# Patient Record
Sex: FEMALE | Race: WHITE | NOT HISPANIC OR LATINO | Employment: OTHER | URBAN - METROPOLITAN AREA
[De-identification: names, ages, dates, MRNs, and addresses within clinical notes are randomized per-mention and may not be internally consistent; named-entity substitution may affect disease eponyms.]

---

## 2017-01-09 ENCOUNTER — OFFICE VISIT (OUTPATIENT)
Dept: PAIN MEDICINE | Facility: CLINIC | Age: 72
End: 2017-01-09
Attending: ANESTHESIOLOGY
Payer: MEDICARE

## 2017-01-09 VITALS
WEIGHT: 246 LBS | TEMPERATURE: 98 F | HEIGHT: 67 IN | DIASTOLIC BLOOD PRESSURE: 58 MMHG | SYSTOLIC BLOOD PRESSURE: 132 MMHG | HEART RATE: 73 BPM | BODY MASS INDEX: 38.61 KG/M2

## 2017-01-09 DIAGNOSIS — M51.16 LUMBAR DISC HERNIATION WITH RADICULOPATHY: ICD-10-CM

## 2017-01-09 DIAGNOSIS — M51.36 ANNULAR TEAR OF LUMBAR DISC: ICD-10-CM

## 2017-01-09 DIAGNOSIS — M47.816 FACET ARTHRITIS OF LUMBAR REGION: ICD-10-CM

## 2017-01-09 PROBLEM — M51.369 ANNULAR TEAR OF LUMBAR DISC: Status: ACTIVE | Noted: 2017-01-09

## 2017-01-09 PROCEDURE — 1160F RVW MEDS BY RX/DR IN RCRD: CPT | Mod: S$GLB,,, | Performed by: ANESTHESIOLOGY

## 2017-01-09 PROCEDURE — 1157F ADVNC CARE PLAN IN RCRD: CPT | Mod: S$GLB,,, | Performed by: ANESTHESIOLOGY

## 2017-01-09 PROCEDURE — 1125F AMNT PAIN NOTED PAIN PRSNT: CPT | Mod: S$GLB,,, | Performed by: ANESTHESIOLOGY

## 2017-01-09 PROCEDURE — 99204 OFFICE O/P NEW MOD 45 MIN: CPT | Mod: GC,S$GLB,, | Performed by: ANESTHESIOLOGY

## 2017-01-09 PROCEDURE — 99999 PR PBB SHADOW E&M-EST. PATIENT-LVL III: CPT | Mod: PBBFAC,,, | Performed by: ANESTHESIOLOGY

## 2017-01-09 PROCEDURE — 1159F MED LIST DOCD IN RCRD: CPT | Mod: S$GLB,,, | Performed by: ANESTHESIOLOGY

## 2017-01-09 RX ORDER — METHYLPREDNISOLONE 4 MG/1
TABLET ORAL
Qty: 1 PACKAGE | Refills: 0 | Status: SHIPPED | OUTPATIENT
Start: 2017-01-09 | End: 2017-01-23 | Stop reason: ALTCHOICE

## 2017-01-09 NOTE — PROGRESS NOTES
Chronic Pain - New Consult    Referring Physician: Dinesh Connors MD    Chief Complaint:   Chief Complaint   Patient presents with    Leg Pain     bilateral         SUBJECTIVE: Disclaimer: This note has been generated using voice-recognition software. There may be typographical errors that have been missed during proof-reading    Initial encounter:    Mila Foster presents to the clinic for the evaluation of bilateral leg pain. The pain started 1 month ago following onset and symptoms have been worsening.    Brief history:    Pain Description:    The pain is located in the low back area and radiates to the bilateral lower extremities      At BEST  1/10     At WORST  10/10 on the WORST day.      On average pain is rated as 7/10.     Today the pain is rated as 7/10    The pain is described as aching, burning, dull, shooting and tight band      Symptoms interfere with daily activity.     Exacerbating factors: Sitting, Bending and Lifting.      Mitigating factors medications.     Patient denies night fever/night sweats, urinary incontinence, bowel incontinence, significant weight loss, significant motor weakness and loss of sensations.  Patient denies any suicidal or homicidal ideations    Pain Medications:  Current:  Gabapentin    Tried in Past:  NSAIDs -Naproxen  TCA -Never  SNRI -Never  Anti-convulsants -Never  Muscle Relaxants -Never  Opioids-[place]    Physical Therapy/Home Exercise: no       report:  Reviewed and consistent with medication use as prescribed.    Pain Procedures: N/A    Chiropractor -never  Acupuncture - never  TENS unit -never  Spinal decompression -never  Joint replacement -never    Imagin2016 Mri of L-Spine  Impression      Lumbar spondylosis as detailed above, most significant for small right foraminal protrusion at the L5-S1 level resulting in moderate neuroforaminal narrowing and likely abutment/impingement upon the exited right L5 nerve root.            History reviewed. No  pertinent past medical history.  History reviewed. No pertinent past surgical history.  Social History     Social History    Marital status: Single     Spouse name: N/A    Number of children: N/A    Years of education: N/A     Occupational History    Not on file.     Social History Main Topics    Smoking status: Never Smoker    Smokeless tobacco: Not on file    Alcohol use Not on file    Drug use: Not on file    Sexual activity: Not on file     Other Topics Concern    Not on file     Social History Narrative     History reviewed. No pertinent family history.    Review of patient's allergies indicates:  No Known Allergies    Current Outpatient Prescriptions   Medication Sig    amlodipine (NORVASC) 10 MG tablet 10 mg.    aspirin (ECOTRIN) 81 MG EC tablet 81 mg.    atenolol (TENORMIN) 50 MG tablet 50 mg.    gabapentin (NEURONTIN) 300 MG capsule 300 mg.    glipiZIDE (GLUCOTROL) 5 MG tablet 5 mg.    hydrochlorothiazide (HYDRODIURIL) 25 MG tablet 25 mg.    lisinopril (PRINIVIL,ZESTRIL) 40 MG tablet 40 mg.    metformin (GLUCOPHAGE-XR) 500 MG 24 hr tablet 500 mg.    omeprazole (PRILOSEC) 20 MG capsule 20 mg.    pravastatin (PRAVACHOL) 40 MG tablet 40 mg.     No current facility-administered medications for this visit.        REVIEW OF SYSTEMS:    GENERAL:  No weight loss, malaise or fevers.  HEENT:   No recent changes in vision or hearing  NECK:  Negative for lumps, no difficulty with swallowing.  RESPIRATORY:  Negative for cough, wheezing or shortness of breath, patient denies any recent URI.  CARDIOVASCULAR:  Negative for chest pain, leg swelling or palpitations.  GI:  Negative for abdominal discomfort, blood in stools or black stools or change in bowel habits.  MUSCULOSKELETAL:  See HPI.  SKIN:  Negative for lesions, rash, and itching.  PSYCH:  No mood disorder or recent psychosocial stressors.  Patients sleep is not disturbed secondary to pain.  HEMATOLOGY/LYMPHOLOGY:  Negative for prolonged  "bleeding, bruising easily or swollen nodes.  Patient is not currently taking any anti-coagulants  ENDO: No history of diabetes or thyroid dysfunction  NEURO:   No history of headaches, syncope, paralysis, seizures or tremors.  All other reviewed and negative other than HPI.    OBJECTIVE:    Visit Vitals    BP (!) 132/58    Pulse 73    Temp 98 °F (36.7 °C) (Oral)    Ht 5' 7" (1.702 m)    Wt 111.6 kg (246 lb)    BMI 38.53 kg/m2       PHYSICAL EXAMINATION:    GENERAL: Well appearing, in no acute distress, alert and oriented x3.  PSYCH:  Mood and affect appropriate.  SKIN: Skin color, texture, turgor normal, no rashes or lesions.  HEAD/FACE:  Normocephalic, atraumatic. Cranial nerves grossly intact.  NECK: No pain to palpation over the cervical paraspinous muscles. Spurling Negative. No pain with neck flexion, extension, or lateral flexion.   CV: RRR with palpation of the radial artery.  PULM: No evidence of respiratory difficulty, symmetric chest rise.  GI:  Soft and non-tender.  BACK: Straight leg raising in the supine position is negative to radicular pain. No pain to palpation over the facet joints of the lumbar spine or spinous processes. Normal range of motion without pain reproduction.  EXTREMITIES: Peripheral joint ROM is full and pain free without obvious instability or laxity in all four extremities. No deformities, edema, or skin discoloration. Good capillary refill.  MUSCULOSKELETAL: Shoulder, hip, and knee provocative maneuvers are negative.  There is no pain with palpation over the sacroiliac joints bilaterally.  FABERs test is negative.  FADIRs test is negative.   Bilateral upper and lower extremity strength is normal and symmetric.  No atrophy or tone abnormalities are noted.  NEURO: Bilateral upper and lower extremity coordination and muscle stretch reflexes are physiologic and symmetric.  Plantar response are downgoing. No clonus.  No loss of sensation is noted.  GAIT: normal.    ASSESSMENT: 71 " y.o. year old female with pain, consistent with     No diagnosis found.    PLAN:     {PLAN:80325}  - RTC ***  - Counseled patient regarding the importance of {:01132}.    The above plan and management options were discussed at length with patient. Patient is in agreement with the above and verbalized understanding. It will be communicated with the referring physician via electronic record, fax, or mail.    Kristina Winn  01/09/2017

## 2017-01-09 NOTE — LETTER
January 9, 2017      Dinesh Connors MD  [address] [facility] [address]           [facility]  [address]  [place] [address]  Phone: [phone]  Fax: [phone]          Patient: Mila Foster   MR Number: [id number]   YOB: 1945   Date of Visit: 1/9/2017       Dear Dr. Dinesh Connors:    Thank you for referring Mila Foster to me for evaluation. Attached you will find relevant portions of my assessment and plan of care.    If you have questions, please do not hesitate to call me. I look forward to following Mila Foster along with you.    Sincerely,    Bonnie Duncan MD    Enclosure  CC:  No Recipients    If you would like to receive this communication electronically, please contact [email or web address] or [phone] to request more information on [employer] access.    For providers and/or their staff who would like to refer a patient to [facility], please contact us through our one-stop-shop provider referral line, [facility], at [phone].    If you feel you have received this communication in error or would no longer like to receive these types of communications, please e-mail [email or web address]

## 2017-01-09 NOTE — PROGRESS NOTES
"  Chronic Pain - New Consult    Referring Physician: Dinesh Connors MD    Chief Complaint:   Chief Complaint   Patient presents with    Leg Pain     bilateral         SUBJECTIVE: Disclaimer: This note has been generated using voice-recognition software. There may be typographical errors that have been missed during proof-reading    Initial encounter:    Mila Fsoter presents to the clinic for the evaluation of bilateral leg pain with intermittent lower back pain. 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 She reports recent falling to knees, but she changed her shoes, and the falling has ceased. She reports that she had a "pain flare" 2 weeks ago requiring a walker for 2 days.     Brief history:  72yo F with hx of HTN and DM complicated by neuropathy BL but R>L with tingling, tightness, shooting pain only in feet. Patient reports glucose was 120 this morning and usually runs around 130.    Pain Description:    The pain is located in the posterior leg area and radiates to her ankles and to her knees at its worst.     At BEST  1/10     At WORST  8/10 on the WORST day.      On average pain is rated as 7/10    Today the pain is rated as 5/10    The pain is described as aching, burning, dull, sharp and tight band      Symptoms interfere with work.     Exacerbating factors: Sitting, picking up children, lifting, bending over    Mitigating factors: walking, rest    Patient denies night fever/night sweats, bowel incontinence, significant weight loss, significant motor weakness and loss of sensations. She does report chronic urinary incontinence unrelated to back/leg pain.  Patient denies any suicidal or homicidal ideations    Pain Medications:  Current:  Gabapentin; been on it 1 year, and she reports she feels it helps    Tried in Past:  NSAIDs -yes; reports it helps  TCA -Never  SNRI -Never  Anti-convulsants -Never  Muscle " Relaxants -Never  Opioids-Never    Physical Therapy/Home Exercise: no       report:  Reviewed and consistent with medication use as prescribed.    Pain Procedures: never    Chiropractor -never  Acupuncture - never  TENS unit -never  Spinal decompression -never  Joint replacement -never    Imagin/12/16 MRI Lumbar Spine WO Contrast -personally reviewed  Annular disk tear in the L5/S1 disk, with small right foraminal protrusion at the L5-S1 level resulting in moderate neuroforaminal narrowing and likely abutment/impingement upon the exited right L5 nerve root. DDD, and facet arthropathy of the lumbar spine.    Past Medical History   Diagnosis Date    Diabetes     Hypertension      History reviewed. No pertinent past surgical history.  Social History     Social History    Marital status: Single     Spouse name: N/A    Number of children: N/A    Years of education: N/A     Occupational History    Not on file.     Social History Main Topics    Smoking status: Never Smoker    Smokeless tobacco: Not on file    Alcohol use Not on file    Drug use: Not on file    Sexual activity: Not on file     Other Topics Concern    Not on file     Social History Narrative     History reviewed. No pertinent family history.    Review of patient's allergies indicates:  No Known Allergies    Current Outpatient Prescriptions   Medication Sig    amlodipine (NORVASC) 10 MG tablet 10 mg.    aspirin (ECOTRIN) 81 MG EC tablet 81 mg.    atenolol (TENORMIN) 50 MG tablet 50 mg.    gabapentin (NEURONTIN) 300 MG capsule 300 mg.    glipiZIDE (GLUCOTROL) 5 MG tablet 5 mg.    hydrochlorothiazide (HYDRODIURIL) 25 MG tablet 25 mg.    lisinopril (PRINIVIL,ZESTRIL) 40 MG tablet 40 mg.    metformin (GLUCOPHAGE-XR) 500 MG 24 hr tablet 500 mg.    omeprazole (PRILOSEC) 20 MG capsule 20 mg.    pravastatin (PRAVACHOL) 40 MG tablet 40 mg.     No current facility-administered medications for this visit.        REVIEW OF  "SYSTEMS:    GENERAL:  No weight loss, malaise or fevers.  HEENT:   No recent changes in vision or hearing  NECK:  Negative for lumps, no difficulty with swallowing.  RESPIRATORY:  Negative for cough, wheezing or shortness of breath, patient denies any recent URI.  CARDIOVASCULAR:  Negative for chest pain, leg swelling or palpitations.  GI:  Negative for abdominal discomfort, blood in stools or black stools or change in bowel habits.  MUSCULOSKELETAL:  See HPI.  SKIN:  Negative for lesions, rash, and itching.  PSYCH:  No mood disorder or recent psychosocial stressors.  Patients sleep is occasionally disturbed secondary to pain.  HEMATOLOGY/LYMPHOLOGY:  Negative for prolonged bleeding, bruising easily or swollen nodes.  Patient is not currently taking any anti-coagulants  ENDO: Positive for DM. Negative for thyroid dysfunction  NEURO:   No history of headaches, syncope, paralysis, seizures or tremors.  All other reviewed and negative other than HPI.    OBJECTIVE:    Visit Vitals    BP (!) 132/58    Pulse 73    Temp 98 °F (36.7 °C) (Oral)    Ht 5' 7" (1.702 m)    Wt 111.6 kg (246 lb)    BMI 38.53 kg/m2       PHYSICAL EXAMINATION:    GENERAL: Well appearing, in no acute distress, alert and oriented x3.  PSYCH:  Mood and affect appropriate.  SKIN: Skin color, texture, turgor normal, no rashes or lesions.  HEAD/FACE:  Normocephalic, atraumatic. Cranial nerves grossly intact.   CV: RRR with palpation of the radial artery.  PULM: No evidence of respiratory difficulty, symmetric chest rise.  BACK: Straight leg raising in the siting position is positive for radicular pain bilaterally in the L5/S1 distribution at 30 degrees elevation. She reports pain to palpation over lumbar spine spinous processes. Pain with lumbar flexion, but no decrease in range of motion.  Facet loading maneuvers cause pain bilaterally, right > left.  LOWER EXTREMITIES: Peripheral joint ROM is full and pain free without obvious instability or " laxity in lower extremities. No deformities, edema, or skin discoloration. Good capillary refill.  MUSCULOSKELETAL: Hip, and knee provocative maneuvers are negative.  There is no pain with palpation over the sacroiliac joints bilaterally.  FABERs test is negative bilaterally.  FADIRs test is negative.   Bilateral and lower extremity strength is normal and symmetric.  No atrophy or tone abnormalities are noted.  NEURO: Bilateral lower extremity coordination and muscle stretch reflexes are physiologic and symmetric.   No loss of sensation is noted.  GAIT: normal.    ASSESSMENT: 71 y.o. year old female with back/leg pain, consistent with BL radiculopathy due to L5 disc protrusion.    Encounter Diagnoses   Name Primary?    Annular tear of lumbar disc     Facet arthritis of lumbar region     Lumbar disc herniation with radiculopathy        PLAN:     At this time as patient is improving from requiring a walker 2 weeks ago, will prescribe a 6 day PO steroid taper. Patient counseled to watch her glucose during this period and evaluate whether pain decreases. Will follow up in clinic with NP in 2 weeks. During this time, she was given a note to restrict lifting to 25lbs until follow up with reevaluation at that time. Further, encouraged to do exercise as pain allows with emphasis on core strengthening and weight loss.     If pain improves from steroid taper and exercise, will try PT. If pain does not improve in this 2 weeks with steroid taper and exercise, will schedule the patient for bilateral S1 transforaminal epidural steroid injections.    - RTC 2 weeks with NP    The above plan and management options were discussed at length with patient. Patient is in agreement with the above and verbalized understanding. It will be communicated with the referring physician via electronic record, fax, or mail.    Prescription sent via e-prescribe to patient's pharmacy.       Lois Gauthier MD, PGY-3  [facility]  01/09/2017     I reviewed and edited the resident's note, I conducted my own interview and physical examination and agree with the findings.      Bonnie Duncan 01/09/2017

## 2017-01-09 NOTE — MR AVS SNAPSHOT
[facility]  [address]  [place] [address]  Phone: [phone]  Fax: [phone]                  Mila Foster   2017 9:00 AM   Office Visit    Description:  Female : 1945   Provider:  Bonnie Duncan MD   Department:  [facility]           Reason for Visit     Leg Pain                To Do List           Future Appointments        Provider Department Dept Phone    2017 9:00 AM Coby Russo NP [facility] [phone]    2017 9:20 AM Dami Parra MD [facility] Neurology [phone]      Goals (5 Years of Data)     None       These Medications        Disp Refills Start End    methylPREDNISolone (MEDROL DOSEPACK) 4 mg tablet 1 Package 0 2017    use as directed    Pharmacy: [facility] [address] [place] [address] AT [facility] #: [phone]         [facility]     [facility] On Call Nurse [facility] -  Assistance  Registered nurses in the [facility] Call Center provide clinical advisement, health education, appointment booking, and other advisory services.  Call for this free service at [phone].             Medications           Message regarding Medications     Verify the changes and/or additions to your medication regime listed below are the same as discussed with your clinician today.  If any of these changes or additions are incorrect, please notify your healthcare provider.        START taking these NEW medications        Refills    methylPREDNISolone (MEDROL DOSEPACK) 4 mg tablet 0    Sig: use as directed    Class: Normal           Verify that the below list of medications is an accurate representation of the medications you are currently taking.  If none reported, the list may be blank. If incorrect, please contact your healthcare provider. Carry this list with you in case of emergency.           Current Medications     amlodipine (NORVASC) 10  "MG tablet 10 mg.    aspirin (ECOTRIN) 81 MG EC tablet 81 mg.    atenolol (TENORMIN) 50 MG tablet 50 mg.    gabapentin (NEURONTIN) 300 MG capsule 300 mg.    glipiZIDE (GLUCOTROL) 5 MG tablet 5 mg.    hydrochlorothiazide (HYDRODIURIL) 25 MG tablet 25 mg.    lisinopril (PRINIVIL,ZESTRIL) 40 MG tablet 40 mg.    metformin (GLUCOPHAGE-XR) 500 MG 24 hr tablet 500 mg.    omeprazole (PRILOSEC) 20 MG capsule 20 mg.    pravastatin (PRAVACHOL) 40 MG tablet 40 mg.    methylPREDNISolone (MEDROL DOSEPACK) 4 mg tablet use as directed           Clinical Reference Information           Vital Signs - Last Recorded  Most recent update: 1/9/2017  9:11 AM by Kristina Winn MA    BP Pulse Temp Ht Wt BMI    (!) 132/58 73 98 °F (36.7 °C) (Oral) 5' 7" (1.702 m) 111.6 kg (246 lb) 38.53 kg/m2      Blood Pressure          Most Recent Value    BP  (!)  132/58      Allergies as of 1/9/2017     No Known Allergies      Immunizations Administered on Date of Encounter - 1/9/2017     None      "

## 2017-01-16 ENCOUNTER — TELEPHONE (OUTPATIENT)
Dept: PAIN MEDICINE | Facility: CLINIC | Age: 72
End: 2017-01-16

## 2017-01-16 NOTE — TELEPHONE ENCOUNTER
Spoke with pt regarding medrol dose pack, states she had diarrhea for 1 day over this past weekend but there are 2 people in her workplace that have the flu, not sure if medication caused this but she is feeling better today        ---- Message from Oumou Henley sent at 1/13/2017  1:07 PM CST -----  _  1st Request  _  2nd Request  _  3rd Request        Who: robert zendejas    Why: methylPREDNISolone (MEDROL DOSEPACK) 4 mg tablet pt is calling because she thinks the med is making her sick. Please call pt     What Number to Call Back:  [phone]    When to Expect a call back: (Before the end of the day)   -- if call after 3:00 call back will be tomorrow.

## 2017-01-23 ENCOUNTER — OFFICE VISIT (OUTPATIENT)
Dept: PAIN MEDICINE | Facility: CLINIC | Age: 72
End: 2017-01-23
Payer: MEDICARE

## 2017-01-23 VITALS
HEART RATE: 91 BPM | BODY MASS INDEX: 37.37 KG/M2 | WEIGHT: 238.13 LBS | TEMPERATURE: 99 F | DIASTOLIC BLOOD PRESSURE: 65 MMHG | HEIGHT: 67 IN | SYSTOLIC BLOOD PRESSURE: 116 MMHG | RESPIRATION RATE: 18 BRPM

## 2017-01-23 DIAGNOSIS — M47.816 FACET ARTHRITIS OF LUMBAR REGION: ICD-10-CM

## 2017-01-23 DIAGNOSIS — M51.16 LUMBAR DISC HERNIATION WITH RADICULOPATHY: Primary | ICD-10-CM

## 2017-01-23 DIAGNOSIS — M51.36 ANNULAR TEAR OF LUMBAR DISC: ICD-10-CM

## 2017-01-23 PROCEDURE — 1159F MED LIST DOCD IN RCRD: CPT | Mod: S$GLB,,, | Performed by: NURSE PRACTITIONER

## 2017-01-23 PROCEDURE — 1125F AMNT PAIN NOTED PAIN PRSNT: CPT | Mod: S$GLB,,, | Performed by: NURSE PRACTITIONER

## 2017-01-23 PROCEDURE — 1157F ADVNC CARE PLAN IN RCRD: CPT | Mod: S$GLB,,, | Performed by: NURSE PRACTITIONER

## 2017-01-23 PROCEDURE — 99999 PR PBB SHADOW E&M-EST. PATIENT-LVL III: CPT | Mod: PBBFAC,,, | Performed by: NURSE PRACTITIONER

## 2017-01-23 PROCEDURE — 99213 OFFICE O/P EST LOW 20 MIN: CPT | Mod: S$GLB,,, | Performed by: NURSE PRACTITIONER

## 2017-01-23 PROCEDURE — 1160F RVW MEDS BY RX/DR IN RCRD: CPT | Mod: S$GLB,,, | Performed by: NURSE PRACTITIONER

## 2017-01-23 RX ORDER — NAPROXEN 500 MG/1
TABLET ORAL
Refills: 1 | Status: ON HOLD | COMMUNITY
Start: 2016-12-06 | End: 2024-02-26 | Stop reason: HOSPADM

## 2017-01-23 NOTE — PROGRESS NOTES
"  Chronic Pain - Established Patient    Referring Physician: No ref. provider found    Chief Complaint:   Chief Complaint   Patient presents with    Low-back Pain     2 week follow up        SUBJECTIVE: Disclaimer: This note has been generated using voice-recognition software. There may be typographical errors that have been missed during proof-reading    Interval History 1/23/2017:  The patient returns to clinic today for follow up of low back pain and bilateral leg pain. She has recently completed a medrol dose pack with some benefit. She still reports radiating pain into both legs. The pain is worse with prolonged sitting and lifting. The pain gets better with walking and rest. The pain radiates down the back of both legs to ankles. She denies any bowel or bladder incontinence or signs of saddle paresthesia. She continues to work with lifting restrictions. She currently takes Gabapentin and Naproxen with benefit. Her pain today is 6/10.     Initial encounter:    Mila Foster presents to the clinic for the evaluation of bilateral leg pain with intermittent lower back pain. 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 She reports recent falling to knees, but she changed her shoes, and the falling has ceased. She reports that she had a "pain flare" 2 weeks ago requiring a walker for 2 days.     Brief history:  72yo F with hx of HTN and DM complicated by neuropathy BL but R>L with tingling, tightness, shooting pain only in feet. Patient reports glucose was 120 this morning and usually runs around 130.    Pain Description:    The pain is located in the posterior leg area and radiates to her ankles and to her knees at its worst.     At BEST  1/10     At WORST  8/10 on the WORST day.      On average pain is rated as 7/10    Today the pain is rated as 5/10    The pain is described as aching, burning, dull, sharp and tight band  "     Symptoms interfere with work.     Exacerbating factors: Sitting, picking up children, lifting, bending over    Mitigating factors: walking, rest    Patient denies night fever/night sweats, bowel incontinence, significant weight loss, significant motor weakness and loss of sensations. She does report chronic urinary incontinence unrelated to back/leg pain.  Patient denies any suicidal or homicidal ideations    Pain Medications:  Current:  Gabapentin; been on it 1 year, and she reports she feels it helps    Tried in Past:  NSAIDs -yes; reports it helps  TCA -Never  SNRI -Never  Anti-convulsants -Never  Muscle Relaxants -Never  Opioids-Never    Physical Therapy/Home Exercise: no       report: N/A    Pain Procedures: never    Chiropractor -never  Acupuncture - never  TENS unit -never  Spinal decompression -never  Joint replacement -never    Imagin/12/16 MRI Lumbar Spine WO Contrast   L1-L2: There is no focal disc herniation. No significant spinal canal or neural foraminal narrowing.    L2-L3: There is no focal disc herniation. No significant spinal canal or neural foraminal narrowing.    L3-L4: Mild bilateral facet arthropathy.  No focal disc herniation. No significant spinal canal or neural foraminal narrowing.    L4-L5: Mild disc bulge and bilateral facet arthropathy results in mild right-sided neural foraminal narrowing. No significant spinal canal stenosis.    L5-S1: Diffuse disc bulge with small superimposed right foraminal disc protrusion.  Findings result in moderate right-sided neuroforaminal narrowing with likely abutment/impingement upon the exited right L5 nerve root. No significant spinal canal stenosis.  Small posterior annular fissure noted.   Impression      Lumbar spondylosis as detailed above, most significant for small right foraminal protrusion at the L5-S1 level resulting in moderate neuroforaminal narrowing and likely abutment/impingement upon the exited right L5 nerve root          Past Medical History   Diagnosis Date    Diabetes     Hypertension      No past surgical history on file.  Social History     Social History    Marital status: Single     Spouse name: N/A    Number of children: N/A    Years of education: N/A     Occupational History    Not on file.     Social History Main Topics    Smoking status: Never Smoker    Smokeless tobacco: Not on file    Alcohol use Not on file    Drug use: Not on file    Sexual activity: Not on file     Other Topics Concern    Not on file     Social History Narrative     No family history on file.    Review of patient's allergies indicates:  No Known Allergies    Current Outpatient Prescriptions   Medication Sig    amlodipine (NORVASC) 10 MG tablet 10 mg.    aspirin (ECOTRIN) 81 MG EC tablet 81 mg.    atenolol (TENORMIN) 50 MG tablet 50 mg.    gabapentin (NEURONTIN) 300 MG capsule 300 mg.    glipiZIDE (GLUCOTROL) 5 MG tablet 5 mg.    hydrochlorothiazide (HYDRODIURIL) 25 MG tablet 25 mg.    lisinopril (PRINIVIL,ZESTRIL) 40 MG tablet 40 mg.    metformin (GLUCOPHAGE-XR) 500 MG 24 hr tablet 500 mg.    methylPREDNISolone (MEDROL DOSEPACK) 4 mg tablet use as directed    omeprazole (PRILOSEC) 20 MG capsule 20 mg.    pravastatin (PRAVACHOL) 40 MG tablet 40 mg.     No current facility-administered medications for this visit.        REVIEW OF SYSTEMS:    GENERAL:  No weight loss, malaise or fevers.  HEENT:   No recent changes in vision or hearing  NECK:  Negative for lumps, no difficulty with swallowing.  RESPIRATORY:  Negative for cough, wheezing or shortness of breath, patient denies any recent URI.  CARDIOVASCULAR:  Negative for chest pain, leg swelling or palpitations. HTN.  GI:  Negative for abdominal discomfort, blood in stools or black stools or change in bowel habits.  MUSCULOSKELETAL:  See HPI.  SKIN:  Negative for lesions, rash, and itching.  PSYCH:  No mood disorder or recent psychosocial stressors.  Patient's sleep is  "occasionally disturbed secondary to pain.  HEMATOLOGY/LYMPHOLOGY:  Negative for prolonged bleeding, bruising easily or swollen nodes.  Patient is not currently taking any anti-coagulants  ENDO: Positive for DM. Negative for thyroid dysfunction  NEURO:   No history of headaches, syncope, paralysis, seizures or tremors.  All other reviewed and negative other than HPI.    OBJECTIVE:    Visit Vitals    /65    Pulse 91    Temp 98.9 °F (37.2 °C) (Oral)    Resp 18    Ht 5' 7" (1.702 m)    Wt 108 kg (238 lb 1.6 oz)    BMI 37.29 kg/m2       PHYSICAL EXAMINATION:    GENERAL: Well appearing, in no acute distress, alert and oriented x3.  PSYCH:  Mood and affect appropriate.  SKIN: Skin color, texture, turgor normal, no rashes or lesions.  HEAD/FACE:  Normocephalic, atraumatic. Cranial nerves grossly intact.   CV: RRR with palpation of the radial artery.  PULM: No evidence of respiratory difficulty, symmetric chest rise.  BACK: Straight leg raising in the siting position is positive for radicular pain bilaterally. Full ROM with pain on flexion. Positive facet loading bilaterally.  LOWER EXTREMITIES: Peripheral joint ROM is full and pain free without obvious instability or laxity in lower extremities. No deformities, edema, or skin discoloration. Good capillary refill.  MUSCULOSKELETAL: Hip, and knee provocative maneuvers are negative.  There is pain with palpation over the sacroiliac joints bilaterally.  FABERs test is negative bilaterally.  FADIRs test is negative.   Bilateral and lower extremity strength is normal and symmetric.  No atrophy or tone abnormalities are noted.  NEURO: Bilateral lower extremity coordination and muscle stretch reflexes are physiologic and symmetric.   No loss of sensation is noted.  GAIT: Antalgic- ambulates without assistance.    ASSESSMENT: 71 y.o. year old female with back/leg pain, consistent with BL radiculopathy due to L5 disc protrusion.    Encounter Diagnoses   Name Primary?    " Lumbar disc herniation with radiculopathy Yes    Annular tear of lumbar disc     Facet arthritis of lumbar region        PLAN:     - Previous imaging was reviewed and discussed with the patient today.    - Schedule for bilateral S1 TF LEONEL. The procedure, risks, benefits and options were discussed with patient. There are no contraindications to the procedure. The patient expressed understanding and agreed to proceed.  Consent obtained today.    - Continue Gabapentin and Naproxen as these are providing benefit.    - Continue 25 lb weight restriction until after procedure. Letter provided today.     - Consider physical therapy for core strengthening after procedure.     - RTC 2 weeks after above procedure.     - Dr. Duncan was consulted on the patient and agrees with this plan.      The above plan and management options were discussed at length with patient. Patient is in agreement with the above and verbalized understanding.     Coby Russo NP  01/23/2017

## 2017-01-23 NOTE — LETTER
January 23, 2017      [facility]  [address]  [facility] [address]  Phone: [phone]  Fax: [phone]       Patient: Mila Foster   YOB: 1945  Date of Visit: 01/23/2017    To Whom It May Concern:    Mila was at [facility] on 01/23/2017. She may return to work on 1/23/2017 with the following restrictions: do no lift heavier than 25 lbs. If you have any questions or concerns, or if I can be of further assistance, please do not hesitate to contact me.    Sincerely,    Coby Russo, NP

## 2017-01-23 NOTE — MR AVS SNAPSHOT
[facility]  [address]  [place] [address]  Phone: [phone]  Fax: [phone]                  Mila Foster   2017 9:00 AM   Office Visit    Description:  Female : 1945   Provider:  Coby Russo NP   Department:  [facility]           Reason for Visit     Low-back Pain           Diagnoses this Visit        Comments    Lumbar disc herniation with radiculopathy    -  Primary     Annular tear of lumbar disc         Facet arthritis of lumbar region         Lumbar radiculopathy                To Do List           Future Appointments        Provider Department Dept Phone    2017 9:20 AM Dami Parra MD [facility] - Neurology [phone]      Goals (5 Years of Data)     None      [facility]     [facility] Nurse Care Line -  Assistance  Registered nurses in the [facility] provide clinical advisement, health education, appointment booking, and other advisory services.  Call for this free service at [phone].             Medications           Message regarding Medications     Verify the changes and/or additions to your medication regime listed below are the same as discussed with your clinician today.  If any of these changes or additions are incorrect, please notify your healthcare provider.        STOP taking these medications     methylPREDNISolone (MEDROL DOSEPACK) 4 mg tablet use as directed           Verify that the below list of medications is an accurate representation of the medications you are currently taking.  If none reported, the list may be blank. If incorrect, please contact your healthcare provider. Carry this list with you in case of emergency.           Current Medications     amlodipine (NORVASC) 10 MG tablet 10 mg.    aspirin (ECOTRIN) 81 MG EC tablet 81 mg.    atenolol (TENORMIN) 50 MG tablet 50 mg.    gabapentin (NEURONTIN) 300 MG capsule 300 mg.    glipiZIDE (GLUCOTROL) 5 MG tablet 5 mg.     "hydrochlorothiazide (HYDRODIURIL) 25 MG tablet 25 mg.    lisinopril (PRINIVIL,ZESTRIL) 40 MG tablet 40 mg.    metformin (GLUCOPHAGE-XR) 500 MG 24 hr tablet 500 mg.    naproxen (NAPROSYN) 500 MG tablet     omeprazole (PRILOSEC) 20 MG capsule 20 mg.    pravastatin (PRAVACHOL) 40 MG tablet 40 mg.           Clinical Reference Information           Vital Signs - Last Recorded  Most recent update: 1/23/2017  8:39 AM by Maryanne Maldonado MA    BP Pulse Temp Resp Ht Wt    116/65 91 98.9 °F (37.2 °C) (Oral) 18 5' 7" (1.702 m) 108 kg (238 lb 1.6 oz)    BMI                37.29 kg/m2          Blood Pressure          Most Recent Value    BP  116/65      Allergies as of 1/23/2017     No Known Allergies      Immunizations Administered on Date of Encounter - 1/23/2017     None      "

## 2017-02-08 ENCOUNTER — HOSPITAL ENCOUNTER (OUTPATIENT)
Facility: OTHER | Age: 72
Discharge: HOME | End: 2017-02-08
Attending: ANESTHESIOLOGY | Admitting: ANESTHESIOLOGY
Payer: MEDICARE

## 2017-02-08 ENCOUNTER — SURGERY (OUTPATIENT)
Age: 72
End: 2017-02-08

## 2017-02-08 VITALS
WEIGHT: 241 LBS | HEIGHT: 67 IN | SYSTOLIC BLOOD PRESSURE: 113 MMHG | TEMPERATURE: 97 F | BODY MASS INDEX: 37.83 KG/M2 | RESPIRATION RATE: 18 BRPM | OXYGEN SATURATION: 96 % | HEART RATE: 68 BPM | DIASTOLIC BLOOD PRESSURE: 65 MMHG

## 2017-02-08 DIAGNOSIS — M51.36 ANNULAR TEAR OF LUMBAR DISC: Primary | ICD-10-CM

## 2017-02-08 DIAGNOSIS — M51.16 LUMBAR DISC HERNIATION WITH RADICULOPATHY: ICD-10-CM

## 2017-02-08 DIAGNOSIS — M47.816 FACET ARTHRITIS OF LUMBAR REGION: ICD-10-CM

## 2017-02-08 LAB — POCT GLUCOSE: 151 MG/DL (ref 70–110)

## 2017-02-08 PROCEDURE — 25000003 PHARM REV CODE 250: Performed by: ANESTHESIOLOGY

## 2017-02-08 PROCEDURE — 64483 NJX AA&/STRD TFRM EPI L/S 1: CPT | Mod: 50 | Performed by: ANESTHESIOLOGY

## 2017-02-08 PROCEDURE — 63600175 PHARM REV CODE 636 W HCPCS: Performed by: ANESTHESIOLOGY

## 2017-02-08 PROCEDURE — 25500020 PHARM REV CODE 255: Performed by: ANESTHESIOLOGY

## 2017-02-08 PROCEDURE — 64483 NJX AA&/STRD TFRM EPI L/S 1: CPT | Mod: 50,,, | Performed by: ANESTHESIOLOGY

## 2017-02-08 PROCEDURE — 82947 ASSAY GLUCOSE BLOOD QUANT: CPT | Performed by: ANESTHESIOLOGY

## 2017-02-08 PROCEDURE — 99152 MOD SED SAME PHYS/QHP 5/>YRS: CPT | Mod: ,,, | Performed by: ANESTHESIOLOGY

## 2017-02-08 RX ORDER — LIDOCAINE HYDROCHLORIDE 10 MG/ML
10 INJECTION INFILTRATION; PERINEURAL
Status: COMPLETED | OUTPATIENT
Start: 2017-02-08 | End: 2017-02-08

## 2017-02-08 RX ORDER — METHYLPREDNISOLONE ACETATE 40 MG/ML
40 INJECTION, SUSPENSION INTRA-ARTICULAR; INTRALESIONAL; INTRAMUSCULAR; SOFT TISSUE ONCE
Status: DISCONTINUED | OUTPATIENT
Start: 2017-02-08 | End: 2017-02-08 | Stop reason: HOSPADM

## 2017-02-08 RX ORDER — MIDAZOLAM HYDROCHLORIDE 1 MG/ML
INJECTION INTRAMUSCULAR; INTRAVENOUS
Status: DISCONTINUED | OUTPATIENT
Start: 2017-02-08 | End: 2017-02-08 | Stop reason: HOSPADM

## 2017-02-08 RX ORDER — SODIUM CHLORIDE 9 MG/ML
INJECTION, SOLUTION INTRAVENOUS CONTINUOUS
Status: DISCONTINUED | OUTPATIENT
Start: 2017-02-08 | End: 2017-02-08 | Stop reason: HOSPADM

## 2017-02-08 RX ORDER — BUPIVACAINE HYDROCHLORIDE 2.5 MG/ML
INJECTION, SOLUTION EPIDURAL; INFILTRATION; INTRACAUDAL
Status: DISCONTINUED | OUTPATIENT
Start: 2017-02-08 | End: 2017-02-08 | Stop reason: HOSPADM

## 2017-02-08 RX ORDER — MIDAZOLAM HYDROCHLORIDE 1 MG/ML
2 INJECTION INTRAMUSCULAR; INTRAVENOUS
Status: DISCONTINUED | OUTPATIENT
Start: 2017-02-08 | End: 2017-02-08 | Stop reason: HOSPADM

## 2017-02-08 RX ORDER — BUPIVACAINE HYDROCHLORIDE 2.5 MG/ML
10 INJECTION, SOLUTION EPIDURAL; INFILTRATION; INTRACAUDAL ONCE
Status: DISCONTINUED | OUTPATIENT
Start: 2017-02-08 | End: 2017-02-08 | Stop reason: HOSPADM

## 2017-02-08 RX ORDER — METHYLPREDNISOLONE ACETATE 40 MG/ML
INJECTION, SUSPENSION INTRA-ARTICULAR; INTRALESIONAL; INTRAMUSCULAR; SOFT TISSUE
Status: DISCONTINUED | OUTPATIENT
Start: 2017-02-08 | End: 2017-02-08 | Stop reason: HOSPADM

## 2017-02-08 RX ADMIN — MIDAZOLAM HYDROCHLORIDE 2 MG: 1 INJECTION, SOLUTION INTRAMUSCULAR; INTRAVENOUS at 10:02

## 2017-02-08 RX ADMIN — BUPIVACAINE HYDROCHLORIDE 10 ML: 2.5 INJECTION, SOLUTION EPIDURAL; INFILTRATION; INTRACAUDAL; PERINEURAL at 10:02

## 2017-02-08 RX ADMIN — LIDOCAINE HYDROCHLORIDE 20 ML: 10 INJECTION, SOLUTION INFILTRATION; PERINEURAL at 10:02

## 2017-02-08 RX ADMIN — METHYLPREDNISOLONE ACETATE 80 MG: 40 INJECTION, SUSPENSION INTRA-ARTICULAR; INTRALESIONAL; INTRAMUSCULAR; SOFT TISSUE at 10:02

## 2017-02-08 RX ADMIN — SODIUM CHLORIDE: 0.9 INJECTION, SOLUTION INTRAVENOUS at 10:02

## 2017-02-08 RX ADMIN — IOHEXOL 50 ML: 300 INJECTION, SOLUTION INTRAVENOUS at 10:02

## 2017-02-08 NOTE — DISCHARGE SUMMARY
Discharge Note  Short Stay      SUMMARY     Admit Date: 2/8/2017    Attending Physician: Bonnie Duncan      Discharge Physician: Bonnie Duncan      Discharge Date: 2/8/2017 10:55 AM       PROCEDURE:  Bilateral  S1  1) Left  S1 TRANSFORAMINAL EPIDURAL STEROID INJECTION  2) Right  S1 TRANSFORAMINAL EPIDURAL STEROID INJECTION      Pre Procedure diagnosis:  Bilateral S1  transforaminal stenosis with radiculopathy    Disposition: Home or self care    Patient Instructions:   Current Discharge Medication List      CONTINUE these medications which have NOT CHANGED    Details   amlodipine (NORVASC) 10 MG tablet 10 mg.  Refills: 1      atenolol (TENORMIN) 50 MG tablet 50 mg.  Refills: 1      gabapentin (NEURONTIN) 300 MG capsule 300 mg.  Refills: 1      glipiZIDE (GLUCOTROL) 5 MG tablet 5 mg.  Refills: 1      hydrochlorothiazide (HYDRODIURIL) 25 MG tablet 25 mg.  Refills: 1      lisinopril (PRINIVIL,ZESTRIL) 40 MG tablet 40 mg.  Refills: 1      metformin (GLUCOPHAGE-XR) 500 MG 24 hr tablet 500 mg.  Refills: 1      naproxen (NAPROSYN) 500 MG tablet Refills: 1      omeprazole (PRILOSEC) 20 MG capsule 20 mg.  Refills: 1      pravastatin (PRAVACHOL) 40 MG tablet 40 mg.  Refills: 1      aspirin (ECOTRIN) 81 MG EC tablet 81 mg.  Refills: 2             Resume home diet and activity

## 2017-02-08 NOTE — IP AVS SNAPSHOT
[facility]  [address] [place], [place] [id number]  Phone: [phone]           Patient Discharge Instructions     Our goal is to set you up for success. This packet includes information on your condition, medications, and your home care. It will help you to care for yourself so you don't get sicker and need to go back to the hospital.     Please ask your nurse if you have any questions.        There are many details to remember when preparing to leave the hospital. Here is what you will need to do:    1. Take your medicine. If you are prescribed medications, review your Medication List in the following pages. You may have new medications to  at the pharmacy and others that you'll need to stop taking. Review the instructions for how and when to take your medications. Talk with your doctor or nurses if you are unsure of what to do.     2. Go to your follow-up appointments. Specific follow-up information is listed in the following pages. Your may be contacted by a transition nurse or clinical provider about future appointments. Be sure we have all of the phone numbers to reach you, if needed. Please contact your provider's office if you are unable to make an appointment.     3. Watch for warning signs. Your doctor or nurse will give you detailed warning signs to watch for and when to call for assistance. These instructions may also include educational information about your condition. If you experience any of warning signs to your health, call your doctor.               [facility]  Unless otherwise directed by your provider, please contact [facility], our nurse care line that is available for 24/7 assistance.     [phone] (toll-free)    Registered nurses in the [facility] provide clinical advisement, health education, appointment booking, and other advisory services.                    ** Verify the list of medication(s) below is accurate and up to  date. Carry this with you in case of emergency. If your medications have changed, please notify your healthcare provider.             Medication List      CONTINUE taking these medications        Additional Info                      amlodipine 10 MG tablet   Commonly known as:  NORVASC   Refills:  1   Dose:  10 mg    Instructions:  10 mg.     Begin Date    AM    Noon    PM    Bedtime       aspirin 81 MG EC tablet   Commonly known as:  ECOTRIN   Refills:  2   Dose:  81 mg    Instructions:  81 mg.     Begin Date    AM    Noon    PM    Bedtime       atenolol 50 MG tablet   Commonly known as:  TENORMIN   Refills:  1   Dose:  50 mg    Instructions:  50 mg.     Begin Date    AM    Noon    PM    Bedtime       gabapentin 300 MG capsule   Commonly known as:  NEURONTIN   Refills:  1   Dose:  300 mg    Instructions:  300 mg.     Begin Date    AM    Noon    PM    Bedtime       glipiZIDE 5 MG tablet   Commonly known as:  GLUCOTROL   Refills:  1   Dose:  5 mg    Instructions:  5 mg.     Begin Date    AM    Noon    PM    Bedtime       hydrochlorothiazide 25 MG tablet   Commonly known as:  HYDRODIURIL   Refills:  1   Dose:  25 mg    Instructions:  25 mg.     Begin Date    AM    Noon    PM    Bedtime       lisinopril 40 MG tablet   Commonly known as:  PRINIVIL,ZESTRIL   Refills:  1   Dose:  40 mg    Instructions:  40 mg.     Begin Date    AM    Noon    PM    Bedtime       metformin 500 MG 24 hr tablet   Commonly known as:  GLUCOPHAGE-XR   Refills:  1   Dose:  500 mg    Instructions:  500 mg.     Begin Date    AM    Noon    PM    Bedtime       naproxen 500 MG tablet   Commonly known as:  NAPROSYN   Refills:  1      Begin Date    AM    Noon    PM    Bedtime       omeprazole 20 MG capsule   Commonly known as:  PRILOSEC   Refills:  1   Dose:  20 mg    Instructions:  20 mg.     Begin Date    AM    Noon    PM    Bedtime       pravastatin 40 MG tablet   Commonly known as:  PRAVACHOL   Refills:  1   Dose:  40 mg    Instructions:  40 mg.      Begin Date    AM    Noon    PM    Bedtime                  Please bring to all follow up appointments:    1. A copy of your discharge instructions.  2. All medicines you are currently taking in their original bottles.  3. Identification and insurance card.    Please arrive 15 minutes ahead of scheduled appointment time.    Please call 24 hours in advance if you must reschedule your appointment and/or time.        Your Scheduled Appointments     Feb 22, 2017  8:00 AM CST   Established Patient Visit with Coby Russo NP   [facility]    [address]  [facility] [id number] [phone]            Feb 23, 2017  9:20 AM CST   Neurology - Established Patient with MD Brady Rosen [facility] - Neurology ([facility] )    [address]  [place] [address] [phone]                  Discharge Instructions       Home Care Instructions Pain Management:    1. DIET:   You may resume your normal diet today.   2. BATHING:   You may shower with luke warm water.  3. DRESSING:   You may remove your bandage today.   4. ACTIVITY LEVEL:   You may resume your normal activities 24 hrs after your procedure.  5. MEDICATIONS:   You may resume your normal medications today.   6. SPECIAL INSTRUCTIONS:   No heat to the injection site for 24 hrs including, bath or shower, heating pad, moist heat, or hot tubs.    Use ice pack to injection site for any pain or discomfort.  Apply ice packs for 20 minute intervals as needed.   If you have received any sedatives by mouth today you may not drive for 12 hours.    If you have received any sedation through your IV, you may not drive for 24 hrs.     PLEASE CALL YOUR DOCTOR IF:  1. Redness or swelling around the injection site.  2. Fever of 101 degrees  3. Drainage (pus) from the injection site.  4. For any continuous bleeding (some dried blood over the incision is normal.)    FOR EMERGENCIES:   If any unusual problems or difficulties occur  "during clinic hours, call [phone] or 321.         Admission Information     Date & Time Provider Department CSN    2/8/2017  9:21 AM Bonnie Duncan MD [facility] [id number]      Care Providers     Provider Role Specialty Primary office phone    Bonnie Duncan MD Attending Provider Pain Medicine [phone]    Bonnie Duncan MD Surgeon  Pain Medicine [phone]      Your Vitals Were     BP Pulse Temp Resp Height Weight    95/60 (BP Method: Manual) 74 97.3 °F (36.3 °C) (Oral) 18 5' 7" (1.702 m) 109.3 kg (241 lb)    SpO2 BMI             97% 37.75 kg/m2         Recent Lab Values     No lab values to display.      Allergies as of 2/8/2017     No Known Allergies      Advance Directives     An advance directive is a document which, in the event you are no longer able to make decisions for yourself, tells your healthcare team what kind of treatment you do or do not want to receive, or who you would like to make those decisions for you.  If you do not currently have an advance directive, [facility] encourages you to create one.  For more information call:  [phone]), [phone] ([phone]),  or log on to [email or web address].        Language Assistance Services     ATTENTION: Language assistance services are available, free of charge. Please call [phone].      ATENCIÓN: Si habla español, tiene a bansal disposición servicios gratuitos de asistencia lingüística. Llame al [phone].     [facility] Ý: N?u b?n nói Ti?ng Vi?t, có các d?ch v? h? tr? ngôn ng? mi?n phí dành cho b?n. G?i s? [phone].         [facility] complies with applicable Federal civil rights laws and does not discriminate on the basis of race, color, national origin, age, disability, or sex.        "

## 2017-02-08 NOTE — H&P (VIEW-ONLY)
"  Chronic Pain - New Consult    Referring Physician: Dinesh Connors MD    Chief Complaint:   Chief Complaint   Patient presents with    Leg Pain     bilateral         SUBJECTIVE: Disclaimer: This note has been generated using voice-recognition software. There may be typographical errors that have been missed during proof-reading    Initial encounter:    Mila Foster presents to the clinic for the evaluation of bilateral leg pain with intermittent lower back pain. 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 She reports recent falling to knees, but she changed her shoes, and the falling has ceased. She reports that she had a "pain flare" 2 weeks ago requiring a walker for 2 days.     Brief history:  70yo F with hx of HTN and DM complicated by neuropathy BL but R>L with tingling, tightness, shooting pain only in feet. Patient reports glucose was 120 this morning and usually runs around 130.    Pain Description:    The pain is located in the posterior leg area and radiates to her ankles and to her knees at its worst.     At BEST  1/10     At WORST  8/10 on the WORST day.      On average pain is rated as 7/10    Today the pain is rated as 5/10    The pain is described as aching, burning, dull, sharp and tight band      Symptoms interfere with work.     Exacerbating factors: Sitting, picking up children, lifting, bending over    Mitigating factors: walking, rest    Patient denies night fever/night sweats, bowel incontinence, significant weight loss, significant motor weakness and loss of sensations. She does report chronic urinary incontinence unrelated to back/leg pain.  Patient denies any suicidal or homicidal ideations    Pain Medications:  Current:  Gabapentin; been on it 1 year, and she reports she feels it helps    Tried in Past:  NSAIDs -yes; reports it helps  TCA -Never  SNRI -Never  Anti-convulsants -Never  Muscle " Relaxants -Never  Opioids-Never    Physical Therapy/Home Exercise: no       report:  Reviewed and consistent with medication use as prescribed.    Pain Procedures: never    Chiropractor -never  Acupuncture - never  TENS unit -never  Spinal decompression -never  Joint replacement -never    Imagin/12/16 MRI Lumbar Spine WO Contrast -personally reviewed  Annular disk tear in the L5/S1 disk, with small right foraminal protrusion at the L5-S1 level resulting in moderate neuroforaminal narrowing and likely abutment/impingement upon the exited right L5 nerve root. DDD, and facet arthropathy of the lumbar spine.    Past Medical History   Diagnosis Date    Diabetes     Hypertension      History reviewed. No pertinent past surgical history.  Social History     Social History    Marital status: Single     Spouse name: N/A    Number of children: N/A    Years of education: N/A     Occupational History    Not on file.     Social History Main Topics    Smoking status: Never Smoker    Smokeless tobacco: Not on file    Alcohol use Not on file    Drug use: Not on file    Sexual activity: Not on file     Other Topics Concern    Not on file     Social History Narrative     History reviewed. No pertinent family history.    Review of patient's allergies indicates:  No Known Allergies    Current Outpatient Prescriptions   Medication Sig    amlodipine (NORVASC) 10 MG tablet 10 mg.    aspirin (ECOTRIN) 81 MG EC tablet 81 mg.    atenolol (TENORMIN) 50 MG tablet 50 mg.    gabapentin (NEURONTIN) 300 MG capsule 300 mg.    glipiZIDE (GLUCOTROL) 5 MG tablet 5 mg.    hydrochlorothiazide (HYDRODIURIL) 25 MG tablet 25 mg.    lisinopril (PRINIVIL,ZESTRIL) 40 MG tablet 40 mg.    metformin (GLUCOPHAGE-XR) 500 MG 24 hr tablet 500 mg.    omeprazole (PRILOSEC) 20 MG capsule 20 mg.    pravastatin (PRAVACHOL) 40 MG tablet 40 mg.     No current facility-administered medications for this visit.        REVIEW OF  "SYSTEMS:    GENERAL:  No weight loss, malaise or fevers.  HEENT:   No recent changes in vision or hearing  NECK:  Negative for lumps, no difficulty with swallowing.  RESPIRATORY:  Negative for cough, wheezing or shortness of breath, patient denies any recent URI.  CARDIOVASCULAR:  Negative for chest pain, leg swelling or palpitations.  GI:  Negative for abdominal discomfort, blood in stools or black stools or change in bowel habits.  MUSCULOSKELETAL:  See HPI.  SKIN:  Negative for lesions, rash, and itching.  PSYCH:  No mood disorder or recent psychosocial stressors.  Patients sleep is occasionally disturbed secondary to pain.  HEMATOLOGY/LYMPHOLOGY:  Negative for prolonged bleeding, bruising easily or swollen nodes.  Patient is not currently taking any anti-coagulants  ENDO: Positive for DM. Negative for thyroid dysfunction  NEURO:   No history of headaches, syncope, paralysis, seizures or tremors.  All other reviewed and negative other than HPI.    OBJECTIVE:    Visit Vitals    BP (!) 132/58    Pulse 73    Temp 98 °F (36.7 °C) (Oral)    Ht 5' 7" (1.702 m)    Wt 111.6 kg (246 lb)    BMI 38.53 kg/m2       PHYSICAL EXAMINATION:    GENERAL: Well appearing, in no acute distress, alert and oriented x3.  PSYCH:  Mood and affect appropriate.  SKIN: Skin color, texture, turgor normal, no rashes or lesions.  HEAD/FACE:  Normocephalic, atraumatic. Cranial nerves grossly intact.   CV: RRR with palpation of the radial artery.  PULM: No evidence of respiratory difficulty, symmetric chest rise.  BACK: Straight leg raising in the siting position is positive for radicular pain bilaterally in the L5/S1 distribution at 30 degrees elevation. She reports pain to palpation over lumbar spine spinous processes. Pain with lumbar flexion, but no decrease in range of motion.  Facet loading maneuvers cause pain bilaterally, right > left.  LOWER EXTREMITIES: Peripheral joint ROM is full and pain free without obvious instability or " laxity in lower extremities. No deformities, edema, or skin discoloration. Good capillary refill.  MUSCULOSKELETAL: Hip, and knee provocative maneuvers are negative.  There is no pain with palpation over the sacroiliac joints bilaterally.  FABERs test is negative bilaterally.  FADIRs test is negative.   Bilateral and lower extremity strength is normal and symmetric.  No atrophy or tone abnormalities are noted.  NEURO: Bilateral lower extremity coordination and muscle stretch reflexes are physiologic and symmetric.   No loss of sensation is noted.  GAIT: normal.    ASSESSMENT: 71 y.o. year old female with back/leg pain, consistent with BL radiculopathy due to L5 disc protrusion.    Encounter Diagnoses   Name Primary?    Annular tear of lumbar disc     Facet arthritis of lumbar region     Lumbar disc herniation with radiculopathy        PLAN:     At this time as patient is improving from requiring a walker 2 weeks ago, will prescribe a 6 day PO steroid taper. Patient counseled to watch her glucose during this period and evaluate whether pain decreases. Will follow up in clinic with NP in 2 weeks. During this time, she was given a note to restrict lifting to 25lbs until follow up with reevaluation at that time. Further, encouraged to do exercise as pain allows with emphasis on core strengthening and weight loss.     If pain improves from steroid taper and exercise, will try PT. If pain does not improve in this 2 weeks with steroid taper and exercise, will schedule the patient for bilateral S1 transforaminal epidural steroid injections.    - RTC 2 weeks with NP    The above plan and management options were discussed at length with patient. Patient is in agreement with the above and verbalized understanding. It will be communicated with the referring physician via electronic record, fax, or mail.    Prescription sent via e-prescribe to patient's pharmacy.       Lois Gauthier MD, PGY-3  [facility]  01/09/2017     I reviewed and edited the resident's note, I conducted my own interview and physical examination and agree with the findings.      Bonnie Duncan 01/09/2017

## 2017-02-08 NOTE — PLAN OF CARE
Problem: Patient Care Overview  Goal: Plan of Care Review  Pt tolerated procedure well. Pt reports 5/10 pain after procedure. Patient complains of soreness at injection site. Dr Duncan notified of blood pressures 90s/50s. Ordered to finish infusing bag of normal saline and recheck blood pressure. Patient asymptomatic.Assisted pt up for first time. Steady on feet. All discharge instructions given.

## 2017-02-08 NOTE — OP NOTE
INFORMED CONSENT: The procedure, risks, benefits and options were discussed with patient. There are no contraindications to the procedure. The patient expressed understanding and agreed to proceed. The personnel performing the procedure was discussed.    02/08/2017    Surgeon: Bonnie Duncan MD    Assistants: None    PROCEDURE:  Bilateral  S1  1) Left  S1 TRANSFORAMINAL EPIDURAL STEROID INJECTION  2) Right  S1 TRANSFORAMINAL EPIDURAL STEROID INJECTION      Pre Procedure diagnosis:  Bilateral S1  transforaminal stenosis with radiculopathy    Post-Procedure diagnosis:   same    Complications: None    Specimens: None      DESCRIPTION OF PROCEDURE: The patient was brought to the procedure room. IV access was obtained prior to the procedure. The patient was positioned prone on the fluoroscopy table. Continuous hemodynamic monitoring was initiated including blood pressure, EKG, and pulse oximetry. . The skin was prepped with chlorhexidine and draped in a sterile fashion. Skin anesthesia was achieved using a total of 10mL of lidocaine, 5mL over each respective injection site.     The  S1 transforaminal spaces were identified with fluoroscopy in the  AP, oblique, and lateral views.  A 22 gauge spinal quinke needle was then advanced into the area of the trans foraminal spaces bilaterally with confirmation of proper needle position using AP, oblique, and lateral fluoroscopic views. Once the needle tip was in the area of the transforaminal space, and there was no blood, CSF or paraesthesias,  1.5 mL of Omnipaque 300mg/ml was injected on each side for a total of 3mL.  Fluoroscopic imaging in the AP and lateral views revealed a clear outline of the spinal nerve with proximal spread of agent through the neural foramen into the epidural space. A total combination of 1 mL of Bupivicaine 0.25% and 40 mg depo medrol was injected on each side for a total of 4mL of injected medications with displacement of the contrast dye confirming  that the medication went into the area of the transforaminal spaces bilaterally. A sterile dressing was applied.   Patient tolerated the procedure well.    Conscious sedation provided by M.D    The patient was monitored with continuous pulse oximetry, EKG, and intermittent blood pressure monitors.  The patient was hemodynamically stable throughout the entire process was responsive to voice, and breathing spontaneously.  Supplemental O2 was provided at 2L/min via nasal cannula.  Patient was comfortable for the duration of the procedure.    There was a total of 2mg IV Midazolam was titrated for the procedure    Patient was taken back to the recovery room for further observation.     The patient was discharged to home in stable condition

## 2017-02-08 NOTE — DISCHARGE INSTRUCTIONS

## 2017-02-13 ENCOUNTER — TELEPHONE (OUTPATIENT)
Dept: PAIN MEDICINE | Facility: CLINIC | Age: 72
End: 2017-02-13

## 2017-02-13 NOTE — TELEPHONE ENCOUNTER
----- Message from Amrita Andrade sent at 2/13/2017  1:03 PM CST -----  x_  1st Request  _  2nd Request  _  3rd Request        Who: robert    Why: pt. Would like to speak with sandro.please call pt. To discuss    What Number to Call Back:[phone]    When to Expect a call back: (Before the end of the day)   -- if the call is after 12:00, the call back will be tomorrow.

## 2017-02-27 ENCOUNTER — OFFICE VISIT (OUTPATIENT)
Dept: NEUROLOGY | Facility: CLINIC | Age: 72
End: 2017-02-27
Payer: MEDICARE

## 2017-02-27 VITALS
HEIGHT: 67 IN | HEART RATE: 75 BPM | SYSTOLIC BLOOD PRESSURE: 122 MMHG | DIASTOLIC BLOOD PRESSURE: 68 MMHG | WEIGHT: 242.75 LBS | BODY MASS INDEX: 38.1 KG/M2

## 2017-02-27 DIAGNOSIS — M54.17 LUMBOSACRAL RADICULOPATHY: Primary | ICD-10-CM

## 2017-02-27 PROCEDURE — 1159F MED LIST DOCD IN RCRD: CPT | Mod: S$GLB,,, | Performed by: PSYCHIATRY & NEUROLOGY

## 2017-02-27 PROCEDURE — 1157F ADVNC CARE PLAN IN RCRD: CPT | Mod: S$GLB,,, | Performed by: PSYCHIATRY & NEUROLOGY

## 2017-02-27 PROCEDURE — 1125F AMNT PAIN NOTED PAIN PRSNT: CPT | Mod: S$GLB,,, | Performed by: PSYCHIATRY & NEUROLOGY

## 2017-02-27 PROCEDURE — 99999 PR PBB SHADOW E&M-EST. PATIENT-LVL III: CPT | Mod: PBBFAC,,, | Performed by: PSYCHIATRY & NEUROLOGY

## 2017-02-27 PROCEDURE — 1160F RVW MEDS BY RX/DR IN RCRD: CPT | Mod: S$GLB,,, | Performed by: PSYCHIATRY & NEUROLOGY

## 2017-02-27 PROCEDURE — 99213 OFFICE O/P EST LOW 20 MIN: CPT | Mod: S$GLB,,, | Performed by: PSYCHIATRY & NEUROLOGY

## 2017-02-27 RX ORDER — GABAPENTIN 300 MG/1
CAPSULE ORAL
Qty: 150 CAPSULE | Refills: 5 | Status: SHIPPED | OUTPATIENT
Start: 2017-02-27 | End: 2019-03-11 | Stop reason: SDUPTHER

## 2017-02-27 NOTE — PROGRESS NOTES
[facility] - NEUROLOGY  [facility] [place]    Date: February 27, 2017   Patient Name: Mila Foster   MRN: [id number]   PCP: Dinesh Connors  Referring Provider: Dinesh Connors MD    Assessment:      This is Mila Foster, 71 y.o. female with lumbar radiculopathy and mild attention difficulties most likely related to ongoing stressors and mood troubles.  Recent MRI L-spine reviewed, does not warrant surgical referral at this time.     Plan:      -  Increase gabapentin to 600/300/600  -  Encouraged follow up with [facility]  -  Counseled regarding cognitive difficulties       I discussed side effects of the medications. I asked the patient to  stop the medication if She notices serious adverse effects as we discussed and to seek immediate medical attention at an ER.     Dami Parra MD  [facility]   Department of Neurology    Subjective:   Primary concern on this visit is centered on back pain and sciatica.  Some improvement since injections this month but continued pain, notably with prolonged sitting.  Notices herself lean forward when walking.  Currently on leave from work due to restrictions with lifting.  Memory troubles largely unchanged.     HPI 11/2016:   Ms. Mila Foster is a 71 y.o. female with HTN and DM with related neuropathy presents for memory trouble     She has noticed trouble completing sentences and reversing word order over the past several months but does not think other people have appreciated this very much. She works at a  center and does not feel that her problems interfere with her work. She does note significant stress related to one of her children going through a divorce and younger co workers leaving additional work for her to do. She began taking fluoxetine a few months ago with some improvement noted. She lives alone and continues to manage her own finances and drive without accidents or trouble getting lost. She has  "some difficulty with sleep related to crawling sensation in right much more than left leg which improves if she gets up and walks around, takes gabapentin at bed time only with mild relief. She completed high school.     Over the past few months she has also notices low back pain radiating down the right leg which is frequently brought on by prolonged sitting, gabapentin has not helped with this. Also notes pain in R>L knee.    PAST MEDICAL HISTORY:  Past Medical History:   Diagnosis Date    Diabetes     Hypertension        PAST SURGICAL HISTORY:  History reviewed. No pertinent surgical history.    CURRENT MEDS:  Current Outpatient Prescriptions   Medication Sig Dispense Refill    amlodipine (NORVASC) 10 MG tablet 10 mg.  1    aspirin (ECOTRIN) 81 MG EC tablet 81 mg.  2    atenolol (TENORMIN) 50 MG tablet 50 mg.  1    gabapentin (NEURONTIN) 300 MG capsule Take 2 tabs in the morning, 1 tab in the afternoon, and 2 tabs in the evening 150 capsule 5    glipiZIDE (GLUCOTROL) 5 MG tablet 5 mg.  1    hydrochlorothiazide (HYDRODIURIL) 25 MG tablet 25 mg.  1    lisinopril (PRINIVIL,ZESTRIL) 40 MG tablet 40 mg.  1    metformin (GLUCOPHAGE-XR) 500 MG 24 hr tablet 500 mg.  1    naproxen (NAPROSYN) 500 MG tablet   1    omeprazole (PRILOSEC) 20 MG capsule 20 mg.  1    pravastatin (PRAVACHOL) 40 MG tablet 40 mg.  1     No current facility-administered medications for this visit.        ALLERGIES:  Review of patient's allergies indicates:  No Known Allergies    FAMILY HISTORY:  History reviewed. No pertinent family history.    SOCIAL HISTORY:  Social History   Substance Use Topics    Smoking status: Never Smoker    Smokeless tobacco: None    Alcohol use None       Review of Systems:  12 review of systems is negative except for the symptoms mentioned in HPI.        Objective:     Vitals:    02/27/17 1024   BP: 122/68   Pulse: 75   Weight: 110.1 kg (242 lb 11.6 oz)   Height: 5' 7" (1.702 m)       General: NAD, well " nourished   Eyes: no tearing, discharge, no erythema   ENT: moist mucous membranes of the oral cavity, nares patent    Neck: Supple, full range of motion  Cardiovascular: Warm and well perfused, pulses equal and symmetrical  Lungs: Normal work of breathing, normal chest wall excursions  Skin: No rash, lesions, or breakdown on exposed skin  Psychiatry: Mood and affect are appropriate   Abdomen: soft, non tender, non distended  Extremeties: No cyanosis, clubbing or edema.    Neurological   MENTAL STATUS: Alert and oriented to person, place, and time. Attention and concentration within normal limits. Speech without dysarthria, able to name and repeat without difficulty. Recent and remote memory within normal limits   CRANIAL NERVES: Visual fields intact. PERRL. EOMI. Facial sensation intact. Face symmetrical. Hearing grossly intact. Full shoulder shrug bilaterally. Tongue protrudes midline   SENSORY: Sensation is intact to light touch throughout.  Joint position perception intact. Negative Romberg.   MOTOR: Normal bulk and tone. No pronator drift.  5/5 deltoid, biceps, triceps, interosseous, hand  bilaterally. 5/5 iliopsoas, knee extension/flexion, foot dorsi/plantarflexion bilaterally.    CEREBELLAR/COORDINATION/GAIT: Gait steady with normal arm swing and stride length.

## 2017-02-27 NOTE — LETTER
February 27, 2017      Dinesh Connors MD  [address] [facility] [address]           [facility]  [address]  [facility] [address]  Phone: [phone]  Fax: [phone]          Patient: Mila Foster   MR Number: [id number]   YOB: 1945   Date of Visit: 2/27/2017       Dear Dr. Dinesh Connors:    Thank you for referring Mila Foster to me for evaluation. Attached you will find relevant portions of my assessment and plan of care.    If you have questions, please do not hesitate to call me. I look forward to following Mila Foster along with you.    Sincerely,    Dami Parra MD    Enclosure  CC:  No Recipients    If you would like to receive this communication electronically, please contact [email or web address] or [phone] to request more information on "[employer]," Link access.    For providers and/or their staff who would like to refer a patient to [facility], please contact us through our one-stop-shop provider referral line, [facility], at [phone].    If you feel you have received this communication in error or would no longer like to receive these types of communications, please e-mail [email or web address]

## 2017-02-27 NOTE — PATIENT INSTRUCTIONS
Take gabapentin 2 tabs morning, 1 tab afternoon, and 2 tabs evening    Call to schedule appointment with [facility].    Return in 3 months

## 2017-02-27 NOTE — MR AVS SNAPSHOT
[facility]  [address]  [facility] [address]  Phone: [phone]  Fax: [phone]                  Mila Foster   2017 11:00 AM   Office Visit    Description:  Female : 1945   Provider:  Dami Parra MD   Department:  [facility]           Reason for Visit     Follow-up           Diagnoses this Visit        Comments    Lumbosacral radiculopathy    -  Primary            To Do List           Future Appointments        Provider Department Dept Phone    2017 11:00 AM MD [facility] [phone]    3/3/2017 8:00 AM Coby Russo NP [facility] Management [phone]      Goals (5 Years of Data)     None      Follow-Up and Disposition     Return in about 3 months (around 2017).       These Medications        Disp Refills Start End    gabapentin (NEURONTIN) 300 MG capsule 150 capsule 5 2017     Take 2 tabs in the morning, 1 tab in the afternoon, and 2 tabs in the evening    Pharmacy: [email or web address] [address] [place] [address] AT [facility] #: [phone]         [facility]     [facility] Care [facility] -  Assistance  Registered nurses in the [facility] provide clinical advisement, health education, appointment booking, and other advisory services.  Call for this free service at [phone].             Medications           Message regarding Medications     Verify the changes and/or additions to your medication regime listed below are the same as discussed with your clinician today.  If any of these changes or additions are incorrect, please notify your healthcare provider.        CHANGE how you are taking these medications     Start Taking Instead of    gabapentin (NEURONTIN) 300 MG capsule gabapentin (NEURONTIN) 300 MG capsule    Dosage:  Take 2 tabs in the morning, 1 tab in the afternoon, and 2 tabs in the evening Dosage:  300 mg.    Reason  for Change:  Reorder            Verify that the below list of medications is an accurate representation of the medications you are currently taking.  If none reported, the list may be blank. If incorrect, please contact your healthcare provider. Carry this list with you in case of emergency.           Current Medications     amlodipine (NORVASC) 10 MG tablet 10 mg.    aspirin (ECOTRIN) 81 MG EC tablet 81 mg.    atenolol (TENORMIN) 50 MG tablet 50 mg.    gabapentin (NEURONTIN) 300 MG capsule Take 2 tabs in the morning, 1 tab in the afternoon, and 2 tabs in the evening    glipiZIDE (GLUCOTROL) 5 MG tablet 5 mg.    hydrochlorothiazide (HYDRODIURIL) 25 MG tablet 25 mg.    lisinopril (PRINIVIL,ZESTRIL) 40 MG tablet 40 mg.    metformin (GLUCOPHAGE-XR) 500 MG 24 hr tablet 500 mg.    naproxen (NAPROSYN) 500 MG tablet     omeprazole (PRILOSEC) 20 MG capsule 20 mg.    pravastatin (PRAVACHOL) 40 MG tablet 40 mg.           Clinical Reference Information           Your Vitals Were     BP                   122/68           Blood Pressure          Most Recent Value    BP  122/68      Allergies as of 2/27/2017     No Known Allergies      Immunizations Administered on Date of Encounter - 2/27/2017     None      Instructions    Take gabapentin 2 tabs morning, 1 tab afternoon, and 2 tabs evening    Call to schedule appointment with [facility].    Return in 3 months       Language Assistance Services     ATTENTION: Language assistance services are available, free of charge. Please call [phone].      ATENCIÓN: Si anala barb, tiene a bansal disposición servicios gratuitos de asistencia lingüística. Sonny al [phone].     CHÚ Ý: N?u b?n nói Ti?ng Vi?t, có các d?ch v? h? tr? ngôn ng? mi?n phí dành cho b?n. G?i s? [phone].         [facility] complies with applicable Federal civil rights laws and does not discriminate on the basis of race, color, national origin, age, disability, or sex.

## 2017-03-06 ENCOUNTER — OFFICE VISIT (OUTPATIENT)
Dept: PAIN MEDICINE | Facility: CLINIC | Age: 72
End: 2017-03-06
Payer: MEDICARE

## 2017-03-06 VITALS
TEMPERATURE: 99 F | HEIGHT: 67 IN | WEIGHT: 249 LBS | SYSTOLIC BLOOD PRESSURE: 88 MMHG | BODY MASS INDEX: 39.08 KG/M2 | HEART RATE: 55 BPM | DIASTOLIC BLOOD PRESSURE: 46 MMHG

## 2017-03-06 DIAGNOSIS — M51.36 DDD (DEGENERATIVE DISC DISEASE), LUMBAR: ICD-10-CM

## 2017-03-06 DIAGNOSIS — M47.816 FACET ARTHRITIS OF LUMBAR REGION: Primary | ICD-10-CM

## 2017-03-06 DIAGNOSIS — M51.36 ANNULAR TEAR OF LUMBAR DISC: ICD-10-CM

## 2017-03-06 PROCEDURE — 1157F ADVNC CARE PLAN IN RCRD: CPT | Mod: S$GLB,,, | Performed by: NURSE PRACTITIONER

## 2017-03-06 PROCEDURE — 1159F MED LIST DOCD IN RCRD: CPT | Mod: S$GLB,,, | Performed by: NURSE PRACTITIONER

## 2017-03-06 PROCEDURE — 99213 OFFICE O/P EST LOW 20 MIN: CPT | Mod: S$GLB,,, | Performed by: NURSE PRACTITIONER

## 2017-03-06 PROCEDURE — 99999 PR PBB SHADOW E&M-EST. PATIENT-LVL III: CPT | Mod: PBBFAC,,, | Performed by: NURSE PRACTITIONER

## 2017-03-06 PROCEDURE — 1125F AMNT PAIN NOTED PAIN PRSNT: CPT | Mod: S$GLB,,, | Performed by: NURSE PRACTITIONER

## 2017-03-06 PROCEDURE — 1160F RVW MEDS BY RX/DR IN RCRD: CPT | Mod: S$GLB,,, | Performed by: NURSE PRACTITIONER

## 2017-03-06 NOTE — PROGRESS NOTES
"  Chronic Pain - Established Patient    Referring Physician: Referral, Self    Chief Complaint:   Chief Complaint   Patient presents with    Back Pain        SUBJECTIVE: Disclaimer: This note has been generated using voice-recognition software. There may be typographical errors that have been missed during proof-reading    Interval History 3/6/2017:  The patient returns to clinic today for follow up. She is s/p bilateral S1 TF LEONEL on 2/8/2017. She reports 80% relief of her radiating leg pain. She continues to have low back pain. She describes the pain as dull and aching. The pain is worse in the morning. She reports that the pain increases with prolonged sitting and standing. She denies any other health changes. She denies any bowel or bladder incontinence. Her pain today is 6/10.     Interval History 1/23/2017:  The patient returns to clinic today for follow up of low back pain and bilateral leg pain. She has recently completed a medrol dose pack with some benefit. She still reports radiating pain into both legs. The pain is worse with prolonged sitting and lifting. The pain gets better with walking and rest. The pain radiates down the back of both legs to ankles. She denies any bowel or bladder incontinence or signs of saddle paresthesia. She continues to work with lifting restrictions. She currently takes Gabapentin and Naproxen with benefit. Her pain today is 6/10.     Initial encounter:    Mila Foster presents to the clinic for the evaluation of bilateral leg pain with intermittent lower back pain. 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 She reports recent falling to knees, but she changed her shoes, and the falling has ceased. She reports that she had a "pain flare" 2 weeks ago requiring a walker for 2 days.     Brief history:  72yo F with hx of HTN and DM complicated by neuropathy BL but R>L with tingling, " tightness, shooting pain only in feet. Patient reports glucose was 120 this morning and usually runs around 130.    Pain Description:    The pain is located in the posterior leg area and radiates to her ankles and to her knees at its worst.     At BEST  1/10     At WORST  8/10 on the WORST day.      On average pain is rated as 7/10    Today the pain is rated as 5/10    The pain is described as aching, burning, dull, sharp and tight band      Symptoms interfere with work.     Exacerbating factors: Sitting, picking up children, lifting, bending over    Mitigating factors: walking, rest    Patient denies night fever/night sweats, bowel incontinence, significant weight loss, significant motor weakness and loss of sensations. She does report chronic urinary incontinence unrelated to back/leg pain.  Patient denies any suicidal or homicidal ideations    Pain Medications:  Current:  Gabapentin; been on it 1 year, and she reports she feels it helps    Tried in Past:  NSAIDs -yes; reports it helps  TCA -Never  SNRI -Never  Anti-convulsants -Never  Muscle Relaxants -Never  Opioids-Never    Physical Therapy/Home Exercise: no       report: N/A    Pain Procedures: never    Chiropractor -never  Acupuncture - never  TENS unit -never  Spinal decompression -never  Joint replacement -never    Imagin/12/16 MRI Lumbar Spine WO Contrast   L1-L2: There is no focal disc herniation. No significant spinal canal or neural foraminal narrowing.    L2-L3: There is no focal disc herniation. No significant spinal canal or neural foraminal narrowing.    L3-L4: Mild bilateral facet arthropathy.  No focal disc herniation. No significant spinal canal or neural foraminal narrowing.    L4-L5: Mild disc bulge and bilateral facet arthropathy results in mild right-sided neural foraminal narrowing. No significant spinal canal stenosis.    L5-S1: Diffuse disc bulge with small superimposed right foraminal disc protrusion.  Findings result in  moderate right-sided neuroforaminal narrowing with likely abutment/impingement upon the exited right L5 nerve root. No significant spinal canal stenosis.  Small posterior annular fissure noted.   Impression      Lumbar spondylosis as detailed above, most significant for small right foraminal protrusion at the L5-S1 level resulting in moderate neuroforaminal narrowing and likely abutment/impingement upon the exited right L5 nerve root         Past Medical History:   Diagnosis Date    Diabetes     Hypertension      History reviewed. No pertinent surgical history.  Social History     Social History    Marital status: Single     Spouse name: N/A    Number of children: N/A    Years of education: N/A     Occupational History    Not on file.     Social History Main Topics    Smoking status: Never Smoker    Smokeless tobacco: Not on file    Alcohol use Not on file    Drug use: Not on file    Sexual activity: Not on file     Other Topics Concern    Not on file     Social History Narrative     History reviewed. No pertinent family history.    Review of patient's allergies indicates:  No Known Allergies    Current Outpatient Prescriptions   Medication Sig    amlodipine (NORVASC) 10 MG tablet 10 mg.    aspirin (ECOTRIN) 81 MG EC tablet 81 mg.    atenolol (TENORMIN) 50 MG tablet 50 mg.    gabapentin (NEURONTIN) 300 MG capsule Take 2 tabs in the morning, 1 tab in the afternoon, and 2 tabs in the evening    glipiZIDE (GLUCOTROL) 5 MG tablet 5 mg.    hydrochlorothiazide (HYDRODIURIL) 25 MG tablet 25 mg.    lisinopril (PRINIVIL,ZESTRIL) 40 MG tablet 40 mg.    metformin (GLUCOPHAGE-XR) 500 MG 24 hr tablet 500 mg.    naproxen (NAPROSYN) 500 MG tablet     omeprazole (PRILOSEC) 20 MG capsule 20 mg.    pravastatin (PRAVACHOL) 40 MG tablet 40 mg.     No current facility-administered medications for this visit.        REVIEW OF SYSTEMS:    GENERAL:  No weight loss, malaise or fevers.  HEENT:   No recent changes in  "vision or hearing  NECK:  Negative for lumps, no difficulty with swallowing.  RESPIRATORY:  Negative for cough, wheezing or shortness of breath, patient denies any recent URI.  CARDIOVASCULAR:  Negative for chest pain, leg swelling or palpitations. HTN.  GI:  Negative for abdominal discomfort, blood in stools or black stools or change in bowel habits.  MUSCULOSKELETAL:  See HPI.  SKIN:  Negative for lesions, rash, and itching.  PSYCH:  No mood disorder or recent psychosocial stressors.  Patient's sleep is occasionally disturbed secondary to pain.  HEMATOLOGY/LYMPHOLOGY:  Negative for prolonged bleeding, bruising easily or swollen nodes.  Patient is not currently taking any anti-coagulants  ENDO: Positive for DM. Negative for thyroid dysfunction  NEURO:   No history of headaches, syncope, paralysis, seizures or tremors.  All other reviewed and negative other than HPI.    OBJECTIVE:    BP (!) 88/46  Pulse (!) 55  Temp 98.6 °F (37 °C)  Ht 5' 7" (1.702 m)  Wt 112.9 kg (249 lb)  BMI 39 kg/m2    PHYSICAL EXAMINATION:    GENERAL: Well appearing, in no acute distress, alert and oriented x3.  PSYCH:  Mood and affect appropriate.  SKIN: Skin color, texture, turgor normal, no rashes or lesions.  HEAD/FACE:  Normocephalic, atraumatic. Cranial nerves grossly intact.   CV: RRR with palpation of the radial artery.  PULM: No evidence of respiratory difficulty, symmetric chest rise.  BACK: Straight leg raising in the siting position is negative for radicular pain bilaterally. Full ROM with pain on flexion and extension. Positive facet loading bilaterally.  LOWER EXTREMITIES: Peripheral joint ROM is full and pain free without obvious instability or laxity in lower extremities. No deformities, edema, or skin discoloration. Good capillary refill.  MUSCULOSKELETAL: Hip, and knee provocative maneuvers are negative.  There is pain with palpation over the sacroiliac joints bilaterally.  FABERs test is negative bilaterally.  FADIRs " test is negative.   Bilateral and lower extremity strength is normal and symmetric.  No atrophy or tone abnormalities are noted.  NEURO: Bilateral lower extremity coordination and muscle stretch reflexes are physiologic and symmetric.   No loss of sensation is noted.  GAIT: Antalgic- ambulates without assistance.    ASSESSMENT: 71 y.o. year old female with back/leg pain, consistent with BL radiculopathy due to L5 disc protrusion.    Encounter Diagnoses   Name Primary?    Facet arthritis of lumbar region Yes    Annular tear of lumbar disc     DDD (degenerative disc disease), lumbar        PLAN:     - Previous imaging was reviewed and discussed with the patient today.    - Schedule for bilateral L2,3,4,5 medial branch blocks x 2, as her pain today is more facet-mediated. The procedure, risks, benefits and options were discussed with patient. There are no contraindications to the procedure. The patient expressed understanding and agreed to proceed.  Consent obtained today.    - If she has significant short term relief with the blocks, we can proceed to radiofrequency ablation, one side at a time.      - Continue Gabapentin and Naproxen as these are providing benefit.    - She has been [facility] to [facility] by neurology. I encouraged her to participate in the program.     - RTC 2 weeks after above procedure.     - Dr. Duncan was consulted on the patient and agrees with this plan.      The above plan and management options were discussed at length with patient. Patient is in agreement with the above and verbalized understanding.     Coby Russo NP  03/06/2017

## 2017-03-06 NOTE — MR AVS SNAPSHOT
[facility]  [address]  [place] [address]  Phone: [phone]  Fax: [phone]                  Mila Foster   3/6/2017 9:30 AM   Office Visit    Description:  Female : 1945   Provider:  Coby Russo NP   Department:  [facility]           Diagnoses this Visit        Comments    Facet arthritis of lumbar region    -  Primary     Annular tear of lumbar disc         DDD (degenerative disc disease), lumbar                To Do List           Future Appointments        Provider Department Dept Phone    3/6/2017 9:30 AM Coby Russo NP [facility] [phone]    2017 9:20 AM Dami Parra MD [facility] Neurology [phone]      Goals (5 Years of Data)     None      [facility]     [facility] Nurse Care [facility] -  Assistance  Registered nurses in the [facility] On Call Center provide clinical advisement, health education, appointment booking, and other advisory services.  Call for this free service at [phone].             Medications           Message regarding Medications     Verify the changes and/or additions to your medication regime listed below are the same as discussed with your clinician today.  If any of these changes or additions are incorrect, please notify your healthcare provider.             Verify that the below list of medications is an accurate representation of the medications you are currently taking.  If none reported, the list may be blank. If incorrect, please contact your healthcare provider. Carry this list with you in case of emergency.           Current Medications     amlodipine (NORVASC) 10 MG tablet 10 mg.    aspirin (ECOTRIN) 81 MG EC tablet 81 mg.    atenolol (TENORMIN) 50 MG tablet 50 mg.    gabapentin (NEURONTIN) 300 MG capsule Take 2 tabs in the morning, 1 tab in the afternoon, and 2 tabs in the evening    glipiZIDE (GLUCOTROL) 5 MG tablet 5 mg.    hydrochlorothiazide (HYDRODIURIL)  "25 MG tablet 25 mg.    lisinopril (PRINIVIL,ZESTRIL) 40 MG tablet 40 mg.    metformin (GLUCOPHAGE-XR) 500 MG 24 hr tablet 500 mg.    naproxen (NAPROSYN) 500 MG tablet     omeprazole (PRILOSEC) 20 MG capsule 20 mg.    pravastatin (PRAVACHOL) 40 MG tablet 40 mg.           Clinical Reference Information           Your Vitals Were     BP Pulse Temp Height Weight BMI    88/46 55 98.6 °F (37 °C) 5' 7" (1.702 m) 112.9 kg (249 lb) 39 kg/m2      Blood Pressure          Most Recent Value    BP  (!)  88/46      Allergies as of 3/6/2017     No Known Allergies      Immunizations Administered on Date of Encounter - 3/6/2017     None      Language Assistance Services     ATTENTION: Language assistance services are available, free of charge. Please call [phone].      ATENCIÓN: Si habla barb, tiene a bansal disposición servicios gratuitos de asistencia lingüística. Llame al [phone].     HEMA Ý: N?u b?n nói Ti?ng Vi?t, có các d?ch v? h? tr? ngôn ng? mi?n phí dành cho b?n. G?i s? [phone].         [facility] complies with applicable Federal civil rights laws and does not discriminate on the basis of race, color, national origin, age, disability, or sex.        "

## 2017-03-22 ENCOUNTER — TELEPHONE (OUTPATIENT)
Dept: PAIN MEDICINE | Facility: CLINIC | Age: 72
End: 2017-03-22

## 2017-03-22 ENCOUNTER — HOSPITAL ENCOUNTER (OUTPATIENT)
Facility: OTHER | Age: 72
Discharge: HOME | End: 2017-03-22
Attending: ANESTHESIOLOGY | Admitting: ANESTHESIOLOGY
Payer: MEDICARE

## 2017-03-22 VITALS
SYSTOLIC BLOOD PRESSURE: 127 MMHG | HEIGHT: 67 IN | RESPIRATION RATE: 17 BRPM | OXYGEN SATURATION: 99 % | BODY MASS INDEX: 37.67 KG/M2 | TEMPERATURE: 99 F | WEIGHT: 240 LBS | DIASTOLIC BLOOD PRESSURE: 60 MMHG | HEART RATE: 60 BPM

## 2017-03-22 DIAGNOSIS — M47.816 FACET ARTHRITIS OF LUMBAR REGION: Primary | ICD-10-CM

## 2017-03-22 LAB — POCT GLUCOSE: 116 MG/DL (ref 70–110)

## 2017-03-22 PROCEDURE — 64495 INJ PARAVERT F JNT L/S 3 LEV: CPT | Mod: 50 | Performed by: ANESTHESIOLOGY

## 2017-03-22 PROCEDURE — 64493 INJ PARAVERT F JNT L/S 1 LEV: CPT | Mod: 50 | Performed by: ANESTHESIOLOGY

## 2017-03-22 PROCEDURE — 25000003 PHARM REV CODE 250: Performed by: ANESTHESIOLOGY

## 2017-03-22 PROCEDURE — 64493 INJ PARAVERT F JNT L/S 1 LEV: CPT | Mod: 50,,, | Performed by: ANESTHESIOLOGY

## 2017-03-22 PROCEDURE — 64494 INJ PARAVERT F JNT L/S 2 LEV: CPT | Mod: 50 | Performed by: ANESTHESIOLOGY

## 2017-03-22 PROCEDURE — 64494 INJ PARAVERT F JNT L/S 2 LEV: CPT | Mod: 50,,, | Performed by: ANESTHESIOLOGY

## 2017-03-22 PROCEDURE — 64495 INJ PARAVERT F JNT L/S 3 LEV: CPT | Mod: 50,,, | Performed by: ANESTHESIOLOGY

## 2017-03-22 PROCEDURE — 82947 ASSAY GLUCOSE BLOOD QUANT: CPT | Performed by: ANESTHESIOLOGY

## 2017-03-22 RX ORDER — BUPIVACAINE HYDROCHLORIDE 5 MG/ML
INJECTION, SOLUTION EPIDURAL; INTRACAUDAL
Status: DISCONTINUED | OUTPATIENT
Start: 2017-03-22 | End: 2017-03-22 | Stop reason: HOSPADM

## 2017-03-22 RX ORDER — BUPIVACAINE HYDROCHLORIDE 5 MG/ML
3 INJECTION, SOLUTION EPIDURAL; INTRACAUDAL ONCE
Status: DISCONTINUED | OUTPATIENT
Start: 2017-03-22 | End: 2017-03-22 | Stop reason: HOSPADM

## 2017-03-22 RX ORDER — LIDOCAINE HYDROCHLORIDE 10 MG/ML
10 INJECTION INFILTRATION; PERINEURAL
Status: DISCONTINUED | OUTPATIENT
Start: 2017-03-22 | End: 2017-03-22 | Stop reason: HOSPADM

## 2017-03-22 RX ORDER — LIDOCAINE HYDROCHLORIDE 10 MG/ML
INJECTION INFILTRATION; PERINEURAL
Status: DISCONTINUED | OUTPATIENT
Start: 2017-03-22 | End: 2017-03-22 | Stop reason: HOSPADM

## 2017-03-22 NOTE — DISCHARGE INSTRUCTIONS

## 2017-03-22 NOTE — IP AVS SNAPSHOT
[facility] Location ([facility])  [address] [place], [place] [address]  Phone: [phone]           Patient Discharge Instructions     Our goal is to set you up for success. This packet includes information on your condition, medications, and your home care. It will help you to care for yourself so you don't get sicker and need to go back to the hospital.     Please ask your nurse if you have any questions.        There are many details to remember when preparing to leave the hospital. Here is what you will need to do:    1. Take your medicine. If you are prescribed medications, review your Medication List in the following pages. You may have new medications to  at the pharmacy and others that you'll need to stop taking. Review the instructions for how and when to take your medications. Talk with your doctor or nurses if you are unsure of what to do.     2. Go to your follow-up appointments. Specific follow-up information is listed in the following pages. Your may be contacted by a transition nurse or clinical provider about future appointments. Be sure we have all of the phone numbers to reach you, if needed. Please contact your provider's office if you are unable to make an appointment.     3. Watch for warning signs. Your doctor or nurse will give you detailed warning signs to watch for and when to call for assistance. These instructions may also include educational information about your condition. If you experience any of warning signs to your health, call your doctor.               [facility]  Unless otherwise directed by your provider, please contact [facility], our nurse care line that is available for 24/7 assistance.     [phone] (toll-free)    Registered nurses in the [facility] provide clinical advisement, health education, appointment booking, and other advisory services.                    ** Verify the list of medication(s) below is accurate and up to  date. Carry this with you in case of emergency. If your medications have changed, please notify your healthcare provider.             Medication List      ASK your doctor about these medications        Additional Info                      amlodipine 10 MG tablet   Commonly known as:  NORVASC   Refills:  1   Dose:  10 mg    Instructions:  10 mg.     Begin Date    AM    Noon    PM    Bedtime       aspirin 81 MG EC tablet   Commonly known as:  ECOTRIN   Refills:  2   Dose:  81 mg    Instructions:  81 mg.     Begin Date    AM    Noon    PM    Bedtime       atenolol 50 MG tablet   Commonly known as:  TENORMIN   Refills:  1   Dose:  50 mg    Instructions:  50 mg.     Begin Date    AM    Noon    PM    Bedtime       gabapentin 300 MG capsule   Commonly known as:  NEURONTIN   Quantity:  150 capsule   Refills:  5    Instructions:  Take 2 tabs in the morning, 1 tab in the afternoon, and 2 tabs in the evening     Begin Date    AM    Noon    PM    Bedtime       glipiZIDE 5 MG tablet   Commonly known as:  GLUCOTROL   Refills:  1   Dose:  5 mg    Instructions:  5 mg.     Begin Date    AM    Noon    PM    Bedtime       hydrochlorothiazide 25 MG tablet   Commonly known as:  HYDRODIURIL   Refills:  1   Dose:  25 mg    Instructions:  25 mg.     Begin Date    AM    Noon    PM    Bedtime       lisinopril 40 MG tablet   Commonly known as:  PRINIVIL,ZESTRIL   Refills:  1   Dose:  40 mg    Instructions:  40 mg.     Begin Date    AM    Noon    PM    Bedtime       metformin 500 MG 24 hr tablet   Commonly known as:  GLUCOPHAGE-XR   Refills:  1   Dose:  500 mg    Instructions:  500 mg.     Begin Date    AM    Noon    PM    Bedtime       naproxen 500 MG tablet   Commonly known as:  NAPROSYN   Refills:  1      Begin Date    AM    Noon    PM    Bedtime       omeprazole 20 MG capsule   Commonly known as:  PRILOSEC   Refills:  1   Dose:  20 mg    Instructions:  20 mg.     Begin Date    AM    Noon    PM    Bedtime       pravastatin 40 MG tablet    Commonly known as:  PRAVACHOL   Refills:  1   Dose:  40 mg    Instructions:  40 mg.     Begin Date    AM    Noon    PM    Bedtime                  Please bring to all follow up appointments:    1. A copy of your discharge instructions.  2. All medicines you are currently taking in their original bottles.  3. Identification and insurance card.    Please arrive 15 minutes ahead of scheduled appointment time.    Please call 24 hours in advance if you must reschedule your appointment and/or time.        Your Scheduled Appointments     Apr 13, 2017  9:00 AM CDT   Established Patient Visit with Coby Russo NP   [facility] ([facility])    [address]  [place] [address]   [phone]            May 26, 2017  9:20 AM CDT   Neurology - Established Patient with Dami Parra MD   [facility] ([facility] )    [address]  [place] [address]   [phone]              Your Future Surgeries/Procedures     Mar 29, 2017   Surgery with Bonnie Duncan MD   [facility] ([facility])    [address] [facility] [address]   [phone]                  Discharge Instructions       Home Care Instructions Pain Management:    1. DIET:   You may resume your normal diet today.   2. BATHING:   You may shower with luke warm water. No soaking in tub.  3. DRESSING:   You may remove your bandage today.   4. ACTIVITY LEVEL:   You may resume your normal activities 24 hrs after your procedure.  5. MEDICATIONS:   You may resume your normal medications today.   6. SPECIAL INSTRUCTIONS:   No heat to the injection site for 24 hrs including, bath or shower, heating pad, moist heat, or hot tubs.    Use ice pack to injection site for any pain or discomfort.  Apply ice packs for 20 minute intervals as needed.   If you have received any sedatives by mouth today you may not drive for 12 hours.    If you have received any sedation through your IV, you may  "not drive for 24 hrs.     PLEASE CALL YOUR DOCTOR IF:  1. Redness or swelling around the injection site.  2. Fever of 101 degrees  3. Drainage (pus) from the injection site.  4. For any continuous bleeding (some dried blood over the incision is normal.)  5. For severe headache that is relieved when lying flat.    FOR EMERGENCIES:   If any unusual problems or difficulties occur during clinic hours, call [phone] or 004.         Admission Information     Date & Time Provider Department CSN    3/22/2017 11:10 AM Bonnie Duncan MD [facility] [id number]      Care Providers     Provider Role Specialty Primary office phone    Bonnie Duncan MD Attending Provider Pain Medicine [phone]    Bonnie Duncan MD Surgeon  Pain Medicine [phone]      Your Vitals Were     BP Pulse Temp Resp Height Weight    127/60 (BP Location: Right arm, Patient Position: Lying, BP Method: Automatic) 60 99 °F (37.2 °C) (Oral) 17 5' 7" (1.702 m) 108.9 kg (240 lb)    SpO2 BMI             99% 37.59 kg/m2         Recent Lab Values     No lab values to display.      Allergies as of 3/22/2017     No Known Allergies      Advance Directives     An advance directive is a document which, in the event you are no longer able to make decisions for yourself, tells your healthcare team what kind of treatment you do or do not want to receive, or who you would like to make those decisions for you.  If you do not currently have an advance directive, [facility] encourages you to create one.  For more information call:  [phone]), [phone] ([phone]),  or log on to [email or web address].        Language Assistance Services     ATTENTION: Language assistance services are available, free of charge. Please call [phone].      ATENCIÓN: Si habla español, tiene a bansal disposición servicios gratuitos de asistencia lingüística. Llame al [phone].     CHÚ Ý: N?u b?n nói Ti?ng Vi?t, có các d?ch v? " h? tr? ngôn ng? mi?n phí aggiegonzalo cho b?n. G?i s? [phone].         [facility] complies with applicable Federal civil rights laws and does not discriminate on the basis of race, color, national origin, age, disability, or sex.

## 2017-03-22 NOTE — PLAN OF CARE
Pt tolerated procedure well. Pt reports 0/10 pain after procedure. Assisted pt up for first time. Steady on feet. All discharge instructions given. Dressing clean, dry, intact.

## 2017-03-22 NOTE — TELEPHONE ENCOUNTER
----- Message from Amrita Andrade sent at 3/22/2017 11:13 AM CDT -----  _x 1st Request  _  2nd Request  _  3rd Request        Who:adriana llanes    Why: cassie Llanes would like to speak with sandro good.please call to discuss    What Number to Call Back:[phone]    When to Expect a call back: (Before the end of the day)   -- if the call is after 12:00, the call back will be tomorrow.

## 2017-03-24 NOTE — OP NOTE
lUMBAR Medial Branch Block Under Fluoroscopy  Time-out taken to identify patient and procedure side prior to starting the procedure.            03/24/2017                                                         PROCEDURE:  Bilateral medial branch block at the transverse processes at the level of L3, L4, L5, Sacral ala    REASON FOR PROCEDURE: Facet arthropathy of the lumbar spine    PHYSICIAN: Bonnie Duncan MD  ASSISTANTS: None    MEDICATIONS INJECTED: 0.5% bupivicane, 1mL at each level    LOCAL ANESTHETIC USED: Xylocaine 1% 10ml    SEDATION MEDICATIONS: None    ESTIMATED BLOOD LOSS:  None.    COMPLICATIONS:  None.    TECHNIQUE: Laying in a prone position, the patient was prepped and draped in the usual sterile fashion using ChloraPrep and fenestrated drape.  The level was determined under fluoroscopic guidance.  Local anesthetic was given by going down to the hub of the 27-gauge 1.25in needle and raising a wheel.  A 22-gauge 3.5inch needle was introduced to the anatomic local of the medial branch at each of the above levels using fluoroscopy in the AP, oblique, and lateral views.  After negative aspiration, medication was injected slowly. The patient tolerated the procedure well.       The patient was monitored after the procedure.  Patient was given post procedure and discharge instructions to follow at home.  We will see the patient back in two weeks or the patient may call to inform of status. The patient was discharged in a stable condition

## 2017-03-24 NOTE — DISCHARGE SUMMARY
Discharge Note  Short Stay      SUMMARY     Admit Date: 3/22/2017    Attending Physician: Bonnie Duncan      Discharge Physician: Bonnie Duncan      Discharge Date: 3/24/2017 1:29 PM     PROCEDURE:  Bilateral medial branch block at the transverse processes at the level of L3, L4, L5, Sacral ala    REASON FOR PROCEDURE: Facet arthropathy of the lumbar spine    Disposition: Home or self care    Patient Instructions:   Discharge Medication List as of 3/22/2017 12:35 PM      CONTINUE these medications which have NOT CHANGED    Details   amlodipine (NORVASC) 10 MG tablet 10 mg., Starting 8/26/2016, Until Discontinued, Historical Med      aspirin (ECOTRIN) 81 MG EC tablet 81 mg., Starting 9/8/2016, Until Discontinued, Historical Med      atenolol (TENORMIN) 50 MG tablet 50 mg., Starting 10/20/2016, Until Discontinued, Historical Med      gabapentin (NEURONTIN) 300 MG capsule Take 2 tabs in the morning, 1 tab in the afternoon, and 2 tabs in the evening, Normal      glipiZIDE (GLUCOTROL) 5 MG tablet 5 mg., Starting 9/18/2016, Until Discontinued, Historical Med      hydrochlorothiazide (HYDRODIURIL) 25 MG tablet 25 mg., Starting 10/22/2016, Until Discontinued, Historical Med      lisinopril (PRINIVIL,ZESTRIL) 40 MG tablet 40 mg., Starting 8/26/2016, Until Discontinued, Historical Med      metformin (GLUCOPHAGE-XR) 500 MG 24 hr tablet 500 mg., Starting 7/25/2016, Until Discontinued, Historical Med      naproxen (NAPROSYN) 500 MG tablet Starting 12/6/2016, Until Discontinued, Historical Med      omeprazole (PRILOSEC) 20 MG capsule 20 mg., Starting 10/24/2016, Until Discontinued, Historical Med      pravastatin (PRAVACHOL) 40 MG tablet 40 mg., Starting 8/21/2016, Until Discontinued, Historical Med             Resume home diet and activity

## 2017-03-29 ENCOUNTER — TELEPHONE (OUTPATIENT)
Dept: PAIN MEDICINE | Facility: CLINIC | Age: 72
End: 2017-03-29

## 2017-03-29 NOTE — TELEPHONE ENCOUNTER
"Contacted and spoke to patient regarding message, she stated "that she has eaten since leaving the procedure and her stomach feels much better." She wanted to know if she should reschedule now or wait till Monday to ensure it isn't a stomach virus?    She was informed to contact office on Monday if she is still feeling better.     She doesn't think it is a stomach virus, she stated " she was told by nurse that she could take her Bp and heart medicine prior to the procedure, however as she was fasting she had eaten anything and she think this is what caused her stomach issue. "    Via nurse Aburto, that is a possibility, but its safer to wait until after the next couple of days to ensure that it is not a virus.     Patient verbalized understanding.   "

## 2017-03-29 NOTE — TELEPHONE ENCOUNTER
----- Message from Sarthak Roe sent at 3/29/2017  2:54 PM CDT -----  Contact: ABILIO SIDDIQUI [[id number]]  X_  1st Request  _  2nd Request  _  3rd Request        Who: ABILIO SIDDIQUI [[id number]]    Why: Patient was not able to have procedure done this morning due to stomach problem. Patient has some questions about medication and if it caused stomach problem. Patient said she ate and is feeling better. Please call back to advise.    What Number to Call Back: [phone]    When to Expect a call back: (Before the end of the day)   -- if the call is after 12:00, the call back will be tomorrow.

## 2017-04-12 ENCOUNTER — SURGERY (OUTPATIENT)
Age: 72
End: 2017-04-12

## 2017-04-17 ENCOUNTER — INITIAL CONSULT (OUTPATIENT)
Dept: UROGYNECOLOGY | Facility: CLINIC | Age: 72
End: 2017-04-17
Payer: MEDICARE

## 2017-04-17 VITALS
BODY MASS INDEX: 40.45 KG/M2 | SYSTOLIC BLOOD PRESSURE: 120 MMHG | WEIGHT: 257.69 LBS | HEIGHT: 67 IN | DIASTOLIC BLOOD PRESSURE: 60 MMHG

## 2017-04-17 DIAGNOSIS — R15.1 FECAL SMEARING: ICD-10-CM

## 2017-04-17 DIAGNOSIS — K58.0 IRRITABLE BOWEL SYNDROME WITH DIARRHEA: ICD-10-CM

## 2017-04-17 DIAGNOSIS — N81.6 POSTERIOR VAGINAL WALL PROLAPSE: ICD-10-CM

## 2017-04-17 DIAGNOSIS — R19.7 DIARRHEA, UNSPECIFIED TYPE: ICD-10-CM

## 2017-04-17 DIAGNOSIS — N39.41 URGE INCONTINENCE: Primary | ICD-10-CM

## 2017-04-17 DIAGNOSIS — R39.15 URINARY URGENCY: ICD-10-CM

## 2017-04-17 PROBLEM — N39.46 MIXED URGE AND STRESS INCONTINENCE: Status: ACTIVE | Noted: 2017-04-17

## 2017-04-17 PROCEDURE — 87186 SC STD MICRODIL/AGAR DIL: CPT

## 2017-04-17 PROCEDURE — 87086 URINE CULTURE/COLONY COUNT: CPT

## 2017-04-17 PROCEDURE — 99999 PR PBB SHADOW E&M-EST. PATIENT-LVL V: CPT | Mod: PBBFAC,,, | Performed by: OBSTETRICS & GYNECOLOGY

## 2017-04-17 PROCEDURE — 82270 OCCULT BLOOD FECES: CPT | Mod: S$GLB,,, | Performed by: OBSTETRICS & GYNECOLOGY

## 2017-04-17 PROCEDURE — 87088 URINE BACTERIA CULTURE: CPT

## 2017-04-17 PROCEDURE — 51701 INSERT BLADDER CATHETER: CPT | Mod: S$GLB,,, | Performed by: OBSTETRICS & GYNECOLOGY

## 2017-04-17 PROCEDURE — 87077 CULTURE AEROBIC IDENTIFY: CPT

## 2017-04-17 PROCEDURE — 99215 OFFICE O/P EST HI 40 MIN: CPT | Mod: 25,S$GLB,, | Performed by: OBSTETRICS & GYNECOLOGY

## 2017-04-17 RX ORDER — HYOSCYAMINE SULFATE 0.125 MG
125 TABLET ORAL EVERY 4 HOURS PRN
Qty: 30 TABLET | Refills: 6 | Status: SHIPPED | OUTPATIENT
Start: 2017-04-17 | End: 2018-04-12 | Stop reason: SDUPTHER

## 2017-04-17 NOTE — LETTER
April 17, 2017      Bonnie Duncan MD  [address]  [address] [address]           [facility]  [address] [place] [id number]  Phone: [phone]          Patient: Mila Foster   MR Number: [id number]   YOB: 1945   Date of Visit: 4/17/2017       Dear Dr. Bonnie Duncan:    Thank you for referring Mila Foster to me for evaluation. Attached you will find relevant portions of my assessment and plan of care.    If you have questions, please do not hesitate to call me. I look forward to following Mila Foster along with you.    Sincerely,    Jude Fiedls,     Enclosure  CC:  No Recipients    If you would like to receive this communication electronically, please contact [email or web address] or [phone] to request more information on [employer] access.    For providers and/or their staff who would like to refer a patient to [facility], please contact us through our one-stop-shop provider referral line, [facility], at [phone].    If you feel you have received this communication in error or would no longer like to receive these types of communications, please e-mail [email or web address]

## 2017-04-17 NOTE — PROGRESS NOTES
Subjective:       Patient ID: Mila Foster is a 72 y.o. female.    Chief Complaint:  Consult and Urinary Incontinence      History of Present Illness: 72 Y O F with history of HTN, DM with neuropathy being managed by with Dr. Saeed ( pain management ) and Dr. Martinez ( neurology)  For lumbar disc herniation with radiculopathy, She is s/p two epidural injections however the most recent one was canceled due to severe diarrhea and urinary incontinence. She was referred by Dr. Saeed for consultation. She had colonoscopy last year which was normal.  She has had urinary incontinence more than 5 years, but notes worsening in the past two year.     HPI   [facility]s Peq Urogyn Hpi      Question    4/17/2017 10:38 AM CDT     General Urogynecology: Are you experiencing the following?       Dysuria (painful urination)  No     Nocturia:  waking up at night to empty your bladder   Yes     If you answered yes to the previous question, how many times does this happen per night?  1-2     Enuresis (urine loss during sleep)  No     Dribbling urine after you urinate  No     Hematuria (urine appears red)  No     Type of stream  Strong     Urinary Incontinence (General): Are you experiencing the following?       Past consultation for incontinence: Have you ever seen someone for the evaluation of incontinence?  No     If you answered yes to the previous question, please select all the therapies you have tried.   None of the above     Please note the effectiveness of the therapies.  Ineffective     Need to wear protection to keep clothes dry   Yes     If you answered yes to the previous question, please william the protection you use.   Pads       Diaper     If you wear protection, how much wetness is typically on each pad?  Moderate     If you wear protection, how often do you have to change per day, if applicable?   2     Stress Symptoms: Are you experiencing the following?       Leakage of urine with cough, laugh and/or sneeze  No     If  "you answered yes to the previous question, what is the frequency in days, weeks and/or months?  Never     Leakage of urine with sex  No     Leakage of urine with bending/ lifting  No     Leakage of urine with briskly walking or jogging  No     If you lose urine for any other reason not previously mentioned, please note it below, if applicable.        Urge Symptoms: Are you experiencing the following?       Urgency ("got to go" feeling)  Yes     Urge: How frequently do you feel an urge to urinate (feeling like you "gotta go" to the bathroom and can't wait)  Weekly     Do you experience a leakage of urine when you have a feeling of urgency?   Yes     Leakage of urine when unaware  No     Past use of anticholinergics (medications used to treat overactive bladder)  No     If you answered yes to the previous question, please william the anticholinergics you have used:        Have you ever used Mirbetriq (aka Mirabegron)?   No     Prolapse Symptoms: Are you experiencing any of the following?        Falling out/ Bulging/ Heaviness in the vagina  No     Vaginal/ Abdominal Pain/ Pressure  No     Need to strain/ Push to void  No     Need to wait on the toilet before you void  No     Unusual position to urinate (using your hands to push back the vaginal bulge)  No     Sensation of incomplete emptying  Yes     Past use of pessary device  No     If you answered yes to the previous question, please list the devices you have used below.        Bowel Symptoms: Are you experiencing any of the following?       Constipation  No     Diarrhea   Yes     Hematochezia (bloody stool)  No     Incomplete evacuation of stool  Yes     Involuntary loss of formed stool  Yes     Fecal smearing/urgency  Yes     Involuntary loss of gas  Yes     Vaginal Symptoms: Are you experiencing any of the following?        Abnormal vaginal bleeding   No     Vaginal dryness  No     Sexually active   No     Dyspareunia (painful intercourse)  No     Estrogen use   No "   HPI reviewed and confirmed with patient     GYN & OB History  No LMP recorded. Patient is postmenopausal.   Date of Last Pap: No result found    OB History    Para Term  AB SAB TAB Ectopic Multiple Living   2               # Outcome Date GA Lbr Narendra/2nd Weight Sex Delivery Anes PTL Lv   2             1                  Past Medical History:   Diagnosis Date    Diabetes     Hypertension      Past Surgical History:   Procedure Laterality Date    CHOLECYSTECTOMY      HYSTERECTOMY       Review of patient's allergies indicates:  No Known Allergies      Current Outpatient Prescriptions:     amlodipine (NORVASC) 10 MG tablet, 10 mg., Disp: , Rfl: 1    aspirin (ECOTRIN) 81 MG EC tablet, 81 mg., Disp: , Rfl: 2    atenolol (TENORMIN) 50 MG tablet, 50 mg., Disp: , Rfl: 1    gabapentin (NEURONTIN) 300 MG capsule, Take 2 tabs in the morning, 1 tab in the afternoon, and 2 tabs in the evening, Disp: 150 capsule, Rfl: 5    glipiZIDE (GLUCOTROL) 5 MG tablet, 5 mg., Disp: , Rfl: 1    hydrochlorothiazide (HYDRODIURIL) 25 MG tablet, 25 mg., Disp: , Rfl: 1    lisinopril (PRINIVIL,ZESTRIL) 40 MG tablet, 40 mg., Disp: , Rfl: 1    metformin (GLUCOPHAGE-XR) 500 MG 24 hr tablet, 500 mg., Disp: , Rfl: 1    omeprazole (PRILOSEC) 20 MG capsule, 20 mg., Disp: , Rfl: 1    pravastatin (PRAVACHOL) 40 MG tablet, 40 mg., Disp: , Rfl: 1    hyoscyamine (ANASPAZ,LEVSIN) 0.125 mg Tab, Take 1 tablet (125 mcg total) by mouth every 4 (four) hours as needed., Disp: 30 tablet, Rfl: 6    mirabegron (MYRBETRIQ) 25 mg Tb24 ER tablet, Take 1 tablet (25 mg total) by mouth once daily., Disp: 30 tablet, Rfl: 11    naproxen (NAPROSYN) 500 MG tablet, , Disp: , Rfl: 1      Review of Systems  Review of Systems   Constitutional: Negative.  Negative for activity change, appetite change, chills, diaphoresis, fatigue, fever and unexpected weight change.   HENT: Negative.    Eyes: Negative.    Respiratory: Negative.  Negative for  apnea, cough and wheezing.    Cardiovascular: Negative.  Negative for chest pain and palpitations.   Gastrointestinal: Positive for constipation. Negative for abdominal distention, abdominal pain, anal bleeding, blood in stool, diarrhea, nausea, rectal pain and vomiting.   Endocrine: Negative.    Genitourinary: Positive for frequency and urgency. Negative for decreased urine volume, difficulty urinating, dyspareunia, dysuria, enuresis, flank pain, genital sores, hematuria, menstrual problem, pelvic pain, vaginal bleeding, vaginal discharge and vaginal pain.   Musculoskeletal: Negative for back pain and gait problem.   Skin: Negative for color change, pallor, rash and wound.   Allergic/Immunologic: Negative for immunocompromised state.   Neurological: Negative.  Negative for dizziness and speech difficulty.   Hematological: Negative for adenopathy.   Psychiatric/Behavioral: Negative for agitation, behavioral problems, confusion and sleep disturbance.           Objective:     Physical Exam   Constitutional: She is oriented to person, place, and time. She appears well-developed.   HENT:   Head: Normocephalic and atraumatic.   Eyes: Conjunctivae and EOM are normal.   Neck: Normal range of motion. Neck supple.   Cardiovascular: Normal rate, regular rhythm, S1 normal, S2 normal, normal heart sounds and intact distal pulses.    Pulmonary/Chest: Effort normal and breath sounds normal. She exhibits no tenderness.   Abdominal: Soft. Bowel sounds are normal. She exhibits no distension and no mass. There is no hepatosplenomegaly, splenomegaly or hepatomegaly. There is no tenderness. There is no rigidity, no rebound, no guarding and no CVA tenderness. Hernia confirmed negative in the right inguinal area and confirmed negative in the left inguinal area.   Genitourinary: Pelvic exam was performed with patient supine. Rectum normal, vagina normal, skenes normal and bartholins normal. Right labia normal and left labia normal.  Urethra exhibits hypermobility. Urethra exhibits no urethral caruncle, no urethral diverticulum and no urethral mass. Right bartholin is not enlarged and not tender. Left bartholin is not enlarged and not tender. Rectal exam shows resting tone normal and active tone normal. Rectal exam shows no external hemorrhoid, no fissure, no tenderness, anal tone normal and no dovetailing. Guaiac negative stool. There is atrophy in the vagina. No foreign body, tenderness, bleeding, unspecified prolapse of vaginal walls, fistula, mesh exposure or lavator tenderness in the vagina. No vaginal discharge found. Right adnexum displays no tenderness. Left adnexum displays no tenderness. Cervix exhibits absence. Uterus is absent.   PVR: 30 ML  Empty cough stress test: Negative.  Kegel: 1/5    POP-Q  Aa: -2 Ba: -2 C: -5   GH: 3 PB: 2 TVL: 8   Ap: 0 Bp: 0 D:                      Musculoskeletal: Normal range of motion.   Lymphadenopathy:     She has no axillary adenopathy.        Right: No inguinal adenopathy present.        Left: No inguinal adenopathy present.   Neurological: She is alert and oriented to person, place, and time. She has normal strength and normal reflexes. Cranial nerves II through XII intact. No cranial nerve deficit.   Skin: Skin is warm, dry and intact.   Psychiatric: She has a normal mood and affect. Her speech is normal and behavior is normal. Judgment normal. Cognition and memory are normal.          Assessment:        1. Urge incontinence    2. Fecal smearing    3. Diarrhea, unspecified type    4. Urinary urgency    5. Posterior vaginal wall prolapse    6. Irritable bowel syndrome with diarrhea               Plan:          1.  Mixed urinary incontinence, urge > stress:   --Bladder diary for 3-7 days, write the number of times you go to the rest room and associated symptoms of urgency or leakage.   --Empty bladder every 3 hours.  Empty well: wait a minute, lean forward on toilet.    --Avoid dietary irritants (see  sheet).  Keep diary x 3-5 days to determine your irritants.  --KEGELS: do 10 in AM and 10 in PM, holding each x 10 seconds.  When you feel urge to go, STOP, KEGEL, and when urge has passed, then go to bathroom.  Start pelvic therapy .    --URGE:  Myrbetriq 25 mg daily.  SE profile reviewed.   Takes 2-4 weeks to see if will have effect.  For dry mouth: get sour, sugar free lozenge or gum.    --STRESS:  Pessary vs. Sling.     2. Fecal incontinence  --start Fiber  --Start PT   --Lomotil as needed   --Start antisosmotic .125 hyoscyamine   --We discussed benefits of SNS     3.  R/o IBS- diarrhea   --Recommend GI evaluation     4.  Morbid Obesity: discussed healthy eating habits, patient trying to loose weight on her own if unable then nutritional consult. Obesity has been shown to be an independent risk factor for urinary incontinence. Weight loss has been shown to decrease incidence of urinary incontinence significantly.     5. Prolapse:  Stage 2 posterior vaginal wall prolapse   --Daily pelvic floor exercises as assigned, Pelvic floor PT  --Non surgical options discussed with patient    --Surgical options discussed such as posterior colporrhaphy.  Risks/ benefits/ alternatives/ succuss and failure rates were reviewed.     Approximately  50 min were spent in consult, 90 % in discussion.     Thank you for requesting consultation of your patient.  I look forward to participating in her care.     Jude Fields DO  Female Pelvic Medicine and Reconstructive Surgery  [facility] [place], [place]

## 2017-04-17 NOTE — MR AVS SNAPSHOT
[facility]  [address] [address]  Phone: [phone]                  Mila Foster   2017 10:00 AM   Initial consult    Description:  Female : 1945   Provider:  Jude Fields DO   Department:  [facility]           Reason for Visit     Consult     Urinary Incontinence           Diagnoses this Visit        Comments    Irritable bowel syndrome with diarrhea    -  Primary     Fecal smearing         Diarrhea, unspecified type         Urge incontinence         Urinary urgency         Posterior vaginal wall prolapse                To Do List           Future Appointments        Provider Department Dept Phone    2017 9:20 AM Dami Parra MD [facility] Neurology [phone]      Goals (5 Years of Data)     None       These Medications        Disp Refills Start End    mirabegron (MYRBETRIQ) 25 mg Tb24 ER tablet 30 tablet 11 2017    Take 1 tablet (25 mg total) by mouth once daily. - Oral    Pharmacy: [facility] [address] [place], [place] - [address] AT [facility] Ph #: [phone]       hyoscyamine (ANASPAZ,LEVSIN) 0.125 mg Tab 30 tablet 6 2017    Take 1 tablet (125 mcg total) by mouth every 4 (four) hours as needed. - Oral    Pharmacy: [facility] [address] [place], [place] - [address] AT [facility] Ph #: [phone]         [facility]     [facility] Nurse Care Line - 24/7 Assistance  Unless otherwise directed by your provider, please contact [facility], our nurse care line that is available for 24/7 assistance.     Registered nurses in the [facility] provide: appointment scheduling, clinical advisement, health education, and other advisory services.  Call: [phone] (toll free)               Medications           Message regarding Medications     Verify the changes and/or additions to your medication regime  "listed below are the same as discussed with your clinician today.  If any of these changes or additions are incorrect, please notify your healthcare provider.        START taking these NEW medications        Refills    mirabegron (MYRBETRIQ) 25 mg Tb24 ER tablet 11    Sig: Take 1 tablet (25 mg total) by mouth once daily.    Class: Normal    Route: Oral    hyoscyamine (ANASPAZ,LEVSIN) 0.125 mg Tab 6    Sig: Take 1 tablet (125 mcg total) by mouth every 4 (four) hours as needed.    Class: Normal    Route: Oral           Verify that the below list of medications is an accurate representation of the medications you are currently taking.  If none reported, the list may be blank. If incorrect, please contact your healthcare provider. Carry this list with you in case of emergency.           Current Medications     amlodipine (NORVASC) 10 MG tablet 10 mg.    aspirin (ECOTRIN) 81 MG EC tablet 81 mg.    atenolol (TENORMIN) 50 MG tablet 50 mg.    gabapentin (NEURONTIN) 300 MG capsule Take 2 tabs in the morning, 1 tab in the afternoon, and 2 tabs in the evening    glipiZIDE (GLUCOTROL) 5 MG tablet 5 mg.    hydrochlorothiazide (HYDRODIURIL) 25 MG tablet 25 mg.    lisinopril (PRINIVIL,ZESTRIL) 40 MG tablet 40 mg.    metformin (GLUCOPHAGE-XR) 500 MG 24 hr tablet 500 mg.    omeprazole (PRILOSEC) 20 MG capsule 20 mg.    pravastatin (PRAVACHOL) 40 MG tablet 40 mg.    hyoscyamine (ANASPAZ,LEVSIN) 0.125 mg Tab Take 1 tablet (125 mcg total) by mouth every 4 (four) hours as needed.    mirabegron (MYRBETRIQ) 25 mg Tb24 ER tablet Take 1 tablet (25 mg total) by mouth once daily.    naproxen (NAPROSYN) 500 MG tablet            Clinical Reference Information           Your Vitals Were     BP Height Weight BMI       120/60 5' 7" (1.702 m) 116.9 kg (257 lb 11.5 oz) 40.36 kg/m2       Blood Pressure          Most Recent Value    BP  120/60      Allergies as of 4/17/2017     No Known Allergies      Immunizations Administered on Date of Encounter - " 4/17/2017     None      Orders Placed During Today's Visit      Normal Orders This Visit    Ambulatory consult to Gastroenterology     Ambulatory consult to Physical Therapy     POCT URINE DIPSTICK WITHOUT MICROSCOPE     Urine culture       Instructions        1.  Urinary incontinence, urge   --Bladder diary for 3-7 days, write the number of times you go to the rest room and associated symptoms of urgency or leakage.   --Empty bladder every 3 hours.  Empty well: wait a minute, lean forward on toilet.    --Avoid dietary irritants (see sheet).  Keep diary x 3-5 days to determine your irritants.  --KEGELS: do 10 in AM and 10 in PM, holding each x 10 seconds.  When you feel urge to go, STOP, KEGEL, and when urge has passed, then go to bathroom.  Start pelvic therapy .    --URGE:  Myrbetriq 25 mg daily.  SE profile reviewed.   Takes 2-4 weeks to see if will have effect.       2. Fecal incontinence  --start Fiber  --Start PT   --Lomotil as needed   --Start antisosmotic .125 hyoscyamine (call peoples to see which they cover)  --We discussed benefits of SNS     3.  R/o IBS- diarrhea   --Recommend GI evaluation     4.  Morbid Obesity: discussed healthy eating habits, patient trying to loose weight on her own if unable then nutritional consult. Obesity has been shown to be an independent risk factor for urinary incontinence. Weight loss has been shown to decrease incidence of urinary incontinence significantly.     5. Prolapse:  Stage 2 posterior vaginal wall prolapse   --Daily pelvic floor exercises as assigned, consider Pelvic floor PT in future  --Non surgical options discussed with patient    --Surgical options discussed such as posterior colporrhaphy.  Risks/ benefits/ alternatives/ succuss and failure rates were reviewed.          Language Assistance Services     ATTENTION: Language assistance services are available, free of charge. Please call [phone].      ATENCIÓN: Si rosie chester a bansal disposición  servicios gratuitos de asistencia lingüística. Sonny carlson [phone].     [facility] Ý: N?u b?n nói Ti?ng Vi?t, có các d?ch v? h? tr? ngôn ng? mi?n phí dành cho b?n. G?i s? [phone].         [facility] complies with applicable Federal civil rights laws and does not discriminate on the basis of race, color, national origin, age, disability, or sex.

## 2017-04-20 LAB — BACTERIA UR CULT: NORMAL

## 2017-04-21 ENCOUNTER — TELEPHONE (OUTPATIENT)
Dept: UROGYNECOLOGY | Facility: CLINIC | Age: 72
End: 2017-04-21

## 2017-04-21 DIAGNOSIS — N30.00 ACUTE CYSTITIS WITHOUT HEMATURIA: Primary | ICD-10-CM

## 2017-04-21 RX ORDER — SULFAMETHOXAZOLE AND TRIMETHOPRIM 800; 160 MG/1; MG/1
1 TABLET ORAL 2 TIMES DAILY
Qty: 10 TABLET | Refills: 0 | Status: SHIPPED | OUTPATIENT
Start: 2017-04-21 | End: 2017-04-26

## 2017-04-21 NOTE — TELEPHONE ENCOUNTER
Called pt no answer. Left detailed voice message for pt to call the office regarding prescription confirmation.

## 2017-04-21 NOTE — TELEPHONE ENCOUNTER
----- Message from Lilian Young sent at 4/21/2017  2:35 PM CDT -----  Contact: Oliver/[employer] [employer] [phone]  Oliver is needing a call back regarding this pt, she can be reached at [phone].

## 2017-04-21 NOTE — TELEPHONE ENCOUNTER
Spoke to Oliver kessler/[facility] [employer] to confirm the ICD-10 code for the rx Hyocsyamine which is K58.0.

## 2017-04-25 ENCOUNTER — TELEPHONE (OUTPATIENT)
Dept: UROGYNECOLOGY | Facility: CLINIC | Age: 72
End: 2017-04-25

## 2017-04-25 NOTE — TELEPHONE ENCOUNTER
----- Message from Jyothi Cunha sent at 4/25/2017 12:51 PM CDT -----  Contact: ABILIO SIDDIQUI [[id number]]  _x  1st Request  _  2nd Request  _  3rd Request        Who: ABILIO SIDDIQUI [[id number]]    Why: pt would like to reschedule therapy. Please call, Thanks!    What Number to Call Back: [phone]    When to Expect a call back: (Before the end of the day)   -- if the call is after 12:00, the call back will be tomorrow.

## 2017-05-23 ENCOUNTER — CLINICAL SUPPORT (OUTPATIENT)
Dept: REHABILITATION | Facility: OTHER | Age: 72
End: 2017-05-23
Attending: OBSTETRICS & GYNECOLOGY
Payer: MEDICARE

## 2017-05-23 DIAGNOSIS — R39.15 URINARY URGENCY: ICD-10-CM

## 2017-05-23 DIAGNOSIS — R15.1 FECAL SMEARING: ICD-10-CM

## 2017-05-23 DIAGNOSIS — N39.41 URGE INCONTINENCE: Primary | ICD-10-CM

## 2017-05-23 PROCEDURE — 97162 PT EVAL MOD COMPLEX 30 MIN: CPT | Mod: PO

## 2017-05-23 PROCEDURE — 97110 THERAPEUTIC EXERCISES: CPT | Mod: PO

## 2017-05-23 PROCEDURE — G8990 OTHER PT/OT CURRENT STATUS: HCPCS | Mod: CJ,PO

## 2017-05-23 PROCEDURE — G8991 OTHER PT/OT GOAL STATUS: HCPCS | Mod: CJ,PO

## 2017-05-23 NOTE — PROGRESS NOTES
Patient: Mila Foster   [facility] #:  [id number]    Date of treatment: 05/23/2017   Time in: 9:32  Time out: 11:00  Total treatment time: 88 minutes  # Visits: 1/6  Auth: 6  Referral expiration: 6/26/17  POC expiration: 8/15/17    Outpatient Physical Therapy   Initial Evaluation    Mila is a 72 y.o. female evaluated on 05/23/2017    Physician:  Jude Fields,*   Diagnosis:   Encounter Diagnoses   Name Primary?    Urge incontinence Yes    Fecal smearing     Urinary urgency         Treatment ordered: PT    Medical History:   Past Medical History:   Diagnosis Date    Diabetes     Hypertension         Surgical History:   Past Surgical History:   Procedure Laterality Date    CHOLECYSTECTOMY      HYSTERECTOMY          Medications:   Current Outpatient Prescriptions   Medication Sig    amlodipine (NORVASC) 10 MG tablet 10 mg.    aspirin (ECOTRIN) 81 MG EC tablet 81 mg.    atenolol (TENORMIN) 50 MG tablet 50 mg.    gabapentin (NEURONTIN) 300 MG capsule Take 2 tabs in the morning, 1 tab in the afternoon, and 2 tabs in the evening    glipiZIDE (GLUCOTROL) 5 MG tablet 5 mg.    hydrochlorothiazide (HYDRODIURIL) 25 MG tablet 25 mg.    hyoscyamine (ANASPAZ,LEVSIN) 0.125 mg Tab Take 1 tablet (125 mcg total) by mouth every 4 (four) hours as needed.    lisinopril (PRINIVIL,ZESTRIL) 40 MG tablet 40 mg.    metformin (GLUCOPHAGE-XR) 500 MG 24 hr tablet 500 mg.    mirabegron (MYRBETRIQ) 25 mg Tb24 ER tablet Take 1 tablet (25 mg total) by mouth once daily.    naproxen (NAPROSYN) 500 MG tablet     omeprazole (PRILOSEC) 20 MG capsule 20 mg.    pravastatin (PRAVACHOL) 40 MG tablet 40 mg.     No current facility-administered medications for this visit.        Allergies: Review of patient's allergies indicates:  No Known Allergies   Precautions: universal   OB/GYN History: 2 pregnancies both delivered vaginally, largest birth weight 9 lbs, episiotomy, first delivery, stitches were infected; hysterectomy (~ 40 years  ago) due to significant prolapse; irregular periods regulated with birth control  Bladder/Bowel History: denies       Barriers to Learning: none  Educational/Spiritual/Cultural needs: none  Environmental Barriers: none noted  Abuse/Neglect: no signs  Nutritional Status: well developed well nourished  Fall Risk: none    Subjective     What is your primary concern? And when did this first begin?  Pt states that Dr. Fields has given her medication for her urinary urgency that is helping (myrbetriq). She states she is also taking medication for IBS (hyoscyamine every 4 hours). She states due to her IBS, her bowel movements come on all of a sudden. If she skips her medication or feels like she won't need it that day and doesn't take it, she notices that she will have an episode of diarrhea and will have an accident. She wears depends. When this is going to happen she will get stomach pains with gas.    Pt states her primary concern is urinary leakage. Pt states this began about a year ago. Fecal leakage began about a month ago. Pt reports urge incontinence.     Pt reports PMH includes diabetes and HTN (diet and medication), herniated disc (managed with epidurals); gall bladder surgery    Pain: Patient reports 0/10 with 0 being the lowest and 10 being the highest. Pt reports back pain that improves with the shots she gets in her back. She says she is due for another one and it is starting to return a bit, can't sit too long in a wooden chair, straight back chair so she avoids this, 7/10. Pt states, come to think of it, when she has an episode of diarrhea, she has some pain in her rectum that is mild.    Pain or lack of sensation with vaginal/pelvic exam? denies  Sexually active? No,     Previous treatment included: none    Frequency of Urination: Daytime: ~ 3         Nighttime: 2    Urine Stream: strong    Bladder Leakage: Yes  Frequency of incidents: every other day  Amount Leaked: teaspoon    Do you have  difficulty initiating your urine stream? No  Do you feel like you are emptying completely? Not all the time, feels like she will go to the bathroom and then have to go again right away    Frequency of Bowel Movements: every other day    Do you have difficulty initiating a bowel movement? No  Colon Leakage: Yes  Frequency of incidents: at least once a week  Amount Leaked: full movement  Stool consistency? Soft and formed ([place] type 3)    Form of Protection: depends  Number of Pads required in 24 hours: 2    Types/amount of Fluid Intake: water, diet coke every once in awhile, milk, 1 C coffee  Diet: downfall is sweets, has to have something sweet after eating and has to be chocolate  Nutrition: obesity  Current Exercise: no, can only walk about a block and a half before her legs will feel fatigued; would like to start Jazzercise and aqua-aerobics     Activities that cause symptoms: urgency  Activity limitations? denies   Participation limitations? denies    Occupation: Pt is retired from day care, working with babies. Pt states that she thinks this where a lot of her back problems came from, lots of lifting heavy babies.  Living situation? Lives alone, first floor apartment (a few steps to enter)     Patient's Goals: To strengthen the muscles so I don't have as much of a problem with urinating and with the bowel movements.    Objective     ORTHO SCREEN  Posture: Left trunk lean, decreased lordosis, forward head with rounded shoulders   Pelvic Alignment: no deviations noted in supine  Gait: decreased heel strike and step length, short, shuffling steps though able to achieve clearance    ABDOMINALS  Scarring: incision upper right quadrant with moderate scar tissue, mobile     Diastasis: none   Abdominal strength: Rectus: 3/5      Transverse: palpable with difficulty due to excess adipose; pt reports some discomfort/pain in her low abdomen with TA activation       VAGINAL PELVIC FLOOR EXAM  EXTERNAL ASSESSMENT  Skin  Condition: redness noted  Scarring: episiotomy scar noted  Sensation: WNL  Pain: none  Introitus: stenotic   Voluntary Contraction: accessory muscle use  Voluntary Relaxation: nil  Involuntary Contraction: bulge  Bearing down: stage 2 posterior vaginal wall (tissue dissent noted)      INTERNAL ASSESSMENT  Pain: none  Sensation: able to distinguish pressure from side to side  Vaginal Vault: asymmetrical  Muscle Bulk: atrophy  Muscle Power: 1/5  Muscle Endurance: 0 sec  # Reps To Fatigue: NT    Fast Contractions: NT     Quality of Contraction: poor, minimal PFM activity, much overflow activity  Specificity: pt bulges abdomen when attempting to perform PFM contraction with some use of gluts and thighs noted   Coordination: cannot isolate PFM  Prolapse Check: Grade 2 posterior vaginal wall    TREATMENT    Pt was instructed in neuromuscular reeducation for 10 minutes including: diaphragmatic breathing     Education: Instructed on general anatomy/physiology of urinary/bowel system and PF function using pelvic model; discussed plan of care with patient; instructed in purpose of physical therapy and the  benefits/risks of treatment; instructed in risks of refusing treatment. Patient agreed to treatment plan. Pt was instructed in HEP including DB and z lying; pt was also administered a bladder diary; she verbalized understanding and was provided with a handout. Mila demonstrated and verbalized understanding of all education provided.     Assessment     Mrs. Foster is a 72 y.o. female referred to outpatient physical therapy with a medical diagnosis of fecal smearing, diarrhea, urinary urgency, and stress urinary incontinence. Pt presents today with signs and symptoms consistent with referring diagnosis including PFM atrophy and much difficulty recruiting PFM with poor coordination of both contraction and bulge. Pt also presents with decreased abdominal, back and hip strength/decreased trunk and pelvic stability, poor fluid  and food intake and decreased awareness of optimal bladder and bowel function. Pt will benefit from physcial therapy services in order to maximize pain free functional independence. The following goals were discussed with the patient and patient is in agreement with them as to be addressed in the treatment plan. Pt was given a HEP as described above. Pt verbally understood the instructions as they were given and demonstrated proper form and technique during therapy. Pt was advise to perform these exercises free of pain and to stop performing them if pain occurs.     Prognosis: good  No educational, cultural, or spiritual needs identified.    History  Co-morbidities and personal factors that may impact the plan of care Examination  Body Structures and Functions, activity limitations and participation restrictions that may impact the plan of care Clinical Presentation   Decision Making/ Complexity Score   Co-morbidities:   DM, HTN  Obesity  Hx of vaginal hysterectomy  Back pain  Personal Factors:   Inactive lifestyle Body Regions: Pelvis, trunk, LE's    Body Systems: musculoskeletal, neuromuscular, GI, urogenital, CV    Activity limitations: denies    Participation Restrictions: denies   Worsening   Moderate     GOALS  Short Term Goals: 4 weeks (6/20/17)  1. Pt will verbalize improved awareness of PFM activity as palpated by PT in order to improve activity involvement with HEP.  2. Pt will demonstrate decreased overflow muscle activity during PF muscle contraction.  3. Pt will demonstrated increase in PF muscle endurance to 3 sec in order to improved endurance and ability to perform urge suppression.  4. Pt will improve PFM strength to 2/5 in order to improve core strength and stability and to manage urinary and fecal incontinence.  5. Pt will tolerate HEP to improve impairments and independence with ADL's.    Long Term Goals: 12 weeks (8/15/17)  1. Pt will demonstrate ability to perform PFM contraction at 3/5  strength with 5'' hold in order to improve PFM function and management of fecal and urinary symptoms.  2. Pt will report decrease in urinary leakage.   3. Pt will demonstrate appropriate and coordinated lift, drop and bulge in order to demonstrate improved motor control and awareness of her PFM.  4. Pt will be independent with HEP and self management.    UIQ-7   Score: 6 = 29%    CRAIQ-7  Score: 6 = 29%  Plan     Pt will be treated by physical therapy 1 times a week for 3 months for Pt Education, HEP, therapeutic exercises, neuromuscular re-education, therapeutic activity, gait training, manual therapy, and modalities (including SEMG) PRN to achieve established goals.

## 2017-05-30 NOTE — PLAN OF CARE
Patient: Mila Foster   [facility] #:  [id number]    Date of treatment: 05/23/2017   Time in: 9:32  Time out: 11:00  Total treatment time: 88 minutes  # Visits: 1/6  Auth: 6  Referral expiration: 6/26/17  POC expiration: 8/15/17    Outpatient Physical Therapy   Initial Evaluation    Mila is a 72 y.o. female evaluated on 05/23/2017    Physician:  Jude Fields,*   Diagnosis:   Encounter Diagnoses   Name Primary?    Urge incontinence Yes    Fecal smearing     Urinary urgency         Treatment ordered: PT    Medical History:   Past Medical History:   Diagnosis Date    Diabetes     Hypertension         Surgical History:   Past Surgical History:   Procedure Laterality Date    CHOLECYSTECTOMY      HYSTERECTOMY          Medications:   Current Outpatient Prescriptions   Medication Sig    amlodipine (NORVASC) 10 MG tablet 10 mg.    aspirin (ECOTRIN) 81 MG EC tablet 81 mg.    atenolol (TENORMIN) 50 MG tablet 50 mg.    gabapentin (NEURONTIN) 300 MG capsule Take 2 tabs in the morning, 1 tab in the afternoon, and 2 tabs in the evening    glipiZIDE (GLUCOTROL) 5 MG tablet 5 mg.    hydrochlorothiazide (HYDRODIURIL) 25 MG tablet 25 mg.    hyoscyamine (ANASPAZ,LEVSIN) 0.125 mg Tab Take 1 tablet (125 mcg total) by mouth every 4 (four) hours as needed.    lisinopril (PRINIVIL,ZESTRIL) 40 MG tablet 40 mg.    metformin (GLUCOPHAGE-XR) 500 MG 24 hr tablet 500 mg.    mirabegron (MYRBETRIQ) 25 mg Tb24 ER tablet Take 1 tablet (25 mg total) by mouth once daily.    naproxen (NAPROSYN) 500 MG tablet     omeprazole (PRILOSEC) 20 MG capsule 20 mg.    pravastatin (PRAVACHOL) 40 MG tablet 40 mg.     No current facility-administered medications for this visit.        Allergies: Review of patient's allergies indicates:  No Known Allergies   Precautions: universal   OB/GYN History: 2 pregnancies both delivered vaginally, largest birth weight 9 lbs, episiotomy, first delivery, stitches were infected; hysterectomy (~ 40 years  ago) due to significant prolapse; irregular periods regulated with birth control  Bladder/Bowel History: denies       Barriers to Learning: none  Educational/Spiritual/Cultural needs: none  Environmental Barriers: none noted  Abuse/Neglect: no signs  Nutritional Status: well developed well nourished  Fall Risk: none    Subjective     What is your primary concern? And when did this first begin?  Pt states that Dr. Fields has given her medication for her urinary urgency that is helping (myrbetriq). She states she is also taking medication for IBS (hyoscyamine every 4 hours). She states due to her IBS, her bowel movements come on all of a sudden. If she skips her medication or feels like she won't need it that day and doesn't take it, she notices that she will have an episode of diarrhea and will have an accident. She wears depends. When this is going to happen she will get stomach pains with gas.    Pt states her primary concern is urinary leakage. Pt states this began about a year ago. Fecal leakage began about a month ago. Pt reports urge incontinence.     Pt reports PMH includes diabetes and HTN (diet and medication), herniated disc (managed with epidurals); gall bladder surgery    Pain: Patient reports 0/10 with 0 being the lowest and 10 being the highest. Pt reports back pain that improves with the shots she gets in her back. She says she is due for another one and it is starting to return a bit, can't sit too long in a wooden chair, straight back chair so she avoids this, 7/10. Pt states, come to think of it, when she has an episode of diarrhea, she has some pain in her rectum that is mild.    Pain or lack of sensation with vaginal/pelvic exam? denies  Sexually active? No,     Previous treatment included: none    Frequency of Urination: Daytime: ~ 3         Nighttime: 2    Urine Stream: strong    Bladder Leakage: Yes  Frequency of incidents: every other day  Amount Leaked: teaspoon    Do you have  difficulty initiating your urine stream? No  Do you feel like you are emptying completely? Not all the time, feels like she will go to the bathroom and then have to go again right away    Frequency of Bowel Movements: every other day    Do you have difficulty initiating a bowel movement? No  Colon Leakage: Yes  Frequency of incidents: at least once a week  Amount Leaked: full movement  Stool consistency? Soft and formed ([place] type 3)    Form of Protection: depends  Number of Pads required in 24 hours: 2    Types/amount of Fluid Intake: water, diet coke every once in awhile, milk, 1 C coffee  Diet: downfall is sweets, has to have something sweet after eating and has to be chocolate  Nutrition: obesity  Current Exercise: no, can only walk about a block and a half before her legs will feel fatigued; would like to start Jazzercise and aqua-aerobics     Activities that cause symptoms: urgency  Activity limitations? denies   Participation limitations? denies    Occupation: Pt is retired from day care, working with babies. Pt states that she thinks this where a lot of her back problems came from, lots of lifting heavy babies.  Living situation? Lives alone, first floor apartment (a few steps to enter)     Patient's Goals: To strengthen the muscles so I don't have as much of a problem with urinating and with the bowel movements.    Objective     ORTHO SCREEN  Posture: Left trunk lean, decreased lordosis, forward head with rounded shoulders   Pelvic Alignment: no deviations noted in supine  Gait: decreased heel strike and step length, short, shuffling steps though able to achieve clearance    ABDOMINALS  Scarring: incision upper right quadrant with moderate scar tissue, mobile     Diastasis: none   Abdominal strength: Rectus: 3/5      Transverse: palpable with difficulty due to excess adipose; pt reports some discomfort/pain in her low abdomen with TA activation       VAGINAL PELVIC FLOOR EXAM  EXTERNAL ASSESSMENT  Skin  Condition: redness noted  Scarring: episiotomy scar noted  Sensation: WNL  Pain: none  Introitus: stenotic   Voluntary Contraction: accessory muscle use  Voluntary Relaxation: nil  Involuntary Contraction: bulge  Bearing down: stage 2 posterior vaginal wall (tissue dissent noted)      INTERNAL ASSESSMENT  Pain: none  Sensation: able to distinguish pressure from side to side  Vaginal Vault: asymmetrical  Muscle Bulk: atrophy  Muscle Power: 1/5  Muscle Endurance: 0 sec  # Reps To Fatigue: NT    Fast Contractions: NT     Quality of Contraction: poor, minimal PFM activity, much overflow activity  Specificity: pt bulges abdomen when attempting to perform PFM contraction with some use of gluts and thighs noted   Coordination: cannot isolate PFM  Prolapse Check: Grade 2 posterior vaginal wall    TREATMENT    Pt was instructed in neuromuscular reeducation for 10 minutes including: diaphragmatic breathing     Education: Instructed on general anatomy/physiology of urinary/bowel system and PF function using pelvic model; discussed plan of care with patient; instructed in purpose of physical therapy and the  benefits/risks of treatment; instructed in risks of refusing treatment. Patient agreed to treatment plan. Pt was instructed in HEP including DB and z lying; pt was also administered a bladder diary; she verbalized understanding and was provided with a handout. Mila demonstrated and verbalized understanding of all education provided.     Assessment     Mrs. Foster is a 72 y.o. female referred to outpatient physical therapy with a medical diagnosis of fecal smearing, diarrhea, urinary urgency, and stress urinary incontinence. Pt presents today with signs and symptoms consistent with referring diagnosis including PFM atrophy and much difficulty recruiting PFM with poor coordination of both contraction and bulge. Pt also presents with decreased abdominal, back and hip strength/decreased trunk and pelvic stability, poor fluid  and food intake and decreased awareness of optimal bladder and bowel function. Pt will benefit from physcial therapy services in order to maximize pain free functional independence. The following goals were discussed with the patient and patient is in agreement with them as to be addressed in the treatment plan. Pt was given a HEP as described above. Pt verbally understood the instructions as they were given and demonstrated proper form and technique during therapy. Pt was advise to perform these exercises free of pain and to stop performing them if pain occurs.     Prognosis: good  No educational, cultural, or spiritual needs identified.    History  Co-morbidities and personal factors that may impact the plan of care Examination  Body Structures and Functions, activity limitations and participation restrictions that may impact the plan of care Clinical Presentation   Decision Making/ Complexity Score   Co-morbidities:   DM, HTN  Obesity  Hx of vaginal hysterectomy  Back pain  Personal Factors:   Inactive lifestyle Body Regions: Pelvis, trunk, LE's    Body Systems: musculoskeletal, neuromuscular, GI, urogenital, CV    Activity limitations: denies    Participation Restrictions: denies   Worsening   Moderate     GOALS  Short Term Goals: 4 weeks (6/20/17)  1. Pt will verbalize improved awareness of PFM activity as palpated by PT in order to improve activity involvement with HEP.  2. Pt will demonstrate decreased overflow muscle activity during PF muscle contraction.  3. Pt will demonstrated increase in PF muscle endurance to 3 sec in order to improved endurance and ability to perform urge suppression.  4. Pt will improve PFM strength to 2/5 in order to improve core strength and stability and to manage urinary and fecal incontinence.  5. Pt will tolerate HEP to improve impairments and independence with ADL's.    Long Term Goals: 12 weeks (8/15/17)  1. Pt will demonstrate ability to perform PFM contraction at 3/5  strength with 5'' hold in order to improve PFM function and management of fecal and urinary symptoms.  2. Pt will report decrease in urinary leakage.   3. Pt will demonstrate appropriate and coordinated lift, drop and bulge in order to demonstrate improved motor control and awareness of her PFM.  4. Pt will be independent with HEP and self management.    UIQ-7   Score: 6 = 29%    CRAIQ-7  Score: 6 = 29%  Plan     Pt will be treated by physical therapy 1 times a week for 3 months for Pt Education, HEP, therapeutic exercises, neuromuscular re-education, therapeutic activity, gait training, manual therapy, and modalities (including SEMG) PRN to achieve established goals.

## 2017-06-13 ENCOUNTER — CLINICAL SUPPORT (OUTPATIENT)
Dept: REHABILITATION | Facility: OTHER | Age: 72
End: 2017-06-13
Attending: OBSTETRICS & GYNECOLOGY
Payer: MEDICARE

## 2017-06-13 DIAGNOSIS — N39.41 URGE INCONTINENCE: Primary | ICD-10-CM

## 2017-06-13 DIAGNOSIS — R15.1 FECAL SMEARING: ICD-10-CM

## 2017-06-13 DIAGNOSIS — R39.15 URINARY URGENCY: ICD-10-CM

## 2017-06-13 PROCEDURE — 97140 MANUAL THERAPY 1/> REGIONS: CPT | Mod: PO

## 2017-06-13 PROCEDURE — 97110 THERAPEUTIC EXERCISES: CPT | Mod: PO

## 2017-06-13 NOTE — PATIENT INSTRUCTIONS
Home Exercises 6/13/17    1. Lie comfortably and take a few deep breaths to relax completely.     2. Inhale through your nose and feel your belly expand and your pelvic floor muscles relax.     3. As you exhale through your mouth, squeeze and lift your pelvic floor muscles (kegal) as if trying not to pee.     4. Repeat 10 times : inhale to relax the pelvic floor muscles and exhale to squeeze.     Two times per day.

## 2017-06-13 NOTE — PROGRESS NOTES
"Patient: Mila Foster   [facility] #: [id number]   Diagnosis:   Encounter Diagnoses   Name Primary?    Urinary urgency     Urge incontinence Yes    Fecal smearing       Date of start of care: 5/23/17  Date of treatment: 06/13/2017   Time in: 9:00  Time out: 9:57  Total treatment time: 57 minutes  # Visits: 2/6  Auth expiration: 6/26/17  POC expiration: 8/15/17  Outpatient Physical Therapy   Daily Note    Subjective     Pt states, "I'm doing all right I guess." She reports she almost didn't want to come today because she didn't complete her bladder diary. Pt states she has been having some personal problems as well as trouble with her back and thinks she is doing to have to go back to see if she needs another injection; she is having trouble sitting for long periods, her back hurts and the backs of her legs become numb, and she has trouble sleeping. "What has me really down is my weight, I just keep eating and eating and can't stop." Pt states she is going to try an over eaters anonymous meeting. She states she feels like she is "off the wagon, like the train is off the track." She said that the last time she saw her pain management doctor he did not want to do another round of shots until she had a consult with Dr. Fields to rule out anything serious causing her issues with diarrhea. She said she will call the doctor's office when she gets home to follow-up about her healthy back referral.     Pain: Current: 4/10 (back)     Objective     TREATMENT    Pt received individual therapeutic exercise to develop strength, coordination, endurance, motor control and core stability for 27 minutes including:    - Pt education regarding the confluence of obesity, back pain, and pelvic floor weakness/UI; discussed purpose of back injections in managing back pain    Pt received individual neuromuscular reeducation for 30 minutes including:    - 10 x 5'' kegals with manual and verbal feedback; pt displays 1+/5 PFM strength and 3 " "sec endurance   - kegal with DB x 10 with manual and verbal feedback; pt displays fair-good coordination; she is able to coordinate a PFM contraction with her exhale, required verbal cueing to exhale completely due to tendency to shorten (likely due to decreased PFM endurance)    Education: Discussed progression of plan of care with patient; educated pt in activity modification; instructed pt to continue with current HEP (DB and Z lying) with addition of kegals coordinated with her breath; pt demonstrated and verbalized understanding and was provided with a handout.  No educational, cultural, or spiritual barriers to learning identified.    Assessment     Pt tolerated tx session well without visible adverse effects. Pt arrived today reporting some frustration about having "fallen off of the wagon" and feeling badly about not completing her bladder diary; we spent time today on pt education discussing relationship between obesity, back pain, and fecal/urinary incontinence. Pt was encouraged to move forward with healthy back PT. Pt displayed improved PFM awareness and coordination today compared to eval likely due to increased time spent on PFM exercises. She was noted to have decreased mobility/extensibility of her PFM with some disruptions in the muscle integrity; she will benefit form manual care to improve mobility and BF. Pt continues to present with PFM atrophy with fair coordination of contraction, difficulty with bulge. Pt also presents with decreased abdominal, back and hip strength/decreased trunk and pelvic stability, poor fluid and food intake and decreased awareness of optimal bladder and bowel function. Pt will benefit from physcial therapy services in order to maximize pain free functional independence.     Pt is making good progress towards established goals.    GOALS  Short Term Goals: 4 weeks (6/20/17)  1. Pt will verbalize improved awareness of PFM activity as palpated by PT in order to improve " activity involvement with HEP.  2. Pt will demonstrate decreased overflow muscle activity during PF muscle contraction.  3. Pt will demonstrated increase in PF muscle endurance to 3 sec in order to improved endurance and ability to perform urge suppression.  4. Pt will improve PFM strength to 2/5 in order to improve core strength and stability and to manage urinary and fecal incontinence.  5. Pt will tolerate HEP to improve impairments and independence with ADL's.     Long Term Goals: 12 weeks (8/15/17)  1. Pt will demonstrate ability to perform PFM contraction at 3/5 strength with 5'' hold in order to improve PFM function and management of fecal and urinary symptoms.  2. Pt will report decrease in urinary leakage.   3. Pt will demonstrate appropriate and coordinated lift, drop and bulge in order to demonstrate improved motor control and awareness of her PFM.  4. Pt will be independent with HEP and self management.     UIQ-7   Score: 6 = 29%     CRAIQ-7  Score: 6 = 29%    Plan     Continue with established POC, working toward PT goals.

## 2017-06-14 ENCOUNTER — OFFICE VISIT (OUTPATIENT)
Dept: NEUROLOGY | Facility: CLINIC | Age: 72
End: 2017-06-14
Payer: MEDICARE

## 2017-06-14 VITALS
DIASTOLIC BLOOD PRESSURE: 61 MMHG | BODY MASS INDEX: 40.97 KG/M2 | SYSTOLIC BLOOD PRESSURE: 120 MMHG | HEART RATE: 88 BPM | HEIGHT: 67 IN | WEIGHT: 261 LBS

## 2017-06-14 DIAGNOSIS — M51.16 LUMBAR DISC HERNIATION WITH RADICULOPATHY: Primary | ICD-10-CM

## 2017-06-14 DIAGNOSIS — M51.36 ANNULAR TEAR OF LUMBAR DISC: ICD-10-CM

## 2017-06-14 DIAGNOSIS — M47.816 FACET ARTHRITIS OF LUMBAR REGION: ICD-10-CM

## 2017-06-14 PROCEDURE — 99213 OFFICE O/P EST LOW 20 MIN: CPT | Mod: S$GLB,,, | Performed by: PSYCHIATRY & NEUROLOGY

## 2017-06-14 PROCEDURE — 1159F MED LIST DOCD IN RCRD: CPT | Mod: S$GLB,,, | Performed by: PSYCHIATRY & NEUROLOGY

## 2017-06-14 PROCEDURE — 99999 PR PBB SHADOW E&M-EST. PATIENT-LVL III: CPT | Mod: PBBFAC,,, | Performed by: PSYCHIATRY & NEUROLOGY

## 2017-06-14 PROCEDURE — 1125F AMNT PAIN NOTED PAIN PRSNT: CPT | Mod: S$GLB,,, | Performed by: PSYCHIATRY & NEUROLOGY

## 2017-06-14 NOTE — PROGRESS NOTES
[facility] - NEUROLOGY  [facility] [place]    Date: June 14, 2017   Patient Name: Mila Foster   MRN: [id number]   PCP: Dinesh Connors  Referring Provider: Dinesh Connors MD    Assessment:      This is Mila Foster, 72 y.o. female with lumbar radiculopathy and mild attention difficulties most likely related to ongoing stressors and mood troubles.  Recent MRI L-spine with mild degenerative changes only.     Plan:      -  Continue gabapentin to 600/300/600  -  Encouraged follow up with [facility]  -  Encouraged use of cane       I discussed side effects of the medications. I asked the patient to  stop the medication if She notices serious adverse effects as we discussed and to seek immediate medical attention at an ER.     Dami Parra MD  [facility]   Department of Neurology    Subjective:   Good effect of GBP, LEONEL x2 with some relief, often feels herself leaning forward while walking but no falls  Recent dx IBS with good effect of medications  Plans to start [employer] [facility] and begin water aerobics    2/2017  Primary concern on this visit is centered on back pain and sciatica.  Some improvement since injections this month but continued pain, notably with prolonged sitting.  Notices herself lean forward when walking.  Currently on leave from work due to restrictions with lifting.  Memory troubles largely unchanged.     HPI 11/2016:   Ms. Mila Foster is a 72 y.o. female with HTN and DM with related neuropathy presents for memory trouble     She has noticed trouble completing sentences and reversing word order over the past several months but does not think other people have appreciated this very much. She works at a  center and does not feel that her problems interfere with her work. She does note significant stress related to one of her children going through a divorce and younger co workers leaving additional work for her to do. She began  taking fluoxetine a few months ago with some improvement noted. She lives alone and continues to manage her own finances and drive without accidents or trouble getting lost. She has some difficulty with sleep related to crawling sensation in right much more than left leg which improves if she gets up and walks around, takes gabapentin at bed time only with mild relief. She completed high school.     Over the past few months she has also notices low back pain radiating down the right leg which is frequently brought on by prolonged sitting, gabapentin has not helped with this. Also notes pain in R>L knee.    PAST MEDICAL HISTORY:  Past Medical History:   Diagnosis Date    Diabetes     Hypertension        PAST SURGICAL HISTORY:  Past Surgical History:   Procedure Laterality Date    CHOLECYSTECTOMY      HYSTERECTOMY         CURRENT MEDS:  Current Outpatient Prescriptions   Medication Sig Dispense Refill    amlodipine (NORVASC) 10 MG tablet 10 mg.  1    aspirin (ECOTRIN) 81 MG EC tablet 81 mg.  2    atenolol (TENORMIN) 50 MG tablet 50 mg.  1    gabapentin (NEURONTIN) 300 MG capsule Take 2 tabs in the morning, 1 tab in the afternoon, and 2 tabs in the evening 150 capsule 5    glipiZIDE (GLUCOTROL) 5 MG tablet 5 mg.  1    hydrochlorothiazide (HYDRODIURIL) 25 MG tablet 25 mg.  1    lisinopril (PRINIVIL,ZESTRIL) 40 MG tablet 40 mg.  1    metformin (GLUCOPHAGE-XR) 500 MG 24 hr tablet 500 mg.  1    mirabegron (MYRBETRIQ) 25 mg Tb24 ER tablet Take 1 tablet (25 mg total) by mouth once daily. 30 tablet 11    naproxen (NAPROSYN) 500 MG tablet   1    omeprazole (PRILOSEC) 20 MG capsule 20 mg.  1    pravastatin (PRAVACHOL) 40 MG tablet 40 mg.  1    hyoscyamine (ANASPAZ,LEVSIN) 0.125 mg Tab Take 1 tablet (125 mcg total) by mouth every 4 (four) hours as needed. 30 tablet 6     No current facility-administered medications for this visit.        ALLERGIES:  Review of patient's allergies indicates:  No Known  "Allergies    FAMILY HISTORY:  Family History   Problem Relation Age of Onset    Vaginal cancer Neg Hx     Endometrial cancer Neg Hx     Cervical cancer Neg Hx        SOCIAL HISTORY:  Social History   Substance Use Topics    Smoking status: Never Smoker    Smokeless tobacco: Not on file    Alcohol use Not on file       Review of Systems:  12 review of systems is negative except for the symptoms mentioned in HPI.        Objective:     Vitals:    06/14/17 1045   BP: 120/61   Pulse: 88   Weight: 118.4 kg (261 lb 0.4 oz)   Height: 5' 7" (1.702 m)       General: NAD, well nourished   Eyes: no tearing, discharge, no erythema   ENT: moist mucous membranes of the oral cavity, nares patent    Neck: Supple, full range of motion  Cardiovascular: Warm and well perfused, pulses equal and symmetrical  Lungs: Normal work of breathing, normal chest wall excursions  Skin: No rash, lesions, or breakdown on exposed skin  Psychiatry: Mood and affect are appropriate   Abdomen: soft, non tender, non distended  Extremeties: No cyanosis, clubbing or edema.    Neurological   MENTAL STATUS: Alert and oriented to person, place, and time. Attention and concentration within normal limits. Speech without dysarthria, able to name and repeat without difficulty. Recent and remote memory within normal limits   CRANIAL NERVES: Visual fields intact. PERRL. EOMI. Facial sensation intact. Face symmetrical. Hearing grossly intact. Full shoulder shrug bilaterally. Tongue protrudes midline   SENSORY: Sensation is intact to light touch throughout.    MOTOR: Normal bulk and tone. No pronator drift.  5/5 deltoid, biceps, triceps, interosseous, hand  bilaterally. 5/5 iliopsoas, knee extension/flexion, foot dorsi/plantarflexion bilaterally.    CEREBELLAR/COORDINATION/GAIT: Gait steady with normal arm swing and stride length.    "

## 2017-06-14 NOTE — LETTER
June 14, 2017      Dinesh Connors MD  [address] [facility] [address]           [facility]  [address]  [facility] [address]  Phone: [phone]  Fax: [phone]          Patient: Mila Foster   MR Number: [id number]   YOB: 1945   Date of Visit: 6/14/2017       Dear Dr. Dinesh Connors:    Thank you for referring Mila Foster to me for evaluation. Attached you will find relevant portions of my assessment and plan of care.    If you have questions, please do not hesitate to call me. I look forward to following Mila Foster along with you.    Sincerely,    Dami Parra MD    Enclosure  CC:  No Recipients    If you would like to receive this communication electronically, please contact [email or web address] or [phone] to request more information on [facility] [employer] access.    For providers and/or their staff who would like to refer a patient to [facility], please contact us through our one-stop-shop provider referral line, [facility], at [phone].    If you feel you have received this communication in error or would no longer like to receive these types of communications, please e-mail [email or web address]

## 2017-06-20 ENCOUNTER — OFFICE VISIT (OUTPATIENT)
Dept: UROGYNECOLOGY | Facility: CLINIC | Age: 72
End: 2017-06-20
Payer: MEDICARE

## 2017-06-20 ENCOUNTER — CLINICAL SUPPORT (OUTPATIENT)
Dept: REHABILITATION | Facility: OTHER | Age: 72
End: 2017-06-20
Attending: OBSTETRICS & GYNECOLOGY
Payer: MEDICARE

## 2017-06-20 VITALS
HEIGHT: 67 IN | WEIGHT: 266.31 LBS | DIASTOLIC BLOOD PRESSURE: 60 MMHG | BODY MASS INDEX: 41.8 KG/M2 | SYSTOLIC BLOOD PRESSURE: 110 MMHG

## 2017-06-20 DIAGNOSIS — N39.41 URGE INCONTINENCE: Primary | ICD-10-CM

## 2017-06-20 DIAGNOSIS — R15.1 FECAL SMEARING: ICD-10-CM

## 2017-06-20 DIAGNOSIS — R35.1 NOCTURIA: ICD-10-CM

## 2017-06-20 DIAGNOSIS — R39.15 URINARY URGENCY: ICD-10-CM

## 2017-06-20 PROCEDURE — 99999 PR PBB SHADOW E&M-EST. PATIENT-LVL III: CPT | Mod: PBBFAC,,, | Performed by: OBSTETRICS & GYNECOLOGY

## 2017-06-20 PROCEDURE — 1159F MED LIST DOCD IN RCRD: CPT | Mod: S$GLB,,, | Performed by: OBSTETRICS & GYNECOLOGY

## 2017-06-20 PROCEDURE — 99214 OFFICE O/P EST MOD 30 MIN: CPT | Mod: S$GLB,,, | Performed by: OBSTETRICS & GYNECOLOGY

## 2017-06-20 PROCEDURE — 1126F AMNT PAIN NOTED NONE PRSNT: CPT | Mod: S$GLB,,, | Performed by: OBSTETRICS & GYNECOLOGY

## 2017-06-20 PROCEDURE — 97112 NEUROMUSCULAR REEDUCATION: CPT | Mod: PO

## 2017-06-20 RX ORDER — NEOMYCIN SULFATE, POLYMYXIN B SULFATE, AND DEXAMETHASONE 3.5; 10000; 1 MG/G; [USP'U]/G; MG/G
OINTMENT OPHTHALMIC
COMMUNITY
Start: 2017-05-16 | End: 2017-06-20

## 2017-06-20 NOTE — PATIENT INSTRUCTIONS
Home Exercises: 6/20/17    Kegals in Supine:    1. Lie comfortably and practice diaphragmatic breathing. Allow your body to relax completely and begin to connect with your pelvis and your pelvic muscles. 5 minutes.    2. Squeeze and lift your pelvic floor muscles as if trying not to pee. Hold for 5 seconds, count out loud.    3. Relax and drop your pelvic floor muscles. Take you time to do this. Once you feel you have let go of these muscles/relaxed, take a deep breath in and a deep breath out to reinforce this relaxed state.     4. Then continue. 10 times, 2 times per day.

## 2017-06-20 NOTE — PROGRESS NOTES
Subjective:       Patient ID: Mila Foster is a 72 y.o. female.    Chief Complaint:  Follow-up      History of Present Illness: 72 Y O F with history of HTN, DM with neuropathy being managed by with Dr. Saeed ( pain management ) and Dr. Martinez ( neurology)  For lumbar disc herniation with radiculopathy, She is s/p two epidural injections however the most recent one was canceled due to severe diarrhea and urinary incontinence. She was referred by Dr. Saeed for consultation. She had colonoscopy last year which was normal.  She has had urinary incontinence more than 5 years, but notes worsening in the past two year. At the initial visit we started Mybetriq which has helped the urinary urgency but she continence to have bothersome incontinence.     HPI   [facility] Peq Urogyn Hpi      Question    4/17/2017 10:38 AM CDT     General Urogynecology: Are you experiencing the following?       Dysuria (painful urination)  No     Nocturia:  waking up at night to empty your bladder   Yes     If you answered yes to the previous question, how many times does this happen per night?  1-2     Enuresis (urine loss during sleep)  No     Dribbling urine after you urinate  No     Hematuria (urine appears red)  No     Type of stream  Strong     Urinary Incontinence (General): Are you experiencing the following?       Past consultation for incontinence: Have you ever seen someone for the evaluation of incontinence?  No     If you answered yes to the previous question, please select all the therapies you have tried.   None of the above     Please note the effectiveness of the therapies.  Ineffective     Need to wear protection to keep clothes dry   Yes     If you answered yes to the previous question, please william the protection you use.   Pads       Diaper     If you wear protection, how much wetness is typically on each pad?  Moderate     If you wear protection, how often do you have to change per day, if applicable?   2     Stress  "Symptoms: Are you experiencing the following?       Leakage of urine with cough, laugh and/or sneeze  No     If you answered yes to the previous question, what is the frequency in days, weeks and/or months?  Never     Leakage of urine with sex  No     Leakage of urine with bending/ lifting  No     Leakage of urine with briskly walking or jogging  No     If you lose urine for any other reason not previously mentioned, please note it below, if applicable.        Urge Symptoms: Are you experiencing the following?       Urgency ("got to go" feeling)  Yes     Urge: How frequently do you feel an urge to urinate (feeling like you "gotta go" to the bathroom and can't wait)  Weekly     Do you experience a leakage of urine when you have a feeling of urgency?   Yes     Leakage of urine when unaware  No     Past use of anticholinergics (medications used to treat overactive bladder)  No     If you answered yes to the previous question, please william the anticholinergics you have used:        Have you ever used Mirbetriq (aka Mirabegron)?   No     Prolapse Symptoms: Are you experiencing any of the following?        Falling out/ Bulging/ Heaviness in the vagina  No     Vaginal/ Abdominal Pain/ Pressure  No     Need to strain/ Push to void  No     Need to wait on the toilet before you void  No     Unusual position to urinate (using your hands to push back the vaginal bulge)  No     Sensation of incomplete emptying  Yes     Past use of pessary device  No     If you answered yes to the previous question, please list the devices you have used below.        Bowel Symptoms: Are you experiencing any of the following?       Constipation  No     Diarrhea   Yes     Hematochezia (bloody stool)  No     Incomplete evacuation of stool  Yes     Involuntary loss of formed stool  Yes     Fecal smearing/urgency  Yes     Involuntary loss of gas  Yes     Vaginal Symptoms: Are you experiencing any of the following?        Abnormal vaginal bleeding   No "     Vaginal dryness  No     Sexually active   No     Dyspareunia (painful intercourse)  No     Estrogen use   No   HPI reviewed and confirmed with patient     GYN & OB History  No LMP recorded. Patient is postmenopausal.   Date of Last Pap: No result found    OB History    Para Term  AB Living   2        SAB TAB Ectopic Multiple Live Births             # Outcome Date GA Lbr Narendra/2nd Weight Sex Delivery Anes PTL Lv   2             1                  Past Medical History:   Diagnosis Date    Diabetes     Hypertension      Past Surgical History:   Procedure Laterality Date    CHOLECYSTECTOMY      HYSTERECTOMY       Review of patient's allergies indicates:  No Known Allergies      Current Outpatient Prescriptions:     amlodipine (NORVASC) 10 MG tablet, 10 mg., Disp: , Rfl: 1    aspirin (ECOTRIN) 81 MG EC tablet, 81 mg., Disp: , Rfl: 2    atenolol (TENORMIN) 50 MG tablet, 50 mg., Disp: , Rfl: 1    gabapentin (NEURONTIN) 300 MG capsule, Take 2 tabs in the morning, 1 tab in the afternoon, and 2 tabs in the evening, Disp: 150 capsule, Rfl: 5    glipiZIDE (GLUCOTROL) 5 MG tablet, 5 mg., Disp: , Rfl: 1    hydrochlorothiazide (HYDRODIURIL) 25 MG tablet, 25 mg., Disp: , Rfl: 1    lisinopril (PRINIVIL,ZESTRIL) 40 MG tablet, 40 mg., Disp: , Rfl: 1    metformin (GLUCOPHAGE-XR) 500 MG 24 hr tablet, 500 mg., Disp: , Rfl: 1    mirabegron (MYRBETRIQ) 25 mg Tb24 ER tablet, Take 1 tablet (25 mg total) by mouth once daily., Disp: 30 tablet, Rfl: 11    naproxen (NAPROSYN) 500 MG tablet, , Disp: , Rfl: 1    omeprazole (PRILOSEC) 20 MG capsule, 20 mg., Disp: , Rfl: 1    pravastatin (PRAVACHOL) 40 MG tablet, 40 mg., Disp: , Rfl: 1    hyoscyamine (ANASPAZ,LEVSIN) 0.125 mg Tab, Take 1 tablet (125 mcg total) by mouth every 4 (four) hours as needed., Disp: 30 tablet, Rfl: 6      Review of Systems  Review of Systems   Constitutional: Negative.  Negative for activity change, appetite change, chills,  diaphoresis, fatigue, fever and unexpected weight change.   HENT: Negative.    Eyes: Negative.    Respiratory: Negative.  Negative for apnea, cough and wheezing.    Cardiovascular: Negative.  Negative for chest pain and palpitations.   Gastrointestinal: Positive for constipation. Negative for abdominal distention, abdominal pain, anal bleeding, blood in stool, diarrhea, nausea, rectal pain and vomiting.   Endocrine: Negative.    Genitourinary: Positive for frequency and urgency. Negative for decreased urine volume, difficulty urinating, dyspareunia, dysuria, enuresis, flank pain, genital sores, hematuria, menstrual problem, pelvic pain, vaginal bleeding, vaginal discharge and vaginal pain.   Musculoskeletal: Negative for back pain and gait problem.   Skin: Negative for color change, pallor, rash and wound.   Allergic/Immunologic: Negative for immunocompromised state.   Neurological: Negative.  Negative for dizziness and speech difficulty.   Hematological: Negative for adenopathy.   Psychiatric/Behavioral: Negative for agitation, behavioral problems, confusion and sleep disturbance.           Objective:     Physical Exam   Constitutional: She is oriented to person, place, and time. She appears well-developed.   HENT:   Head: Normocephalic and atraumatic.   Eyes: Conjunctivae and EOM are normal.   Neck: Normal range of motion. Neck supple.   Cardiovascular: Normal rate, regular rhythm, S1 normal, S2 normal, normal heart sounds and intact distal pulses.    Pulmonary/Chest: Effort normal and breath sounds normal. She exhibits no tenderness.   Abdominal: Soft. Bowel sounds are normal. She exhibits no distension and no mass. There is no hepatosplenomegaly, splenomegaly or hepatomegaly. There is no tenderness. There is no rigidity, no rebound, no guarding and no CVA tenderness. Hernia confirmed negative in the right inguinal area and confirmed negative in the left inguinal area.   Genitourinary: Pelvic exam was performed  with patient supine. Rectum normal, vagina normal, skenes normal and bartholins normal. Right labia normal and left labia normal. Urethra exhibits hypermobility. Urethra exhibits no urethral caruncle, no urethral diverticulum and no urethral mass. Right bartholin is not enlarged and not tender. Left bartholin is not enlarged and not tender. Rectal exam shows resting tone normal and active tone normal. Rectal exam shows no external hemorrhoid, no fissure, no tenderness, anal tone normal and no dovetailing. Guaiac negative stool. There is atrophy in the vagina. No foreign body, tenderness, bleeding, unspecified prolapse of vaginal walls, fistula, mesh exposure or lavator tenderness in the vagina. No vaginal discharge found. Right adnexum displays no tenderness. Left adnexum displays no tenderness. Cervix exhibits absence. Uterus is absent.   PVR: 45 ML  Empty cough stress test: Negative.  Kegel: 1/5    POP-Q  Aa: -2 Ba: -2 C: -5   GH: 3 PB: 2 TVL: 8   Ap: 0 Bp: 0 D:                      Musculoskeletal: Normal range of motion.   Lymphadenopathy:     She has no axillary adenopathy.        Right: No inguinal adenopathy present.        Left: No inguinal adenopathy present.   Neurological: She is alert and oriented to person, place, and time. She has normal strength and normal reflexes. Cranial nerves II through XII intact. No cranial nerve deficit.   Skin: Skin is warm, dry and intact.   Psychiatric: She has a normal mood and affect. Her speech is normal and behavior is normal. Judgment normal. Cognition and memory are normal.          Assessment:        1. Urge incontinence    2. Nocturia               Plan:          1.  Mixed urinary incontinence, now more stress predominant :   --Bladder diary for 3-7 days, write the number of times you go to the rest room and associated symptoms of urgency or leakage. Will fax this to us.   --Empty bladder every 3 hours.  Empty well: wait a minute, lean forward on toilet.    --Avoid  dietary irritants (see sheet).  Keep diary x 3-5 days to determine your irritants.  --KEGELS: do 10 in AM and 10 in PM, holding each x 10 seconds.  When you feel urge to go, STOP, KEGEL, and when urge has passed, then go to bathroom. Continue pelvic floor therapy .    --URGE:  Continue Myrbetriq 25 mg daily.  SE profile reviewed.       --STRESS:  Pessary vs. Sling discussed in detail, information and literature  given. Will need UDS to better evaluate the bladder. Will continue pelvic floor therapy for now if no improvement to call us so we can schedule SUDS.      2. Fecal incontinence: improved no recent episodes of incontinence   --Start Fiber  --Start PT   --Lomotil as needed   --continue antisosmotic .125 hyoscyamine   --We discussed benefits of SNS     3.  R/o IBS- diarrhea   --Recommend GI evaluation     4.  Morbid Obesity: discussed healthy eating habits, patient trying to loose weight on her own if unable then nutritional consult. Obesity has been shown to be an independent risk factor for urinary incontinence. Weight loss has been shown to decrease incidence of urinary incontinence significantly.     5. Prolapse:  Stage 2 posterior vaginal wall prolapse   --Daily pelvic floor exercises as assigned,   --Non surgical options discussed with patient    --Surgical options discussed such as posterior colporrhaphy.  Risks/ benefits/ alternatives/ succuss and failure rates were reviewed.     6. Chronic back pain  -- Managed  by Dr. Aidan Serrano   --Recommend Healthy Back     Approximately  30 min were spent in consult, 90 % in discussion.      Jude Fields DO  Female Pelvic Medicine and Reconstructive Surgery  [facility] [place], [place]

## 2017-06-20 NOTE — PROGRESS NOTES
Patient: Mila Foster   [employer] #: [id number]   Diagnosis:   Encounter Diagnoses   Name Primary?    Urinary urgency     Urge incontinence Yes    Fecal smearing       Date of start of care: 5/23/17  Date of treatment: 06/20/2017   Time in: 10:06  Time out: 11:00  Total treatment time: 54 minutes  # Visits: 3/6  Auth expiration: 6/26/17  POC expiration: 8/15/17  Outpatient Physical Therapy   Daily Note    Subjective     Pt states she saw Dr. Fields this morning and she is to continue with Myrbetrique and Lomotil. Pt states she did fill out a day or two of her bladder diary but she did not bring it today. Pt was also given compression stockings and is supposed to wear them daily per Dr. Fields. Pt states that she has an evaluation for Healthy Back tomorrow. Pt states she was compliant with her HEP but is not sure if she is coordinating correctly.     Pain: Current: 5/10 (back)     Objective     TREATMENT    Pt received individual therapeutic exercise to develop strength, coordination, endurance, motor control and core stability for 0 minutes including:    - Pt education regarding the confluence of obesity, back pain, and pelvic floor weakness/UI; discussed purpose of back injections in managing back pain     Pt received individual neuromuscular reeducation for 54 minutes including:    - Diaphragmatic breathing  - 2 x 10 x 5'' kegals with manual and verbal feedback; pt displays improved strength and coordination since her eval with 2/5 strength and 5'' holding ability; pt was encouraged to focus on relaxing and dropping in between her contractions when performing at home and to take a deep breath before performing her next kegal once she feels she has relaxed fully; pt demonstrated and verbalized understanding    Education: Discussed progression of plan of care with patient; educated pt in activity modification; instructed pt to continue with current HEP (DB and Z lying) with addition of 5'' kegals; pt  demonstrated and verbalized understanding and was provided with a handout. Pt was instructed to finish her bladder diary and to bring to her next visit.  No educational, cultural, or spiritual barriers to learning identified.    Assessment     Pt tolerated tx session well without visible adverse effects. Pt with improvement noted today in PFM strength and endurance demonstrating improved awareness of her pelvic muscles and improved neuromuscular control. Pt able to perform 5'' kegals today with verbal cueing. She displayed slow relaxation with tendency to perform her next kegal before dropping fully; pt was instructed to take her time while working at home and to make sure she is relaxing completely in between PFM contractions. Pt demonstrated and verbalized understanding. Pt continues to present with PFM atrophy with improving coordination of contraction; last visit she displayed difficulty with bulge; will follow up at her next session to assess coordination. Pt also presents with decreased abdominal, back and hip strength/decreased trunk and pelvic stability, poor fluid and food intake and decreased awareness of optimal bladder and bowel function. Pt will benefit from physcial therapy services in order to maximize pain free functional independence.     Will administer PFIQ-7 and update G-codes next session.    Pt is making good progress towards established goals.    GOALS  Short Term Goals: 4 weeks (6/20/17)  1. Pt will verbalize improved awareness of PFM activity as palpated by PT in order to improve activity involvement with HEP. Met 6/20/17  2. Pt will demonstrate decreased overflow muscle activity during PF muscle contraction. Met 6/20/17  3. Pt will demonstrated increase in PF muscle endurance to 3 sec in order to improved endurance and ability to perform urge suppression. Met 6/20/17  4. Pt will improve PFM strength to 2/5 in order to improve core strength and stability and to manage urinary and fecal  incontinence. Met 6/20/17  5. Pt will tolerate HEP to improve impairments and independence with ADL's. Met 6/20/17     Long Term Goals: 12 weeks (8/15/17)  1. Pt will demonstrate ability to perform PFM contraction at 3/5 strength with 5'' hold in order to improve PFM function and management of fecal and urinary symptoms.  2. Pt will report decrease in urinary leakage.   3. Pt will demonstrate appropriate and coordinated lift, drop and bulge in order to demonstrate improved motor control and awareness of her PFM.  4. Pt will be independent with HEP and self management.     5/23/17 (Eval)  UIQ-7   Score: 6 = 29%     CRAIQ-7  Score: 6 = 29%    Plan     Continue with established POC, working toward PT goals.

## 2017-06-21 ENCOUNTER — CLINICAL SUPPORT (OUTPATIENT)
Dept: REHABILITATION | Facility: OTHER | Age: 72
End: 2017-06-21
Attending: PHYSICAL MEDICINE & REHABILITATION
Payer: MEDICARE

## 2017-06-21 ENCOUNTER — CLINICAL SUPPORT (OUTPATIENT)
Dept: REHABILITATION | Facility: OTHER | Age: 72
End: 2017-06-21
Attending: PSYCHIATRY & NEUROLOGY
Payer: MEDICARE

## 2017-06-21 VITALS
WEIGHT: 264.81 LBS | SYSTOLIC BLOOD PRESSURE: 148 MMHG | BODY MASS INDEX: 41.56 KG/M2 | HEART RATE: 78 BPM | DIASTOLIC BLOOD PRESSURE: 90 MMHG | HEIGHT: 67 IN

## 2017-06-21 DIAGNOSIS — M51.36 DDD (DEGENERATIVE DISC DISEASE), LUMBAR: ICD-10-CM

## 2017-06-21 DIAGNOSIS — G89.29 CHRONIC MIDLINE LOW BACK PAIN WITHOUT SCIATICA: Primary | ICD-10-CM

## 2017-06-21 DIAGNOSIS — M54.50 CHRONIC MIDLINE LOW BACK PAIN WITHOUT SCIATICA: Primary | ICD-10-CM

## 2017-06-21 LAB — BACTERIA UR CULT: NO GROWTH

## 2017-06-21 PROCEDURE — 99204 OFFICE O/P NEW MOD 45 MIN: CPT | Mod: ,,, | Performed by: PHYSICAL MEDICINE & REHABILITATION

## 2017-06-21 NOTE — PROGRESS NOTES
Health  met with patient to complete initial outcomes for the Healthy Back lumbar program.  Questions were reviewed with patient and discussed with Dr. Barajas. The patient will meet with Dr. Barajas to determine program enrollment.   HealthyBack Questionnaire  6/21/2017   Please select the location of your worst pain:  Low back   Please select the location of any additional pain: (hold down the control key, and click to select multiple responses) Leg- Left, Leg- Right   Did any event trigger your pain?  No   How long has this pain been going on?  2 years- worsening in last year   Please indicate how the pain is changing.  Worsening   What is the WORST level of pain that you have experienced in the last week?  7   What is the LEAST level of pain that you have experienced in the past week?  1   What can you NOT do not that you used to be able to do?  Lifting   Are your limitations mainly due to your pain?  Yes   What are your additional complaints, if any? Burning, Weakness   Is there ever a time during the day when your pain stops, even for a brief moment, before returning? No   If yes, when your pain stops, does it disappear completely? Is it totally gone? No   Does bending forward make your typical pain worse? Yes   Morning: Better during   Afternoon: Better during   Evening:  Worse during   Nighttime: Worse during   Standing:  Worse   Lying on stomach: Same   Lying on back: Better   Sitting:  Worse   Walking: Worse   Climbing stairs: Worse   In the last seven days, have you had any changes in your bowel and/or bladder habits? Yes   Have all of your attempts to treat your back/leg pain resulted in failure?  No   Do you believe your doctor(s) do NOT understand how much pain you have?  No   Do you believe that you have one or more conditions that have not been diagnosed by your doctor(s)?  No   Do you believe that you deserve more understanding from others including your family than you are getting?  No   Do  you feel relatively calm about how your back/leg pain has impacted your life?  No   Are you OK with treatment taking a very long time (even years) before you feel relief from your back/leg pain?  Yes   Do you believe that your pain has caused you to be more forgetful?  Yes   Do you feel that you have not received enough treatment for your pain?  No   Do you believe that your doctor(s) do not have the right training to treat your back/leg pain effectively?  No   Do you believe you should not be allowed to work or attend school because of your back/leg pain?  Yes   When did this pain begin?  2 years   Did the pain begin after an injury or an accident? No   Is the pain work related?  No   Please william which of the following over-the-counter medicines you take. (hold down the control key, and click to select multiple responses) None   Please william which of the following prescription medicines you take. (hold down the control key, and click to select multiple responses) NSAIDS   Emergency department  No   Health care providers (hold down the control key, and click to select multiple responses) Family doctor, Pain management, Neurologist   Investigations done (hold down the control key, and click to select multiple responses) X-ray   Procedures (hold down the control key, and click to select multiple responses) Epidural steroid injections (LEONEL)   Emergency department  No   Health care providers (hold down the control key, and click to select multiple responses) Family doctor, Pain management, Neurologist   Investigations done (hold down the control key, and click to select multiple responses) X-ray   Procedures (hold down the control key, and click to select multiple responses) Epidural steroid injections (LEONEL)   Have you had any surgery on your back?  No   Please william what you are experiencing. (hold down the control key, and click to select multiple responses) Problems with urinary functions, Problems with bowel  functions, Palpitations, Arthritic joint pain, Muscle pain in arms or legs   First activity you would like to perform better:  Walking   Current ability score to perform first activity: 5   Second activity you would like to perform better: Bend/Stoop   Current ability score to perform second activity: 3   Third activity you would like to perform better: Lifting   Current ability score to perform third activity: 8   Pain intensity:  The pain comes and goes and is moderate.   Personal care (washing, dressing, etc.):  I do not normally change my way of washing or dressing even though it causes some pain.   Lifting: I can only life very light weights, at the most.   Walking: I cannot walk more than a half mile without increasing pain.   Sitting: Pain prevents me from sitting more than one hour.   Standing: Pain prevents me from standing more than 10 minutes.   Sleeping: I get pain in bed, but it does not prevent me from sleeping well.   Social life: My social life is normal but increases the degree of pain.   Traveling: I get some pain while traveling, but none of my usual forms of travel make it any worse.   Changing degree of pain: My pain is neither getting better nor worse.   Do you need any help looking after yourself? I need no help at all.   When doing household tasks, e.g., preparing food, gardening, using the video recorder, radio, telephone, or washing the car: I need no help at all.   Thinking about how easily you can get around your home and community: I get around my home and community by myself without any difficulty.   Because of your health, your relationships, e.g., your friends, partner or parents, generally: Are very close and warm   Thinking about your relationships with other people: Although I have friends, I am occasionally lonely.   Thinking about your health and your relationship with your  family:  My role in the family is unaffected by my health.   Thinking about your vision, including when  using your glasses or contact lenses if needed: I see normally.   Thinking about your hearing, including using your hearing aid if needed: I hear normally.   When you communicate with others, e.g., talking, listening, writing, or signing: I have no trouble speaking to them or understanding what they are saying.   Thinking about how you sleep: My sleep is interrupted some of the time, but I am usually able to go back to sleep without difficulty.   Thinking about how you generally feel: I am slightly anxious, worried or depressed.   How much pain or discomfort do you experience? I have moderate pain.   Little interest or pleasure in doing things Not at all   Feeling down, depressed or hopeless Not at all   What is your occupation?  Retired   How do you spend your leisure time? What are your hobbies? Watching tv, walking around the block, reading, having coffee   How do you spend your leisure time? What are your hobbies? Watching tv, walking around the block, reading, having coffee   What is your highest level of education? High school/GED   What is your work status? Retired   How did you hear about the Healthyback program?  Physician   When did this pain begin?  2 years

## 2017-06-21 NOTE — PROGRESS NOTES
Subjective:      Patient ID: Mila Foster is a 72 y.o. female.    Chief Complaint: No chief complaint on file.    Referred by: Dami Parra MD     Ms Foster is a 71 yo female sent by Dr. Parra  for evaluation for the [facility] lumbar program.  she has had low back pain for 2 years on and off worsening in the last year.  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 The pain is low back dominant, but present anterior and posterior thigh.  It does not go past the knee.  The pain is intermittent, ranging 0-7/10.  The back pain is sharp and shooting pain in the legs.  The back pain is more throbbing and achy pain.  It is worse with bending, stooping, lifting, sitting in a soft chair and standing.  It is better lying on her back.  She has tried walking but gets SOB, so is unsure if walking would effect her pain. She has not done PT for her back.  She feels like the injections only helped a couple of weeks.  She is currently doing therapy for pelvic floor, and she is interested in core exercises.  Her goals are to be able to stoop, walk, and lift with decreased pain.  Pattern 1    s/p bilateral S1 TF LEONEL on 2/8/2017. She reports 80% relief of her radiating leg pain on 3/6/2017  3/22 bilateral medial branch blocks with relief    MRI lumbar 12/16/2016  Lumbar spine alignment is within normal limits. The vertebral body heights are well maintained with no evidence of fracture. Intervertebral disc space narrowing and desiccation visualized again L5-S1 level. No marrow signal abnormality suspicious for an infiltrative process.      The conus is normal in appearance, and terminates at the L1-2 level.  The adjacent soft tissue structures show no significant abnormalities.        L1-L2: There is no focal disc herniation. No significant spinal canal or neural foraminal narrowing.    L2-L3: There is no focal disc herniation. No significant spinal canal or  neural foraminal narrowing.    L3-L4: Mild bilateral facet arthropathy.  No focal disc herniation. No significant spinal canal or neural foraminal narrowing.    L4-L5: Mild disc bulge and bilateral facet arthropathy results in mild right-sided neural foraminal narrowing. No significant spinal canal stenosis.    L5-S1: Diffuse disc bulge with small superimposed right foraminal disc protrusion.  Findings result in moderate right-sided neuroforaminal narrowing with likely abutment/impingement upon the exited right L5 nerve root. No significant spinal canal stenosis.  Small posterior annular fissure noted.  Impression        Lumbar spondylosis as detailed above, most significant for small right foraminal protrusion at the L5-S1 level resulting in moderate neuroforaminal narrowing and likely abutment/impingement upon the exited right L5 nerve root.     Past Medical History:  No date: Diabetes  No date: Hypertension    Past Surgical History:  No date: CHOLECYSTECTOMY  No date: HYSTERECTOMY    Review of patient's family history indicates:    Vaginal cancer                 Neg Hx                    Endometrial cancer             Neg Hx                    Cervical cancer                Neg Hx                      Social History    Marital status: Single              Spouse name:                       Years of education:                 Number of children:               Social History Main Topics    Smoking status: Never Smoker                                                                  Current Outpatient Prescriptions:  amlodipine (NORVASC) 10 MG tablet, 10 mg., Disp: , Rfl: 1  aspirin (ECOTRIN) 81 MG EC tablet, 81 mg., Disp: , Rfl: 2  atenolol (TENORMIN) 50 MG tablet, 50 mg., Disp: , Rfl: 1  gabapentin (NEURONTIN) 300 MG capsule, Take 2 tabs in the morning, 1 tab in the afternoon, and 2 tabs in the evening, Disp: 150 capsule, Rfl: 5  glipiZIDE (GLUCOTROL) 5 MG tablet, 5 mg., Disp: , Rfl: 1  hydrochlorothiazide  (HYDRODIURIL) 25 MG tablet, 25 mg., Disp: , Rfl: 1  hyoscyamine (ANASPAZ,LEVSIN) 0.125 mg Tab, Take 1 tablet (125 mcg total) by mouth every 4 (four) hours as needed., Disp: 30 tablet, Rfl: 6  lisinopril (PRINIVIL,ZESTRIL) 40 MG tablet, 40 mg., Disp: , Rfl: 1  metformin (GLUCOPHAGE-XR) 500 MG 24 hr tablet, 500 mg., Disp: , Rfl: 1  mirabegron (MYRBETRIQ) 25 mg Tb24 ER tablet, Take 1 tablet (25 mg total) by mouth once daily., Disp: 30 tablet, Rfl: 11  naproxen (NAPROSYN) 500 MG tablet, , Disp: , Rfl: 1  omeprazole (PRILOSEC) 20 MG capsule, 20 mg., Disp: , Rfl: 1  pravastatin (PRAVACHOL) 40 MG tablet, 40 mg., Disp: , Rfl: 1    No current facility-administered medications for this visit.       Review of patient's allergies indicates:  No Known Allergies                Review of Systems   Constitution: Negative for weight gain and weight loss.   Cardiovascular: Negative for chest pain.   Respiratory: Negative for shortness of breath.    Musculoskeletal: Positive for back pain. Negative for joint pain and joint swelling.   Gastrointestinal: Negative for abdominal pain and bowel incontinence.   Genitourinary: Negative for bladder incontinence.   Neurological: Negative for numbness.           Objective:          General    Vitals reviewed.  Constitutional: She is oriented to person, place, and time. She appears well-developed and well-nourished.   HENT:   Head: Normocephalic and atraumatic.   Pulmonary/Chest: Effort normal.   Neurological: She is alert and oriented to person, place, and time.   Psychiatric: She has a normal mood and affect. Her behavior is normal. Judgment and thought content normal.     General Musculoskeletal Exam   Gait: abnormal     Right Ankle/Foot Exam     Tests   Heel Walk: able to perform  Tiptoe Walk: able to perform    Left Ankle/Foot Exam     Tests   Heel Walk: able to perform  Tiptoe Walk: able to perform  Back (L-Spine & T-Spine) / Neck (C-Spine) Exam     Tenderness Posterior midline palpation  reveals tenderness of the Sacrum (no tenderness but points to tailbone).     Back (L-Spine & T-Spine) Range of Motion   Extension: 0   Flexion: 80   Lateral Bend Right: 10   Lateral Bend Left: 10   Rotation Right: 30   Rotation Left: 30     Spinal Sensation   Right Side Sensation  C-Spine Level: normal   L-Spine Level: normal  S-Spine Level: normal  Left Side Sensation  C-Spine Level: normal  L-Spine Level: normal  S-Spine Level: normal    Back (L-Spine & T-Spine) Tests   Right Side Tests  Straight leg raise:      Sitting SLR: > 70 degrees      Left Side Tests  Straight leg raise:     Sitting SLR: > 70 degrees          Other She has no scoliosis .  Spinal Kyphosis:  Absent      Muscle Strength   Right Upper Extremity   Biceps: 5/5/5   Deltoid:  5/5  Triceps:  5/5  Wrist Extension: 5/5/5   Finger Flexors:  5/5  Left Upper Extremity  Biceps: 5/5/5   Deltoid:  5/5  Triceps:  5/5  Wrist Extension: 5/5/5   Finger Flexors:  5/5  Right Lower Extremity   Hip Flexion: 5/5   Quadriceps:  5/5   Anterior tibial:  5/5/5  EHL:  5/5  Left Lower Extremity   Hip Flexion: 5/5   Quadriceps:  5/5   Anterior tibial:  5/5/5   EHL:  5/5    Reflexes     Left Side  Biceps:  2+  Triceps:  2+  Brachioradialis:  2+  Quadriceps:  2+  Achilles:  2+  Left Mace's Sign:  Absent  Babinski Sign:  absent    Right Side   Biceps:  2+  Triceps:  2+  Brachioradialis:  2+  Quadriceps:  2+  Achilles:  2+  Right Mace's Sign:  absent  Babinski Sign:  absent    Vascular Exam     Right Pulses        Carotid:                  2+    Left Pulses        Carotid:                  2+        Assessment:       Encounter Diagnoses   Name Primary?    Chronic midline low back pain without sciatica Yes    DDD (degenerative disc disease), lumbar          Plan:       Diagnoses and all orders for this visit:    Chronic midline low back pain without sciatica  -     Ambulatory Referral to Physical/Occupational Therapy    DDD (degenerative disc disease), lumbar  -      Ambulatory Referral to Physical/Occupational Therapy         The patient has had a history of low back pain with limitations in there activities of Daily living    Previous treatment has not provided relief.    The situation was discussed at length with the patient.  More than 50% of the total time of 45 minutes was spent in counseling.  We discussed different causes of back pain and different treatment options.  We discussed the importance of stretching and strengthening.  We discussed posture.  We discussed the pros and cons of further diagnostic testing, alternative treatment and Medications.     Based on the history, physical exam, and functional index, an active physical therapy program is recommended.  The goal is to restore the patients function and reduce pain.  A program of progressive resistance exercises, biomechanical, and mobility maneuvers, instructions in proper body mechanics, aerobic conditioning and HEP will be utilized. The program will continue as long as making improvements.  We discussed the value of exercise, and strengthening.  We discussed starting a walking program gradually.      An assessment of patients progress will be made at each visit to document change in status.    The patient will be actively involved in there own treatment, and responsible for appointments and home program    The patient's lumbar isometric strength will be tested and they will be placed in a program of isolated strength training based on 50% of their total functional torque and advanced as clinically appropriate.      Directional preference of pain will further influence the patients active rehabilitation program    The patient was instructed there might be an initial increase in discomfort    They are enrolled with good prognosis  Pattern 1  We discussed the goal to function better, and not to limit activity

## 2017-06-27 ENCOUNTER — CLINICAL SUPPORT (OUTPATIENT)
Dept: REHABILITATION | Facility: OTHER | Age: 72
End: 2017-06-27
Attending: OBSTETRICS & GYNECOLOGY
Payer: MEDICARE

## 2017-06-27 DIAGNOSIS — R15.1 FECAL SMEARING: ICD-10-CM

## 2017-06-27 DIAGNOSIS — R39.15 URINARY URGENCY: ICD-10-CM

## 2017-06-27 DIAGNOSIS — N39.41 URGE INCONTINENCE: Primary | ICD-10-CM

## 2017-06-27 PROCEDURE — 97110 THERAPEUTIC EXERCISES: CPT | Mod: PO

## 2017-06-27 PROCEDURE — G8990 OTHER PT/OT CURRENT STATUS: HCPCS | Mod: CJ,PO

## 2017-06-27 PROCEDURE — G8991 OTHER PT/OT GOAL STATUS: HCPCS | Mod: CI,PO

## 2017-06-27 NOTE — PROGRESS NOTES
Patient: Mila Foster   [facility] #: [id number]   Diagnosis:   Encounter Diagnoses   Name Primary?    Urinary urgency     Urge incontinence Yes    Fecal smearing       Date of start of care: 5/23/17  Date of treatment: 06/27/2017   Time in: 9:55  Time out: 10:55  Total treatment time: 60 minutes  # Visits: 4 (1/3)  Auth expiration: 7/20/17  POC expiration: 8/15/17  Outpatient Physical Therapy   Daily Note    Subjective     Pt states she is doing ok this morning. She was in the ER last night with her son who has a hernia so she didn't get a lot of sleep last night. Pt reports that her urinary leakage doesn't seem to be occurring as frequently. Pt denies colon leakage; she states that she will still have diarrhea when she doesn't eat until later in the day. Pt has an eval with [facility] on 7/7.     Pain: Current: 5/10 (back)     Objective     TREATMENT    Pt received individual therapeutic exercise to develop strength, coordination, endurance, motor control and core stability for 60 minutes including:    - 2 x 10 x 5'' kegals in supine with SEMG assist; pt displays normal resting baseline, fair WR rise, decreased holding ability and complete derecruitment  - Instruction in dying bug  - Supine marching x 10 ea    Education: Discussed progression of plan of care with patient; educated pt in activity modification; instructed pt to continue with current HEP (DB, Z lying and 5'' kegals); pt was instructed to increase kegals to 30/day broken into sets of 10 as well as dead bug and supine march; demonstrated and verbalized understanding and was provided with a handout. We also discussed the importance of eating breakfast. Pt displays good understanding of all education provided.  No educational, cultural, or spiritual barriers to learning identified.    Assessment     Pt tolerated tx session well without visible adverse effects. Pt continues to display decreased strength and endurance of her PFM and requires verbal cueing  to take time to relax between contractions, though good carry over during exercises today and good improvement in recruitment. Pt also presents with decreased abdominal, back and hip strength/decreased trunk and pelvic stability, and decreased awareness of optimal bladder and bowel function. Pt with improved water intake. Pt will benefit from physcial therapy services in order to maximize pain free functional independence.     Pt is making good progress towards established goals.    GOALS  Short Term Goals: 4 weeks (6/20/17)  1. Pt will verbalize improved awareness of PFM activity as palpated by PT in order to improve activity involvement with HEP. Met 6/20/17  2. Pt will demonstrate decreased overflow muscle activity during PF muscle contraction. Met 6/20/17  3. Pt will demonstrated increase in PF muscle endurance to 3 sec in order to improved endurance and ability to perform urge suppression. Met 6/20/17  4. Pt will improve PFM strength to 2/5 in order to improve core strength and stability and to manage urinary and fecal incontinence. Met 6/20/17  5. Pt will tolerate HEP to improve impairments and independence with ADL's. Met 6/20/17     Long Term Goals: 12 weeks (8/15/17)  1. Pt will demonstrate ability to perform PFM contraction at 3/5 strength with 5'' hold in order to improve PFM function and management of fecal and urinary symptoms.  2. Pt will report decrease in urinary leakage.   3. Pt will demonstrate appropriate and coordinated lift, drop and bulge in order to demonstrate improved motor control and awareness of her PFM.  4. Pt will be independent with HEP and self management.     5/23/17 (Eval)   6/27/17  UIQ-7   Score: 6 = 29%  Score: 5 = 24%     CRAIQ-7  Score: 6 = 29%  Score: 5 = 24%    Plan     Continue with established POC, working toward PT goals.

## 2017-07-17 ENCOUNTER — CLINICAL SUPPORT (OUTPATIENT)
Dept: REHABILITATION | Facility: HOSPITAL | Age: 72
End: 2017-07-17
Attending: OBSTETRICS & GYNECOLOGY
Payer: MEDICARE

## 2017-07-17 DIAGNOSIS — R39.15 URINARY URGENCY: ICD-10-CM

## 2017-07-17 DIAGNOSIS — R15.1 FECAL SMEARING: ICD-10-CM

## 2017-07-17 DIAGNOSIS — N39.41 URGE INCONTINENCE: Primary | ICD-10-CM

## 2017-07-17 PROCEDURE — 97110 THERAPEUTIC EXERCISES: CPT | Mod: PN

## 2017-07-17 NOTE — PROGRESS NOTES
"Patient: Mila Foster   [facility] #: [id number]   Diagnosis:   Encounter Diagnoses   Name Primary?    Urinary urgency     Urge incontinence Yes    Fecal smearing       Date of start of care: 5/23/17  Date of treatment: 07/17/2017   Time in: 11:05  Time out: 12:10  Total treatment time: 65 minutes  Visit: 5 (2/3)  Auth expiration: 7/20/17  POC expiration: 8/15/17  Outpatient Physical Therapy   Daily Note    Subjective     Pt states she had her appointment with [facility] scheduled but her transportation never came so she had to reschedule and couldn't get back in for two weeks so is disappointed. Pt reports she doesn't feel well today, sore throat and a HA, so hopes she isn't coming down with anything. Her knees are also bothering her because of arthritis. Pt states she has been doing her HEP but not as often as she should, she states she tries to do it every other day. She reports she has been able to make it to the bathroom in time for the most part, "unless I have a really full bladder." Pt reports "I haven't had as much leakage, not like before." Pt denies fecal leakage since our last visit and states that she realized that it usually occurs if she goes for a long period without eating. She has started eating breakfast and tries not to go for too long without eating in order to minimize/prevent accidents.    Pain: Current: 0/10    Objective     TREATMENT    Pt received individual therapeutic exercise to develop strength, coordination, endurance, motor control and core stability for 60 minutes including:    - 10 x 5'' kegals in supine; pt displays good carry over from last session (independently pauses between kegals to relax and take a breath); good recruitment, fair holding ability, complete derecruitment  - 10 quick flicks with verbal cueing; pt instructed to use quick flicks for bladder suppression when she needs to get to the bathroom with a full bladder  - Dying bug x 10 ea  - Bridges 2 x 10  - Supine " clams 2 x 10    Education: Discussed progression of plan of care with patient; educated pt in activity modification; instructed pt to continue with current HEP (DB, Z lying and 5'' kegals x 30/day broken into sets of 10 as well as dead bug and supine march; added bridges and clams today; pt demonstrated and verbalized understanding and was provided with a handout. Pt was encouraged to perform her HEP daily and to continue eating breakfast every day. Pt displays good understanding of all education provided.  No educational, cultural, or spiritual barriers to learning identified.    Assessment     Pt tolerated tx session well without visible adverse effects. Pt with good carry over from her last tx session in terms of her PFM exercises as well as making lifestyle change including eating breakfast and trying to eat more consistently throughout the day; pt continues to voice concern about her weight and states she has been procrastinating on doing something to get started with weight loss. Pt continues to display decreased strength and endurance of her PFM as well as decreased abdominal, back and hip strength/decreased trunk and pelvic stability, and decreased awareness of optimal bladder and bowel function. Pt with improved water intake. Pt will benefit from physcial therapy services in order to maximize pain free functional independence.     Pt is making good progress towards established goals.    GOALS  Short Term Goals: 4 weeks (6/20/17)  1. Pt will verbalize improved awareness of PFM activity as palpated by PT in order to improve activity involvement with HEP. Met 6/20/17  2. Pt will demonstrate decreased overflow muscle activity during PF muscle contraction. Met 6/20/17  3. Pt will demonstrated increase in PF muscle endurance to 3 sec in order to improved endurance and ability to perform urge suppression. Met 6/20/17  4. Pt will improve PFM strength to 2/5 in order to improve core strength and stability and to  manage urinary and fecal incontinence. Met 6/20/17  5. Pt will tolerate HEP to improve impairments and independence with ADL's. Met 6/20/17     Long Term Goals: 12 weeks (8/15/17)  1. Pt will demonstrate ability to perform PFM contraction at 3/5 strength with 5'' hold in order to improve PFM function and management of fecal and urinary symptoms.  2. Pt will report decrease in urinary leakage. Met 7/17/17 (pt reports leakage x ~1x/week)  3. Pt will demonstrate appropriate and coordinated lift, drop and bulge in order to demonstrate improved motor control and awareness of her PFM.  4. Pt will be independent with HEP and self management.     5/23/17 (Eval)   6/27/17  UIQ-7   Score: 6 = 29%  Score: 5 = 24%     CRAIQ-7  Score: 6 = 29%  Score: 5 = 24%    Plan     Continue with established POC, working toward PT goals.

## 2017-07-24 ENCOUNTER — CLINICAL SUPPORT (OUTPATIENT)
Dept: REHABILITATION | Facility: HOSPITAL | Age: 72
End: 2017-07-24
Attending: OBSTETRICS & GYNECOLOGY
Payer: MEDICARE

## 2017-07-24 DIAGNOSIS — N39.41 URGE INCONTINENCE: Primary | ICD-10-CM

## 2017-07-24 DIAGNOSIS — R39.15 URINARY URGENCY: ICD-10-CM

## 2017-07-24 DIAGNOSIS — R15.1 FECAL SMEARING: ICD-10-CM

## 2017-07-24 PROCEDURE — 97110 THERAPEUTIC EXERCISES: CPT | Mod: PN

## 2017-07-24 NOTE — PROGRESS NOTES
Patient: Mila Foster   [facility] #: [id number]   Diagnosis:   Encounter Diagnoses   Name Primary?    Urinary urgency     Urge incontinence Yes    Fecal smearing       Date of start of care: 5/23/17  Date of treatment: 07/24/2017   Time in: 10:05  Time out: 11:10  Total treatment time: 65 minutes   Visit: 6 (3/3)  Auth expiration: 7/20/17  POC expiration: 8/15/17  Outpatient Physical Therapy   Daily Note    Subjective     Pt states she is doing well, her exercises are going well. She continues to have difficulty with coordination for dead bug and is doing 20 kegals per day.    Pain: Current: 0/10    Objective     TREATMENT    Pt received individual therapeutic exercise to develop strength, coordination, endurance, motor control and core stability for 60 minutes including:    - Nustep x 6 min  - Side lying clams 2 x 10, 3'' hold, RTB  - Bridges 2 x 10  - Dead bug x 2 min  - Supine ball squeeze 20 x 3''  - Leg press 2 x 10, 25#  - Treadmill, 1.0 mph x 5 min  - Supine with wedge x 5 min    Not performed:  - 10 x 5'' kegals in supine; pt displays good carry over from last session (independently pauses between kegals to relax and take a breath); good recruitment, fair holding ability, complete derecruitment  - 10 quick flicks with verbal cueing; pt instructed to use quick flicks for bladder suppression when she needs to get to the bathroom with a full bladder    Education: Discussed progression of plan of care with patient; educated pt in activity modification; instructed pt to continue with current HEP (DB, Z lying and 5'' kegals x 30/day broken into sets of 10 as well as dead bug, supine march, and bridges; progressed clams to side lying and added ball squeeze; pt demonstrated and verbalized understanding and was provided with a handout. Pt was instructed to increase kegals to 30x/day. We also discussed beginning a walking program and pt was encouraged to begin walking for 5-10 minutes twice/day; will follow-up. Pt  displays good understanding of all education provided.  No educational, cultural, or spiritual barriers to learning identified.    Assessment     Pt tolerated tx session well without visible adverse effects. Pt has made good progress overall with improvement in her symptoms. Pt with good tolerance of progression of core strengthening and aerobic exercise today. She continues to present with decreased strength and endurance of her PFM as well as decreased abdominal, back and hip strength/decreased trunk and pelvic stability, decreased awareness of optimal bladder and bowel function, decreased CV endurance and decreased functional mobility and strength. Pt with improved water intake. Pt will benefit from physcial therapy services in order to maximize pain free functional independence.     Pt is making good progress towards established goals.    GOALS  Short Term Goals: 4 weeks (6/20/17)  1. Pt will verbalize improved awareness of PFM activity as palpated by PT in order to improve activity involvement with HEP. Met 6/20/17  2. Pt will demonstrate decreased overflow muscle activity during PF muscle contraction. Met 6/20/17  3. Pt will demonstrated increase in PF muscle endurance to 3 sec in order to improved endurance and ability to perform urge suppression. Met 6/20/17  4. Pt will improve PFM strength to 2/5 in order to improve core strength and stability and to manage urinary and fecal incontinence. Met 6/20/17  5. Pt will tolerate HEP to improve impairments and independence with ADL's. Met 6/20/17     Long Term Goals: 12 weeks (8/15/17)  1. Pt will demonstrate ability to perform PFM contraction at 3/5 strength with 5'' hold in order to improve PFM function and management of fecal and urinary symptoms.  2. Pt will report decrease in urinary leakage. Met 7/17/17 (pt reports leakage x ~1x/week)  3. Pt will demonstrate appropriate and coordinated lift, drop and bulge in order to demonstrate improved motor control and  awareness of her PFM.  4. Pt will be independent with HEP and self management.     5/23/17 (Eval)   6/27/17  UIQ-7   Score: 6 = 29%  Score: 5 = 24%     CRAIQ-7  Score: 6 = 29%  Score: 5 = 24%    Plan     Continue with established POC, working toward PT goals.

## 2017-07-27 ENCOUNTER — TELEPHONE (OUTPATIENT)
Dept: UROGYNECOLOGY | Facility: CLINIC | Age: 72
End: 2017-07-27

## 2017-07-27 DIAGNOSIS — R39.15 URINARY URGENCY: ICD-10-CM

## 2017-07-27 DIAGNOSIS — N39.41 URGE INCONTINENCE: Primary | ICD-10-CM

## 2017-07-27 DIAGNOSIS — R35.1 NOCTURIA: ICD-10-CM

## 2017-07-27 NOTE — TELEPHONE ENCOUNTER
Referral order is requested for continuation of therapy. Please advise            Hello,    I am working on a referral for continuation of therapy for mrn [id number]. I need current signed orders or cosigned/attestment POC, (signed),  from the doctor.    Please in basket message me when orders are in system so I can process referral.     Thank you,  Dinah Reinoso

## 2017-08-03 ENCOUNTER — CLINICAL SUPPORT (OUTPATIENT)
Dept: REHABILITATION | Facility: OTHER | Age: 72
End: 2017-08-03
Attending: PSYCHIATRY & NEUROLOGY
Payer: MEDICARE

## 2017-08-03 DIAGNOSIS — M51.36 DDD (DEGENERATIVE DISC DISEASE), LUMBAR: Primary | ICD-10-CM

## 2017-08-03 PROCEDURE — G8979 MOBILITY GOAL STATUS: HCPCS | Mod: CJ

## 2017-08-03 PROCEDURE — G8978 MOBILITY CURRENT STATUS: HCPCS | Mod: CK

## 2017-08-03 PROCEDURE — 97110 THERAPEUTIC EXERCISES: CPT

## 2017-08-03 PROCEDURE — 97162 PT EVAL MOD COMPLEX 30 MIN: CPT

## 2017-08-03 NOTE — PROGRESS NOTES
[facility] - PHYSICAL THERAPY EVALUATION     Name: Mila Foster  Clinic Number: [id number]    Mila is a 72 y.o. female evaluated on 08/03/2017.   Time In: 10:30  Time out: 12:00    Diagnosis:   Encounter Diagnosis   Name Primary?    DDD (degenerative disc disease), lumbar Yes     Physician: Dami Parra MD  Treatment Orders: PT Eval and Treat    Past Medical History:   Diagnosis Date    Diabetes     Hypertension      Current Outpatient Prescriptions   Medication Sig    amlodipine (NORVASC) 10 MG tablet 10 mg.    aspirin (ECOTRIN) 81 MG EC tablet 81 mg.    atenolol (TENORMIN) 50 MG tablet 50 mg.    gabapentin (NEURONTIN) 300 MG capsule Take 2 tabs in the morning, 1 tab in the afternoon, and 2 tabs in the evening    glipiZIDE (GLUCOTROL) 5 MG tablet 5 mg.    hydrochlorothiazide (HYDRODIURIL) 25 MG tablet 25 mg.    hyoscyamine (ANASPAZ,LEVSIN) 0.125 mg Tab Take 1 tablet (125 mcg total) by mouth every 4 (four) hours as needed.    lisinopril (PRINIVIL,ZESTRIL) 40 MG tablet 40 mg.    metformin (GLUCOPHAGE-XR) 500 MG 24 hr tablet 500 mg.    mirabegron (MYRBETRIQ) 25 mg Tb24 ER tablet Take 1 tablet (25 mg total) by mouth once daily.    naproxen (NAPROSYN) 500 MG tablet     omeprazole (PRILOSEC) 20 MG capsule 20 mg.    pravastatin (PRAVACHOL) 40 MG tablet 40 mg.     No current facility-administered medications for this visit.      Review of patient's allergies indicates:  No Known Allergies  Precautions: IBS, urinary incontinence,      Visit # authorized: 12  Authorization period: 7/20/17  Plan of care Expiration: Sent 8/4/17      HISTORY   History of Present Illness: Low back into tailbone, reports it primarily in the middle. Describes as achy and occasionally sharp when making quick movements. Has occasional numbness and tingling into feet (does have diabetic neuropathy)  Spinal injections helped. Has gradually increased more over the last 6 months. Reports feeling afraid of falling.      Notes: Wakes up with left sided arm numbness       Diagnostic Tests: From EPIC MRI 12/13/16  Lumbar spine alignment is within normal limits. The vertebral body heights are well maintained with no evidence of fracture. Intervertebral disc space narrowing and desiccation visualized again L5-S1 level. No marrow signal abnormality suspicious for an infiltrative process.      The conus is normal in appearance, and terminates at the L1-2 level.  The adjacent soft tissue structures show no significant abnormalities.        L1-L2: There is no focal disc herniation. No significant spinal canal or neural foraminal narrowing.    L2-L3: There is no focal disc herniation. No significant spinal canal or neural foraminal narrowing.    L3-L4: Mild bilateral facet arthropathy.  No focal disc herniation. No significant spinal canal or neural foraminal narrowing.    L4-L5: Mild disc bulge and bilateral facet arthropathy results in mild right-sided neural foraminal narrowing. No significant spinal canal stenosis.    L5-S1: Diffuse disc bulge with small superimposed right foraminal disc protrusion.  Findings result in moderate right-sided neuroforaminal narrowing with likely abutment/impingement upon the exited right L5 nerve root. No significant spinal canal stenosis.  Small posterior annular fissure noted.   Impression        Lumbar spondylosis as detailed above, most significant for small right foraminal protrusion at the L5-S1 level resulting in moderate neuroforaminal narrowing and likely abutment/impingement upon the exited right L5 nerve root.         Pain Scale: Mila rates pain on a scale of 0-10 to be 7 at worst; 4 currently; 0 at best using VAS.   Pain location: Low back     Aggravating factors: Standing too long (Up to 30 minutes), lifting, bending, sitting for too long (up to half an hour), getting in and out of car, (drives but doesn't have car), getting up and down front steps reciprocally to get into home (4 steps)    Easing Factors: Walking, doing core exercise, (Naproxen helps a little)   Disturbed Sleep: No, sleeps on side without pillow.     Pattern of pain questions:  1.  Where is your pain the worst? Low back   2.  Is your pain constant or intermittent?  Intermittent   3.  Does bending forward make your typical pain worse? Yes  4.  Since the start of your back pain, has there been a change in your bowel or bladder? No, does have history of urinary incontinence   5.  What can't you do now that you use to be able to do? 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, get confidence with walking back,     Red Flag Screening:   Cough  Sneeze  Strain: No  Bladder/ bowel: No  Falls: Fell 2 months ago, tripping over own feet no injuries  General health: Good  Night pain: No   Unexplained weight loss: No     OBJECTIVEforward head     POSTURE  Posture Alignment :  Postural examination/scapula alignment: Rounded shoulder, Head forward, Slouched posture and Increased kyphosis  Joint integrity: Hypomobile as seen through spring testing  Skin integrity: WNL   Edema: Not significant   Sitting: Poor  Standing: Pooranterior head lean, kyphotic, mild lordosis   Correction of posture: Added slimline roll, Improved posture in sitting.     MOVEMENT LOSS    ROM Loss   Flexion moderate loss, poor curve reversal, tight HS   Extension moderate loss, pt reported feeling anxious about standing.    Side bending Right minimal loss   Side bending Left minimal loss   Rotation Right minimal loss, ERP, left low back    Rotation Left minimal loss     Hip  Flexiblity:   Supine Flexion:  Minimal limitaiton bilaterally     Supine Internal rotation:  Minimal limitaiton bilaterally     Hamstrings 90/90: Minimal limitaiton bilaterally   Prone Quadriceps: left minimal limitation, right moderate limitation  Prone Extension:  Minimal limitaiton bilaterally     Lower Extremity Strength  Right LE  Left LE    Hip flexion: 4-/5 Hip flexion: 4-/5   Hip extension:  5/5 Hip extension: 5/5   Hip abduction: 5/5 Hip abduction: 5/5   Hip adduction:  5/5 Hip adduction:  5/5   Hip Internal rotation   4/5 Hip Internal rotation 4/5   Knee Flexion 4-/5 Knee Flexion 4/5   Knee Extension 5/5 Knee Extension 5/5   Ankle dorsiflexion: 4/5 Ankle dorsiflexion: 4/5   Ankle plantarflexion: 4/5 Ankle plantarflexion: 4/5       GAIT:  Assistive Device used: None  Level of Assistance: Independent  Patient displays the following gait deviations: Pt ambulates with mild forward trunk lean, decreased step length    Special Tests:   Test Name  Test Result   Prone Instability Test (--)   SI Joint Provocation Test (--)   Straight Leg Raise (--)   Neural Tension Test (--)   Crossed Straight Leg Raise (--)   Walking on toes (--)   Walking on heels  (--)       NEUROLOGICAL SCREENING     Sensory deficit: LE intact to light touch    Reflexes:    Left Right   Patella Tendon 2+ 2+   Achilles Tendon 2+ +   Babinski NT NT   Clonus - -     REPEATED TEST MOVEMENTS:  Repeated Flexion in Standing no better   Repeated Extension in Standing better, performed with LE support, better compared to standing    Repeated Flexion in lying better   Repeated Extension in lying  Unable to assess per shoulder and neck pain while performing       STATIC TESTS   Sitting slouched  no effect   Sitting erect better   Standing slouched no effect   Standing erect  better   Lying prone in extension  worse   Long sitting   no effect       Baseline Isometric Testing on Med X equipment: Testing administered by PT    Baseline IM Testing Results:    Date of testin2017  ROM 48-0 deg   Max Peak Torque 110    Min Peak Torque 17    Flex/Ext Ratio 6.4   % below normative data -67       FOTO: Focus on Therapeutic Outcomes   Category: lumbar   % Impaired: 41%  Current Score  = CK = at least 40% but < 60% impaired, limited or restricted  Goal at Discharge Score = CJ = at least 20% but < 40% impaired, limited or restricted    Score interpretation is as follows:     TEST SCORE  Modifier  Impairment Limitation Restriction    0/50  CH  0 % impaired, limited or restricted   1-9/50  CI  @ least 1% but less than 20% impaired, limited or restricted   10-19/50  CJ  @ least 20%<40% impaired, limited or restricted   20-29/50  CK  @ least 40%<60% impaired, limited or restricted   30-39/50  CL  @ least 60% <80% impaired, limited or restricted   40-49/50  CM  @ least 80%<100% impaired limited or restricted   50/50  CN  100% impaired, limited or restricted       Treatment   Time In: 11:20  Time Out: 12:00    PT Evaluation Completed? Yes  Discussed Plan of Care with patient: Yes      Written Home Exercises Provided:   Handouts were given to the patient. Pt demo good understanding of the education provided. Mila demonstrated good return demonstration of activities.     - Patient received education regarding proper posture and body mechanics.    - Ting roll tried, recommended, and purchase information was provided.    - Patient received a handout regarding anticipated muscular soreness following the isometric test and strategies for management were reviewed with patient including stretching, using ice and scheduled rest.     HEP as follows     Flexion in lying with theraball 10x, 3x/day  Extension in standing with LE supported 10x, 3x/day  LTR with theraball 10x 3x/day    Pt reported increased right thigh pain with z-lie, attempt with chair next session.     Pt was instructed in and performed the following:   Cardiovascular exercise and therapeutic exercise to improve  posture, lumbar/cervical ROM, strength, and muscular endurance as follows:     Mila received therapeutic exercises to develop/improve posture, lumbar/cervical ROM, strength and muscular endurance for 30 minutes including the following exercises: med-x lumbar machine testing    [facility] 8/3/2017   Visit Number 1   VAS Pain Rating 4   Extension in Standing 10   Flexion in Lying 10   Lumbar Extension Seat Pad 0   Femur Restraint 196   Top Dead Center 24   Counterweight 196   Lumbar Flexion 48   Lumbar Extension 0   Lumbar Peak Torque 110   Min Torque 17   Percent From Norm -67   Ice - Z Lie (in min.) 10       Mila received the following manual therapy techniques: None    Assessment   This is a 72 y.o. female referred to [facility] and presents with a medical diagnosis of No diagnosis found. and demonstrates limitations as described below in the problem list. Pt rehab potential is Good. Pt presents with lumbar dysfunction, decreased lumbar strength and ROM compared to norms, decreased LE ROM, decreased LE strength, decreased hip flexibility, poor postural alignment, poor body mechanics, impaired balance.     Pt would benefit from skilled PT care to improve strength, endurance, ROM, and balance to improve functional activity tolerance, reduce fall risk and improve quality of life.        Pain Pattern: 1 PEN        Patient received education on the [facility] program, purpose of the isometric test, progression of back strengthening as well as wellness approach and systemic strengthening.  Details of the program were discussed.  Reviewed that patient should feel support/pressure from med ex restraints but no pain or discomfort and patient expressed understanding.    Based on the above history and physical examination an active physical therapy program is recommended.  Pt will continue to benefit from skilled outpatient physical therapy to address the deficits listed below in the chart, provide  pt/family education and to maximize pt's level of independence in the home and community environment. .     No environmental, cultural, spiritual, developmental or education needs expressed or noted    Medical necessity is demonstrated by the following problem list.    Pt presents with the following impairments:   History  Co-morbidities and personal factors that may impact the plan of care Examination  Body Structures and Functions, activity limitations and participation restrictions that may impact the plan of care Clinical Presentation   Decision Making/ Complexity Score   Co-morbidities:   IBS, urinary incontinence        Personal Factors:   age  lifestyle Body Regions:   back  lower extremities    Body Systems:   gross symmetry  ROM  strength  gross coordinated movement  balance  gait    Activity limitations:   Learning and applying knowledge  no deficits    General Tasks and Commands  no deficits    Communication  no deficits    Mobility  lifting and carrying objects  walking  driving (bike, car, motorcycle)  Standing, bending, stairs    Self care  no deficits    Domestic Life  cooking  doing house work (cleaning house, washing dishes, laundry)    Interactions/Relationships  family relationships    Life Areas  employment    Community and Social Life  recreation and leisure    Participation Restrictions:   Unable to work as day care provider per lifting limitations     evolving clinical presentation with changing clinical characteristics Moderate           GOALS: Pt is in agreement with the following goals.    Short term goals:  6 weeks or 10 visits   1.  Pt will demonstrate increased lumbar ROM by at least 3 degrees from the initial ROM value with improvements noted in functional ROM and ability to perform ADLs  2.  Pt will demonstrate increased maximum isometric torque value by 10% when compared to the initial value resulting in improved ability to perform bending, lifting, and carrying activities safely,  "confidently.  3.  Patient report a reduction in worst pain score by 1-2 points for improved tolerance during work and recreational activities  4.  Pt able to perform HEP correctly with minimal cueing or supervision for therapist    Long term goals: 13 weeks or 20 visits   1. Pt will demonstrate increased lumbar ROM by at least 6 degrees from initial ROM value, resulting in improved ability to perform functional fwd bending while standing and sitting.   2. Pt will demonstrate increased maximum isometric torque value by 20% when compared to the initial value resulting in improved ability to perform bending, lifting, and carrying activities safely, confidently.  3. Pt to demonstrate ability to independently control and reduce their pain through posture positioning and mechanical movements throughout a typical day.  4.  Patient will demonstrate improved overall function per FOTO Survey to CK = at least 40% but < 60% impaired, limited or restricted score or less.  5. Pt will be able to climb up to 1 flight of stairs with reciprocal steps safely and confidently.   6. Pt will be able to maintain appropriate posture while ambulating for up to 80% treatment session to demonstrate improved postural alignment.       Plan   Outpatient physical therapy 2x week for 13 weeks or 20 visits to include the following:   - Patient education  - Therapeutic exercise  - Manual therapy  - Performance testing   - Neuromuscular Re-education  - Therapeutic activity   - Modalities    Pt may be seen by PTA as part of the rehabilitation team.     Therapist: Nery Capone, PT  8/3/2017    "I certify the need for these services furnished under this plan of treatment and while under my care."    ____________________________________  Physician/Referring Practitioner    _______________  Date of Signature              "

## 2017-08-04 NOTE — PLAN OF CARE
[facility] - PHYSICAL THERAPY EVALUATION     Name: Mila Foster  Clinic Number: [id number]    Mila is a 72 y.o. female evaluated on 08/03/2017.   Time In: 10:30  Time out: 12:00    Diagnosis:   Encounter Diagnosis   Name Primary?    DDD (degenerative disc disease), lumbar Yes     Physician: Dami Parra MD  Treatment Orders: PT Eval and Treat    Past Medical History:   Diagnosis Date    Diabetes     Hypertension      Current Outpatient Prescriptions   Medication Sig    amlodipine (NORVASC) 10 MG tablet 10 mg.    aspirin (ECOTRIN) 81 MG EC tablet 81 mg.    atenolol (TENORMIN) 50 MG tablet 50 mg.    gabapentin (NEURONTIN) 300 MG capsule Take 2 tabs in the morning, 1 tab in the afternoon, and 2 tabs in the evening    glipiZIDE (GLUCOTROL) 5 MG tablet 5 mg.    hydrochlorothiazide (HYDRODIURIL) 25 MG tablet 25 mg.    hyoscyamine (ANASPAZ,LEVSIN) 0.125 mg Tab Take 1 tablet (125 mcg total) by mouth every 4 (four) hours as needed.    lisinopril (PRINIVIL,ZESTRIL) 40 MG tablet 40 mg.    metformin (GLUCOPHAGE-XR) 500 MG 24 hr tablet 500 mg.    mirabegron (MYRBETRIQ) 25 mg Tb24 ER tablet Take 1 tablet (25 mg total) by mouth once daily.    naproxen (NAPROSYN) 500 MG tablet     omeprazole (PRILOSEC) 20 MG capsule 20 mg.    pravastatin (PRAVACHOL) 40 MG tablet 40 mg.     No current facility-administered medications for this visit.      Review of patient's allergies indicates:  No Known Allergies  Precautions: IBS, urinary incontinence,      Visit # authorized: 12  Authorization period: 7/20/17  Plan of care Expiration: Sent 8/4/17      HISTORY   History of Present Illness: Low back into tailbone, reports it primarily in the middle. Describes as achy and occasionally sharp when making quick movements. Has occasional numbness and tingling into feet (does have diabetic neuropathy)  Spinal injections helped. Has gradually increased more over the last 6 months. Reports feeling afraid of falling.      Notes: Wakes up with left sided arm numbness       Diagnostic Tests: From EPIC MRI 12/13/16  Lumbar spine alignment is within normal limits. The vertebral body heights are well maintained with no evidence of fracture. Intervertebral disc space narrowing and desiccation visualized again L5-S1 level. No marrow signal abnormality suspicious for an infiltrative process.      The conus is normal in appearance, and terminates at the L1-2 level.  The adjacent soft tissue structures show no significant abnormalities.        L1-L2: There is no focal disc herniation. No significant spinal canal or neural foraminal narrowing.    L2-L3: There is no focal disc herniation. No significant spinal canal or neural foraminal narrowing.    L3-L4: Mild bilateral facet arthropathy.  No focal disc herniation. No significant spinal canal or neural foraminal narrowing.    L4-L5: Mild disc bulge and bilateral facet arthropathy results in mild right-sided neural foraminal narrowing. No significant spinal canal stenosis.    L5-S1: Diffuse disc bulge with small superimposed right foraminal disc protrusion.  Findings result in moderate right-sided neuroforaminal narrowing with likely abutment/impingement upon the exited right L5 nerve root. No significant spinal canal stenosis.  Small posterior annular fissure noted.   Impression        Lumbar spondylosis as detailed above, most significant for small right foraminal protrusion at the L5-S1 level resulting in moderate neuroforaminal narrowing and likely abutment/impingement upon the exited right L5 nerve root.         Pain Scale: Mila rates pain on a scale of 0-10 to be 7 at worst; 4 currently; 0 at best using VAS.   Pain location: Low back     Aggravating factors: Standing too long (Up to 30 minutes), lifting, bending, sitting for too long (up to half an hour), getting in and out of car, (drives but doesn't have car), getting up and down front steps reciprocally to get into home (4 steps)    Easing Factors: Walking, doing core exercise, (Naproxen helps a little)   Disturbed Sleep: No, sleeps on side without pillow.     Pattern of pain questions:  1.  Where is your pain the worst? Low back   2.  Is your pain constant or intermittent?  Intermittent   3.  Does bending forward make your typical pain worse? Yes  4.  Since the start of your back pain, has there been a change in your bowel or bladder? No, does have history of urinary incontinence   5.  What can't you do now that you use to be able to do? 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, get confidence with walking back,     Red Flag Screening:   Cough  Sneeze  Strain: No  Bladder/ bowel: No  Falls: Fell 2 months ago, tripping over own feet no injuries  General health: Good  Night pain: No   Unexplained weight loss: No     OBJECTIVEforward head     POSTURE  Posture Alignment :  Postural examination/scapula alignment: Rounded shoulder, Head forward, Slouched posture and Increased kyphosis  Joint integrity: Hypomobile as seen through spring testing  Skin integrity: WNL   Edema: Not significant   Sitting: Poor  Standing: Pooranterior head lean, kyphotic, mild lordosis   Correction of posture: Added slimline roll, Improved posture in sitting.     MOVEMENT LOSS    ROM Loss   Flexion moderate loss, poor curve reversal, tight HS   Extension moderate loss, pt reported feeling anxious about standing.    Side bending Right minimal loss   Side bending Left minimal loss   Rotation Right minimal loss, ERP, left low back    Rotation Left minimal loss     Hip  Flexiblity:   Supine Flexion:  Minimal limitaiton bilaterally     Supine Internal rotation:  Minimal limitaiton bilaterally     Hamstrings 90/90: Minimal limitaiton bilaterally   Prone Quadriceps: left minimal limitation, right moderate limitation  Prone Extension:  Minimal limitaiton bilaterally     Lower Extremity Strength  Right LE  Left LE    Hip flexion: 4-/5 Hip flexion: 4-/5   Hip extension:  5/5 Hip extension: 5/5   Hip abduction: 5/5 Hip abduction: 5/5   Hip adduction:  5/5 Hip adduction:  5/5   Hip Internal rotation   4/5 Hip Internal rotation 4/5   Knee Flexion 4-/5 Knee Flexion 4/5   Knee Extension 5/5 Knee Extension 5/5   Ankle dorsiflexion: 4/5 Ankle dorsiflexion: 4/5   Ankle plantarflexion: 4/5 Ankle plantarflexion: 4/5       GAIT:  Assistive Device used: None  Level of Assistance: Independent  Patient displays the following gait deviations: Pt ambulates with mild forward trunk lean, decreased step length    Special Tests:   Test Name  Test Result   Prone Instability Test (--)   SI Joint Provocation Test (--)   Straight Leg Raise (--)   Neural Tension Test (--)   Crossed Straight Leg Raise (--)   Walking on toes (--)   Walking on heels  (--)       NEUROLOGICAL SCREENING     Sensory deficit: LE intact to light touch    Reflexes:    Left Right   Patella Tendon 2+ 2+   Achilles Tendon 2+ +   Babinski NT NT   Clonus - -     REPEATED TEST MOVEMENTS:  Repeated Flexion in Standing no better   Repeated Extension in Standing better, performed with LE support, better compared to standing    Repeated Flexion in lying better   Repeated Extension in lying  Unable to assess per shoulder and neck pain while performing       STATIC TESTS   Sitting slouched  no effect   Sitting erect better   Standing slouched no effect   Standing erect  better   Lying prone in extension  worse   Long sitting   no effect       Baseline Isometric Testing on Med X equipment: Testing administered by PT    Baseline IM Testing Results:    Date of testin2017  ROM 48-0 deg   Max Peak Torque 110    Min Peak Torque 17    Flex/Ext Ratio 6.4   % below normative data -67       FOTO: Focus on Therapeutic Outcomes   Category: lumbar   % Impaired: FOTO not completed at time of Eval. Per clinical judgement based on ADL and participation restrictions  Current Score  = CL = least 60% but < 80% impaired, limited or restricted  Goal at Discharge Score = CK = at least 40% but < 60% impaired, limited or restricted    Score interpretation is as follows:     TEST SCORE  Modifier  Impairment Limitation Restriction    0/50  CH  0 % impaired, limited or restricted   1-9/50  CI  @ least 1% but less than 20% impaired, limited or restricted   10-19/50  CJ  @ least 20%<40% impaired, limited or restricted   20-29/50  CK  @ least 40%<60% impaired, limited or restricted   30-39/50  CL  @ least 60% <80% impaired, limited or restricted   40-49/50  CM  @ least 80%<100% impaired limited or restricted   50/50  CN  100% impaired, limited or restricted       Treatment   Time In: 11:20  Time Out: 12:00    PT Evaluation Completed? Yes  Discussed Plan of Care with patient: Yes      Written Home Exercises Provided:   Handouts were given to the patient. Pt demo good understanding of the education provided. Mila demonstrated good return demonstration of activities.     - Patient received education regarding proper posture and body mechanics.    - Ting roll tried, recommended, and purchase information was provided.    - Patient received a handout regarding anticipated muscular soreness following the isometric test and strategies for management were reviewed with patient including stretching, using ice and scheduled rest.     HEP as follows     Flexion in lying with theraball 10x, 3x/day  Extension in standing with LE supported 10x, 3x/day  LTR with theraball 10x 3x/day    Pt reported increased right thigh pain with z-lie, attempt with chair next session.     Pt was instructed  in and performed the following:   Cardiovascular exercise and therapeutic exercise to improve posture, lumbar/cervical ROM, strength, and muscular endurance as follows:     Mila received therapeutic exercises to develop/improve posture, lumbar/cervical ROM, strength and muscular endurance for 30 minutes including the following exercises: med-x lumbar machine testing    [facility] 8/3/2017   Visit Number 1   VAS Pain Rating 4   Extension in Standing 10   Flexion in Lying 10   Lumbar Extension Seat Pad 0   Femur Restraint 196   Top Dead Center 24   Counterweight 196   Lumbar Flexion 48   Lumbar Extension 0   Lumbar Peak Torque 110   Min Torque 17   Percent From Norm -67   Ice - Z Lie (in min.) 10       Mila received the following manual therapy techniques: None    Assessment   This is a 72 y.o. female referred to [facility] and presents with a medical diagnosis of No diagnosis found. and demonstrates limitations as described below in the problem list. Pt rehab potential is Good. Pt presents with lumbar dysfunction, decreased lumbar strength and ROM compared to norms, decreased LE ROM, decreased LE strength, decreased hip flexibility, poor postural alignment, poor body mechanics, impaired balance.     Pt would benefit from skilled PT care to improve strength, endurance, ROM, and balance to improve functional activity tolerance, reduce fall risk and improve quality of life.        Pain Pattern: 1 PEN        Patient received education on the [facility] program, purpose of the isometric test, progression of back strengthening as well as wellness approach and systemic strengthening.  Details of the program were discussed.  Reviewed that patient should feel support/pressure from med ex restraints but no pain or discomfort and patient expressed understanding.    Based on the above history and physical examination an active physical therapy program is recommended.  Pt will continue to benefit from  skilled outpatient physical therapy to address the deficits listed below in the chart, provide pt/family education and to maximize pt's level of independence in the home and community environment. .     No environmental, cultural, spiritual, developmental or education needs expressed or noted    Medical necessity is demonstrated by the following problem list.    Pt presents with the following impairments:   History  Co-morbidities and personal factors that may impact the plan of care Examination  Body Structures and Functions, activity limitations and participation restrictions that may impact the plan of care Clinical Presentation   Decision Making/ Complexity Score   Co-morbidities:   IBS, urinary incontinence        Personal Factors:   age  lifestyle Body Regions:   back  lower extremities    Body Systems:   gross symmetry  ROM  strength  gross coordinated movement  balance  gait    Activity limitations:   Learning and applying knowledge  no deficits    General Tasks and Commands  no deficits    Communication  no deficits    Mobility  lifting and carrying objects  walking  driving (bike, car, motorcycle)  Standing, bending, stairs    Self care  no deficits    Domestic Life  cooking  doing house work (cleaning house, washing dishes, laundry)    Interactions/Relationships  family relationships    Life Areas  employment    Community and Social Life  recreation and leisure    Participation Restrictions:   Unable to work as day care provider per lifting limitations     evolving clinical presentation with changing clinical characteristics Moderate           GOALS: Pt is in agreement with the following goals.    Short term goals:  6 weeks or 10 visits   1.  Pt will demonstrate increased lumbar ROM by at least 3 degrees from the initial ROM value with improvements noted in functional ROM and ability to perform ADLs  2.  Pt will demonstrate increased maximum isometric torque value by 10% when compared to the initial value  "resulting in improved ability to perform bending, lifting, and carrying activities safely, confidently.  3.  Patient report a reduction in worst pain score by 1-2 points for improved tolerance during work and recreational activities  4.  Pt able to perform HEP correctly with minimal cueing or supervision for therapist    Long term goals: 13 weeks or 20 visits   1. Pt will demonstrate increased lumbar ROM by at least 6 degrees from initial ROM value, resulting in improved ability to perform functional fwd bending while standing and sitting.   2. Pt will demonstrate increased maximum isometric torque value by 20% when compared to the initial value resulting in improved ability to perform bending, lifting, and carrying activities safely, confidently.  3. Pt to demonstrate ability to independently control and reduce their pain through posture positioning and mechanical movements throughout a typical day.  4.  Patient will demonstrate improved overall function per FOTO Survey to CK = at least 40% but < 60% impaired, limited or restricted score or less.  5. Pt will be able to climb up to 1 flight of stairs with reciprocal steps safely and confidently.   6. Pt will be able to maintain appropriate posture while ambulating for up to 80% treatment session to demonstrate improved postural alignment.       Plan   Outpatient physical therapy 2x week for 13 weeks or 20 visits to include the following:   - Patient education  - Therapeutic exercise  - Manual therapy  - Performance testing   - Neuromuscular Re-education  - Therapeutic activity   - Modalities    Pt may be seen by PTA as part of the rehabilitation team.     Therapist: Nery Capone, PT  8/3/2017    "I certify the need for these services furnished under this plan of treatment and while under my care."    ____________________________________  Physician/Referring Practitioner    _______________  Date of Signature                "

## 2017-08-04 NOTE — PLAN OF CARE
[facility] - PHYSICAL THERAPY EVALUATION     Name: Mila Foster  Clinic Number: [id number]    Mila is a 72 y.o. female evaluated on 08/03/2017.   Time In: 10:30  Time out: 12:00    Diagnosis:   Encounter Diagnosis   Name Primary?    DDD (degenerative disc disease), lumbar Yes     Physician: Dami Parra MD  Treatment Orders: PT Eval and Treat    Past Medical History:   Diagnosis Date    Diabetes     Hypertension      Current Outpatient Prescriptions   Medication Sig    amlodipine (NORVASC) 10 MG tablet 10 mg.    aspirin (ECOTRIN) 81 MG EC tablet 81 mg.    atenolol (TENORMIN) 50 MG tablet 50 mg.    gabapentin (NEURONTIN) 300 MG capsule Take 2 tabs in the morning, 1 tab in the afternoon, and 2 tabs in the evening    glipiZIDE (GLUCOTROL) 5 MG tablet 5 mg.    hydrochlorothiazide (HYDRODIURIL) 25 MG tablet 25 mg.    hyoscyamine (ANASPAZ,LEVSIN) 0.125 mg Tab Take 1 tablet (125 mcg total) by mouth every 4 (four) hours as needed.    lisinopril (PRINIVIL,ZESTRIL) 40 MG tablet 40 mg.    metformin (GLUCOPHAGE-XR) 500 MG 24 hr tablet 500 mg.    mirabegron (MYRBETRIQ) 25 mg Tb24 ER tablet Take 1 tablet (25 mg total) by mouth once daily.    naproxen (NAPROSYN) 500 MG tablet     omeprazole (PRILOSEC) 20 MG capsule 20 mg.    pravastatin (PRAVACHOL) 40 MG tablet 40 mg.     No current facility-administered medications for this visit.      Review of patient's allergies indicates:  No Known Allergies  Precautions: IBS, urinary incontinence,      Visit # authorized: 12  Authorization period: 7/20/17  Plan of care Expiration: Sent 8/4/17      HISTORY   History of Present Illness: Low back into tailbone, reports it primarily in the middle. Describes as achy and occasionally sharp when making quick movements. Has occasional numbness and tingling into feet (does have diabetic neuropathy)  Spinal injections helped. Has gradually increased more over the last 6 months. Reports feeling afraid of falling.      Notes: Wakes up with left sided arm numbness       Diagnostic Tests: From EPIC MRI 12/13/16  Lumbar spine alignment is within normal limits. The vertebral body heights are well maintained with no evidence of fracture. Intervertebral disc space narrowing and desiccation visualized again L5-S1 level. No marrow signal abnormality suspicious for an infiltrative process.      The conus is normal in appearance, and terminates at the L1-2 level.  The adjacent soft tissue structures show no significant abnormalities.        L1-L2: There is no focal disc herniation. No significant spinal canal or neural foraminal narrowing.    L2-L3: There is no focal disc herniation. No significant spinal canal or neural foraminal narrowing.    L3-L4: Mild bilateral facet arthropathy.  No focal disc herniation. No significant spinal canal or neural foraminal narrowing.    L4-L5: Mild disc bulge and bilateral facet arthropathy results in mild right-sided neural foraminal narrowing. No significant spinal canal stenosis.    L5-S1: Diffuse disc bulge with small superimposed right foraminal disc protrusion.  Findings result in moderate right-sided neuroforaminal narrowing with likely abutment/impingement upon the exited right L5 nerve root. No significant spinal canal stenosis.  Small posterior annular fissure noted.   Impression        Lumbar spondylosis as detailed above, most significant for small right foraminal protrusion at the L5-S1 level resulting in moderate neuroforaminal narrowing and likely abutment/impingement upon the exited right L5 nerve root.         Pain Scale: Mila rates pain on a scale of 0-10 to be 7 at worst; 4 currently; 0 at best using VAS.   Pain location: Low back     Aggravating factors: Standing too long (Up to 30 minutes), lifting, bending, sitting for too long (up to half an hour), getting in and out of car, (drives but doesn't have car), getting up and down front steps reciprocally to get into home (4 steps)    Easing Factors: Walking, doing core exercise, (Naproxen helps a little)   Disturbed Sleep: No, sleeps on side without pillow.     Pattern of pain questions:  1.  Where is your pain the worst? Low back   2.  Is your pain constant or intermittent?  Intermittent   3.  Does bending forward make your typical pain worse? Yes  4.  Since the start of your back pain, has there been a change in your bowel or bladder? No, does have history of urinary incontinence   5.  What can't you do now that you use to be able to do? 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, get confidence with walking back,     Red Flag Screening:   Cough  Sneeze  Strain: No  Bladder/ bowel: No  Falls: Fell 2 months ago, tripping over own feet no injuries  General health: Good  Night pain: No   Unexplained weight loss: No     OBJECTIVEforward head     POSTURE  Posture Alignment :  Postural examination/scapula alignment: Rounded shoulder, Head forward, Slouched posture and Increased kyphosis  Joint integrity: Hypomobile as seen through spring testing  Skin integrity: WNL   Edema: Not significant   Sitting: Poor  Standing: Pooranterior head lean, kyphotic, mild lordosis   Correction of posture: Added slimline roll, Improved posture in sitting.     MOVEMENT LOSS    ROM Loss   Flexion moderate loss, poor curve reversal, tight HS   Extension moderate loss, pt reported feeling anxious about standing.    Side bending Right minimal loss   Side bending Left minimal loss   Rotation Right minimal loss, ERP, left low back    Rotation Left minimal loss     Hip  Flexiblity:   Supine Flexion:  Minimal limitaiton bilaterally     Supine Internal rotation:  Minimal limitaiton bilaterally     Hamstrings 90/90: Minimal limitaiton bilaterally   Prone Quadriceps: left minimal limitation, right moderate limitation  Prone Extension:  Minimal limitaiton bilaterally     Lower Extremity Strength  Right LE  Left LE    Hip flexion: 4-/5 Hip flexion: 4-/5   Hip extension:  5/5 Hip extension: 5/5   Hip abduction: 5/5 Hip abduction: 5/5   Hip adduction:  5/5 Hip adduction:  5/5   Hip Internal rotation   4/5 Hip Internal rotation 4/5   Knee Flexion 4-/5 Knee Flexion 4/5   Knee Extension 5/5 Knee Extension 5/5   Ankle dorsiflexion: 4/5 Ankle dorsiflexion: 4/5   Ankle plantarflexion: 4/5 Ankle plantarflexion: 4/5       GAIT:  Assistive Device used: None  Level of Assistance: Independent  Patient displays the following gait deviations: Pt ambulates with mild forward trunk lean, decreased step length    Special Tests:   Test Name  Test Result   Prone Instability Test (--)   SI Joint Provocation Test (--)   Straight Leg Raise (--)   Neural Tension Test (--)   Crossed Straight Leg Raise (--)   Walking on toes (--)   Walking on heels  (--)       NEUROLOGICAL SCREENING     Sensory deficit: LE intact to light touch    Reflexes:    Left Right   Patella Tendon 2+ 2+   Achilles Tendon 2+ +   Babinski NT NT   Clonus - -     REPEATED TEST MOVEMENTS:  Repeated Flexion in Standing no better   Repeated Extension in Standing better, performed with LE support, better compared to standing    Repeated Flexion in lying better   Repeated Extension in lying  Unable to assess per shoulder and neck pain while performing       STATIC TESTS   Sitting slouched  no effect   Sitting erect better   Standing slouched no effect   Standing erect  better   Lying prone in extension  worse   Long sitting   no effect       Baseline Isometric Testing on Med X equipment: Testing administered by PT    Baseline IM Testing Results:    Date of testin2017  ROM 48-0 deg   Max Peak Torque 110    Min Peak Torque 17    Flex/Ext Ratio 6.4   % below normative data -67       FOTO: Focus on Therapeutic Outcomes   Category: lumbar   % Impaired: FOTO not completed at time of Eval. Per clinical judgement based on ADL and participation restrictions  Current Score  = CL = least 60% but < 80% impaired, limited or restricted  Goal at Discharge Score = CK = at least 40% but < 60% impaired, limited or restricted    Score interpretation is as follows:     TEST SCORE  Modifier  Impairment Limitation Restriction    0/50  CH  0 % impaired, limited or restricted   1-9/50  CI  @ least 1% but less than 20% impaired, limited or restricted   10-19/50  CJ  @ least 20%<40% impaired, limited or restricted   20-29/50  CK  @ least 40%<60% impaired, limited or restricted   30-39/50  CL  @ least 60% <80% impaired, limited or restricted   40-49/50  CM  @ least 80%<100% impaired limited or restricted   50/50  CN  100% impaired, limited or restricted       Treatment   Time In: 11:20  Time Out: 12:00    PT Evaluation Completed? Yes  Discussed Plan of Care with patient: Yes      Written Home Exercises Provided:   Handouts were given to the patient. Pt demo good understanding of the education provided. Mila demonstrated good return demonstration of activities.     - Patient received education regarding proper posture and body mechanics.    - Ting roll tried, recommended, and purchase information was provided.    - Patient received a handout regarding anticipated muscular soreness following the isometric test and strategies for management were reviewed with patient including stretching, using ice and scheduled rest.     HEP as follows     Flexion in lying with theraball 10x, 3x/day  Extension in standing with LE supported 10x, 3x/day  LTR with theraball 10x 3x/day    Pt reported increased right thigh pain with z-lie, attempt with chair next session.     Pt was instructed  in and performed the following:   Cardiovascular exercise and therapeutic exercise to improve posture, lumbar/cervical ROM, strength, and muscular endurance as follows:     Mila received therapeutic exercises to develop/improve posture, lumbar/cervical ROM, strength and muscular endurance for 30 minutes including the following exercises: med-x lumbar machine testing    [facility] 8/3/2017   Visit Number 1   VAS Pain Rating 4   Extension in Standing 10   Flexion in Lying 10   Lumbar Extension Seat Pad 0   Femur Restraint 196   Top Dead Center 24   Counterweight 196   Lumbar Flexion 48   Lumbar Extension 0   Lumbar Peak Torque 110   Min Torque 17   Percent From Norm -67   Ice - Z Lie (in min.) 10       Mila received the following manual therapy techniques: None    Assessment   This is a 72 y.o. female referred to [facility] and presents with a medical diagnosis of No diagnosis found. and demonstrates limitations as described below in the problem list. Pt rehab potential is Good. Pt presents with lumbar dysfunction, decreased lumbar strength and ROM compared to norms, decreased LE ROM, decreased LE strength, decreased hip flexibility, poor postural alignment, poor body mechanics, impaired balance.     Pt would benefit from skilled PT care to improve strength, endurance, ROM, and balance to improve functional activity tolerance, reduce fall risk and improve quality of life.        Pain Pattern: 1 PEN        Patient received education on the [facility] program, purpose of the isometric test, progression of back strengthening as well as wellness approach and systemic strengthening.  Details of the program were discussed.  Reviewed that patient should feel support/pressure from med ex restraints but no pain or discomfort and patient expressed understanding.    Based on the above history and physical examination an active physical therapy program is recommended.  Pt will continue to benefit from  skilled outpatient physical therapy to address the deficits listed below in the chart, provide pt/family education and to maximize pt's level of independence in the home and community environment. .     No environmental, cultural, spiritual, developmental or education needs expressed or noted    Medical necessity is demonstrated by the following problem list.    Pt presents with the following impairments:   History  Co-morbidities and personal factors that may impact the plan of care Examination  Body Structures and Functions, activity limitations and participation restrictions that may impact the plan of care Clinical Presentation   Decision Making/ Complexity Score   Co-morbidities:   IBS, urinary incontinence        Personal Factors:   age  lifestyle Body Regions:   back  lower extremities    Body Systems:   gross symmetry  ROM  strength  gross coordinated movement  balance  gait    Activity limitations:   Learning and applying knowledge  no deficits    General Tasks and Commands  no deficits    Communication  no deficits    Mobility  lifting and carrying objects  walking  driving (bike, car, motorcycle)  Standing, bending, stairs    Self care  no deficits    Domestic Life  cooking  doing house work (cleaning house, washing dishes, laundry)    Interactions/Relationships  family relationships    Life Areas  employment    Community and Social Life  recreation and leisure    Participation Restrictions:   Unable to work as day care provider per lifting limitations     evolving clinical presentation with changing clinical characteristics Moderate           GOALS: Pt is in agreement with the following goals.    Short term goals:  6 weeks or 10 visits   1.  Pt will demonstrate increased lumbar ROM by at least 3 degrees from the initial ROM value with improvements noted in functional ROM and ability to perform ADLs  2.  Pt will demonstrate increased maximum isometric torque value by 10% when compared to the initial value  "resulting in improved ability to perform bending, lifting, and carrying activities safely, confidently.  3.  Patient report a reduction in worst pain score by 1-2 points for improved tolerance during work and recreational activities  4.  Pt able to perform HEP correctly with minimal cueing or supervision for therapist    Long term goals: 13 weeks or 20 visits   1. Pt will demonstrate increased lumbar ROM by at least 6 degrees from initial ROM value, resulting in improved ability to perform functional fwd bending while standing and sitting.   2. Pt will demonstrate increased maximum isometric torque value by 20% when compared to the initial value resulting in improved ability to perform bending, lifting, and carrying activities safely, confidently.  3. Pt to demonstrate ability to independently control and reduce their pain through posture positioning and mechanical movements throughout a typical day.  4.  Patient will demonstrate improved overall function per FOTO Survey to CK = at least 40% but < 60% impaired, limited or restricted score or less.  5. Pt will be able to climb up to 1 flight of stairs with reciprocal steps safely and confidently.   6. Pt will be able to maintain appropriate posture while ambulating for up to 80% treatment session to demonstrate improved postural alignment.       Plan   Outpatient physical therapy 2x week for 13 weeks or 20 visits to include the following:   - Patient education  - Therapeutic exercise  - Manual therapy  - Performance testing   - Neuromuscular Re-education  - Therapeutic activity   - Modalities    Pt may be seen by PTA as part of the rehabilitation team.     Therapist: Nery Capone, PT  8/3/2017    "I certify the need for these services furnished under this plan of treatment and while under my care."    ____________________________________  Physician/Referring Practitioner    _______________  Date of Signature                "

## 2017-08-08 ENCOUNTER — CLINICAL SUPPORT (OUTPATIENT)
Dept: REHABILITATION | Facility: OTHER | Age: 72
End: 2017-08-08
Attending: OBSTETRICS & GYNECOLOGY
Payer: MEDICARE

## 2017-08-08 DIAGNOSIS — R15.1 FECAL SMEARING: ICD-10-CM

## 2017-08-08 DIAGNOSIS — R39.15 URINARY URGENCY: ICD-10-CM

## 2017-08-08 DIAGNOSIS — N39.41 URGE INCONTINENCE: Primary | ICD-10-CM

## 2017-08-08 PROCEDURE — G8992 OTHER PT/OT  D/C STATUS: HCPCS | Mod: CI,PO

## 2017-08-08 PROCEDURE — 97110 THERAPEUTIC EXERCISES: CPT | Mod: PO

## 2017-08-08 PROCEDURE — G8991 OTHER PT/OT GOAL STATUS: HCPCS | Mod: CJ,PO

## 2017-08-08 NOTE — PROGRESS NOTES
Patient: Mila Foster   [facility] #: [id number]   Diagnosis:   Encounter Diagnoses   Name Primary?    Urinary urgency     Urge incontinence Yes    Fecal smearing       Date of start of care: 5/23/17  Date of treatment: 08/08/2017   Time in: 1:03  Time out: 1:45  Total treatment time: 42 minutes   Visit: 7 (2/10; expires 8/15/17)  POC expiration: 8/15/17  Outpatient Physical Therapy   Daily Note    Subjective     Pt states she has been having trouble with transportation. She has used up her alloted 48 rides and is going to try to go through medicaid but is having trouble at the moment. Pt's son brought her to today's session. Pt denies urinary leakage unless she allows her bladder to get too full. Pt denies fecal leakage.    Pain: Current: 0/10    Objective     TREATMENT    Pt received individual therapeutic exercise to develop strength, coordination, endurance, motor control and core stability for 42 minutes including:    - 10 x 5'' kegals in supine  - 10 quick flicks with verbal cueing; pt instructed to use quick flicks for bladder suppression when she needs to get to the bathroom with a full bladder    Not performed:  - Nustep x 6 min  - Side lying clams 2 x 10, 3'' hold, RTB  - Bridges 2 x 10  - Dead bug x 2 min  - Supine ball squeeze 20 x 3''  - Leg press 2 x 10, 25#  - Treadmill, 1.0 mph x 5 min  - Supine with wedge x 5 min    Education: Discussed progress, current functional status and self management; instructed pt to continue with HEP including: DB, Z lying and 5'' kegals x 30/day broken into sets of 10 as well as dead bug, supine march, bridges, clams, ball squeeze; pt verbalized understanding.   No educational, cultural, or spiritual barriers to learning.    Assessment     Pt tolerated tx session well without visible adverse effects. Pt has made good overall progress with improvement in her symptoms reporting no fecal incontinence and minimal urinary incontinence. Reviewed urge suppression today in order  "to provide pt with technique to minimize leakage for "when she lets her bladder get too full." Pt also displays good overall carry over of learning with good insight into effect of diet and food habits on her GI symptoms. Due to pt's progress and having met goals as outlined below, will discharge at this time. Pt was instructed to come back to follow-up should her symptoms worsen or should she have any questions or issues in the future indicating need for further PFPT. Pt verbalized understanding and agreement with this plan; will provide pt with opportunity to focus on healthy back PT, weight loss, and minimize stress with transportation issues.     GOALS  Short Term Goals: 4 weeks (6/20/17)  1. Pt will verbalize improved awareness of PFM activity as palpated by PT in order to improve activity involvement with HEP. Met 6/20/17  2. Pt will demonstrate decreased overflow muscle activity during PF muscle contraction. Met 6/20/17  3. Pt will demonstrated increase in PF muscle endurance to 3 sec in order to improved endurance and ability to perform urge suppression. Met 6/20/17  4. Pt will improve PFM strength to 2/5 in order to improve core strength and stability and to manage urinary and fecal incontinence. Met 6/20/17  5. Pt will tolerate HEP to improve impairments and independence with ADL's. Met 6/20/17     Long Term Goals: 12 weeks (8/15/17)  1. Pt will demonstrate ability to perform PFM contraction at 3/5 strength with 5'' hold in order to improve PFM function and management of fecal and urinary symptoms. Progressing, continue with HEP  2. Pt will report decrease in urinary leakage. Met 7/17/17   3. Pt will demonstrate appropriate and coordinated lift, drop and bulge in order to demonstrate improved motor control and awareness of her PFM. Met 8/8/17  4. Pt will be independent with HEP and self management. Met 8/8/17 5/23/17 (Eval)   6/27/17     UIQ-7   Score: 6 = 29%  Score: 5 = 24%     CRAIQ-7  Score: 6 = " 29%  Score: 5 = 24%    JOSEPH-6 8/8/17 2/24 = .08%    Plan     Discharge

## 2017-08-14 ENCOUNTER — CLINICAL SUPPORT (OUTPATIENT)
Dept: REHABILITATION | Facility: OTHER | Age: 72
End: 2017-08-14
Attending: PSYCHIATRY & NEUROLOGY
Payer: MEDICARE

## 2017-08-14 DIAGNOSIS — M51.36 DDD (DEGENERATIVE DISC DISEASE), LUMBAR: Primary | ICD-10-CM

## 2017-08-14 PROCEDURE — G8979 MOBILITY GOAL STATUS: HCPCS | Mod: CJ

## 2017-08-14 PROCEDURE — 97110 THERAPEUTIC EXERCISES: CPT

## 2017-08-14 PROCEDURE — G8978 MOBILITY CURRENT STATUS: HCPCS | Mod: CK

## 2017-08-14 NOTE — PROGRESS NOTES
[facility]      Name: Mila Foster  Clinic Number: [id number]  Date of Treatment: 2017   Diagnosis:   Encounter Diagnosis   Name Primary?    DDD (degenerative disc disease), lumbar Yes     Physician: Jude Fields,*    Pain pattern determined: 1 PEN      Plan of care signed: 17   Time in: 1:40 (Pt late secondary to busy parking lot)  Time Out: 2:30  Total Treatment time: 40  Precautions: IBS, urinary incontinence, HTN, DM II  Visit #: 2    POC due:17  Reassessment due:17    Subjective   Mila reports no significant change in symptoms. She has not purchased theraball. She admits she hasn't really started performing HEP regularly.       Patient reports their pain to be 2/10 on a 0-10 scale with 0 being no pain and 10 being the worst pain imaginable.    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, get confidence with walking back,     Objective     Baseline IM Testing Results:   Date of testin2017  ROM 48-0 deg   Max Peak Torque 110    Min Peak Torque 17    Flex/Ext Ratio 6.4   % below normative data -67         FOTO: Focus on Therapeutic Outcomes   Category: lumbar   % Impaired: 41  Current Score  = CK = at least 40% but < 60% impaired, limited or restricted    Goal at Discharge Score = CJ =  least 20%<40% impaired, limited or restricted    Treatment    Pt was instructed in and performed the following:     Mila received therapeutic exercises to develop/improved posture, cardiovascular endurance, muscular endurance, lumbar/cervical ROM, strength and muscular endurance for 40 minutes including the following exercises:     [facility] 2017   Visit Number 2   VAS Pain Rating 2   Extension in Standing -   Flexion in Lying -   Lumbar Extension  Seat Pad -   Femur Restraint -   Top Dead Center -   Counterweight -   Lumbar Flexion -   Lumbar Extension -   Lumbar Peak Torque -   Min Torque -   Percent From Norm -   Lumbar Weight 45   Repetitions 20   Rating of Perceived Exertion 3   Ice - Z Lie (in min.) 10       Peripheral muscle strengthening which included 1 set of 15-20 repetitions at a slow, controlled 7 second per rep pace focused on strengthening supporting musculature for improved body mechanics and functional mobility.  Pt and therapist focused on proper form during treatment to ensure optimal strengthening of each targeted muscle group.  Machines were utilized including torso rotation, leg extension, leg curl, chest press, upright row. Tricep extension, bicep curl, leg press, and hip abduction added on third visit.       Mila received the following manual therapy techniques:None      Home Exercise Program as follows:   Single knee to chest 10x, 2xdaily  Supine lower trunk rotation 10x, 2x daily  Extension in standing with LE supported 10x, 3x/day    Handouts were given to the patient. Pt demo good understanding of the education provided. Mila demonstrated good return demonstration of activities.     Lumbar roll use compliance: Not purchased as of 8/15/17  Additional exercises taught this treatment session:   Single knee to chest 10x, 2xdaily  Supine lower trunk rotation 10x, 2x daily    Assessment     Pt presents to physical therapy for 2nd visit following initial evaluation. Pt reports mild soreness following initial evaluation. Reviewed HEP exercises. Pt performed with moderate verbal cues. Pt has not purchased therball, exercises were modified as listed above with good stretch response. Pt introduced to Med X lumbar dynamic exercises and peripheral resistance exercises up to upright row.  Pt tolerated Med X lumbar exercise weight of previous tolerated warm up weight per high flx/ext ratio with reported 3/10 Owen Exertion scale. Pt required  moderate verbal cues to maintian speed to 7 sec per rep. Pt completed all peripheral resistance exercises with no reports of increased symptoms or discomfort.       Patient is making fair progress towards established goals.  Pt will continue to benefit from skilled outpatient physical therapy to address the deficits stated in the impairment chart, provide pt/family education and to maximize pt's level of independence in the home and community environment.       Pt's spiritual, cultural and educational needs considered and pt agreeable to plan of care and goals as stated below:     Medical necessity is demonstrated by the following problem list.    Pt presents with lumbar dysfunction, decreased lumbar strength and ROM compared to norms, decreased LE ROM, decreased LE strength, decreased hip flexibility, poor postural alignment, poor body mechanics, impaired balance.   Pt presents with the following impairments:   History  Co-morbidities and personal factors that may impact the plan of care Examination  Body Structures and Functions, activity limitations and participation restrictions that may impact the plan of care Clinical Presentation Decision Making/ Complexity Score   Co-morbidities:   IBS, urinary incontinence           Personal Factors:   age  lifestyle Body Regions:   back  lower extremities     Body Systems:   gross symmetry  ROM  strength  gross coordinated movement  balance  gait     Activity limitations:   Learning and applying knowledge  no deficits     General Tasks and Commands  no deficits     Communication  no deficits     Mobility  lifting and carrying objects  walking  driving (bike, car, motorcycle)  Standing, bending, stairs     Self care  no deficits     Domestic Life  cooking  doing house work (cleaning house, washing dishes, laundry)     Interactions/Relationships  family relationships     Life Areas  employment     Community and Social Life  recreation and leisure     Participation  Restrictions:   Unable to work as day care provider per lifting limitations    evolving clinical presentation with changing clinical characteristics Moderate            GOALS: Pt is in agreement with the following goals.     Short term goals:  6 weeks or 10 visits   1.  Pt will demonstrate increased lumbar ROM by at least 3 degrees from the initial ROM value with improvements noted in functional ROM and ability to perform ADLs  2.  Pt will demonstrate increased maximum isometric torque value by 10% when compared to the initial value resulting in improved ability to perform bending, lifting, and carrying activities safely, confidently.  3.  Patient report a reduction in worst pain score by 1-2 points for improved tolerance during work and recreational activities  4.  Pt able to perform HEP correctly with minimal cueing or supervision for therapist     Long term goals: 13 weeks or 20 visits   1. Pt will demonstrate increased lumbar ROM by at least 6 degrees from initial ROM value, resulting in improved ability to perform functional fwd bending while standing and sitting.   2. Pt will demonstrate increased maximum isometric torque value by 20% when compared to the initial value resulting in improved ability to perform bending, lifting, and carrying activities safely, confidently.  3. Pt to demonstrate ability to independently control and reduce their pain through posture positioning and mechanical movements throughout a typical day.  4.  Patient will demonstrate improved overall function per FOTO Survey to CK = at least 40% but < 60% impaired, limited or restricted score or less.  5. Pt will be able to climb up to 1 flight of stairs with reciprocal steps safely and confidently.   6. Pt will be able to maintain appropriate posture while ambulating for up to 80% treatment session to demonstrate improved postural alignment.          Plan   Continue with established Plan of Care towards established PT goals.

## 2017-09-19 ENCOUNTER — TELEPHONE (OUTPATIENT)
Dept: UROGYNECOLOGY | Facility: CLINIC | Age: 72
End: 2017-09-19

## 2017-10-27 ENCOUNTER — OFFICE VISIT (OUTPATIENT)
Dept: NEUROLOGY | Facility: CLINIC | Age: 72
End: 2017-10-27
Payer: MEDICARE

## 2017-10-27 VITALS
HEIGHT: 67 IN | DIASTOLIC BLOOD PRESSURE: 67 MMHG | HEART RATE: 78 BPM | WEIGHT: 268.06 LBS | BODY MASS INDEX: 42.07 KG/M2 | SYSTOLIC BLOOD PRESSURE: 131 MMHG

## 2017-10-27 DIAGNOSIS — M47.816 FACET ARTHRITIS OF LUMBAR REGION: Primary | ICD-10-CM

## 2017-10-27 PROCEDURE — 99999 PR PBB SHADOW E&M-EST. PATIENT-LVL III: CPT | Mod: PBBFAC,,, | Performed by: PSYCHIATRY & NEUROLOGY

## 2017-10-27 PROCEDURE — 99213 OFFICE O/P EST LOW 20 MIN: CPT | Mod: S$GLB,,, | Performed by: PSYCHIATRY & NEUROLOGY

## 2017-10-27 NOTE — PROGRESS NOTES
[facility] - NEUROLOGY  [facility] [place]    Date: October 27, 2017   Patient Name: Mila Foster   MRN: [id number]   PCP: Dinesh Connors  Referring Provider: Self, Aaareferral    Assessment:      This is Mila Foster, 72 y.o. female with lumbar radiculopathy and mild attention difficulties most likely related to ongoing stressors and mood troubles.  Recent MRI L-spine with mild degenerative changes only.     Plan:      -  Continue gabapentin to 600/300/600  -  Encouraged follow up with [facility]  -  Encouraged use of cane    Follow up 6 months       I discussed side effects of the medications. I asked the patient to  stop the medication if She notices serious adverse effects as we discussed and to seek immediate medical attention at an ER.     Dami Parra MD  [facility]   Department of Neurology    Subjective:   Experienced relief after starting [facility] but had to take hiatus after her son's diagnosis of cancer, planning on resuming soon  Using can but recently had a fall when she tripped on loose fitting shoes she was wearing - feels she tends to lean forward while walking  Continued relief from GBP and naproxen    6/2017  Good effect of GBP, LEONEL x2 with some relief, often feels herself leaning forward while walking but no falls  Recent dx IBS with good effect of medications  Plans to start [facility] and begin water aerobics    2/2017  Primary concern on this visit is centered on back pain and sciatica.  Some improvement since injections this month but continued pain, notably with prolonged sitting.  Notices herself lean forward when walking.  Currently on leave from work due to restrictions with lifting.  Memory troubles largely unchanged.     HPI 11/2016:   Ms. Mila Foster is a 72 y.o. female with HTN and DM with related neuropathy presents for memory trouble     She has noticed trouble completing sentences and reversing word  order over the past several months but does not think other people have appreciated this very much. She works at a  center and does not feel that her problems interfere with her work. She does note significant stress related to one of her children going through a divorce and younger co workers leaving additional work for her to do. She began taking fluoxetine a few months ago with some improvement noted. She lives alone and continues to manage her own finances and drive without accidents or trouble getting lost. She has some difficulty with sleep related to crawling sensation in right much more than left leg which improves if she gets up and walks around, takes gabapentin at bed time only with mild relief. She completed high school.     Over the past few months she has also notices low back pain radiating down the right leg which is frequently brought on by prolonged sitting, gabapentin has not helped with this. Also notes pain in R>L knee.    PAST MEDICAL HISTORY:  Past Medical History:   Diagnosis Date    Diabetes     Hypertension        PAST SURGICAL HISTORY:  Past Surgical History:   Procedure Laterality Date    CHOLECYSTECTOMY      HYSTERECTOMY         CURRENT MEDS:  Current Outpatient Prescriptions   Medication Sig Dispense Refill    amlodipine (NORVASC) 10 MG tablet 10 mg.  1    aspirin (ECOTRIN) 81 MG EC tablet 81 mg.  2    atenolol (TENORMIN) 50 MG tablet 50 mg.  1    gabapentin (NEURONTIN) 300 MG capsule Take 2 tabs in the morning, 1 tab in the afternoon, and 2 tabs in the evening 150 capsule 5    glipiZIDE (GLUCOTROL) 5 MG tablet 5 mg.  1    hydrochlorothiazide (HYDRODIURIL) 25 MG tablet 25 mg.  1    lisinopril (PRINIVIL,ZESTRIL) 40 MG tablet 40 mg.  1    metformin (GLUCOPHAGE-XR) 500 MG 24 hr tablet 500 mg.  1    mirabegron (MYRBETRIQ) 25 mg Tb24 ER tablet Take 1 tablet (25 mg total) by mouth once daily. 30 tablet 11    naproxen (NAPROSYN) 500 MG tablet   1    omeprazole (PRILOSEC)  "20 MG capsule 20 mg.  1    pravastatin (PRAVACHOL) 40 MG tablet 40 mg.  1    hyoscyamine (ANASPAZ,LEVSIN) 0.125 mg Tab Take 1 tablet (125 mcg total) by mouth every 4 (four) hours as needed. 30 tablet 6     No current facility-administered medications for this visit.        ALLERGIES:  Review of patient's allergies indicates:  No Known Allergies    FAMILY HISTORY:  Family History   Problem Relation Age of Onset    Vaginal cancer Neg Hx     Endometrial cancer Neg Hx     Cervical cancer Neg Hx        SOCIAL HISTORY:  Social History   Substance Use Topics    Smoking status: Never Smoker    Smokeless tobacco: Not on file    Alcohol use Not on file       Review of Systems:  12 review of systems is negative except for the symptoms mentioned in HPI.        Objective:     Vitals:    10/27/17 1140   BP: 131/67   Pulse: 78   Weight: 121.6 kg (268 lb 1.3 oz)   Height: 5' 7" (1.702 m)       General: NAD, well nourished   Eyes: no tearing, discharge, no erythema   ENT: moist mucous membranes of the oral cavity, nares patent    Neck: Supple, full range of motion  Cardiovascular: Warm and well perfused, pulses equal and symmetrical  Lungs: Normal work of breathing, normal chest wall excursions  Skin: No rash, lesions, or breakdown on exposed skin  Psychiatry: Mood and affect are appropriate   Abdomen: soft, non tender, non distended  Extremeties: No cyanosis, clubbing or edema.    Neurological   MENTAL STATUS: Alert and oriented to person, place, and time. Attention and concentration within normal limits. Speech without dysarthria, able to name and repeat without difficulty. Recent and remote memory within normal limits   CRANIAL NERVES: Visual fields intact. PERRL. EOMI. Facial sensation intact. Face symmetrical. Hearing grossly intact. Full shoulder shrug bilaterally. Tongue protrudes midline   SENSORY: Sensation is intact to light touch throughout.    MOTOR: Normal bulk and tone. No pronator drift.  5/5 deltoid, biceps, " triceps, interosseous, hand  bilaterally. 5/5 iliopsoas, knee extension/flexion, foot dorsi/plantarflexion bilaterally.    CEREBELLAR/COORDINATION/GAIT: Gait steady with mild decreased clearance.

## 2017-12-13 ENCOUNTER — DOCUMENTATION ONLY (OUTPATIENT)
Dept: REHABILITATION | Facility: OTHER | Age: 72
End: 2017-12-13

## 2017-12-13 NOTE — PROGRESS NOTES
Outpatient Physical Therapy Discharge Summary    Date: 12/13/2017      Date of PT eval: 8/3/17  Number of PT visits: 2  Date of last visit: 8/14/17    Assessment:  Unable to assess if goals met secondary to lack of attendance. Pt demonstrated non-compliance with HEP and treatment. Pt unable to attend consistently secondary to personal and family stresses (3 NS, 5 Cx) . Per chart review pt planned on returning to PT but failed to follow up with clinic.    Plan: Discharge from outpatient physical therapy due to failure to return.

## 2018-04-10 ENCOUNTER — TELEPHONE (OUTPATIENT)
Dept: GASTROENTEROLOGY | Facility: CLINIC | Age: 73
End: 2018-04-10

## 2018-04-10 NOTE — TELEPHONE ENCOUNTER
----- Message from Marie John sent at 4/10/2018 12:27 PM CDT -----  Contact: Self- [phone]  Anselmo- pt called to discuss possibly moving her appt time back for her appt tomorrow- pt relies on her son to bring her to her appts and he also has one at the same time as hers tomorrow- please call pt back at [phone]

## 2018-04-10 NOTE — TELEPHONE ENCOUNTER
Spoke with patient.  Cancelled her appointment with Dr.James Briones for tomorrow.  Stated she will call back to reschedule.

## 2018-04-12 DIAGNOSIS — R15.1 FECAL SMEARING: ICD-10-CM

## 2018-04-12 DIAGNOSIS — R19.7 DIARRHEA, UNSPECIFIED TYPE: ICD-10-CM

## 2018-04-12 RX ORDER — HYOSCYAMINE SULFATE 0.125 MG
125 TABLET ORAL EVERY 4 HOURS PRN
Qty: 30 TABLET | Refills: 6 | Status: SHIPPED | OUTPATIENT
Start: 2018-04-12 | End: 2018-05-12

## 2018-06-11 DIAGNOSIS — R39.15 URINARY URGENCY: ICD-10-CM

## 2018-06-11 DIAGNOSIS — N39.41 URGE INCONTINENCE: ICD-10-CM

## 2018-07-20 ENCOUNTER — OFFICE VISIT (OUTPATIENT)
Dept: PAIN MEDICINE | Facility: CLINIC | Age: 73
End: 2018-07-20
Payer: MEDICARE

## 2018-07-20 VITALS
HEART RATE: 71 BPM | BODY MASS INDEX: 40.32 KG/M2 | DIASTOLIC BLOOD PRESSURE: 71 MMHG | TEMPERATURE: 98 F | SYSTOLIC BLOOD PRESSURE: 116 MMHG | HEIGHT: 67 IN | WEIGHT: 256.88 LBS

## 2018-07-20 DIAGNOSIS — Z91.81 HISTORY OF FALL: ICD-10-CM

## 2018-07-20 DIAGNOSIS — M51.16 LUMBAR DISC HERNIATION WITH RADICULOPATHY: ICD-10-CM

## 2018-07-20 DIAGNOSIS — M54.16 LUMBAR RADICULOPATHY: ICD-10-CM

## 2018-07-20 DIAGNOSIS — M47.816 FACET ARTHRITIS OF LUMBAR REGION: ICD-10-CM

## 2018-07-20 DIAGNOSIS — M51.36 ANNULAR TEAR OF LUMBAR DISC: Primary | ICD-10-CM

## 2018-07-20 DIAGNOSIS — M48.061 NEUROFORAMINAL STENOSIS OF LUMBAR SPINE: ICD-10-CM

## 2018-07-20 DIAGNOSIS — M51.36 DDD (DEGENERATIVE DISC DISEASE), LUMBAR: ICD-10-CM

## 2018-07-20 PROCEDURE — 99999 PR PBB SHADOW E&M-EST. PATIENT-LVL III: CPT | Mod: PBBFAC,,, | Performed by: NURSE PRACTITIONER

## 2018-07-20 PROCEDURE — 99213 OFFICE O/P EST LOW 20 MIN: CPT | Mod: S$GLB,,, | Performed by: NURSE PRACTITIONER

## 2018-07-20 NOTE — PROGRESS NOTES
Patient, Mila Foster (MRN #[id number]), presented with a recorded BMI of 40.23 kg/m^2 consistent with the definition of morbid obesity (ICD-10 E66.01). The patient's morbid obesity was monitored, evaluated, addressed and/or treated. This addendum to the medical record is made on 07/20/2018.

## 2018-07-20 NOTE — PROGRESS NOTES
Chronic Pain - Established Patient    Referring Physician: No ref. provider found    Chief Complaint:   Chief Complaint   Patient presents with    Low-back Pain        SUBJECTIVE: Disclaimer: This note has been generated using voice-recognition software. There may be typographical errors that have been missed during proof-reading    Interval History 7/20/2018:  The patient returns to clinic today for follow up. She has not been seen in over a year due to her son being diagnosed with cancer. She reports that he is in remission now. She returns today due to three fall recently, last a week ago. She continues to report low back pain that radiates intermittently down the posterior portion of both legs to her feet. She reports that her legs often feel heavy and weak which is why she falls. She denies any bowel or bladder incontinence. Her pain today is 7/10.    Interval History 3/6/2017:  The patient returns to clinic today for follow up. She is s/p bilateral S1 TF LEONEL on 2/8/2017. She reports 80% relief of her radiating leg pain. She continues to have low back pain. She describes the pain as dull and aching. The pain is worse in the morning. She reports that the pain increases with prolonged sitting and standing. She denies any other health changes. She denies any bowel or bladder incontinence. Her pain today is 6/10.     Interval History 1/23/2017:  The patient returns to clinic today for follow up of low back pain and bilateral leg pain. She has recently completed a medrol dose pack with some benefit. She still reports radiating pain into both legs. The pain is worse with prolonged sitting and lifting. The pain gets better with walking and rest. The pain radiates down the back of both legs to ankles. She denies any bowel or bladder incontinence or signs of saddle paresthesia. She continues to work with lifting restrictions. She currently takes Gabapentin and Naproxen with benefit. Her pain today is 6/10.  "    Initial encounter:    Mila Foster presents to the clinic for the evaluation of bilateral leg pain with intermittent lower back pain. 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 She reports recent falling to knees, but she changed her shoes, and the falling has ceased. She reports that she had a "pain flare" 2 weeks ago requiring a walker for 2 days.     Brief history:  70yo F with hx of HTN and DM complicated by neuropathy BL but R>L with tingling, tightness, shooting pain only in feet. Patient reports glucose was 120 this morning and usually runs around 130.    Pain Description:    The pain is located in the posterior leg area and radiates to her ankles and to her knees at its worst.     At BEST  1/10     At WORST  8/10 on the WORST day.      On average pain is rated as 7/10    Today the pain is rated as 5/10    The pain is described as aching, burning, dull, sharp and tight band      Symptoms interfere with work.     Exacerbating factors: Sitting, picking up children, lifting, bending over    Mitigating factors: walking, rest    Patient denies night fever/night sweats, bowel incontinence, significant weight loss, significant motor weakness and loss of sensations. She does report chronic urinary incontinence unrelated to back/leg pain.  Patient denies any suicidal or homicidal ideations    Pain Medications:  Current:  Gabapentin; been on it 1 year, and she reports she feels it helps    Tried in Past:  NSAIDs -yes; reports it helps  TCA -Never  SNRI -Never  Anti-convulsants -Never  Muscle Relaxants -Never  Opioids-Never    Physical Therapy/Home Exercise: no       report: N/A    Pain Procedures:   2017- Bilateral S1 TF LEONEL  3/22/2017- Bilateral L2,3,4,5 MBB       Chiropractor -never  Acupuncture - never  TENS unit -never  Spinal decompression -never  Joint replacement -never    Imagin/12/16 MRI Lumbar " Spine WO Contrast   L1-L2: There is no focal disc herniation. No significant spinal canal or neural foraminal narrowing.    L2-L3: There is no focal disc herniation. No significant spinal canal or neural foraminal narrowing.    L3-L4: Mild bilateral facet arthropathy.  No focal disc herniation. No significant spinal canal or neural foraminal narrowing.    L4-L5: Mild disc bulge and bilateral facet arthropathy results in mild right-sided neural foraminal narrowing. No significant spinal canal stenosis.    L5-S1: Diffuse disc bulge with small superimposed right foraminal disc protrusion.  Findings result in moderate right-sided neuroforaminal narrowing with likely abutment/impingement upon the exited right L5 nerve root. No significant spinal canal stenosis.  Small posterior annular fissure noted.   Impression      Lumbar spondylosis as detailed above, most significant for small right foraminal protrusion at the L5-S1 level resulting in moderate neuroforaminal narrowing and likely abutment/impingement upon the exited right L5 nerve root         Past Medical History:   Diagnosis Date    Diabetes     Hypertension      Past Surgical History:   Procedure Laterality Date    CHOLECYSTECTOMY      HYSTERECTOMY       Social History     Social History    Marital status: Single     Spouse name: N/A    Number of children: N/A    Years of education: N/A     Occupational History    Not on file.     Social History Main Topics    Smoking status: Never Smoker    Smokeless tobacco: Not on file    Alcohol use Not on file    Drug use: Unknown    Sexual activity: Not on file     Other Topics Concern    Not on file     Social History Narrative    No narrative on file     Family History   Problem Relation Age of Onset    Vaginal cancer Neg Hx     Endometrial cancer Neg Hx     Cervical cancer Neg Hx        Review of patient's allergies indicates:  No Known Allergies    Current Outpatient Prescriptions   Medication Sig     "amlodipine (NORVASC) 10 MG tablet 10 mg.    aspirin (ECOTRIN) 81 MG EC tablet 81 mg.    atenolol (TENORMIN) 50 MG tablet 50 mg.    gabapentin (NEURONTIN) 300 MG capsule Take 2 tabs in the morning, 1 tab in the afternoon, and 2 tabs in the evening    glipiZIDE (GLUCOTROL) 5 MG tablet 5 mg.    hydrochlorothiazide (HYDRODIURIL) 25 MG tablet 25 mg.    hyoscyamine (ANASPAZ,LEVSIN) 0.125 mg Tab Take 1 tablet (125 mcg total) by mouth every 4 (four) hours as needed.    lisinopril (PRINIVIL,ZESTRIL) 40 MG tablet 40 mg.    metformin (GLUCOPHAGE-XR) 500 MG 24 hr tablet 500 mg.    mirabegron (MYRBETRIQ) 25 mg Tb24 ER tablet Take 1 tablet (25 mg total) by mouth once daily.    naproxen (NAPROSYN) 500 MG tablet     omeprazole (PRILOSEC) 20 MG capsule 20 mg.    pravastatin (PRAVACHOL) 40 MG tablet 40 mg.     No current facility-administered medications for this visit.        REVIEW OF SYSTEMS:    GENERAL:  No weight loss, malaise or fevers.  HEENT:   No recent changes in vision or hearing  NECK:  Negative for lumps, no difficulty with swallowing.  RESPIRATORY:  Negative for cough, wheezing or shortness of breath, patient denies any recent URI.  CARDIOVASCULAR:  Negative for chest pain, leg swelling or palpitations. HTN.  GI:  Negative for abdominal discomfort, blood in stools or black stools or change in bowel habits.  MUSCULOSKELETAL:  See HPI.  SKIN:  Negative for lesions, rash, and itching.  PSYCH:  No mood disorder or recent psychosocial stressors.  Patient's sleep is occasionally disturbed secondary to pain.  HEMATOLOGY/LYMPHOLOGY:  Negative for prolonged bleeding, bruising easily or swollen nodes.  Patient is not currently taking any anti-coagulants  ENDO: Positive for DM. Negative for thyroid dysfunction  NEURO:   No history of headaches, syncope, paralysis, seizures or tremors.  All other reviewed and negative other than HPI.    OBJECTIVE:    /71   Pulse 71   Temp 98.3 °F (36.8 °C)   Ht 5' 7" (1.702 m)  "  Wt 116.5 kg (256 lb 13.7 oz)   BMI 40.23 kg/m²     PHYSICAL EXAMINATION:    GENERAL: Well appearing, in no acute distress, alert and oriented x3.  PSYCH:  Mood and affect appropriate.  SKIN: Skin color, texture, turgor normal, no rashes or lesions.  HEAD/FACE:  Normocephalic, atraumatic. Cranial nerves grossly intact.   CV: RRR with palpation of the radial artery.  PULM: No evidence of respiratory difficulty, symmetric chest rise.  BACK: Straight leg raising in the siting position is positive for radicular pain bilaterally. Limited ROM with pain on flexion and extension. Positive facet loading bilaterally.  LOWER EXTREMITIES: Peripheral joint ROM is full and pain free without obvious instability or laxity in lower extremities. No deformities, edema, or skin discoloration. Good capillary refill.  MUSCULOSKELETAL: Hip, and knee provocative maneuvers are negative.  There is pain with palpation over the sacroiliac joints bilaterally.  FABERs test is negative bilaterally.  FADIRs test is negative. 5/5 strength in right ankle with plantar and dorsiflexion. 4/5 strength in left ankle with plantar and dorsiflexion. 5/5 strength with right knee flexion and extension. 5/5 strength with left knee flexion and extension. 5/5 strength in right EHL, 4/5 strength in left EHL.  No atrophy or tone abnormalities are noted.  NEURO: Bilateral lower extremity coordination and muscle stretch reflexes are physiologic and symmetric.   No loss of sensation is noted.  GAIT: Antalgic- ambulates with cane.     ASSESSMENT: 73 y.o. year old female with back/leg pain, consistent with BL radiculopathy due to L5 disc protrusion.    Encounter Diagnoses   Name Primary?    Annular tear of lumbar disc Yes    Lumbar disc herniation with radiculopathy     Lumbar radiculopathy     Facet arthritis of lumbar region     DDD (degenerative disc disease), lumbar     Neuroforaminal stenosis of lumbar spine     History of fall        PLAN:     - Previous  imaging was reviewed and discussed with the patient today.    - Obtain lumbar xray and MRI.     - Consider repeat bilateral S1 TF LEONEL in the future.     - Continue Gabapentin and Naproxen as these are providing benefit.    - HME order for rollator provided today.     - RTC after above imaging.     - Dr. Duncan was consulted on the patient and agrees with this plan.      The above plan and management options were discussed at length with patient. Patient is in agreement with the above and verbalized understanding.     Coby Russo NP  07/20/2018

## 2018-07-25 ENCOUNTER — HOSPITAL ENCOUNTER (OUTPATIENT)
Dept: RADIOLOGY | Facility: OTHER | Age: 73
Discharge: HOME | End: 2018-07-25
Attending: NURSE PRACTITIONER
Payer: MEDICARE

## 2018-07-25 DIAGNOSIS — M47.816 FACET ARTHRITIS OF LUMBAR REGION: ICD-10-CM

## 2018-07-25 DIAGNOSIS — M48.061 NEUROFORAMINAL STENOSIS OF LUMBAR SPINE: ICD-10-CM

## 2018-07-25 DIAGNOSIS — M51.16 LUMBAR DISC HERNIATION WITH RADICULOPATHY: ICD-10-CM

## 2018-07-25 DIAGNOSIS — M51.36 DDD (DEGENERATIVE DISC DISEASE), LUMBAR: ICD-10-CM

## 2018-07-25 DIAGNOSIS — M51.36 ANNULAR TEAR OF LUMBAR DISC: ICD-10-CM

## 2018-07-25 DIAGNOSIS — Z91.81 HISTORY OF FALL: ICD-10-CM

## 2018-07-25 DIAGNOSIS — M54.16 LUMBAR RADICULOPATHY: ICD-10-CM

## 2018-07-25 PROCEDURE — 72148 MRI LUMBAR SPINE W/O DYE: CPT | Mod: 26,,, | Performed by: RADIOLOGY

## 2018-07-25 PROCEDURE — 72114 X-RAY EXAM L-S SPINE BENDING: CPT | Mod: TC,FY

## 2018-07-25 PROCEDURE — 72148 MRI LUMBAR SPINE W/O DYE: CPT | Mod: TC

## 2018-07-25 PROCEDURE — 72114 X-RAY EXAM L-S SPINE BENDING: CPT | Mod: 26,,, | Performed by: RADIOLOGY

## 2018-07-27 ENCOUNTER — OFFICE VISIT (OUTPATIENT)
Dept: PAIN MEDICINE | Facility: CLINIC | Age: 73
End: 2018-07-27
Payer: MEDICARE

## 2018-07-27 VITALS
HEART RATE: 75 BPM | RESPIRATION RATE: 18 BRPM | TEMPERATURE: 99 F | WEIGHT: 251.31 LBS | SYSTOLIC BLOOD PRESSURE: 147 MMHG | BODY MASS INDEX: 39.44 KG/M2 | HEIGHT: 67 IN | DIASTOLIC BLOOD PRESSURE: 61 MMHG

## 2018-07-27 DIAGNOSIS — M48.061 NEUROFORAMINAL STENOSIS OF LUMBAR SPINE: ICD-10-CM

## 2018-07-27 DIAGNOSIS — M54.16 LUMBAR RADICULOPATHY: ICD-10-CM

## 2018-07-27 DIAGNOSIS — M51.36 ANNULAR TEAR OF LUMBAR DISC: ICD-10-CM

## 2018-07-27 DIAGNOSIS — M51.36 DDD (DEGENERATIVE DISC DISEASE), LUMBAR: ICD-10-CM

## 2018-07-27 DIAGNOSIS — M47.816 FACET ARTHRITIS OF LUMBAR REGION: ICD-10-CM

## 2018-07-27 DIAGNOSIS — M51.16 LUMBAR DISC HERNIATION WITH RADICULOPATHY: Primary | ICD-10-CM

## 2018-07-27 PROCEDURE — 99213 OFFICE O/P EST LOW 20 MIN: CPT | Mod: S$GLB,,, | Performed by: NURSE PRACTITIONER

## 2018-07-27 PROCEDURE — 99999 PR PBB SHADOW E&M-EST. PATIENT-LVL III: CPT | Mod: PBBFAC,,, | Performed by: NURSE PRACTITIONER

## 2018-07-27 NOTE — PROGRESS NOTES
Chronic Pain - Established Patient    Referring Physician: No ref. provider found    Chief Complaint:   Chief Complaint   Patient presents with    Low-back Pain        SUBJECTIVE: Disclaimer: This note has been generated using voice-recognition software. There may be typographical errors that have been missed during proof-reading    Interval History 7/27/2018:  The patient returns to clinic today for follow up and image review. She continues to report low back pain that radiates down the posterior aspect of her right leg to her foot. She reports intermittent left leg pain in the same distribution. Her pain is worse with prolonged walking and activity. She often feels like her legs are heavy. She often feels like she will fall. She denies any other health changes. She denies any bowel or bladder incontinence. Her pain today is 2/10.    Interval History 7/20/2018:  The patient returns to clinic today for follow up. She has not been seen in over a year due to her son being diagnosed with cancer. She reports that he is in remission now. She returns today due to three fall recently, last a week ago. She continues to report low back pain that radiates intermittently down the posterior portion of both legs to her feet. She reports that her legs often feel heavy and weak which is why she falls. She denies any bowel or bladder incontinence. Her pain today is 7/10.    Interval History 3/6/2017:  The patient returns to clinic today for follow up. She is s/p bilateral S1 TF LEONEL on 2/8/2017. She reports 80% relief of her radiating leg pain. She continues to have low back pain. She describes the pain as dull and aching. The pain is worse in the morning. She reports that the pain increases with prolonged sitting and standing. She denies any other health changes. She denies any bowel or bladder incontinence. Her pain today is 6/10.     Interval History 1/23/2017:  The patient returns to clinic today for follow up of low back  "pain and bilateral leg pain. She has recently completed a medrol dose pack with some benefit. She still reports radiating pain into both legs. The pain is worse with prolonged sitting and lifting. The pain gets better with walking and rest. The pain radiates down the back of both legs to ankles. She denies any bowel or bladder incontinence or signs of saddle paresthesia. She continues to work with lifting restrictions. She currently takes Gabapentin and Naproxen with benefit. Her pain today is 6/10.     Initial encounter:    Mila Foster presents to the clinic for the evaluation of bilateral leg pain with intermittent lower back pain. 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 She reports recent falling to knees, but she changed her shoes, and the falling has ceased. She reports that she had a "pain flare" 2 weeks ago requiring a walker for 2 days.     Brief history:  72yo F with hx of HTN and DM complicated by neuropathy BL but R>L with tingling, tightness, shooting pain only in feet. Patient reports glucose was 120 this morning and usually runs around 130.    Pain Description:    The pain is located in the posterior leg area and radiates to her ankles and to her knees at its worst.     At BEST  1/10     At WORST  8/10 on the WORST day.      On average pain is rated as 7/10    Today the pain is rated as 5/10    The pain is described as aching, burning, dull, sharp and tight band      Symptoms interfere with work.     Exacerbating factors: Sitting, picking up children, lifting, bending over    Mitigating factors: walking, rest    Patient denies night fever/night sweats, bowel incontinence, significant weight loss, significant motor weakness and loss of sensations. She does report chronic urinary incontinence unrelated to back/leg pain.  Patient denies any suicidal or homicidal ideations    Pain " Medications:  Current:  Gabapentin; been on it 1 year, and she reports she feels it helps    Tried in Past:  NSAIDs -yes; reports it helps  TCA -Never  SNRI -Never  Anti-convulsants -gabapentin  Muscle Relaxants -Never  Opioids-Never    Physical Therapy/Home Exercise: yes, in the past       report: N/A    Pain Procedures:   2/8/2017- Bilateral S1 TF LEONEL  3/22/2017- Bilateral L2,3,4,5 MBB       Chiropractor -never  Acupuncture - never  TENS unit -never  Spinal decompression -never  Joint replacement -never    Imaging:   MRI Lumbar Spine 7/25/2018:  COMPARISON:  12/16/2016    FINDINGS:  There is again normal lumbar lordosis.  No spondylolisthesis or spondylolysis or marrow edema to suggest acute lumbar fractures or neoplastic processes.  The tip of the conus is at L1-2 disc space.  Paraspinal soft tissues are unremarkable.    L5-S1: There is dehydration of the nucleus pulposis associated with disc space narrowing.  There is a right paracentral and right foraminal disc protrusion/herniation with findings suggestive of slight flattening of the descending right S1 root (image 30 series 7 and 8 and image 7 series 4).  In addition there is also a moderate right L5-S1 foraminal stenosis.  In the far lateral neural foramen the inferior surface of the exiting L5 root contacts the disc protrusion.  Clinical correlation for right L5 and S1 radiculopathy suggested.  Left neural foramen is unremarkable.  No significant central canal spinal stenosis.  There is mild facet arthropathy.    L4-5: Dehydration of the nucleus pulposis associated with a mild posterior bulging of the annulus.  No significant central canal stenosis.  There is degenerative hypertrophic facet arthropathy.    L3-4: Dehydration of the nucleus pulposis.  No significant central canal spinal stenosis.  There is degenerative hypertrophic facet arthropathy and hypertrophy of the ligamentum flava unchanged from the previous study.    L2-3: Mild spondylotic changes  associated with mild posterior bulging of the annulus.  No significant spinal stenosis.  There is facet arthropathy.    L1-2: No significant disc herniations or significant spinal stenosis.  Neural foramina are unremarkable.  There is facet arthropathy.      Impression       1. Right L5-S1 foraminal stenosis associated with a disc protrusion/disc herniation.  Clinical correlation for right L5 and S1 radiculopathy suggested.  2. Multilevel facet arthropathy.  3. Milder spondylotic changes at the rest of the disc spaces as above.     Xray Lumbar Spine 7/25/2018:  COMPARISON:  Prior lumbar spine MRI dated 12/16/16    FINDINGS:  There is diffuse osteopenia.  There is mild grade 1 anterolisthesis of L3 on L4 (4 mm) and of L4 on L5 (6 mm).  There is no evidence of translational instability.  There is moderate facet arthropathy at the lower lumbar spine with probable L5-S1 neural foraminal narrowing.  Endplate degenerative changes are present with subchondral sclerosis and marginal osteophyte formation.    There is atherosclerotic calcification of the aorta.      Impression       Osteopenia and degenerative change of the lumbar spine with grade 1 anterolisthesis of L3-4 and L4-5, new from the 2016 MRI.  No evidence of translational instability.     12/12/16 MRI Lumbar Spine WO Contrast   L1-L2: There is no focal disc herniation. No significant spinal canal or neural foraminal narrowing.    L2-L3: There is no focal disc herniation. No significant spinal canal or neural foraminal narrowing.    L3-L4: Mild bilateral facet arthropathy.  No focal disc herniation. No significant spinal canal or neural foraminal narrowing.    L4-L5: Mild disc bulge and bilateral facet arthropathy results in mild right-sided neural foraminal narrowing. No significant spinal canal stenosis.    L5-S1: Diffuse disc bulge with small superimposed right foraminal disc protrusion.  Findings result in moderate right-sided neuroforaminal narrowing with  likely abutment/impingement upon the exited right L5 nerve root. No significant spinal canal stenosis.  Small posterior annular fissure noted.   Impression      Lumbar spondylosis as detailed above, most significant for small right foraminal protrusion at the L5-S1 level resulting in moderate neuroforaminal narrowing and likely abutment/impingement upon the exited right L5 nerve root         Past Medical History:   Diagnosis Date    Diabetes     Hypertension      Past Surgical History:   Procedure Laterality Date    CHOLECYSTECTOMY      HYSTERECTOMY       Social History     Social History    Marital status: Single     Spouse name: N/A    Number of children: N/A    Years of education: N/A     Occupational History    Not on file.     Social History Main Topics    Smoking status: Never Smoker    Smokeless tobacco: Not on file    Alcohol use Not on file    Drug use: Unknown    Sexual activity: Not on file     Other Topics Concern    Not on file     Social History Narrative    No narrative on file     Family History   Problem Relation Age of Onset    Vaginal cancer Neg Hx     Endometrial cancer Neg Hx     Cervical cancer Neg Hx        Review of patient's allergies indicates:  No Known Allergies    Current Outpatient Prescriptions   Medication Sig    amlodipine (NORVASC) 10 MG tablet 10 mg.    aspirin (ECOTRIN) 81 MG EC tablet 81 mg.    atenolol (TENORMIN) 50 MG tablet 50 mg.    gabapentin (NEURONTIN) 300 MG capsule Take 2 tabs in the morning, 1 tab in the afternoon, and 2 tabs in the evening    glipiZIDE (GLUCOTROL) 5 MG tablet 5 mg.    hydrochlorothiazide (HYDRODIURIL) 25 MG tablet 25 mg.    hyoscyamine (ANASPAZ,LEVSIN) 0.125 mg Tab Take 1 tablet (125 mcg total) by mouth every 4 (four) hours as needed.    lisinopril (PRINIVIL,ZESTRIL) 40 MG tablet 40 mg.    metformin (GLUCOPHAGE-XR) 500 MG 24 hr tablet 500 mg.    mirabegron (MYRBETRIQ) 25 mg Tb24 ER tablet Take 1 tablet (25 mg total) by mouth  "once daily.    naproxen (NAPROSYN) 500 MG tablet     omeprazole (PRILOSEC) 20 MG capsule 20 mg.    pravastatin (PRAVACHOL) 40 MG tablet 40 mg.     No current facility-administered medications for this visit.        REVIEW OF SYSTEMS:    GENERAL:  No weight loss, malaise or fevers.  HEENT:   No recent changes in vision or hearing  NECK:  Negative for lumps, no difficulty with swallowing.  RESPIRATORY:  Negative for cough, wheezing or shortness of breath, patient denies any recent URI.  CARDIOVASCULAR:  Negative for chest pain, leg swelling or palpitations. HTN.  GI:  Negative for abdominal discomfort, blood in stools or black stools or change in bowel habits.  MUSCULOSKELETAL:  See HPI.  SKIN:  Negative for lesions, rash, and itching.  PSYCH:  No mood disorder or recent psychosocial stressors.  Patient's sleep is occasionally disturbed secondary to pain.  HEMATOLOGY/LYMPHOLOGY:  Negative for prolonged bleeding, bruising easily or swollen nodes.  Patient is not currently taking any anti-coagulants  ENDO: Positive for DM. Negative for thyroid dysfunction  NEURO:   No history of headaches, syncope, paralysis, seizures or tremors.  All other reviewed and negative other than HPI.    OBJECTIVE:    BP (!) 147/61 Comment: b/p medication not taken yet  Pulse 75   Temp 98.8 °F (37.1 °C) (Oral)   Resp 18   Ht 5' 7" (1.702 m)   Wt 114 kg (251 lb 4.8 oz)   BMI 39.36 kg/m²     PHYSICAL EXAMINATION:    GENERAL: Well appearing, in no acute distress, alert and oriented x3.  PSYCH:  Mood and affect appropriate.  SKIN: Skin color, texture, turgor normal, no rashes or lesions.  HEAD/FACE:  Normocephalic, atraumatic. Cranial nerves grossly intact.   CV: RRR with palpation of the radial artery.  PULM: No evidence of respiratory difficulty, symmetric chest rise.  BACK: Straight leg raising in the siting position is positive for radicular pain bilaterally, R>L. Limited ROM with pain on flexion and extension. Positive facet loading " bilaterally.  LOWER EXTREMITIES: Peripheral joint ROM is full and pain free without obvious instability or laxity in lower extremities. No deformities, edema, or skin discoloration. Good capillary refill.  MUSCULOSKELETAL: Hip, and knee provocative maneuvers are negative.  There is pain with palpation over the sacroiliac joints bilaterally.  FABERs test is negative bilaterally.  FADIRs test is negative. 5/5 strength in right ankle with plantar and dorsiflexion. 4/5 strength in left ankle with plantar and dorsiflexion. 5/5 strength with right knee flexion and extension. 5/5 strength with left knee flexion and extension. 5/5 strength in right EHL, 4/5 strength in left EHL.  No atrophy or tone abnormalities are noted.  NEURO: Bilateral lower extremity coordination and muscle stretch reflexes are physiologic and symmetric.   No loss of sensation is noted.  GAIT: Antalgic- ambulates with cane.     ASSESSMENT: 73 y.o. year old female with back/leg pain, consistent with BL radiculopathy due to L5 disc protrusion.    Encounter Diagnoses   Name Primary?    Lumbar disc herniation with radiculopathy Yes    Neuroforaminal stenosis of lumbar spine     Annular tear of lumbar disc     Lumbar radiculopathy     Facet arthritis of lumbar region     DDD (degenerative disc disease), lumbar        PLAN:     - Previous imaging was reviewed and discussed with the patient today.    - Schedule for right L5 and S1 TF LEONEL.   The procedure, risks, benefits and options were discussed with patient. There are no contraindications to the procedure. The patient expressed understanding and agreed to proceed.  Consent obtained today.    - Continue Gabapentin and Naproxen as these are providing benefit.    - E order for rollator provided today.     - In the future, she may benefit from a neurosurgery consult.     - RTC 2 weeks after above procedure.     - Dr. Duncan was consulted on the patient and agrees with this plan.      The above plan  and management options were discussed at length with patient. Patient is in agreement with the above and verbalized understanding.     Coby Russo NP  07/27/2018

## 2018-08-17 ENCOUNTER — TELEPHONE (OUTPATIENT)
Dept: PAIN MEDICINE | Facility: CLINIC | Age: 73
End: 2018-08-17

## 2018-08-17 NOTE — TELEPHONE ENCOUNTER
Hello, this is staff I've received your request I'm returning your call from the  clinic at [facility]. I just wanted to let you know that I'm working on getting an answer for you by . ( Please add all other notes here if needed.)    Returned patient call, no answer, left voicemail message

## 2018-08-17 NOTE — TELEPHONE ENCOUNTER
----- Message from Sarthak Roe sent at 8/17/2018  2:21 PM CDT -----  Contact: Mila Foster             Name of Who is Calling: Mila Foster       What is the request in detail: Patient called in regards to questions she has to an upcoming procedure      Can the clinic reply by [facility]: No      What Number to Call Back if not in [facility]: [phone]

## 2018-08-24 ENCOUNTER — TELEPHONE (OUTPATIENT)
Dept: PAIN MEDICINE | Facility: CLINIC | Age: 73
End: 2018-08-24

## 2018-08-24 NOTE — TELEPHONE ENCOUNTER
----- Message from Nela Abbasi sent at 8/24/2018  4:42 PM CDT -----  Contact: Pt  Name of Who is Calling:ABILIO SIDDIQUI [[id number]]    What is the request in detail:  Patient would like for the staff to return her call to discuss rescheduling procedure. Patient states she's having trouble with getting someone to bring her to procedure Please contact to further discuss and advise    Can the clinic reply by [facility]: No    What Number to [facility] Back [facility]: [phone]

## 2018-08-27 DIAGNOSIS — M54.16 LUMBAR RADICULOPATHY: Primary | ICD-10-CM

## 2018-09-11 ENCOUNTER — HOSPITAL ENCOUNTER (OUTPATIENT)
Facility: OTHER | Age: 73
Discharge: HOME | End: 2018-09-11
Attending: ANESTHESIOLOGY | Admitting: ANESTHESIOLOGY
Payer: MEDICARE

## 2018-09-11 VITALS
TEMPERATURE: 99 F | DIASTOLIC BLOOD PRESSURE: 85 MMHG | RESPIRATION RATE: 18 BRPM | SYSTOLIC BLOOD PRESSURE: 143 MMHG | BODY MASS INDEX: 37.67 KG/M2 | HEIGHT: 67 IN | OXYGEN SATURATION: 98 % | HEART RATE: 57 BPM | WEIGHT: 240 LBS

## 2018-09-11 DIAGNOSIS — M51.37 DDD (DEGENERATIVE DISC DISEASE), LUMBOSACRAL: Primary | ICD-10-CM

## 2018-09-11 DIAGNOSIS — M51.16 LUMBAR DISC HERNIATION WITH RADICULOPATHY: ICD-10-CM

## 2018-09-11 DIAGNOSIS — M47.819 SPONDYLOSIS WITHOUT MYELOPATHY: ICD-10-CM

## 2018-09-11 LAB — POCT GLUCOSE: 179 MG/DL (ref 70–110)

## 2018-09-11 PROCEDURE — 64483 NJX AA&/STRD TFRM EPI L/S 1: CPT | Performed by: ANESTHESIOLOGY

## 2018-09-11 PROCEDURE — 64484 NJX AA&/STRD TFRM EPI L/S EA: CPT | Mod: RT,,, | Performed by: ANESTHESIOLOGY

## 2018-09-11 PROCEDURE — 63600175 PHARM REV CODE 636 W HCPCS: Performed by: ANESTHESIOLOGY

## 2018-09-11 PROCEDURE — 25500020 PHARM REV CODE 255: Performed by: ANESTHESIOLOGY

## 2018-09-11 PROCEDURE — 64483 NJX AA&/STRD TFRM EPI L/S 1: CPT | Mod: RT,,, | Performed by: ANESTHESIOLOGY

## 2018-09-11 PROCEDURE — 64484 NJX AA&/STRD TFRM EPI L/S EA: CPT | Performed by: ANESTHESIOLOGY

## 2018-09-11 PROCEDURE — 25000003 PHARM REV CODE 250: Performed by: ANESTHESIOLOGY

## 2018-09-11 PROCEDURE — 82947 ASSAY GLUCOSE BLOOD QUANT: CPT | Performed by: ANESTHESIOLOGY

## 2018-09-11 RX ORDER — LIDOCAINE HYDROCHLORIDE 10 MG/ML
INJECTION INFILTRATION; PERINEURAL
Status: DISCONTINUED | OUTPATIENT
Start: 2018-09-11 | End: 2018-09-11 | Stop reason: HOSPADM

## 2018-09-11 RX ORDER — BUPIVACAINE HYDROCHLORIDE 2.5 MG/ML
INJECTION, SOLUTION EPIDURAL; INFILTRATION; INTRACAUDAL
Status: DISCONTINUED | OUTPATIENT
Start: 2018-09-11 | End: 2018-09-11 | Stop reason: HOSPADM

## 2018-09-11 RX ORDER — SODIUM CHLORIDE 9 MG/ML
INJECTION, SOLUTION INTRAVENOUS CONTINUOUS
Status: DISCONTINUED | OUTPATIENT
Start: 2018-09-11 | End: 2018-09-11 | Stop reason: HOSPADM

## 2018-09-11 RX ORDER — ALPRAZOLAM 0.5 MG/1
0.5 TABLET, ORALLY DISINTEGRATING ORAL ONCE
Status: COMPLETED | OUTPATIENT
Start: 2018-09-11 | End: 2018-09-11

## 2018-09-11 RX ORDER — METHYLPREDNISOLONE ACETATE 40 MG/ML
INJECTION, SUSPENSION INTRA-ARTICULAR; INTRALESIONAL; INTRAMUSCULAR; SOFT TISSUE
Status: DISCONTINUED | OUTPATIENT
Start: 2018-09-11 | End: 2018-09-11 | Stop reason: HOSPADM

## 2018-09-11 RX ADMIN — ALPRAZOLAM 0.5 MG: 0.5 TABLET, ORALLY DISINTEGRATING ORAL at 08:09

## 2018-09-11 NOTE — DISCHARGE INSTRUCTIONS
Thank you for allowing us to care for you today. You may receive a survey about the care we provided. Your feedback is valuable and helps us provide excellent care throughout the community.     Home Care Instructions for Pain Management:    1. DIET:   You may resume your normal diet today.   2. BATHING:   You may shower with luke warm water. No tub baths or anything that will soak injection sites under water for the next 24 hours.  3. DRESSING:   You may remove your bandage today.   4. ACTIVITY LEVEL:   You may resume your normal activities 24 hrs after your procedure. Nothing strenuous today.  5. MEDICATIONS:   You may resume your normal medications today. To restart blood thinners, ask your doctor.  6. DRIVING    If you have received any sedatives by mouth today, you may not drive for 12 hours.    If you have received any sedation through your IV, you may not drive for 24 hrs.   7. SPECIAL INSTRUCTIONS:   No heat to the injection site for 24 hrs including, hot bath or shower, heating pad, moist heat, or hot tubs.    Use ice pack to injection site for any pain or discomfort.  Apply ice packs for 20 minute intervals as needed.    IF you have diabetes, be sure to monitor your blood sugar more closely. IF your injection contained steroids your blood sugar levels may become higher than normal.    If you are still having pain upon discharge:  Your pain may improve over the next 48 hours. The anesthetic (numbing medication) works immediately to 48 hours. IF your injection contained a steroid (anti-inflammatory medication), it takes approximately 3 days to start feeling relief and 7-10 days to see your greatest results from the medication. It is possible you may need subsequent injections. This would be discussed at your follow up appointment with pain management or your referring doctor.      PLEASE CALL YOUR DOCTOR IF:  1. Redness or swelling around the injection site.  2. Fever of 101 degrees or more  3. Drainage  (pus) from the injection site.  4. For any continuous bleeding (some dried blood over the incision is normal.)    FOR EMERGENCIES:   If any unusual problems or difficulties occur during clinic hours, call [phone] or 192.

## 2018-09-11 NOTE — DISCHARGE SUMMARY
Discharge Note  Short Stay      SUMMARY     Admit Date: 9/11/2018    Attending Physician: Bonnie Duncan      Discharge Physician: Bonnie Duncan      Discharge Date: 9/11/2018 10:01 AM    Procedure(s) (LRB):  Injection,steroid,epidural,transforaminal approach  Right L5 and S1 (Right)    Final Diagnosis: Lumbar radiculopathy [M54.16]    Disposition: Home or self care    Patient Instructions:   Current Discharge Medication List      CONTINUE these medications which have NOT CHANGED    Details   amlodipine (NORVASC) 10 MG tablet 10 mg.  Refills: 1      aspirin (ECOTRIN) 81 MG EC tablet 81 mg.  Refills: 2      atenolol (TENORMIN) 50 MG tablet 50 mg.  Refills: 1      gabapentin (NEURONTIN) 300 MG capsule Take 2 tabs in the morning, 1 tab in the afternoon, and 2 tabs in the evening  Qty: 150 capsule, Refills: 5    Associated Diagnoses: Lumbosacral radiculopathy      glipiZIDE (GLUCOTROL) 5 MG tablet 5 mg.  Refills: 1      hydrochlorothiazide (HYDRODIURIL) 25 MG tablet 25 mg.  Refills: 1      lisinopril (PRINIVIL,ZESTRIL) 40 MG tablet 40 mg.  Refills: 1      metformin (GLUCOPHAGE-XR) 500 MG 24 hr tablet 500 mg.  Refills: 1      mirabegron (MYRBETRIQ) 25 mg Tb24 ER tablet Take 1 tablet (25 mg total) by mouth once daily.  Qty: 30 tablet, Refills: 11    Associated Diagnoses: Urge incontinence; Urinary urgency      naproxen (NAPROSYN) 500 MG tablet Refills: 1      omeprazole (PRILOSEC) 20 MG capsule 20 mg.  Refills: 1      pravastatin (PRAVACHOL) 40 MG tablet 40 mg.  Refills: 1      hyoscyamine (ANASPAZ,LEVSIN) 0.125 mg Tab Take 1 tablet (125 mcg total) by mouth every 4 (four) hours as needed.  Qty: 30 tablet, Refills: 6    Associated Diagnoses: Fecal smearing; Diarrhea, unspecified type                 Discharge Diagnosis: Lumbar radiculopathy [M54.16]  Condition on Discharge: Stable with no complications to procedure   Diet on Discharge: Same as before.  Activity: as per instruction sheet.  Discharge to: Home with a  responsible adult.  Follow up: 2-4 weeks

## 2018-09-11 NOTE — OP NOTE
INFORMED CONSENT: The procedure, risks, benefits and options were discussed with patient. There are no contraindications to the procedure. The patient expressed understanding and agreed to proceed. The personnel performing the procedure was discussed.    09/11/2018    Surgeon: Bonnie Duncan MD    Assistants: None    PROCEDURE:    1)  Right  L5 TRANSFORAMINAL EPIDURAL STEROID INJECTION    2)  Right  S1 TRANSFORAMINAL EPIDURAL STEROID INJECTION    Pre Procedure diagnosis:    Right  L5 and S1  Lumbar radiculopathy [M54.16]    Post-Procedure diagnosis:   same    Complications: None    Specimens: None      DESCRIPTION OF PROCEDURE: The patient was brought to the procedure room. IV access was obtained prior to the procedure. The patient was positioned prone on the fluoroscopy table. Continuous hemodynamic monitoring was initiated including blood pressure, EKG, and pulse oximetry. . The skin was prepped with chlorhexidine and draped in a sterile fashion. Skin anesthesia was achieved using a total of 10mL of lidocaine, 5mL over each respective injection site.     The  L5 and S1  transforaminal spaces were identified with fluoroscopy in the  AP, oblique, and lateral views.  A 22 gauge spinal quinke needle was then advanced into the area of the trans foraminal spaces at the above levels with confirmation of proper needle position using AP, oblique, and lateral fluoroscopic views. Once the needle tip was in the area of the transforaminal space, and there was no blood, CSF or paraesthesias,  1 mL of Omnipaque 300mg/ml was injected on each side for a total of 2 mL.  Fluoroscopic imaging in the AP and lateral views revealed a clear outline of the spinal nerve with proximal spread of agent through the neural foramen into the epidural space. A total combination of 1 mL of Bupivicaine 0.25% and 40 mg depo medrol was injected into each level for a total of 4mL of injected medications with displacement of the contrast dye confirming  that the medication went into the area of the transforaminal spaces at each level. A sterile dressing was applied.   Patient tolerated the procedure well.    Patient was taken back to the recovery room for further observation.     The patient was discharged to home in stable condition

## 2018-09-25 ENCOUNTER — OFFICE VISIT (OUTPATIENT)
Dept: PAIN MEDICINE | Facility: CLINIC | Age: 73
End: 2018-09-25
Payer: MEDICARE

## 2018-09-25 VITALS
HEIGHT: 67 IN | TEMPERATURE: 100 F | DIASTOLIC BLOOD PRESSURE: 63 MMHG | WEIGHT: 249.81 LBS | SYSTOLIC BLOOD PRESSURE: 113 MMHG | HEART RATE: 72 BPM | BODY MASS INDEX: 39.21 KG/M2

## 2018-09-25 DIAGNOSIS — M51.36 DDD (DEGENERATIVE DISC DISEASE), LUMBAR: ICD-10-CM

## 2018-09-25 DIAGNOSIS — M54.16 LUMBAR RADICULOPATHY: ICD-10-CM

## 2018-09-25 DIAGNOSIS — M51.16 LUMBAR DISC HERNIATION WITH RADICULOPATHY: Primary | ICD-10-CM

## 2018-09-25 DIAGNOSIS — M48.061 NEUROFORAMINAL STENOSIS OF LUMBAR SPINE: ICD-10-CM

## 2018-09-25 DIAGNOSIS — M47.816 FACET ARTHRITIS OF LUMBAR REGION: ICD-10-CM

## 2018-09-25 DIAGNOSIS — M51.36 ANNULAR TEAR OF LUMBAR DISC: ICD-10-CM

## 2018-09-25 PROCEDURE — 99213 OFFICE O/P EST LOW 20 MIN: CPT | Mod: S$PBB,,, | Performed by: NURSE PRACTITIONER

## 2018-09-25 PROCEDURE — 99213 OFFICE O/P EST LOW 20 MIN: CPT | Mod: PBBFAC | Performed by: NURSE PRACTITIONER

## 2018-09-25 PROCEDURE — 99999 PR PBB SHADOW E&M-EST. PATIENT-LVL III: CPT | Mod: PBBFAC,,, | Performed by: NURSE PRACTITIONER

## 2018-09-25 PROCEDURE — 1101F PT FALLS ASSESS-DOCD LE1/YR: CPT | Mod: CPTII,,, | Performed by: NURSE PRACTITIONER

## 2018-09-25 RX ORDER — METOPROLOL SUCCINATE 25 MG/1
25 TABLET, EXTENDED RELEASE ORAL DAILY
COMMUNITY
End: 2019-04-26

## 2018-09-25 RX ORDER — HYOSCYAMINE SULFATE 0.125 MG
TABLET ORAL
Refills: 6 | COMMUNITY
Start: 2018-08-24 | End: 2019-04-26

## 2018-09-25 NOTE — PROGRESS NOTES
Chronic Pain - Established Patient    Referring Physician: No ref. provider found    Chief Complaint:   Chief Complaint   Patient presents with    Follow-up        SUBJECTIVE: Disclaimer: This note has been generated using voice-recognition software. There may be typographical errors that have been missed during proof-reading    Interval History 9/25/2018:  The patient returns to clinic today for follow up. She is s/p right L5 and S1 TF LEONEL. She reports 90% relief of her low back and leg pain. She reports intermittent low back pain that is aching in nature. She denies any radiating leg pain today. Her back pain is worse with bending. She also reports that her legs feel heavy with prolonged walking. She denies any other health changes. Her pain today is 4/10.    Interval History 7/27/2018:  The patient returns to clinic today for follow up and image review. She continues to report low back pain that radiates down the posterior aspect of her right leg to her foot. She reports intermittent left leg pain in the same distribution. Her pain is worse with prolonged walking and activity. She often feels like her legs are heavy. She often feels like she will fall. She denies any other health changes. She denies any bowel or bladder incontinence. Her pain today is 2/10.    Interval History 7/20/2018:  The patient returns to clinic today for follow up. She has not been seen in over a year due to her son being diagnosed with cancer. She reports that he is in remission now. She returns today due to three fall recently, last a week ago. She continues to report low back pain that radiates intermittently down the posterior portion of both legs to her feet. She reports that her legs often feel heavy and weak which is why she falls. She denies any bowel or bladder incontinence. Her pain today is 7/10.    Interval History 3/6/2017:  The patient returns to clinic today for follow up. She is s/p bilateral S1 TF LEONEL on 2/8/2017. She  "reports 80% relief of her radiating leg pain. She continues to have low back pain. She describes the pain as dull and aching. The pain is worse in the morning. She reports that the pain increases with prolonged sitting and standing. She denies any other health changes. She denies any bowel or bladder incontinence. Her pain today is 6/10.     Interval History 1/23/2017:  The patient returns to clinic today for follow up of low back pain and bilateral leg pain. She has recently completed a medrol dose pack with some benefit. She still reports radiating pain into both legs. The pain is worse with prolonged sitting and lifting. The pain gets better with walking and rest. The pain radiates down the back of both legs to ankles. She denies any bowel or bladder incontinence or signs of saddle paresthesia. She continues to work with lifting restrictions. She currently takes Gabapentin and Naproxen with benefit. Her pain today is 6/10.     Initial encounter:    Mila Foster presents to the clinic for the evaluation of bilateral leg pain with intermittent lower back pain. 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 She reports recent falling to knees, but she changed her shoes, and the falling has ceased. She reports that she had a "pain flare" 2 weeks ago requiring a walker for 2 days.     Brief history:  70yo F with hx of HTN and DM complicated by neuropathy BL but R>L with tingling, tightness, shooting pain only in feet. Patient reports glucose was 120 this morning and usually runs around 130.    Pain Description:    The pain is located in the posterior leg area and radiates to her ankles and to her knees at its worst.     At BEST  1/10     At WORST  8/10 on the WORST day.      On average pain is rated as 7/10    Today the pain is rated as 5/10    The pain is described as aching, burning, dull, sharp and tight band      Symptoms " interfere with work.     Exacerbating factors: Sitting, picking up children, lifting, bending over    Mitigating factors: walking, rest    Patient denies night fever/night sweats, bowel incontinence, significant weight loss, significant motor weakness and loss of sensations. She does report chronic urinary incontinence unrelated to back/leg pain.  Patient denies any suicidal or homicidal ideations    Pain Medications:  Current:  Gabapentin; been on it 1 year, and she reports she feels it helps    Tried in Past:  NSAIDs -yes; reports it helps  TCA -Never  SNRI -Never  Anti-convulsants -gabapentin  Muscle Relaxants -Never  Opioids-Never    Physical Therapy/Home Exercise: yes, in the past       report: N/A    Pain Procedures:   2/8/2017- Bilateral S1 TF LEONEL  3/22/2017- Bilateral L2,3,4,5 MBB  9/11/2018- Right L5 and S1 TF LEONEL     Chiropractor -never  Acupuncture - never  TENS unit -never  Spinal decompression -never  Joint replacement -never    Imaging:   MRI Lumbar Spine 7/25/2018:  COMPARISON:  12/16/2016    FINDINGS:  There is again normal lumbar lordosis.  No spondylolisthesis or spondylolysis or marrow edema to suggest acute lumbar fractures or neoplastic processes.  The tip of the conus is at L1-2 disc space.  Paraspinal soft tissues are unremarkable.    L5-S1: There is dehydration of the nucleus pulposis associated with disc space narrowing.  There is a right paracentral and right foraminal disc protrusion/herniation with findings suggestive of slight flattening of the descending right S1 root (image 30 series 7 and 8 and image 7 series 4).  In addition there is also a moderate right L5-S1 foraminal stenosis.  In the far lateral neural foramen the inferior surface of the exiting L5 root contacts the disc protrusion.  Clinical correlation for right L5 and S1 radiculopathy suggested.  Left neural foramen is unremarkable.  No significant central canal spinal stenosis.  There is mild facet arthropathy.    L4-5:  Dehydration of the nucleus pulposis associated with a mild posterior bulging of the annulus.  No significant central canal stenosis.  There is degenerative hypertrophic facet arthropathy.    L3-4: Dehydration of the nucleus pulposis.  No significant central canal spinal stenosis.  There is degenerative hypertrophic facet arthropathy and hypertrophy of the ligamentum flava unchanged from the previous study.    L2-3: Mild spondylotic changes associated with mild posterior bulging of the annulus.  No significant spinal stenosis.  There is facet arthropathy.    L1-2: No significant disc herniations or significant spinal stenosis.  Neural foramina are unremarkable.  There is facet arthropathy.      Impression       1. Right L5-S1 foraminal stenosis associated with a disc protrusion/disc herniation.  Clinical correlation for right L5 and S1 radiculopathy suggested.  2. Multilevel facet arthropathy.  3. Milder spondylotic changes at the rest of the disc spaces as above.     Xray Lumbar Spine 7/25/2018:  COMPARISON:  Prior lumbar spine MRI dated 12/16/16    FINDINGS:  There is diffuse osteopenia.  There is mild grade 1 anterolisthesis of L3 on L4 (4 mm) and of L4 on L5 (6 mm).  There is no evidence of translational instability.  There is moderate facet arthropathy at the lower lumbar spine with probable L5-S1 neural foraminal narrowing.  Endplate degenerative changes are present with subchondral sclerosis and marginal osteophyte formation.    There is atherosclerotic calcification of the aorta.      Impression       Osteopenia and degenerative change of the lumbar spine with grade 1 anterolisthesis of L3-4 and L4-5, new from the 2016 MRI.  No evidence of translational instability.     12/12/16 MRI Lumbar Spine WO Contrast   L1-L2: There is no focal disc herniation. No significant spinal canal or neural foraminal narrowing.    L2-L3: There is no focal disc herniation. No significant spinal canal or neural foraminal  narrowing.    L3-L4: Mild bilateral facet arthropathy.  No focal disc herniation. No significant spinal canal or neural foraminal narrowing.    L4-L5: Mild disc bulge and bilateral facet arthropathy results in mild right-sided neural foraminal narrowing. No significant spinal canal stenosis.    L5-S1: Diffuse disc bulge with small superimposed right foraminal disc protrusion.  Findings result in moderate right-sided neuroforaminal narrowing with likely abutment/impingement upon the exited right L5 nerve root. No significant spinal canal stenosis.  Small posterior annular fissure noted.   Impression      Lumbar spondylosis as detailed above, most significant for small right foraminal protrusion at the L5-S1 level resulting in moderate neuroforaminal narrowing and likely abutment/impingement upon the exited right L5 nerve root         Past Medical History:   Diagnosis Date    Diabetes     Hypertension      Past Surgical History:   Procedure Laterality Date    BLOCK-NERVE-MEDIAL BRANCH-LUMBAR Bilateral 4/12/2017    [facility]    BLOCK-NERVE-MEDIAL BRANCH-LUMBAR Bilateral 3/22/2017    [facility]    CHOLECYSTECTOMY      HYSTERECTOMY      Injection,steroid,epidural,transforaminal approach  Right L5 and S1 Right 9/11/2018    [facility]    INJECTION-STEROID-EPIDURAL-TRANSFORAMINAL Bilateral 2/8/2017    [facility] at [facility]     Social History     Socioeconomic History    Marital status: Single     Spouse name: Not on file    Number of children: Not on file    Years of education: Not on file    Highest education level: Not on file   Social Needs    Financial resource strain: Not on file    Food insecurity - worry: Not on file    Food insecurity - inability: Not on file    Transportation needs - medical: Not on file    Transportation needs - non-medical: Not  on file   Occupational History    Not on file   Tobacco Use    Smoking status: Never Smoker   Substance and Sexual Activity    Alcohol use: Not on file    Drug use: Not on file    Sexual activity: Not on file   Other Topics Concern    Not on file   Social History Narrative    Not on file     Family History   Problem Relation Age of Onset    Vaginal cancer Neg Hx     Endometrial cancer Neg Hx     Cervical cancer Neg Hx        Review of patient's allergies indicates:  No Known Allergies    Current Outpatient Medications   Medication Sig    amlodipine (NORVASC) 10 MG tablet 10 mg.    aspirin (ECOTRIN) 81 MG EC tablet 81 mg.    gabapentin (NEURONTIN) 300 MG capsule Take 2 tabs in the morning, 1 tab in the afternoon, and 2 tabs in the evening    glipiZIDE (GLUCOTROL) 5 MG tablet 5 mg.    hydrochlorothiazide (HYDRODIURIL) 25 MG tablet 25 mg.    lisinopril (PRINIVIL,ZESTRIL) 40 MG tablet 40 mg.    metformin (GLUCOPHAGE-XR) 500 MG 24 hr tablet 500 mg.    metoprolol succinate (TOPROL-XL) 25 MG 24 hr tablet Take 25 mg by mouth once daily.    mirabegron (MYRBETRIQ) 25 mg Tb24 ER tablet Take 1 tablet (25 mg total) by mouth once daily.    naproxen (NAPROSYN) 500 MG tablet     omeprazole (PRILOSEC) 20 MG capsule 20 mg.    pravastatin (PRAVACHOL) 40 MG tablet 40 mg.    atenolol (TENORMIN) 50 MG tablet 50 mg.    hyoscyamine (ANASPAZ,LEVSIN) 0.125 mg Tab TK 1 T PO Q 4 H PRN     No current facility-administered medications for this visit.        REVIEW OF SYSTEMS:    GENERAL:  No weight loss, malaise or fevers.  HEENT:   No recent changes in vision or hearing  NECK:  Negative for lumps, no difficulty with swallowing.  RESPIRATORY:  Negative for cough, wheezing or shortness of breath, patient denies any recent URI.  CARDIOVASCULAR:  Negative for chest pain, leg swelling or palpitations. HTN.  GI:  Negative for abdominal discomfort, blood in stools or black stools or change in bowel habits.  MUSCULOSKELETAL:   "See HPI.  SKIN:  Negative for lesions, rash, and itching.  PSYCH:  No mood disorder or recent psychosocial stressors.  Patient's sleep is occasionally disturbed secondary to pain.  HEMATOLOGY/LYMPHOLOGY:  Negative for prolonged bleeding, bruising easily or swollen nodes.  Patient is not currently taking any anti-coagulants  ENDO: Positive for DM. Negative for thyroid dysfunction  NEURO:   No history of headaches, syncope, paralysis, seizures or tremors.  All other reviewed and negative other than HPI.    OBJECTIVE:    /63   Pulse 72   Temp 99.5 °F (37.5 °C)   Ht 5' 7" (1.702 m)   Wt 113.3 kg (249 lb 12.5 oz)   BMI 39.12 kg/m²     PHYSICAL EXAMINATION:    GENERAL: Well appearing, in no acute distress, alert and oriented x3.  PSYCH:  Mood and affect appropriate.  SKIN: Skin color, texture, turgor normal, no rashes or lesions.  HEAD/FACE:  Normocephalic, atraumatic. Cranial nerves grossly intact.   CV: RRR with palpation of the radial artery.  PULM: No evidence of respiratory difficulty, symmetric chest rise.  BACK: Straight leg raising in the siting position is negative for radicular pain bilaterally. Limited ROM with pain on flexion and extension. Positive facet loading bilaterally.  LOWER EXTREMITIES: Peripheral joint ROM is full and pain free without obvious instability or laxity in lower extremities. No deformities, edema, or skin discoloration. Good capillary refill.  MUSCULOSKELETAL: Hip and knee provocative maneuvers are negative.  There is pain with palpation over the sacroiliac joints bilaterally.  FABERs test is negative bilaterally.  FADIRs test is negative. 5/5 strength in right ankle with plantar and dorsiflexion. 4/5 strength in left ankle with plantar and dorsiflexion. 5/5 strength with right knee flexion and extension. 5/5 strength with left knee flexion and extension. 5/5 strength in right EHL, 4/5 strength in left EHL.  No atrophy or tone abnormalities are noted.  NEURO: Bilateral lower " extremity coordination and muscle stretch reflexes are physiologic and symmetric.   No loss of sensation is noted.  GAIT: Antalgic- ambulates with cane.     ASSESSMENT: 73 y.o. year old female with back/leg pain, consistent with BL radiculopathy due to L5 disc protrusion.    Encounter Diagnoses   Name Primary?    Lumbar disc herniation with radiculopathy Yes    Annular tear of lumbar disc     Neuroforaminal stenosis of lumbar spine     Lumbar radiculopathy     Facet arthritis of lumbar region     DDD (degenerative disc disease), lumbar        PLAN:     - Previous imaging was reviewed and discussed with the patient today.    - She is s/p right L5 and S1 TF LEONEL with benefit. We can repeat this as needed.     - In the future, she may benefit from repeat MBB.     - Continue Gabapentin and Naproxen as these are providing benefit.    - Consult [facility].     - RTC in 2 month.     - Dr. Duncan was consulted on the patient and agrees with this plan.    The above plan and management options were discussed at length with patient. Patient is in agreement with the above and verbalized understanding.     Coby Russo NP  09/25/2018

## 2018-10-16 ENCOUNTER — OFFICE VISIT (OUTPATIENT)
Dept: GASTROENTEROLOGY | Facility: CLINIC | Age: 73
End: 2018-10-16
Payer: MEDICARE

## 2018-10-16 VITALS
BODY MASS INDEX: 39.62 KG/M2 | SYSTOLIC BLOOD PRESSURE: 110 MMHG | HEIGHT: 67 IN | WEIGHT: 252.44 LBS | DIASTOLIC BLOOD PRESSURE: 62 MMHG

## 2018-10-16 DIAGNOSIS — R19.7 DIARRHEA, UNSPECIFIED TYPE: ICD-10-CM

## 2018-10-16 DIAGNOSIS — R15.9 INCONTINENCE OF FECES WITH FECAL URGENCY: ICD-10-CM

## 2018-10-16 DIAGNOSIS — R10.9 ABDOMINAL CRAMPING: ICD-10-CM

## 2018-10-16 DIAGNOSIS — R15.2 INCONTINENCE OF FECES WITH FECAL URGENCY: ICD-10-CM

## 2018-10-16 DIAGNOSIS — Z80.0 FAMILY HISTORY OF COLON CANCER: Primary | ICD-10-CM

## 2018-10-16 DIAGNOSIS — R15.2 FECAL URGENCY: ICD-10-CM

## 2018-10-16 PROCEDURE — 99999 PR PBB SHADOW E&M-EST. PATIENT-LVL III: CPT | Mod: PBBFAC,,, | Performed by: NURSE PRACTITIONER

## 2018-10-16 PROCEDURE — 1101F PT FALLS ASSESS-DOCD LE1/YR: CPT | Mod: CPTII,,, | Performed by: NURSE PRACTITIONER

## 2018-10-16 PROCEDURE — 99213 OFFICE O/P EST LOW 20 MIN: CPT | Mod: PBBFAC,PO | Performed by: NURSE PRACTITIONER

## 2018-10-16 PROCEDURE — 99204 OFFICE O/P NEW MOD 45 MIN: CPT | Mod: S$PBB,,, | Performed by: NURSE PRACTITIONER

## 2018-10-16 RX ORDER — SODIUM, POTASSIUM,MAG SULFATES 17.5-3.13G
SOLUTION, RECONSTITUTED, ORAL ORAL
Qty: 1 KIT | Refills: 0 | Status: CANCELLED | OUTPATIENT
Start: 2018-10-16

## 2018-10-16 NOTE — LETTER
October 16, 2018      Dinesh Connors MD  [address] [address]           [place] [facility]  [address]  [place] [address]  Phone: [phone]          Patient: Mila Foster   MR Number: [id number]   YOB: 1945   Date of Visit: 10/16/2018       Dear Dr. Dinesh Connors:    Thank you for referring Mila Foster to me for evaluation. Attached you will find relevant portions of my assessment and plan of care.    If you have questions, please do not hesitate to call me. I look forward to following Mila Foster along with you.    Sincerely,    Geovanna Hernandez, [facility]    Enclosure  CC:  No Recipients    If you would like to receive this communication electronically, please contact [email or web address] or [phone] to request more information on [email or web address] access.    For providers and/or their staff who would like to refer a patient to [facility], please contact us through our one-stop-shop provider referral line, [facility] , at [phone].    If you feel you have received this communication in error or would no longer like to receive these types of communications, please e-mail [email or web address]

## 2018-10-16 NOTE — H&P (VIEW-ONLY)
Subjective:       Patient ID: Mila Foster is a 73 y.o. female.    Chief Complaint: Diarrhea (fecal urgency after eating); Low-back Pain (after a bowel movement); and Other (family history of colon canceer sister )    HPI  Reports over the last year episodic diarrhea with abdominal cramping and fecal urgency.  Reports that this occurs after eating but she can not determine specific foods.  If she eats three meals evenly spaced through the day and that are of the same size and with no snacking she will not have diarrhea and have a formed stool.  Otherwise, will have loose or watery stools.  She has had incontinence.  Reports the sensation that something is stuck in the rectum.  Denies blood with bowel movements or black stools.  Has noted a mucousy discharge.    She had colonoscopy 5 years ago.  Her sister has colon cancer.    She has used pepto and imodium with minimal relief.  Levsin has been helpful.    Review of Systems   Constitutional: Negative.  Negative for activity change, appetite change, fatigue, fever and unexpected weight change.   HENT: Negative for trouble swallowing.    Respiratory: Negative for shortness of breath.    Cardiovascular: Negative for chest pain.   Gastrointestinal: Positive for diarrhea. Negative for abdominal pain, blood in stool, nausea, rectal pain and vomiting.   Genitourinary: Negative.    Musculoskeletal: Negative.    Skin: Negative.    Neurological: Negative.    Psychiatric/Behavioral: Negative.        Objective:      Physical Exam   Constitutional: She is oriented to person, place, and time. She appears well-developed and well-nourished. No distress.   HENT:   Head: Normocephalic.   Eyes: No scleral icterus.   Neck: Neck supple.   Pulmonary/Chest: Effort normal. No respiratory distress.   Abdominal: Soft. She exhibits no distension. There is no tenderness.   Genitourinary:   Genitourinary Comments: Stool in rectal vault.  Brown.  Soft.  No palpable masses.  No blood.  "  Musculoskeletal: Normal range of motion.   Neurological: She is alert and oriented to person, place, and time.   Skin: Skin is warm and dry. She is not diaphoretic.   Psychiatric: She has a normal mood and affect. Her behavior is normal. Judgment and thought content normal.   Vitals reviewed.      Assessment:       1. Family history of colon cancer    2. Diarrhea, unspecified type    3. Abdominal cramping    4. Fecal urgency    5. Incontinence of feces with fecal urgency        Plan:     Mila was seen today for diarrhea, low-back pain and other.    Diagnoses and all orders for this visit:    Family history of colon cancer  -     Case request GI: COLONOSCOPY Suprep    Diarrhea, unspecified type  -     Case request GI: COLONOSCOPY Suprep    Abdominal cramping    Fecal urgency    Incontinence of feces with fecal urgency    Other orders  -     Cancel: sodium,potassium,mag sulfates (SUPREP BOWEL PREP KIT) 17.5-3.13-1.6 gram SolR; Use as directed        Continue levsin as needed for cramping and urgency.  Add fiber supplement.    Colonoscopy.  Suspect she may have some pelvic floor dysfunction as she has soft stool noted in rectal vault with the sensation that something is "stuck" here and with incontinence of loose stool.  Could consider referral for further workup depending upon symptoms and findings.  "

## 2018-10-16 NOTE — PATIENT INSTRUCTIONS
SUPREP Instructions    You are scheduled for a colonoscopy with Dr. Hood on 11-1-18 at [facility] located at [address] [place] [address].  Check in at the admit desk, first floor of the hospital (which is the building on the left).     You will receive a call 2-3 days before your colonoscopy to tell you the time to arrive.  If you have not received a call by the day before your procedure, call the Endoscopy Lab at [phone].  To ensure that your test is accurate and complete, you MUST follow these instructions listed below.  If you have any questions, please call our office at [phone].  Plan on being at the hospital for your procedure for 3-4 hours.    1.  Follow a CLEAR LIQUID DIET for the entire day before your scheduled colonoscopy.  This means no solid food the entire day starting when you wake.  You may have as much of the clear liquids as you want throughout the day.   CLEAR LIQUID DIET:   - Avoid Red, Orange, Purple, and/or Blue food coloring   - NO DAIRY   - You can have:  Coffee with sugar (no creamer), tea, water, soda, apple or white grape juice, chicken or beef broth/bouillon (no meat, noodles, or veggies), green/yellow popsicles, green/yellow Jell-O, lemonade.    2.  AT 5 pm the evening before your colonoscopy, POUR ONE (1) BOTTLE OF SUPREP INTO THE MIXING CONTAINER, PROVIDED INSIDE THE BOX.  ADD WATER TO THE LINE ON THE CONTAINER AND MIX IT WELL.  DRINK THE ENTIRE CONTAINER AND THEN DRINK TWO (2) MORE CONTAINERS OF WATER OVER THE NEXT 1 HOUR.  This is sometimes easier to drink if this solution is cold, so you can mix the solution a few hours ahead of time and place in the refrigerator prior to drinking.  You have to drink the solution within 24 hours of mixing it.  Do NOT put this solution over ice.  It IS ok to drink with a straw.    3.  The endoscopy department will call you 2 days before your colonoscopy to tell you the exact time to arrive, AND to tell you the exact time to  drink the 2nd portion of your prep (which will be FIVE HOURS BEFORE YOUR ARRIVAL TIME).  At this time given to you, POUR ONE (1) BOTTLE OF SUPREP INTO THE MIXING CONTAINER, PROVIDED INSIDE THE BOX.  ADD WATER TO THE LINE ON THE CONTAINER AND MIX IT WELL.  DRINK THE ENTIRE CONTAINER AND THEN DRINK TWO (2) MORE CONTAINERS OF WATER OVER THE NEXT 1 HOUR.  This is sometimes easier to drink if this solution is cold, so you can mix the solution a few hours ahead of time and place in the refrigerator prior to drinking.  You have to drink the solution within 24 hours of mixing it.  Do NOT put this solution over ice.  It IS ok to drink with a straw. Once this is complete, you may not have ANYTHING else by mouth!    4.  You must have someone with you to DRIVE YOU HOME since you will be receiving IV sedation for the colonoscopy.    5.  It is ok to take your heart, blood pressure, and seizure medications in the morning of your test with a SIP of water.  Hold other medications until after your procedure.  Do NOT have anything else to eat or drink the morning of your colonoscopy.  It is ok to brush your teeth.    6.  If you are on blood thinners THAT YOU HAVE BEEN INSTRUCTED TO HOLD BY YOUR DOCTOR FOR THIS PROCEDURE, then do NOT take this the morning of your colonoscopy.  Do NOT stop these medications on your own, they must be approved to be held by your doctor.  Your colonoscopy can NOT be done if you are on these medications.  Examples of blood thinners include: Coumadin, Aggrenox, Plavix, Pradaxa, Reapro, Pletal, Xarelto, Ticagrelor, Brilinta, Eliquis, and high dose aspirin (325 mg).  You do not have to stop baby aspirin 81 mg.    7.  IF YOU ARE DIABETIC:  NO INSULIN OR ORAL MEDICATIONS THE MORNING OF THE COLONOSCOPY.  TAKE ONLY HALF THE DOSE OF YOUR INSULIN THE DAY BEFORE THE COLONOSCOPY.  DO NOT TAKE ANY ORAL DIABETIC MEDICATIONS THE DAY BEFORE THE COLONOSCOPY.  IF YOU ARE AN INSULIN DEPENDENT DIABETIC WITH UNSTABLE BLOOD  NICCI, NOTIFY YOUR PRIMARY CARE PHYSICIAN FOR INSTRUCTIONS.

## 2018-10-30 ENCOUNTER — TELEPHONE (OUTPATIENT)
Dept: ENDOSCOPY | Facility: HOSPITAL | Age: 73
End: 2018-10-30

## 2018-10-31 ENCOUNTER — TELEPHONE (OUTPATIENT)
Dept: ENDOSCOPY | Facility: HOSPITAL | Age: 73
End: 2018-10-31

## 2018-10-31 NOTE — TELEPHONE ENCOUNTER
Spoke with patient about arrival time @.  0900.     Prep instructions reviewed: the day before the procedure, follow a clear liquid diet all day, then start the first 1/2 of prep at 5pm and take 2nd 1/2 of prep @ 0300   .  Pt must be completely NPO when prep completed @   0400       .    Medications: Do not take Insulin or oral diabetic medications the day of the procedure.  Take as prescribed: heart, seizure and blood pressure medication in the morning with a sip of water (less than an ounce).  Take any breathing medications and bring inhalers to hospital with you Leave all valuables and jewelry at home.     Wear comfortable clothes to procedure to change into hospital gown You cannot drive for 24 hours after your procedure because you will receive sedation for your procedure to make you comfortable.  A ride must be provided at discharge.

## 2018-11-01 ENCOUNTER — ANESTHESIA (OUTPATIENT)
Dept: ENDOSCOPY | Facility: HOSPITAL | Age: 73
End: 2018-11-01
Payer: MEDICARE

## 2018-11-01 ENCOUNTER — ANESTHESIA EVENT (OUTPATIENT)
Dept: ENDOSCOPY | Facility: HOSPITAL | Age: 73
End: 2018-11-01
Payer: MEDICARE

## 2018-11-01 ENCOUNTER — HOSPITAL ENCOUNTER (OUTPATIENT)
Facility: HOSPITAL | Age: 73
Discharge: HOME | End: 2018-11-01
Attending: INTERNAL MEDICINE | Admitting: INTERNAL MEDICINE
Payer: MEDICARE

## 2018-11-01 DIAGNOSIS — Z12.11 SCREENING FOR MALIGNANT NEOPLASM OF COLON: Primary | ICD-10-CM

## 2018-11-01 DIAGNOSIS — R15.1 FECAL SMEARING: ICD-10-CM

## 2018-11-01 PROCEDURE — 27201012 HC FORCEPS, HOT/COLD, DISP: Performed by: INTERNAL MEDICINE

## 2018-11-01 PROCEDURE — 37000009 HC ANESTHESIA EA ADD 15 MINS: Performed by: INTERNAL MEDICINE

## 2018-11-01 PROCEDURE — 88305 TISSUE EXAM BY PATHOLOGIST: CPT | Mod: 26,,, | Performed by: PATHOLOGY

## 2018-11-01 PROCEDURE — 37000008 HC ANESTHESIA 1ST 15 MINUTES: Performed by: INTERNAL MEDICINE

## 2018-11-01 PROCEDURE — 63600175 PHARM REV CODE 636 W HCPCS: Performed by: NURSE ANESTHETIST, CERTIFIED REGISTERED

## 2018-11-01 PROCEDURE — 25000003 PHARM REV CODE 250: Performed by: INTERNAL MEDICINE

## 2018-11-01 PROCEDURE — 88305 TISSUE EXAM BY PATHOLOGIST: CPT | Mod: 59 | Performed by: PATHOLOGY

## 2018-11-01 PROCEDURE — 45380 COLONOSCOPY AND BIOPSY: CPT | Performed by: INTERNAL MEDICINE

## 2018-11-01 PROCEDURE — 45380 COLONOSCOPY AND BIOPSY: CPT | Mod: ,,, | Performed by: INTERNAL MEDICINE

## 2018-11-01 RX ORDER — PROPOFOL 10 MG/ML
VIAL (ML) INTRAVENOUS
Status: DISCONTINUED | OUTPATIENT
Start: 2018-11-01 | End: 2018-11-01

## 2018-11-01 RX ORDER — SODIUM CHLORIDE 0.9 % (FLUSH) 0.9 %
3 SYRINGE (ML) INJECTION
Status: DISCONTINUED | OUTPATIENT
Start: 2018-11-01 | End: 2018-11-01 | Stop reason: HOSPADM

## 2018-11-01 RX ORDER — PROPOFOL 10 MG/ML
VIAL (ML) INTRAVENOUS CONTINUOUS PRN
Status: DISCONTINUED | OUTPATIENT
Start: 2018-11-01 | End: 2018-11-01

## 2018-11-01 RX ORDER — SODIUM CHLORIDE 9 MG/ML
INJECTION, SOLUTION INTRAVENOUS CONTINUOUS
Status: DISCONTINUED | OUTPATIENT
Start: 2018-11-01 | End: 2018-11-01 | Stop reason: HOSPADM

## 2018-11-01 RX ORDER — LIDOCAINE HCL/PF 100 MG/5ML
SYRINGE (ML) INTRAVENOUS
Status: DISCONTINUED | OUTPATIENT
Start: 2018-11-01 | End: 2018-11-01

## 2018-11-01 RX ADMIN — PROPOFOL 30 MG: 10 INJECTION, EMULSION INTRAVENOUS at 11:11

## 2018-11-01 RX ADMIN — PROPOFOL 50 MG: 10 INJECTION, EMULSION INTRAVENOUS at 11:11

## 2018-11-01 RX ADMIN — SODIUM CHLORIDE: 0.9 INJECTION, SOLUTION INTRAVENOUS at 10:11

## 2018-11-01 RX ADMIN — LIDOCAINE HYDROCHLORIDE 50 MG: 20 INJECTION, SOLUTION INTRAVENOUS at 11:11

## 2018-11-01 RX ADMIN — PROPOFOL 150 MCG/KG/MIN: 10 INJECTION, EMULSION INTRAVENOUS at 11:11

## 2018-11-01 NOTE — ANESTHESIA POSTPROCEDURE EVALUATION
"Anesthesia Post Evaluation    Patient: Mila Foster    Procedure(s) Performed: Procedure(s) (LRB):  COLONOSCOPY Suprep (N/A)    Final Anesthesia Type: MAC  Patient location during evaluation: GI PACU  Patient participation: Yes- Able to Participate  Level of consciousness: awake and alert and oriented  Post-procedure vital signs: reviewed and stable  Pain management: adequate  Airway patency: patent  PONV status at discharge: No PONV  Anesthetic complications: no      Cardiovascular status: blood pressure returned to baseline and hemodynamically stable  Respiratory status: unassisted, spontaneous ventilation and room air  Hydration status: euvolemic  Follow-up not needed.        Visit Vitals  BP (!) 108/58   Pulse 73   Temp 36.8 °C (98.2 °F)   Resp 18   Ht 5' 7" (1.702 m)   Wt 113.4 kg (250 lb)   SpO2 95%   Breastfeeding? No   BMI 39.16 kg/m²       Pain/Shweta Score: Pain Assessment Performed: Yes (11/1/2018 10:01 AM)  Presence of Pain: denies (11/1/2018 10:01 AM)        "

## 2018-11-01 NOTE — ANESTHESIA PREPROCEDURE EVALUATION
11/01/2018  Mila Foster is a 73 y.o., female presents for colonoscopy under MAC.    Past Medical History:   Diagnosis Date    Diabetes     Hypertension      Past Surgical History:   Procedure Laterality Date    BLOCK-NERVE-MEDIAL BRANCH-LUMBAR Bilateral 4/12/2017    Performed by [facility] at [facility]    BLOCK-NERVE-MEDIAL BRANCH-LUMBAR Bilateral 3/22/2017    Performed by [facility] at [facility]    CHOLECYSTECTOMY      HYSTERECTOMY      Injection,steroid,epidural,transforaminal approach  Right L5 and S1 Right 9/11/2018    Performed by [facility] at [facility]    INJECTION-STEROID-EPIDURAL-TRANSFORAMINAL Bilateral 2/8/2017    Performed by [facility] at [facility]         Anesthesia Evaluation    I have reviewed the Patient Summary Reports.    I have reviewed the Nursing Notes.   I have reviewed the Medications.     Review of Systems  Anesthesia Hx:  No problems with previous Anesthesia    Hematology/Oncology:  Hematology Normal   Oncology Normal     EENT/Dental:EENT/Dental Normal   Cardiovascular:   Hypertension    Pulmonary:  Pulmonary Normal    Renal/:  Renal/ Normal     Hepatic/GI:  Hepatic/GI Normal    Neurological:   Neuromuscular Disease,    Endocrine:   Diabetes        Physical Exam  General:  Well nourished    Airway/Jaw/Neck:  Airway Findings: Mouth Opening: Normal Tongue: Normal  General Airway Assessment: Adult  Mallampati: II  TM Distance: Normal, at least 6 cm     Eyes/Ears/Nose:  Eyes/Ears/Nose Findings:     Chest/Lungs:  Chest/Lungs Findings: Clear to auscultation, Normal Respiratory Rate     Heart/Vascular:  Heart Findings: Rate: Normal  Rhythm: Regular Rhythm  Sounds: Normal        Mental Status:  Mental Status Findings:  Cooperative, Alert and Oriented         Anesthesia Plan  Type of Anesthesia, risks & benefits  discussed:  Anesthesia Type:  MAC  Patient's Preference:   Intra-op Monitoring Plan: standard ASA monitors  Intra-op Monitoring Plan Comments:   Post Op Pain Control Plan: per primary service following discharge from PACU  Post Op Pain Control Plan Comments:   Induction:   IV  Beta Blocker:  Patient is on a Beta-Blocker and has received one dose within the past 24 hours (No further documentation required).       Informed Consent: Patient understands risks and agrees with Anesthesia plan.  Questions answered. Anesthesia consent signed with patient.  ASA Score: 2     Day of Surgery Review of History & Physical:  There are no significant changes.          Ready For Surgery From Anesthesia Perspective.

## 2018-11-01 NOTE — TRANSFER OF CARE
"Anesthesia Transfer of Care Note    Patient: Mila Foster    Procedure(s) Performed: Procedure(s) (LRB):  COLONOSCOPY Suprep (N/A)    Patient location: GI    Anesthesia Type: MAC    Transport from OR: Transported from OR on room air with adequate spontaneous ventilation    Post pain: adequate analgesia    Post assessment: no apparent anesthetic complications    Post vital signs: stable    Level of consciousness: awake, alert and oriented    Nausea/Vomiting: no nausea/vomiting    Complications: none    Transfer of care protocol was followed      Last vitals:   Visit Vitals  BP (!) 108/58   Pulse 73   Temp 36.8 °C (98.2 °F)   Resp 18   Ht 5' 7" (1.702 m)   Wt 113.4 kg (250 lb)   SpO2 95%   Breastfeeding? No   BMI 39.16 kg/m²     "

## 2018-11-01 NOTE — [employer] PATIENT INSTRUCTIONS
Discharge Summary/Instructions after an Endoscopic Procedure  Patient Name: Mila Foster  Patient MRN: [id number]  Patient YOB: 1945  Thursday, November 01, 2018  Azucena Hood MD  RESTRICTIONS:  During your procedure today, you received medications for sedation.  These   medications may affect your judgment, balance and coordination.  Therefore,   for 24 hours, you have the following restrictions:   - DO NOT drive a car, operate machinery, make legal/financial decisions,   sign important papers or drink alcohol.    ACTIVITY:  Today: no heavy lifting, straining or running due to procedural   sedation/anesthesia.  The following day: return to full activity including work.  DIET:  Eat and drink normally unless instructed otherwise.     TREATMENT FOR COMMON SIDE EFFECTS:  - Mild abdominal pain, nausea, belching, bloating or excessive gas:  rest,   eat lightly and use a heating pad.  - Sore Throat: treat with throat lozenges and/or gargle with warm salt   water.  - Because air was used during the procedure, expelling large amounts of air   from your rectum or belching is normal.  - If a bowel prep was taken, you may not have a bowel movement for 1-3 days.    This is normal.  SYMPTOMS TO WATCH FOR AND REPORT TO YOUR PHYSICIAN:  1. Abdominal pain or bloating, other than gas cramps.  2. Chest pain.  3. Back pain.  4. Signs of infection such as: chills or fever occurring within 24 hours   after the procedure.  5. Rectal bleeding, which would show as bright red, maroon, or black stools.   (A tablespoon of blood from the rectum is not serious, especially if   hemorrhoids are present.)  6. Vomiting.  7. Weakness or dizziness.  GO DIRECTLY TO THE NEAREST EMERGENCY ROOM IF YOU HAVE ANY OF THE FOLLOWING:      Difficulty breathing              Chills and/or fever over 101 F   Persistent vomiting and/or vomiting blood   Severe abdominal pain   Severe chest pain   Black, tarry stools   Bleeding- more than one  tablespoon   Any other symptom or condition that you feel may need urgent attention  Your doctor recommends these additional instructions:  If any biopsies were taken, your doctors clinic will contact you in 1 to 2   weeks with any results.  - Discharge patient to home (via wheelchair).   - Patient has a contact number available for emergencies.  The signs and   symptoms of potential delayed complications were discussed with the   patient.  Return to normal activities tomorrow.  Written discharge   instructions were provided to the patient.   - Resume previous diet.   - Continue present medications.   - Await pathology results.   - Repeat colonoscopy date to be determined after pending pathology results   are reviewed for surveillance.  For questions, problems or results please call your physician - Azucena Hood MD at Work:  [phone].  EMERGENCY PHONE NUMBER: [phone],  LAB RESULTS: [phone]  IF A COMPLICATION OR EMERGENCY SITUATION ARISES AND YOU ARE UNABLE TO REACH   YOUR PHYSICIAN - GO DIRECTLY TO THE EMERGENCY ROOM.  Azucena Hood MD  11/1/2018 12:19:14 PM  This report has been verified and signed electronically.  [employer]

## 2018-11-01 NOTE — INTERVAL H&P NOTE
The patient has been examined and the H&P has been reviewed:    I concur with the findings and no changes have occurred since H&P was written.    Anesthesia/Surgery risks, benefits and alternative options discussed and understood by patient/family.          Active Hospital Problems    Diagnosis  POA    Screening for malignant neoplasm of colon [Z12.11]  Not Applicable      Resolved Hospital Problems   No resolved problems to display.

## 2018-11-02 VITALS
HEIGHT: 67 IN | BODY MASS INDEX: 39.24 KG/M2 | SYSTOLIC BLOOD PRESSURE: 101 MMHG | RESPIRATION RATE: 20 BRPM | TEMPERATURE: 98 F | WEIGHT: 250 LBS | HEART RATE: 91 BPM | DIASTOLIC BLOOD PRESSURE: 58 MMHG | OXYGEN SATURATION: 95 %

## 2018-11-06 LAB — POCT GLUCOSE: 173 MG/DL (ref 70–110)

## 2018-12-11 ENCOUNTER — OFFICE VISIT (OUTPATIENT)
Dept: PAIN MEDICINE | Facility: CLINIC | Age: 73
End: 2018-12-11
Payer: MEDICARE

## 2018-12-11 VITALS
DIASTOLIC BLOOD PRESSURE: 58 MMHG | BODY MASS INDEX: 39.97 KG/M2 | TEMPERATURE: 98 F | WEIGHT: 254.63 LBS | HEART RATE: 80 BPM | HEIGHT: 67 IN | SYSTOLIC BLOOD PRESSURE: 93 MMHG

## 2018-12-11 DIAGNOSIS — M47.816 FACET ARTHRITIS OF LUMBAR REGION: ICD-10-CM

## 2018-12-11 DIAGNOSIS — M46.1 BILATERAL SACROILIITIS: Primary | ICD-10-CM

## 2018-12-11 DIAGNOSIS — M48.061 NEUROFORAMINAL STENOSIS OF LUMBAR SPINE: ICD-10-CM

## 2018-12-11 DIAGNOSIS — M51.36 ANNULAR TEAR OF LUMBAR DISC: ICD-10-CM

## 2018-12-11 DIAGNOSIS — M51.36 DDD (DEGENERATIVE DISC DISEASE), LUMBAR: ICD-10-CM

## 2018-12-11 DIAGNOSIS — M53.3 SACROILIAC JOINT PAIN: Primary | ICD-10-CM

## 2018-12-11 PROCEDURE — 1101F PT FALLS ASSESS-DOCD LE1/YR: CPT | Mod: CPTII,S$GLB,, | Performed by: NURSE PRACTITIONER

## 2018-12-11 PROCEDURE — 99999 PR PBB SHADOW E&M-EST. PATIENT-LVL III: CPT | Mod: PBBFAC,,, | Performed by: NURSE PRACTITIONER

## 2018-12-11 PROCEDURE — 99213 OFFICE O/P EST LOW 20 MIN: CPT | Mod: S$GLB,,, | Performed by: NURSE PRACTITIONER

## 2018-12-11 RX ORDER — METOPROLOL TARTRATE 25 MG/1
TABLET, FILM COATED ORAL
Refills: 1 | Status: ON HOLD | COMMUNITY
Start: 2018-09-26 | End: 2024-02-26 | Stop reason: HOSPADM

## 2018-12-11 NOTE — PROGRESS NOTES
Chronic Pain - Established Patient    Referring Physician: No ref. provider found    Chief Complaint:   Chief Complaint   Patient presents with    Follow-up     3 months        SUBJECTIVE: Disclaimer: This note has been generated using voice-recognition software. There may be typographical errors that have been missed during proof-reading    Interval History 12/11/2018:  The patient returns to clinic today for follow up. She reports increased low back and bilateral buttock pain, left greater than right. She describes this pain as sore in nature. This pain is worse with prolonged sitting. She denies any radiating leg pain. She reports that she often feels like her back is locking up on her. She continues to take Gabapentin with benefit. She denies any other health changes. Her pain today is 5/10.    Interval History 9/25/2018:  The patient returns to clinic today for follow up. She is s/p right L5 and S1 TF LEONEL. She reports 90% relief of her low back and leg pain. She reports intermittent low back pain that is aching in nature. She denies any radiating leg pain today. Her back pain is worse with bending. She also reports that her legs feel heavy with prolonged walking. She denies any other health changes. Her pain today is 4/10.    Interval History 7/27/2018:  The patient returns to clinic today for follow up and image review. She continues to report low back pain that radiates down the posterior aspect of her right leg to her foot. She reports intermittent left leg pain in the same distribution. Her pain is worse with prolonged walking and activity. She often feels like her legs are heavy. She often feels like she will fall. She denies any other health changes. She denies any bowel or bladder incontinence. Her pain today is 2/10.    Interval History 7/20/2018:  The patient returns to clinic today for follow up. She has not been seen in over a year due to her son being diagnosed with cancer. She reports that he is  "in remission now. She returns today due to three fall recently, last a week ago. She continues to report low back pain that radiates intermittently down the posterior portion of both legs to her feet. She reports that her legs often feel heavy and weak which is why she falls. She denies any bowel or bladder incontinence. Her pain today is 7/10.    Interval History 3/6/2017:  The patient returns to clinic today for follow up. She is s/p bilateral S1 TF LEONEL on 2/8/2017. She reports 80% relief of her radiating leg pain. She continues to have low back pain. She describes the pain as dull and aching. The pain is worse in the morning. She reports that the pain increases with prolonged sitting and standing. She denies any other health changes. She denies any bowel or bladder incontinence. Her pain today is 6/10.     Interval History 1/23/2017:  The patient returns to clinic today for follow up of low back pain and bilateral leg pain. She has recently completed a medrol dose pack with some benefit. She still reports radiating pain into both legs. The pain is worse with prolonged sitting and lifting. The pain gets better with walking and rest. The pain radiates down the back of both legs to ankles. She denies any bowel or bladder incontinence or signs of saddle paresthesia. She continues to work with lifting restrictions. She currently takes Gabapentin and Naproxen with benefit. Her pain today is 6/10.     Initial encounter:    Mila Foster presents to the clinic for the evaluation of bilateral leg pain with intermittent lower back pain. 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 She reports recent falling to knees, but she changed her shoes, and the falling has ceased. She reports that she had a "pain flare" 2 weeks ago requiring a walker for 2 days.     Brief history:  70yo F with hx of HTN and DM complicated by neuropathy BL but " R>L with tingling, tightness, shooting pain only in feet. Patient reports glucose was 120 this morning and usually runs around 130.    Pain Description:    The pain is located in the posterior leg area and radiates to her ankles and to her knees at its worst.     At BEST  1/10     At WORST  8/10 on the WORST day.      On average pain is rated as 7/10    Today the pain is rated as 5/10    The pain is described as aching, burning, dull, sharp and tight band      Symptoms interfere with work.     Exacerbating factors: Sitting, picking up children, lifting, bending over    Mitigating factors: walking, rest    Patient denies night fever/night sweats, bowel incontinence, significant weight loss, significant motor weakness and loss of sensations. She does report chronic urinary incontinence unrelated to back/leg pain.  Patient denies any suicidal or homicidal ideations    Pain Medications:  Current:  Gabapentin    Tried in Past:  NSAIDs -yes; reports it helps  TCA -Never  SNRI -Never  Anti-convulsants -gabapentin  Muscle Relaxants -Never  Opioids-Never    Physical Therapy/Home Exercise: yes, in the past       report: N/A    Pain Procedures:   2/8/2017- Bilateral S1 TF LEONEL  3/22/2017- Bilateral L2,3,4,5 MBB  9/11/2018- Right L5 and S1 TF LEONEL     Chiropractor -never  Acupuncture - never  TENS unit -never  Spinal decompression -never  Joint replacement -never    Imaging:   MRI Lumbar Spine 7/25/2018:  COMPARISON:  12/16/2016    FINDINGS:  There is again normal lumbar lordosis.  No spondylolisthesis or spondylolysis or marrow edema to suggest acute lumbar fractures or neoplastic processes.  The tip of the conus is at L1-2 disc space.  Paraspinal soft tissues are unremarkable.    L5-S1: There is dehydration of the nucleus pulposis associated with disc space narrowing.  There is a right paracentral and right foraminal disc protrusion/herniation with findings suggestive of slight flattening of the descending right S1 root  (image 30 series 7 and 8 and image 7 series 4).  In addition there is also a moderate right L5-S1 foraminal stenosis.  In the far lateral neural foramen the inferior surface of the exiting L5 root contacts the disc protrusion.  Clinical correlation for right L5 and S1 radiculopathy suggested.  Left neural foramen is unremarkable.  No significant central canal spinal stenosis.  There is mild facet arthropathy.    L4-5: Dehydration of the nucleus pulposis associated with a mild posterior bulging of the annulus.  No significant central canal stenosis.  There is degenerative hypertrophic facet arthropathy.    L3-4: Dehydration of the nucleus pulposis.  No significant central canal spinal stenosis.  There is degenerative hypertrophic facet arthropathy and hypertrophy of the ligamentum flava unchanged from the previous study.    L2-3: Mild spondylotic changes associated with mild posterior bulging of the annulus.  No significant spinal stenosis.  There is facet arthropathy.    L1-2: No significant disc herniations or significant spinal stenosis.  Neural foramina are unremarkable.  There is facet arthropathy.      Impression       1. Right L5-S1 foraminal stenosis associated with a disc protrusion/disc herniation.  Clinical correlation for right L5 and S1 radiculopathy suggested.  2. Multilevel facet arthropathy.  3. Milder spondylotic changes at the rest of the disc spaces as above.     Xray Lumbar Spine 7/25/2018:  COMPARISON:  Prior lumbar spine MRI dated 12/16/16    FINDINGS:  There is diffuse osteopenia.  There is mild grade 1 anterolisthesis of L3 on L4 (4 mm) and of L4 on L5 (6 mm).  There is no evidence of translational instability.  There is moderate facet arthropathy at the lower lumbar spine with probable L5-S1 neural foraminal narrowing.  Endplate degenerative changes are present with subchondral sclerosis and marginal osteophyte formation.    There is atherosclerotic calcification of the aorta.      Impression        Osteopenia and degenerative change of the lumbar spine with grade 1 anterolisthesis of L3-4 and L4-5, new from the 2016 MRI.  No evidence of translational instability.     12/12/16 MRI Lumbar Spine WO Contrast   L1-L2: There is no focal disc herniation. No significant spinal canal or neural foraminal narrowing.    L2-L3: There is no focal disc herniation. No significant spinal canal or neural foraminal narrowing.    L3-L4: Mild bilateral facet arthropathy.  No focal disc herniation. No significant spinal canal or neural foraminal narrowing.    L4-L5: Mild disc bulge and bilateral facet arthropathy results in mild right-sided neural foraminal narrowing. No significant spinal canal stenosis.    L5-S1: Diffuse disc bulge with small superimposed right foraminal disc protrusion.  Findings result in moderate right-sided neuroforaminal narrowing with likely abutment/impingement upon the exited right L5 nerve root. No significant spinal canal stenosis.  Small posterior annular fissure noted.   Impression      Lumbar spondylosis as detailed above, most significant for small right foraminal protrusion at the L5-S1 level resulting in moderate neuroforaminal narrowing and likely abutment/impingement upon the exited right L5 nerve root         Past Medical History:   Diagnosis Date    Diabetes     Hypertension      Past Surgical History:   Procedure Laterality Date    BLOCK-NERVE-MEDIAL BRANCH-LUMBAR Bilateral 4/12/2017    [facility]    BLOCK-NERVE-MEDIAL BRANCH-LUMBAR Bilateral 3/22/2017    [facility]    CHOLECYSTECTOMY      COLONOSCOPY N/A 11/1/2018    Procedure: COLONOSCOPY Suprep;  Surgeon: Azucena Hood MD;  Location: [facility];  Service: Endoscopy;  Laterality: N/A;    COLONOSCOPY Suprep N/A 11/1/2018    Per[facility]    HYSTERECTOMY      Injection,steroid,epidural,transforaminal approach   Right L5 and S1 Right 9/11/2018    [facility]    INJECTION-STEROID-EPIDURAL-TRANSFORAMINAL Bilateral 2/8/2017    [facility]     Social History     Socioeconomic History    Marital status: Single     Spouse name: Not on file    Number of children: Not on file    Years of education: Not on file    Highest education level: Not on file   Social Needs    Financial resource strain: Not on file    Food insecurity - worry: Not on file    Food insecurity - inability: Not on file    Transportation needs - medical: Not on file    Transportation needs - non-medical: Not on file   Occupational History    Not on file   Tobacco Use    Smoking status: Never Smoker   Substance and Sexual Activity    Alcohol use: Not on file    Drug use: Not on file    Sexual activity: Not on file   Other Topics Concern    Not on file   Social History Narrative    Not on file     Family History   Problem Relation Age of Onset    Vaginal cancer Neg Hx     Endometrial cancer Neg Hx     Cervical cancer Neg Hx        Review of patient's allergies indicates:  No Known Allergies    Current Outpatient Medications   Medication Sig    amlodipine (NORVASC) 10 MG tablet 10 mg.    aspirin (ECOTRIN) 81 MG EC tablet 81 mg.    atenolol (TENORMIN) 50 MG tablet 50 mg.    gabapentin (NEURONTIN) 300 MG capsule Take 2 tabs in the morning, 1 tab in the afternoon, and 2 tabs in the evening    glipiZIDE (GLUCOTROL) 5 MG tablet 5 mg.    hydrochlorothiazide (HYDRODIURIL) 25 MG tablet 25 mg.    hyoscyamine (ANASPAZ,LEVSIN) 0.125 mg Tab TK 1 T PO Q 4 H PRN    lisinopril (PRINIVIL,ZESTRIL) 40 MG tablet 40 mg.    metformin (GLUCOPHAGE-XR) 500 MG 24 hr tablet 500 mg.    metoprolol succinate (TOPROL-XL) 25 MG 24 hr tablet Take 25 mg by mouth once daily.    mirabegron (MYRBETRIQ) 25 mg Tb24 ER tablet Take 1 tablet (25 mg total) by mouth once daily.    naproxen  "(NAPROSYN) 500 MG tablet     omeprazole (PRILOSEC) 20 MG capsule 20 mg.    pravastatin (PRAVACHOL) 40 MG tablet 40 mg.    sodium,potassium,mag sulfates (SUPREP BOWEL PREP KIT) 17.5-3.13-1.6 gram SolR TAKE AS DIRECTED     No current facility-administered medications for this visit.        REVIEW OF SYSTEMS:    GENERAL:  No weight loss, malaise or fevers.  HEENT:   No recent changes in vision or hearing  NECK:  Negative for lumps, no difficulty with swallowing.  RESPIRATORY:  Negative for cough, wheezing or shortness of breath, patient denies any recent URI.  CARDIOVASCULAR:  Negative for chest pain, leg swelling or palpitations. HTN.  GI:  Negative for abdominal discomfort, blood in stools or black stools or change in bowel habits.  MUSCULOSKELETAL:  See HPI.  SKIN:  Negative for lesions, rash, and itching.  PSYCH:  No mood disorder or recent psychosocial stressors.  Patient's sleep is occasionally disturbed secondary to pain.  HEMATOLOGY/LYMPHOLOGY:  Negative for prolonged bleeding, bruising easily or swollen nodes.  Patient is not currently taking any anti-coagulants  ENDO: Positive for DM. Negative for thyroid dysfunction  NEURO:   No history of headaches, syncope, paralysis, seizures or tremors.  All other reviewed and negative other than HPI.    OBJECTIVE:    BP (!) 93/58   Pulse 80   Temp 98.2 °F (36.8 °C)   Ht 5' 7" (1.702 m)   Wt 115.5 kg (254 lb 10.1 oz)   BMI 39.88 kg/m²     PHYSICAL EXAMINATION:    GENERAL: Well appearing, in no acute distress, alert and oriented x3.  PSYCH:  Mood and affect appropriate.  SKIN: Skin color, texture, turgor normal, no rashes or lesions.  HEAD/FACE:  Normocephalic, atraumatic. Cranial nerves grossly intact.   CV: RRR with palpation of the radial artery.  PULM: No evidence of respiratory difficulty, symmetric chest rise.  BACK: Straight leg raising in the siting position is negative for radicular pain bilaterally. Limited ROM with pain on extension. Positive facet " loading bilaterally.  LOWER EXTREMITIES: Peripheral joint ROM is full and pain free without obvious instability or laxity in lower extremities. No deformities, edema, or skin discoloration. Good capillary refill.  MUSCULOSKELETAL: Hip and knee provocative maneuvers are negative.  There is pain with palpation over the sacroiliac joints bilaterally.  FABERs test is positive bilaterally.  FADIRs test is negative. 5/5 strength in right ankle with plantar and dorsiflexion. 4/5 strength in left ankle with plantar and dorsiflexion. 5/5 strength with right knee flexion and extension. 5/5 strength with left knee flexion and extension. 5/5 strength in right EHL, 4/5 strength in left EHL.  No atrophy or tone abnormalities are noted.  NEURO: Bilateral lower extremity coordination and muscle stretch reflexes are physiologic and symmetric.   No loss of sensation is noted.  GAIT: Antalgic- ambulates with cane.     ASSESSMENT: 73 y.o. year old female with back/leg pain, consistent with BL radiculopathy due to L5 disc protrusion.    Encounter Diagnoses   Name Primary?    Sacroiliac joint pain Yes    Annular tear of lumbar disc     Facet arthritis of lumbar region     DDD (degenerative disc disease), lumbar     Neuroforaminal stenosis of lumbar spine        PLAN:     - Previous imaging was reviewed and discussed with the patient today.    - Schedule for bilateral SI joint injections.   The procedure, risks, benefits and options were discussed with patient. There are no contraindications to the procedure. The patient expressed understanding and agreed to proceed.      - In the future, she may benefit from repeat MBB.     - She is s/p right L5 and S1 TF LEONEL with benefit. We can repeat this as needed.     - Continue Gabapentin and Naproxen as these are providing benefit.    - Continue home exercise routine.     - RTC 2 weeks after above procedure.     - Dr. Duncan was consulted on the patient and agrees with this plan.    The  above plan and management options were discussed at length with patient. Patient is in agreement with the above and verbalized understanding.     Coby Russo NP  12/11/2018

## 2018-12-11 NOTE — H&P (VIEW-ONLY)
Chronic Pain - Established Patient    Referring Physician: No ref. provider found    Chief Complaint:   Chief Complaint   Patient presents with    Follow-up     3 months        SUBJECTIVE: Disclaimer: This note has been generated using voice-recognition software. There may be typographical errors that have been missed during proof-reading    Interval History 12/11/2018:  The patient returns to clinic today for follow up. She reports increased low back and bilateral buttock pain, left greater than right. She describes this pain as sore in nature. This pain is worse with prolonged sitting. She denies any radiating leg pain. She reports that she often feels like her back is locking up on her. She continues to take Gabapentin with benefit. She denies any other health changes. Her pain today is 5/10.    Interval History 9/25/2018:  The patient returns to clinic today for follow up. She is s/p right L5 and S1 TF LEONEL. She reports 90% relief of her low back and leg pain. She reports intermittent low back pain that is aching in nature. She denies any radiating leg pain today. Her back pain is worse with bending. She also reports that her legs feel heavy with prolonged walking. She denies any other health changes. Her pain today is 4/10.    Interval History 7/27/2018:  The patient returns to clinic today for follow up and image review. She continues to report low back pain that radiates down the posterior aspect of her right leg to her foot. She reports intermittent left leg pain in the same distribution. Her pain is worse with prolonged walking and activity. She often feels like her legs are heavy. She often feels like she will fall. She denies any other health changes. She denies any bowel or bladder incontinence. Her pain today is 2/10.    Interval History 7/20/2018:  The patient returns to clinic today for follow up. She has not been seen in over a year due to her son being diagnosed with cancer. She reports that he is  "in remission now. She returns today due to three fall recently, last a week ago. She continues to report low back pain that radiates intermittently down the posterior portion of both legs to her feet. She reports that her legs often feel heavy and weak which is why she falls. She denies any bowel or bladder incontinence. Her pain today is 7/10.    Interval History 3/6/2017:  The patient returns to clinic today for follow up. She is s/p bilateral S1 TF LEONEL on 2/8/2017. She reports 80% relief of her radiating leg pain. She continues to have low back pain. She describes the pain as dull and aching. The pain is worse in the morning. She reports that the pain increases with prolonged sitting and standing. She denies any other health changes. She denies any bowel or bladder incontinence. Her pain today is 6/10.     Interval History 1/23/2017:  The patient returns to clinic today for follow up of low back pain and bilateral leg pain. She has recently completed a medrol dose pack with some benefit. She still reports radiating pain into both legs. The pain is worse with prolonged sitting and lifting. The pain gets better with walking and rest. The pain radiates down the back of both legs to ankles. She denies any bowel or bladder incontinence or signs of saddle paresthesia. She continues to work with lifting restrictions. She currently takes Gabapentin and Naproxen with benefit. Her pain today is 6/10.     Initial encounter:    Mila Foster presents to the clinic for the evaluation of bilateral leg pain with intermittent lower back pain. 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 She reports recent falling to knees, but she changed her shoes, and the falling has ceased. She reports that she had a "pain flare" 2 weeks ago requiring a walker for 2 days.     Brief history:  72yo F with hx of HTN and DM complicated by neuropathy BL but " R>L with tingling, tightness, shooting pain only in feet. Patient reports glucose was 120 this morning and usually runs around 130.    Pain Description:    The pain is located in the posterior leg area and radiates to her ankles and to her knees at its worst.     At BEST  1/10     At WORST  8/10 on the WORST day.      On average pain is rated as 7/10    Today the pain is rated as 5/10    The pain is described as aching, burning, dull, sharp and tight band      Symptoms interfere with work.     Exacerbating factors: Sitting, picking up children, lifting, bending over    Mitigating factors: walking, rest    Patient denies night fever/night sweats, bowel incontinence, significant weight loss, significant motor weakness and loss of sensations. She does report chronic urinary incontinence unrelated to back/leg pain.  Patient denies any suicidal or homicidal ideations    Pain Medications:  Current:  Gabapentin    Tried in Past:  NSAIDs -yes; reports it helps  TCA -Never  SNRI -Never  Anti-convulsants -gabapentin  Muscle Relaxants -Never  Opioids-Never    Physical Therapy/Home Exercise: yes, in the past       report: N/A    Pain Procedures:   2/8/2017- Bilateral S1 TF LEONEL  3/22/2017- Bilateral L2,3,4,5 MBB  9/11/2018- Right L5 and S1 TF LEONEL     Chiropractor -never  Acupuncture - never  TENS unit -never  Spinal decompression -never  Joint replacement -never    Imaging:   MRI Lumbar Spine 7/25/2018:  COMPARISON:  12/16/2016    FINDINGS:  There is again normal lumbar lordosis.  No spondylolisthesis or spondylolysis or marrow edema to suggest acute lumbar fractures or neoplastic processes.  The tip of the conus is at L1-2 disc space.  Paraspinal soft tissues are unremarkable.    L5-S1: There is dehydration of the nucleus pulposis associated with disc space narrowing.  There is a right paracentral and right foraminal disc protrusion/herniation with findings suggestive of slight flattening of the descending right S1 root  (image 30 series 7 and 8 and image 7 series 4).  In addition there is also a moderate right L5-S1 foraminal stenosis.  In the far lateral neural foramen the inferior surface of the exiting L5 root contacts the disc protrusion.  Clinical correlation for right L5 and S1 radiculopathy suggested.  Left neural foramen is unremarkable.  No significant central canal spinal stenosis.  There is mild facet arthropathy.    L4-5: Dehydration of the nucleus pulposis associated with a mild posterior bulging of the annulus.  No significant central canal stenosis.  There is degenerative hypertrophic facet arthropathy.    L3-4: Dehydration of the nucleus pulposis.  No significant central canal spinal stenosis.  There is degenerative hypertrophic facet arthropathy and hypertrophy of the ligamentum flava unchanged from the previous study.    L2-3: Mild spondylotic changes associated with mild posterior bulging of the annulus.  No significant spinal stenosis.  There is facet arthropathy.    L1-2: No significant disc herniations or significant spinal stenosis.  Neural foramina are unremarkable.  There is facet arthropathy.      Impression       1. Right L5-S1 foraminal stenosis associated with a disc protrusion/disc herniation.  Clinical correlation for right L5 and S1 radiculopathy suggested.  2. Multilevel facet arthropathy.  3. Milder spondylotic changes at the rest of the disc spaces as above.     Xray Lumbar Spine 7/25/2018:  COMPARISON:  Prior lumbar spine MRI dated 12/16/16    FINDINGS:  There is diffuse osteopenia.  There is mild grade 1 anterolisthesis of L3 on L4 (4 mm) and of L4 on L5 (6 mm).  There is no evidence of translational instability.  There is moderate facet arthropathy at the lower lumbar spine with probable L5-S1 neural foraminal narrowing.  Endplate degenerative changes are present with subchondral sclerosis and marginal osteophyte formation.    There is atherosclerotic calcification of the aorta.      Impression        Osteopenia and degenerative change of the lumbar spine with grade 1 anterolisthesis of L3-4 and L4-5, new from the 2016 MRI.  No evidence of translational instability.     12/12/16 MRI Lumbar Spine WO Contrast   L1-L2: There is no focal disc herniation. No significant spinal canal or neural foraminal narrowing.    L2-L3: There is no focal disc herniation. No significant spinal canal or neural foraminal narrowing.    L3-L4: Mild bilateral facet arthropathy.  No focal disc herniation. No significant spinal canal or neural foraminal narrowing.    L4-L5: Mild disc bulge and bilateral facet arthropathy results in mild right-sided neural foraminal narrowing. No significant spinal canal stenosis.    L5-S1: Diffuse disc bulge with small superimposed right foraminal disc protrusion.  Findings result in moderate right-sided neuroforaminal narrowing with likely abutment/impingement upon the exited right L5 nerve root. No significant spinal canal stenosis.  Small posterior annular fissure noted.   Impression      Lumbar spondylosis as detailed above, most significant for small right foraminal protrusion at the L5-S1 level resulting in moderate neuroforaminal narrowing and likely abutment/impingement upon the exited right L5 nerve root         Past Medical History:   Diagnosis Date    Diabetes     Hypertension      Past Surgical History:   Procedure Laterality Date    BLOCK-NERVE-MEDIAL BRANCH-LUMBAR Bilateral 4/12/2017    [facility]    BLOCK-NERVE-MEDIAL BRANCH-LUMBAR Bilateral 3/22/2017    Performed by [facility] at [facility]    CHOLECYSTECTOMY      COLONOSCOPY N/A 11/1/2018    Procedure: COLONOSCOPY Suprep;  Surgeon: Azucena Hood MD;  Location: [facility];  Service: Endoscopy;  Laterality: N/A;    COLONOSCOPY Suprep N/A 11/1/2018    Performed by [facility] at [facility]    HYSTERECTOMY      Injection,steroid,epidural,transforaminal approach   Right L5 and S1 Right 9/11/2018    [facility]    INJECTION-STEROID-EPIDURAL-TRANSFORAMINAL Bilateral 2/8/2017    [facility] at [facility]     Social History     Socioeconomic History    Marital status: Single     Spouse name: Not on file    Number of children: Not on file    Years of education: Not on file    Highest education level: Not on file   Social Needs    Financial resource strain: Not on file    Food insecurity - worry: Not on file    Food insecurity - inability: Not on file    Transportation needs - medical: Not on file    Transportation needs - non-medical: Not on file   Occupational History    Not on file   Tobacco Use    Smoking status: Never Smoker   Substance and Sexual Activity    Alcohol use: Not on file    Drug use: Not on file    Sexual activity: Not on file   Other Topics Concern    Not on file   Social History Narrative    Not on file     Family History   Problem Relation Age of Onset    Vaginal cancer Neg Hx     Endometrial cancer Neg Hx     Cervical cancer Neg Hx        Review of patient's allergies indicates:  No Known Allergies    Current Outpatient Medications   Medication Sig    amlodipine (NORVASC) 10 MG tablet 10 mg.    aspirin (ECOTRIN) 81 MG EC tablet 81 mg.    atenolol (TENORMIN) 50 MG tablet 50 mg.    gabapentin (NEURONTIN) 300 MG capsule Take 2 tabs in the morning, 1 tab in the afternoon, and 2 tabs in the evening    glipiZIDE (GLUCOTROL) 5 MG tablet 5 mg.    hydrochlorothiazide (HYDRODIURIL) 25 MG tablet 25 mg.    hyoscyamine (ANASPAZ,LEVSIN) 0.125 mg Tab TK 1 T PO Q 4 H PRN    lisinopril (PRINIVIL,ZESTRIL) 40 MG tablet 40 mg.    metformin (GLUCOPHAGE-XR) 500 MG 24 hr tablet 500 mg.    metoprolol succinate (TOPROL-XL) 25 MG 24 hr tablet Take 25 mg by mouth once daily.    mirabegron (MYRBETRIQ) 25 mg Tb24 ER tablet Take 1 tablet (25 mg total) by mouth once daily.    naproxen  "(NAPROSYN) 500 MG tablet     omeprazole (PRILOSEC) 20 MG capsule 20 mg.    pravastatin (PRAVACHOL) 40 MG tablet 40 mg.    sodium,potassium,mag sulfates (SUPREP BOWEL PREP KIT) 17.5-3.13-1.6 gram SolR TAKE AS DIRECTED     No current facility-administered medications for this visit.        REVIEW OF SYSTEMS:    GENERAL:  No weight loss, malaise or fevers.  HEENT:   No recent changes in vision or hearing  NECK:  Negative for lumps, no difficulty with swallowing.  RESPIRATORY:  Negative for cough, wheezing or shortness of breath, patient denies any recent URI.  CARDIOVASCULAR:  Negative for chest pain, leg swelling or palpitations. HTN.  GI:  Negative for abdominal discomfort, blood in stools or black stools or change in bowel habits.  MUSCULOSKELETAL:  See HPI.  SKIN:  Negative for lesions, rash, and itching.  PSYCH:  No mood disorder or recent psychosocial stressors.  Patient's sleep is occasionally disturbed secondary to pain.  HEMATOLOGY/LYMPHOLOGY:  Negative for prolonged bleeding, bruising easily or swollen nodes.  Patient is not currently taking any anti-coagulants  ENDO: Positive for DM. Negative for thyroid dysfunction  NEURO:   No history of headaches, syncope, paralysis, seizures or tremors.  All other reviewed and negative other than HPI.    OBJECTIVE:    BP (!) 93/58   Pulse 80   Temp 98.2 °F (36.8 °C)   Ht 5' 7" (1.702 m)   Wt 115.5 kg (254 lb 10.1 oz)   BMI 39.88 kg/m²     PHYSICAL EXAMINATION:    GENERAL: Well appearing, in no acute distress, alert and oriented x3.  PSYCH:  Mood and affect appropriate.  SKIN: Skin color, texture, turgor normal, no rashes or lesions.  HEAD/FACE:  Normocephalic, atraumatic. Cranial nerves grossly intact.   CV: RRR with palpation of the radial artery.  PULM: No evidence of respiratory difficulty, symmetric chest rise.  BACK: Straight leg raising in the siting position is negative for radicular pain bilaterally. Limited ROM with pain on extension. Positive facet " loading bilaterally.  LOWER EXTREMITIES: Peripheral joint ROM is full and pain free without obvious instability or laxity in lower extremities. No deformities, edema, or skin discoloration. Good capillary refill.  MUSCULOSKELETAL: Hip and knee provocative maneuvers are negative.  There is pain with palpation over the sacroiliac joints bilaterally.  FABERs test is positive bilaterally.  FADIRs test is negative. 5/5 strength in right ankle with plantar and dorsiflexion. 4/5 strength in left ankle with plantar and dorsiflexion. 5/5 strength with right knee flexion and extension. 5/5 strength with left knee flexion and extension. 5/5 strength in right EHL, 4/5 strength in left EHL.  No atrophy or tone abnormalities are noted.  NEURO: Bilateral lower extremity coordination and muscle stretch reflexes are physiologic and symmetric.   No loss of sensation is noted.  GAIT: Antalgic- ambulates with cane.     ASSESSMENT: 73 y.o. year old female with back/leg pain, consistent with BL radiculopathy due to L5 disc protrusion.    Encounter Diagnoses   Name Primary?    Sacroiliac joint pain Yes    Annular tear of lumbar disc     Facet arthritis of lumbar region     DDD (degenerative disc disease), lumbar     Neuroforaminal stenosis of lumbar spine        PLAN:     - Previous imaging was reviewed and discussed with the patient today.    - Schedule for bilateral SI joint injections.   The procedure, risks, benefits and options were discussed with patient. There are no contraindications to the procedure. The patient expressed understanding and agreed to proceed.      - In the future, she may benefit from repeat MBB.     - She is s/p right L5 and S1 TF LEONEL with benefit. We can repeat this as needed.     - Continue Gabapentin and Naproxen as these are providing benefit.    - Continue home exercise routine.     - RTC 2 weeks after above procedure.     - Dr. Duncan was consulted on the patient and agrees with this plan.    The  above plan and management options were discussed at length with patient. Patient is in agreement with the above and verbalized understanding.     Coby Russo NP  12/11/2018

## 2018-12-27 ENCOUNTER — HOSPITAL ENCOUNTER (OUTPATIENT)
Facility: OTHER | Age: 73
Discharge: HOME | End: 2018-12-27
Attending: ANESTHESIOLOGY | Admitting: ANESTHESIOLOGY
Payer: MEDICARE

## 2018-12-27 VITALS
HEIGHT: 68 IN | TEMPERATURE: 100 F | DIASTOLIC BLOOD PRESSURE: 76 MMHG | HEART RATE: 66 BPM | BODY MASS INDEX: 38.49 KG/M2 | RESPIRATION RATE: 18 BRPM | SYSTOLIC BLOOD PRESSURE: 136 MMHG | OXYGEN SATURATION: 95 % | WEIGHT: 254 LBS

## 2018-12-27 DIAGNOSIS — M46.1 SACROILIITIS: Primary | ICD-10-CM

## 2018-12-27 LAB — POCT GLUCOSE: 205 MG/DL (ref 70–110)

## 2018-12-27 PROCEDURE — 25000003 PHARM REV CODE 250: Performed by: ANESTHESIOLOGY

## 2018-12-27 PROCEDURE — 82947 ASSAY GLUCOSE BLOOD QUANT: CPT | Performed by: ANESTHESIOLOGY

## 2018-12-27 PROCEDURE — 27096 INJECT SACROILIAC JOINT: CPT | Mod: 50 | Performed by: ANESTHESIOLOGY

## 2018-12-27 PROCEDURE — 25500020 PHARM REV CODE 255: Performed by: ANESTHESIOLOGY

## 2018-12-27 PROCEDURE — 63600175 PHARM REV CODE 636 W HCPCS: Performed by: ANESTHESIOLOGY

## 2018-12-27 PROCEDURE — 27096 INJECT SACROILIAC JOINT: CPT | Mod: 50,,, | Performed by: ANESTHESIOLOGY

## 2018-12-27 RX ORDER — METHYLPREDNISOLONE ACETATE 40 MG/ML
INJECTION, SUSPENSION INTRA-ARTICULAR; INTRALESIONAL; INTRAMUSCULAR; SOFT TISSUE
Status: DISCONTINUED | OUTPATIENT
Start: 2018-12-27 | End: 2018-12-27 | Stop reason: HOSPADM

## 2018-12-27 RX ORDER — BUPIVACAINE HYDROCHLORIDE 2.5 MG/ML
INJECTION, SOLUTION EPIDURAL; INFILTRATION; INTRACAUDAL
Status: DISCONTINUED | OUTPATIENT
Start: 2018-12-27 | End: 2018-12-27 | Stop reason: HOSPADM

## 2018-12-27 RX ORDER — LIDOCAINE HYDROCHLORIDE 10 MG/ML
INJECTION INFILTRATION; PERINEURAL
Status: DISCONTINUED | OUTPATIENT
Start: 2018-12-27 | End: 2018-12-27 | Stop reason: HOSPADM

## 2018-12-27 RX ORDER — ALPRAZOLAM 0.5 MG/1
0.5 TABLET, ORALLY DISINTEGRATING ORAL ONCE
Status: COMPLETED | OUTPATIENT
Start: 2018-12-27 | End: 2018-12-27

## 2018-12-27 NOTE — DISCHARGE SUMMARY
Discharge Note  Short Stay      SUMMARY     Admit Date: 12/27/2018    Attending Physician: Brunilda Ochoa      Discharge Physician: Brunilda Ochoa      Discharge Date: 12/27/2018 12:01 PM    Procedure(s) (LRB):  Injection, Joint BILATERAL SACROILIAC JOINT INJECTION (Bilateral)    Final Diagnosis: Bilateral sacroiliitis [M46.1]    Disposition: Home or self care    Patient Instructions:   Current Discharge Medication List      CONTINUE these medications which have NOT CHANGED    Details   amlodipine (NORVASC) 10 MG tablet 10 mg.  Refills: 1      aspirin (ECOTRIN) 81 MG EC tablet 81 mg.  Refills: 2      atenolol (TENORMIN) 50 MG tablet 50 mg.  Refills: 1      FLUZONE HIGH-DOSE 2018-19, PF, 180 mcg/0.5 mL vaccine ADM 0.5ML IM UTD  Refills: 0      gabapentin (NEURONTIN) 300 MG capsule Take 2 tabs in the morning, 1 tab in the afternoon, and 2 tabs in the evening  Qty: 150 capsule, Refills: 5    Associated Diagnoses: Lumbosacral radiculopathy      glipiZIDE (GLUCOTROL) 5 MG tablet 5 mg.  Refills: 1      hydrochlorothiazide (HYDRODIURIL) 25 MG tablet 25 mg.  Refills: 1      hyoscyamine (ANASPAZ,LEVSIN) 0.125 mg Tab TK 1 T PO Q 4 H PRN  Refills: 6      lisinopril (PRINIVIL,ZESTRIL) 40 MG tablet 40 mg.  Refills: 1      metformin (GLUCOPHAGE-XR) 500 MG 24 hr tablet 500 mg.  Refills: 1      metoprolol succinate (TOPROL-XL) 25 MG 24 hr tablet Take 25 mg by mouth once daily.      metoprolol tartrate (LOPRESSOR) 25 MG tablet Refills: 1      mirabegron (MYRBETRIQ) 25 mg Tb24 ER tablet Take 1 tablet (25 mg total) by mouth once daily.  Qty: 30 tablet, Refills: 11    Associated Diagnoses: Urge incontinence; Urinary urgency      naproxen (NAPROSYN) 500 MG tablet Refills: 1      omeprazole (PRILOSEC) 20 MG capsule 20 mg.  Refills: 1      pravastatin (PRAVACHOL) 40 MG tablet 40 mg.  Refills: 1      sodium,potassium,mag sulfates (SUPREP BOWEL PREP KIT) 17.5-3.13-1.6 gram SolR TAKE AS DIRECTED  Qty: 354 mL, Refills: 0                  Discharge Diagnosis: Bilateral sacroiliitis [M46.1]  Condition on Discharge: Stable with no complications to procedure   Diet on Discharge: Same as before.  Activity: as per instruction sheet.  Discharge to: Home with a responsible adult.  Follow up: 2-4 weeks

## 2018-12-27 NOTE — DISCHARGE INSTRUCTIONS
Thank you for allowing us to care for you today. You may receive a survey about the care we provided. Your feedback is valuable and helps us provide excellent care throughout the community.  Home Care Instructions Pain Management:    1. DIET:   You may resume your normal diet today.   2. BATHING:   You may shower with luke warm water.  3. DRESSING:   You may remove your bandage today.   4. ACTIVITY LEVEL:   You may resume your normal activities 24 hrs after your procedure.  5. MEDICATIONS:   You may resume your normal medications today.   6. SPECIAL INSTRUCTIONS:   No heat to the injection site for 24 hrs including, bath or shower, heating pad, moist heat, or hot tubs.    Use ice pack to injection site for any pain or discomfort.  Apply ice packs for 20 minute intervals as needed.   If you have received any sedatives by mouth today you may not drive for 12 hours.    If you have received any sedation through your IV, you may not drive for 24 hrs.     PLEASE CALL YOUR DOCTOR IF:  1. Redness or swelling around the injection site.  2. Fever of 101 degrees  3. Drainage (pus) from the injection site.  4. For any continuous bleeding (some dried blood over the incision is normal.)    FOR EMERGENCIES:   If any unusual problems or difficulties occur during clinic hours, call [phone] or [phone].   Adult Procedural Sedation Instructions

## 2018-12-27 NOTE — OP NOTE
Sacroiliac Joint Injection under Fluoroscopy    Date of procedure 12/27/2018    Time-out taken to identify patient and procedure side prior to starting the procedure.                                                                 PROCEDURE:  bilateralsacroiliac joint injection under fluoroscopy.    REASON FOR PROCEDURE: bilateral Bilateral sacroiliitis [M46.1]    PHYSICIAN: Bonnie Duncan MD    ASSISTANTS: Clifford Grady MD resident    MEDICATIONS INJECTED:  Depo-Medrol 80mg and 4 mL Bupivacaine 0.25% at each site    LOCAL ANESTHETIC USED: Xylocaine 1% 5mL    SEDATION MEDICATIONS: None    ESTIMATED BLOOD LOSS:  None.    COMPLICATIONS:  None.    TECHNIQUE:   Laying in the prone position, the patient was prepped and draped in the usual sterile fashion using ChloraPrep and fenestrated drape.  The area was determined under fluoroscopy.  Local Xylocaine was injected by raising a wheel and going down to the periosteum using a 27-gauge hypodermic needle.  The 3.5 inch 22-gauge spinal needle was introduce into the bilateral sacroiliac joint.  Negative pressure applied to confirm no intravascular placement.  Omnipaque was injected to confirm placement and to confirm that there was no vascular runoff.  The medication was then injected slowly.  The patient tolerated the procedure well.      The patient was monitored for approximately 30 minutes after the procedure.  Patient was given post procedure and discharge instructions to follow at home.  We will see the patient back in two weeks or the patient may call to inform of status. The patient was discharged in a stable condition

## 2018-12-27 NOTE — PLAN OF CARE
Pt tolerated procedure well. Pt pain 3/10. Pt assisted to feet for the first time, steady on feet. Discharge instructions given. Pt verbalized understanding.

## 2019-01-09 ENCOUNTER — OFFICE VISIT (OUTPATIENT)
Dept: PAIN MEDICINE | Facility: CLINIC | Age: 74
End: 2019-01-09
Payer: MEDICARE

## 2019-01-09 VITALS
BODY MASS INDEX: 38.29 KG/M2 | TEMPERATURE: 99 F | HEIGHT: 68 IN | DIASTOLIC BLOOD PRESSURE: 72 MMHG | SYSTOLIC BLOOD PRESSURE: 127 MMHG | WEIGHT: 252.63 LBS | HEART RATE: 89 BPM

## 2019-01-09 DIAGNOSIS — M51.37 DDD (DEGENERATIVE DISC DISEASE), LUMBOSACRAL: ICD-10-CM

## 2019-01-09 DIAGNOSIS — M47.816 FACET ARTHRITIS OF LUMBAR REGION: ICD-10-CM

## 2019-01-09 DIAGNOSIS — M53.3 SACROILIAC JOINT PAIN: Primary | ICD-10-CM

## 2019-01-09 DIAGNOSIS — M51.36 ANNULAR TEAR OF LUMBAR DISC: ICD-10-CM

## 2019-01-09 DIAGNOSIS — M51.36 DDD (DEGENERATIVE DISC DISEASE), LUMBAR: ICD-10-CM

## 2019-01-09 DIAGNOSIS — M48.061 NEUROFORAMINAL STENOSIS OF LUMBAR SPINE: ICD-10-CM

## 2019-01-09 PROCEDURE — 1101F PR PT FALLS ASSESS DOC 0-1 FALLS W/OUT INJ PAST YR: ICD-10-PCS | Mod: CPTII,S$GLB,, | Performed by: NURSE PRACTITIONER

## 2019-01-09 PROCEDURE — 99213 OFFICE O/P EST LOW 20 MIN: CPT | Mod: S$GLB,,, | Performed by: NURSE PRACTITIONER

## 2019-01-09 PROCEDURE — 99999 PR PBB SHADOW E&M-EST. PATIENT-LVL III: CPT | Mod: PBBFAC,,, | Performed by: NURSE PRACTITIONER

## 2019-01-09 PROCEDURE — 1101F PT FALLS ASSESS-DOCD LE1/YR: CPT | Mod: CPTII,S$GLB,, | Performed by: NURSE PRACTITIONER

## 2019-01-09 PROCEDURE — 99213 PR OFFICE/OUTPT VISIT, EST, LEVL III, 20-29 MIN: ICD-10-PCS | Mod: S$GLB,,, | Performed by: NURSE PRACTITIONER

## 2019-01-09 PROCEDURE — 99999 PR PBB SHADOW E&M-EST. PATIENT-LVL III: ICD-10-PCS | Mod: PBBFAC,,, | Performed by: NURSE PRACTITIONER

## 2019-01-09 NOTE — PROGRESS NOTES
Chronic Pain - Established Patient    Referring Physician: No ref. provider found    Chief Complaint:   Chief Complaint   Patient presents with    Low-back Pain        SUBJECTIVE: Disclaimer: This note has been generated using voice-recognition software. There may be typographical errors that have been missed during proof-reading    Interval History 1/9/2019:  The patient returns to clinic today for follow up. She is s/p bilateral SI joint injections on 12/27/2018. She reports 100% for the first week. She now reports approximately 50% relief of her pain. She reports intermittent low back and bilateral buttock pain. She describes this pain as sore and aching in nature. This pain is worse with prolonged sitting. She denies any radiating leg pain. She often feels as though as she is leaning over while walking. She often feels as though her balance is off. She continues to take Gabapentin with benefit. She denies any other health changes. His pain today is 2/10.     Interval History 12/11/2018:  The patient returns to clinic today for follow up. She reports increased low back and bilateral buttock pain, left greater than right. She describes this pain as sore in nature. This pain is worse with prolonged sitting. She denies any radiating leg pain. She reports that she often feels like her back is locking up on her. She continues to take Gabapentin with benefit. She denies any other health changes. Her pain today is 5/10.    Interval History 9/25/2018:  The patient returns to clinic today for follow up. She is s/p right L5 and S1 TF LEONEL. She reports 90% relief of her low back and leg pain. She reports intermittent low back pain that is aching in nature. She denies any radiating leg pain today. Her back pain is worse with bending. She also reports that her legs feel heavy with prolonged walking. She denies any other health changes. Her pain today is 4/10.    Interval History 7/27/2018:  The patient returns to clinic  today for follow up and image review. She continues to report low back pain that radiates down the posterior aspect of her right leg to her foot. She reports intermittent left leg pain in the same distribution. Her pain is worse with prolonged walking and activity. She often feels like her legs are heavy. She often feels like she will fall. She denies any other health changes. She denies any bowel or bladder incontinence. Her pain today is 2/10.    Interval History 7/20/2018:  The patient returns to clinic today for follow up. She has not been seen in over a year due to her son being diagnosed with cancer. She reports that he is in remission now. She returns today due to three fall recently, last a week ago. She continues to report low back pain that radiates intermittently down the posterior portion of both legs to her feet. She reports that her legs often feel heavy and weak which is why she falls. She denies any bowel or bladder incontinence. Her pain today is 7/10.    Interval History 3/6/2017:  The patient returns to clinic today for follow up. She is s/p bilateral S1 TF LEONEL on 2/8/2017. She reports 80% relief of her radiating leg pain. She continues to have low back pain. She describes the pain as dull and aching. The pain is worse in the morning. She reports that the pain increases with prolonged sitting and standing. She denies any other health changes. She denies any bowel or bladder incontinence. Her pain today is 6/10.     Interval History 1/23/2017:  The patient returns to clinic today for follow up of low back pain and bilateral leg pain. She has recently completed a medrol dose pack with some benefit. She still reports radiating pain into both legs. The pain is worse with prolonged sitting and lifting. The pain gets better with walking and rest. The pain radiates down the back of both legs to ankles. She denies any bowel or bladder incontinence or signs of saddle paresthesia. She continues to work with  "lifting restrictions. She currently takes Gabapentin and Naproxen with benefit. Her pain today is 6/10.     Initial encounter:    Mila Foster presents to the clinic for the evaluation of bilateral leg pain with intermittent lower back pain. 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 She reports recent falling to knees, but she changed her shoes, and the falling has ceased. She reports that she had a "pain flare" 2 weeks ago requiring a walker for 2 days.     Brief history:  72yo F with hx of HTN and DM complicated by neuropathy BL but R>L with tingling, tightness, shooting pain only in feet. Patient reports glucose was 120 this morning and usually runs around 130.    Pain Description:    The pain is located in the posterior leg area and radiates to her ankles and to her knees at its worst.     At BEST  1/10     At WORST  8/10 on the WORST day.      On average pain is rated as 7/10    Today the pain is rated as 5/10    The pain is described as aching, burning, dull, sharp and tight band      Symptoms interfere with work.     Exacerbating factors: Sitting, picking up children, lifting, bending over    Mitigating factors: walking, rest    Patient denies night fever/night sweats, bowel incontinence, significant weight loss, significant motor weakness and loss of sensations. She does report chronic urinary incontinence unrelated to back/leg pain.  Patient denies any suicidal or homicidal ideations    Pain Medications:  Current:  Gabapentin    Tried in Past:  NSAIDs -yes; reports it helps  TCA -Never  SNRI -Never  Anti-convulsants -gabapentin  Muscle Relaxants -Never  Opioids-Never    Physical Therapy/Home Exercise: yes, in the past       report: N/A    Pain Procedures:   2/8/2017- Bilateral S1 TF LEONEL  3/22/2017- Bilateral L2,3,4,5 MBB  9/11/2018- Right L5 and S1 TF LEONEL   12/27/2018- Bilateral SI joint " injections    Chiropractor -never  Acupuncture - never  TENS unit -never  Spinal decompression -never  Joint replacement -never    Imaging:   MRI Lumbar Spine 7/25/2018:  COMPARISON:  12/16/2016    FINDINGS:  There is again normal lumbar lordosis.  No spondylolisthesis or spondylolysis or marrow edema to suggest acute lumbar fractures or neoplastic processes.  The tip of the conus is at L1-2 disc space.  Paraspinal soft tissues are unremarkable.    L5-S1: There is dehydration of the nucleus pulposis associated with disc space narrowing.  There is a right paracentral and right foraminal disc protrusion/herniation with findings suggestive of slight flattening of the descending right S1 root (image 30 series 7 and 8 and image 7 series 4).  In addition there is also a moderate right L5-S1 foraminal stenosis.  In the far lateral neural foramen the inferior surface of the exiting L5 root contacts the disc protrusion.  Clinical correlation for right L5 and S1 radiculopathy suggested.  Left neural foramen is unremarkable.  No significant central canal spinal stenosis.  There is mild facet arthropathy.    L4-5: Dehydration of the nucleus pulposis associated with a mild posterior bulging of the annulus.  No significant central canal stenosis.  There is degenerative hypertrophic facet arthropathy.    L3-4: Dehydration of the nucleus pulposis.  No significant central canal spinal stenosis.  There is degenerative hypertrophic facet arthropathy and hypertrophy of the ligamentum flava unchanged from the previous study.    L2-3: Mild spondylotic changes associated with mild posterior bulging of the annulus.  No significant spinal stenosis.  There is facet arthropathy.    L1-2: No significant disc herniations or significant spinal stenosis.  Neural foramina are unremarkable.  There is facet arthropathy.      Impression       1. Right L5-S1 foraminal stenosis associated with a disc protrusion/disc herniation.  Clinical correlation  for right L5 and S1 radiculopathy suggested.  2. Multilevel facet arthropathy.  3. Milder spondylotic changes at the rest of the disc spaces as above.     Xray Lumbar Spine 7/25/2018:  COMPARISON:  Prior lumbar spine MRI dated 12/16/16    FINDINGS:  There is diffuse osteopenia.  There is mild grade 1 anterolisthesis of L3 on L4 (4 mm) and of L4 on L5 (6 mm).  There is no evidence of translational instability.  There is moderate facet arthropathy at the lower lumbar spine with probable L5-S1 neural foraminal narrowing.  Endplate degenerative changes are present with subchondral sclerosis and marginal osteophyte formation.    There is atherosclerotic calcification of the aorta.      Impression       Osteopenia and degenerative change of the lumbar spine with grade 1 anterolisthesis of L3-4 and L4-5, new from the 2016 MRI.  No evidence of translational instability.     12/12/16 MRI Lumbar Spine WO Contrast   L1-L2: There is no focal disc herniation. No significant spinal canal or neural foraminal narrowing.    L2-L3: There is no focal disc herniation. No significant spinal canal or neural foraminal narrowing.    L3-L4: Mild bilateral facet arthropathy.  No focal disc herniation. No significant spinal canal or neural foraminal narrowing.    L4-L5: Mild disc bulge and bilateral facet arthropathy results in mild right-sided neural foraminal narrowing. No significant spinal canal stenosis.    L5-S1: Diffuse disc bulge with small superimposed right foraminal disc protrusion.  Findings result in moderate right-sided neuroforaminal narrowing with likely abutment/impingement upon the exited right L5 nerve root. No significant spinal canal stenosis.  Small posterior annular fissure noted.   Impression      Lumbar spondylosis as detailed above, most significant for small right foraminal protrusion at the L5-S1 level resulting in moderate neuroforaminal narrowing and likely abutment/impingement upon the exited right L5 nerve root          Past Medical History:   Diagnosis Date    Diabetes     Hypertension      Past Surgical History:   Procedure Laterality Date    BLOCK-NERVE-MEDIAL BRANCH-LUMBAR Bilateral 4/12/2017    [facility]    BLOCK-NERVE-MEDIAL BRANCH-LUMBAR Bilateral 3/22/2017    [facility]    CHOLECYSTECTOMY      COLONOSCOPY Suprep N/A 11/1/2018    Performed [facility] at [facility]    HYSTERECTOMY      Injection, Joint BILATERAL SACROILIAC JOINT INJECTION Bilateral 12/27/2018    [facility]formed [facility]    Injection,steroid,epidural,transforaminal approach  Right L5 and S1 Right 9/11/2018    [facility]    INJECTION-STEROID-EPIDURAL-TRANSFORAMINAL Bilateral 2/8/2017    [facility] at [facility]     Social History     Socioeconomic History    Marital status: Single     Spouse name: Not on file    Number of children: Not on file    Years of education: Not on file    Highest education level: Not on file   Social Needs    Financial resource strain: Not on file    Food insecurity - worry: Not on file    Food insecurity - inability: Not on file    Transportation needs - medical: Not on file    Transportation needs - non-medical: Not on file   Occupational History    Not on file   Tobacco Use    Smoking status: Never Smoker   Substance and Sexual Activity    Alcohol use: Not on file    Drug use: Not on file    Sexual activity: Not on file   Other Topics Concern    Not on file   Social History Narrative    Not on file     Family History   Problem Relation Age of Onset    Vaginal cancer Neg Hx     Endometrial cancer Neg Hx     Cervical cancer Neg Hx        Review of patient's allergies indicates:  No Known Allergies    Current Outpatient Medications   Medication Sig    amlodipine (NORVASC) 10 MG tablet 10 mg.    aspirin (ECOTRIN)  81 MG EC tablet 81 mg.    atenolol (TENORMIN) 50 MG tablet 50 mg.    FLUZONE HIGH-DOSE 2018-19, PF, 180 mcg/0.5 mL vaccine ADM 0.5ML IM UTD    gabapentin (NEURONTIN) 300 MG capsule Take 2 tabs in the morning, 1 tab in the afternoon, and 2 tabs in the evening    glipiZIDE (GLUCOTROL) 5 MG tablet 5 mg.    hydrochlorothiazide (HYDRODIURIL) 25 MG tablet 25 mg.    hyoscyamine (ANASPAZ,LEVSIN) 0.125 mg Tab TK 1 T PO Q 4 H PRN    lisinopril (PRINIVIL,ZESTRIL) 40 MG tablet 40 mg.    metformin (GLUCOPHAGE-XR) 500 MG 24 hr tablet 500 mg.    metoprolol succinate (TOPROL-XL) 25 MG 24 hr tablet Take 25 mg by mouth once daily.    metoprolol tartrate (LOPRESSOR) 25 MG tablet     mirabegron (MYRBETRIQ) 25 mg Tb24 ER tablet Take 1 tablet (25 mg total) by mouth once daily.    naproxen (NAPROSYN) 500 MG tablet     omeprazole (PRILOSEC) 20 MG capsule 20 mg.    pravastatin (PRAVACHOL) 40 MG tablet 40 mg.    sodium,potassium,mag sulfates (SUPREP BOWEL PREP KIT) 17.5-3.13-1.6 gram SolR TAKE AS DIRECTED     No current facility-administered medications for this visit.        REVIEW OF SYSTEMS:    GENERAL:  No weight loss, malaise or fevers.  HEENT:   No recent changes in vision or hearing  NECK:  Negative for lumps, no difficulty with swallowing.  RESPIRATORY:  Negative for cough, wheezing or shortness of breath, patient denies any recent URI.  CARDIOVASCULAR:  Negative for chest pain, leg swelling or palpitations. HTN.  GI:  Negative for abdominal discomfort, blood in stools or black stools or change in bowel habits.  MUSCULOSKELETAL:  See HPI.  SKIN:  Negative for lesions, rash, and itching.  PSYCH:  No mood disorder or recent psychosocial stressors.  Patient's sleep is occasionally disturbed secondary to pain.  HEMATOLOGY/LYMPHOLOGY:  Negative for prolonged bleeding, bruising easily or swollen nodes.  Patient is not currently taking any anti-coagulants  ENDO: Positive for DM. Negative for thyroid dysfunction  NEURO:   No  "history of headaches, syncope, paralysis, seizures or tremors.  All other reviewed and negative other than HPI.    OBJECTIVE:    /72   Pulse 89   Temp 99.3 °F (37.4 °C)   Ht 5' 7.5" (1.715 m)   Wt 114.6 kg (252 lb 10.4 oz)   BMI 38.99 kg/m²     PHYSICAL EXAMINATION:    GENERAL: Well appearing, in no acute distress, alert and oriented x3.  PSYCH:  Mood and affect appropriate.  SKIN: Skin color, texture, turgor normal, no rashes or lesions.  HEAD/FACE:  Normocephalic, atraumatic. Cranial nerves grossly intact.   CV: RRR with palpation of the radial artery.  PULM: No evidence of respiratory difficulty, symmetric chest rise.  BACK: Straight leg raising in the siting position is negative for radicular pain bilaterally. Limited ROM with mild pain on extension. Positive facet loading bilaterally.  LOWER EXTREMITIES: Peripheral joint ROM is full and pain free without obvious instability or laxity in lower extremities. No deformities, edema, or skin discoloration. Good capillary refill.  MUSCULOSKELETAL: Hip and knee provocative maneuvers are negative.  There is pain with palpation over the sacroiliac joints bilaterally.  FABERs test is positive bilaterally.  FADIRs test is negative. 5/5 strength in right ankle with plantar and dorsiflexion. 4/5 strength in left ankle with plantar and dorsiflexion. 5/5 strength with right knee flexion and extension. 5/5 strength with left knee flexion and extension. 5/5 strength in right EHL, 4/5 strength in left EHL.  No atrophy or tone abnormalities are noted.  NEURO: Bilateral lower extremity coordination and muscle stretch reflexes are physiologic and symmetric.   No loss of sensation is noted.  GAIT: Antalgic- ambulates with cane.     ASSESSMENT: 73 y.o. year old female with back/leg pain, consistent with BL radiculopathy due to L5 disc protrusion.    Encounter Diagnoses   Name Primary?    Sacroiliac joint pain Yes    Annular tear of lumbar disc     Facet arthritis of " lumbar region     DDD (degenerative disc disease), lumbar     Neuroforaminal stenosis of lumbar spine     DDD (degenerative disc disease), lumbosacral        PLAN:     - Previous imaging was reviewed and discussed with the patient today.    - She is s/p bilateral SI joint injections with benefit. We can repeat this as needed.     - In the future, she may benefit from repeat MBB.     - She is s/p right L5 and S1 TF LEONEL with benefit. We can repeat this as needed.     - Continue Gabapentin and Naproxen as these are providing benefit.    - Continue home exercise routine. She is scheduled for PT at the end of the month.     - RTC in 2 months.     - Dr. Duncan was consulted on the patient and agrees with this plan.    The above plan and management options were discussed at length with patient. Patient is in agreement with the above and verbalized understanding.     Coby Russo NP  01/09/2019

## 2019-01-31 ENCOUNTER — CLINICAL SUPPORT (OUTPATIENT)
Dept: REHABILITATION | Facility: OTHER | Age: 74
End: 2019-01-31
Attending: NURSE PRACTITIONER
Payer: MEDICARE

## 2019-01-31 DIAGNOSIS — M51.36 ANNULAR TEAR OF LUMBAR DISC: ICD-10-CM

## 2019-01-31 DIAGNOSIS — M48.061 NEUROFORAMINAL STENOSIS OF LUMBAR SPINE: ICD-10-CM

## 2019-01-31 DIAGNOSIS — M54.16 LUMBAR RADICULOPATHY: ICD-10-CM

## 2019-01-31 DIAGNOSIS — M51.36 DDD (DEGENERATIVE DISC DISEASE), LUMBAR: ICD-10-CM

## 2019-01-31 DIAGNOSIS — M51.16 LUMBAR DISC HERNIATION WITH RADICULOPATHY: ICD-10-CM

## 2019-01-31 DIAGNOSIS — M47.816 FACET ARTHRITIS OF LUMBAR REGION: ICD-10-CM

## 2019-01-31 PROCEDURE — 97110 THERAPEUTIC EXERCISES: CPT

## 2019-01-31 PROCEDURE — G8979 MOBILITY GOAL STATUS: HCPCS | Mod: CJ

## 2019-01-31 PROCEDURE — G8978 MOBILITY CURRENT STATUS: HCPCS | Mod: CK

## 2019-01-31 PROCEDURE — 97161 PT EVAL LOW COMPLEX 20 MIN: CPT

## 2019-01-31 NOTE — PLAN OF CARE
2  [facility] - PHYSICAL THERAPY EVALUATION     Name: Mila Foster  Clinic Number: [id number]    Mila is a 73 y.o. female evaluated on 01/31/2019.   Time In: 100pm  Time out: 230pm    Diagnosis:   Encounter Diagnoses   Name Primary?    Lumbar disc herniation with radiculopathy     Annular tear of lumbar disc     Neuroforaminal stenosis of lumbar spine     Lumbar radiculopathy     Facet arthritis of lumbar region     DDD (degenerative disc disease), lumbar      Physician: Coby Russo NP  Treatment Orders: PT Eval and Treat    Past Medical History:   Diagnosis Date    Diabetes     Hypertension      Current Outpatient Medications   Medication Sig    amlodipine (NORVASC) 10 MG tablet 10 mg.    aspirin (ECOTRIN) 81 MG EC tablet 81 mg.    atenolol (TENORMIN) 50 MG tablet 50 mg.    FLUZONE HIGH-DOSE 2018-19, PF, 180 mcg/0.5 mL vaccine ADM 0.5ML IM UTD    gabapentin (NEURONTIN) 300 MG capsule Take 2 tabs in the morning, 1 tab in the afternoon, and 2 tabs in the evening    glipiZIDE (GLUCOTROL) 5 MG tablet 5 mg.    hydrochlorothiazide (HYDRODIURIL) 25 MG tablet 25 mg.    hyoscyamine (ANASPAZ,LEVSIN) 0.125 mg Tab TK 1 T PO Q 4 H PRN    lisinopril (PRINIVIL,ZESTRIL) 40 MG tablet 40 mg.    metformin (GLUCOPHAGE-XR) 500 MG 24 hr tablet 500 mg.    metoprolol succinate (TOPROL-XL) 25 MG 24 hr tablet Take 25 mg by mouth once daily.    metoprolol tartrate (LOPRESSOR) 25 MG tablet     mirabegron (MYRBETRIQ) 25 mg Tb24 ER tablet Take 1 tablet (25 mg total) by mouth once daily.    naproxen (NAPROSYN) 500 MG tablet     omeprazole (PRILOSEC) 20 MG capsule 20 mg.    pravastatin (PRAVACHOL) 40 MG tablet 40 mg.    sodium,potassium,mag sulfates (SUPREP BOWEL PREP KIT) 17.5-3.13-1.6 gram SolR TAKE AS DIRECTED     No current facility-administered medications for this visit.      Review of patient's allergies indicates:  No Known Allergies  Precautions: DM/HTN     Visit # authorized: 12   Authorization  period: 2/14/19  Plan of care Expiration: 4/30/19      HISTORY   History of Present Illness: Chronic LBP approx one year ago.without particular incident.   Pt reports pain progressively worsening.  Previously in this program however had to stop secondary to a family member being ill.  Pt reports program was helping her  a lot. Did not keep up with exercises once dc'd.  Pain is LB dominant and intermittent, without c/o LE radiating or pain.  Pt describes pain as dull and achy.Pt has been utilizing QC recently due to fear of loosing balance and falling.    AS per MD note:   Interval History 1/9/2019:  The patient returns to clinic today for follow up. She is s/p bilateral SI joint injections on 12/27/2018. She reports 100% for the first week. She now reports approximately 50% relief of her pain. She reports intermittent low back and bilateral buttock pain. She describes this pain as sore and aching in nature. This pain is worse with prolonged sitting. She denies any radiating leg pain. She often feels as though as she is leaning over while walking. She often feels as though her balance is off. She continues to take Gabapentin with benefit. She denies any other health changes. His pain today is 2/10.      Interval History 12/11/2018:  The patient returns to clinic today for follow up. She reports increased low back and bilateral buttock pain, left greater than right. She describes this pain as sore in nature. This pain is worse with prolonged sitting. She denies any radiating leg pain. She reports that she often feels like her back is locking up on her. She continues to take Gabapentin with benefit. She denies any other health changes. Her pain today is 5/10.     Interval History 9/25/2018:  The patient returns to clinic today for follow up. She is s/p right L5 and S1 TF LEONEL. She reports 90% relief of her low back and leg pain. She reports intermittent low back pain that is aching in nature. She denies any radiating  leg pain today. Her back pain is worse with bending. She also reports that her legs feel heavy with prolonged walking. She denies any other health changes. Her pain today is 4/10.     Interval History 7/27/2018:  The patient returns to clinic today for follow up and image review. She continues to report low back pain that radiates down the posterior aspect of her right leg to her foot. She reports intermittent left leg pain in the same distribution. Her pain is worse with prolonged walking and activity. She often feels like her legs are heavy. She often feels like she will fall. She denies any other health changes. She denies any bowel or bladder incontinence. Her pain today is 2/10.     Interval History 7/20/2018:  The patient returns to clinic today for follow up. She has not been seen in over a year due to her son being diagnosed with cancer. She reports that he is in remission now. She returns today due to three fall recently, last a week ago. She continues to report low back pain that radiates intermittently down the posterior portion of both legs to her feet. She reports that her legs often feel heavy and weak which is why she falls. She denies any bowel or bladder incontinence. Her pain today is 7/10.     Interval History 3/6/2017:  The patient returns to clinic today for follow up. She is s/p bilateral S1 TF LEONEL on 2/8/2017. She reports 80% relief of her radiating leg pain. She continues to have low back pain. She describes the pain as dull and aching. The pain is worse in the morning. She reports that the pain increases with prolonged sitting and standing. She denies any other health changes. She denies any bowel or bladder incontinence. Her pain today is 6/10.      Interval History 1/23/2017:  The patient returns to clinic today for follow up of low back pain and bilateral leg pain. She has recently completed a medrol dose pack with some benefit. She still reports radiating pain into both legs. The pain is  "worse with prolonged sitting and lifting. The pain gets better with walking and rest. The pain radiates down the back of both legs to ankles. She denies any bowel or bladder incontinence or signs of saddle paresthesia. She continues to work with lifting restrictions. She currently takes Gabapentin and Naproxen with benefit. Her pain today is 6/10.      Initial encounter:     Mila Foster presents to the clinic for the evaluation of bilateral leg pain with intermittent lower back pain. 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 She reports recent falling to knees, but she changed her shoes, and the falling has ceased. She reports that she had a "pain flare" 2 weeks ago requiring a walker for 2 days.      Brief history:  70yo F with hx of HTN and DM complicated by neuropathy BL but R>L with tingling, tightness, shooting pain only in feet. Patient reports glucose was 120 this morning and usually runs around 130.     Diagnostic Tests: From EPIC Radiographs and MRI    1. Right L5-S1 foraminal stenosis associated with a disc protrusion/disc herniation.  Clinical correlation for right L5 and S1 radiculopathy suggested.  2. Multilevel facet arthropathy.  3. Milder spondylotic changes at the rest of the disc spaces as above.     Impression       Osteopenia and degenerative change of the lumbar spine with grade 1 anterolisthesis of L3-4 and L4-5, new from the 2016 MRI.  No evidence of translational instability.       Pain Scale: Mila rates pain on a scale of 0-10 to be 5 at worst; n/a currently; n/a at best using VAS.   Pain location: B LB    Aggravating factors: sitting/sit to stand/walking prolonged distances/ stiff AM/stairs  Easing Factors: standing, walking short distances/ movemenet  Disturbed Sleep:     Pattern of pain questions:  1.  Where is your pain the worst? Low back   2.  Is your pain constant or intermittent? " "Intermittent  3.  Does bending forward make your typical pain worse?  No  4.  Since the start of your back pain, has there been a change in your bowel or bladder? No  5.  What can't you do now that you use to be able to do? Walking  For exercises    Prior Treatment: yes injections and PT  Prior functional status: Independent  DME owned/used: QC  Occupation:  retired   Leisure: TV/walking                     Pts goals:  "get stronger"    Red Flag Screening:   Cough  Sneeze  Strain: No  Bladder/ bowel: No  Falls: yes  General health: Good  Night pain: yes  Unexplained weight loss: No     OBJECTIVE     POSTURE  Posture Alignment :slouched posture, B ER of LE's  Postural examination/scapula alignment: Rounded shoulder, Head forward, Increased kyphosis and Decreased lordosis  Joint integrity: WNL hypomobility as seen through spring testing  Skin integrity: WNL   Edema: Not significant   Sitting: Poor  Standing: Poor  Correction of posture: Added slimline roll, Improved posture in sitting.     MOVEMENT LOSS    ROM Loss   Flexion moderate loss   Extension major loss   Side bending Right minimal loss   Side bending Left minimal loss   Rotation Right minimal loss   Rotation Left minimal loss     Lower Extremity Strength  Right LE  Left LE    Hip flexion: 4+/5 Hip flexion: 4+_5   Hip extension:  4-5 Hip extension: 4-/5   Hip abduction: 5/5 Hip abduction: 5/5   Hip adduction:  5/5 Hip adduction:  5/5   Hip Internal rotation   n/t Hip Internal rotation n/t   Knee Flexion 5/5 Knee Flexion 5/5   Knee Extension 4/5 Knee Extension 4/5   Ankle dorsiflexion: 5/5 Ankle dorsiflexion: 5/5   Ankle plantarflexion: 5/5 Ankle plantarflexion: 5/5       GAIT:  Assistive Device used:QC, just started to use it for security  Level of Assistance: Independent  Patient displays the following gait deviations: decreased stride length/narrow [facility]    Special Tests:   Test Name  Test Result   Prone Instability Test (--)   SI Joint Provocation Test (--) "   Straight Leg Raise (--)   Neural Tension Test (--)   Crossed Straight Leg Raise (--)   Walking on toes (--)   Walking on heels  (--)       NEUROLOGICAL SCREENING     Sensory deficit: B tingling and pain in LE's secondary to neuropathy    Reflexes:    Left Right   Patella Tendon 1+ 1+   Achilles Tendon 1+ 1+   Babinski NT NT   Clonus - -     REPEATED TEST MOVEMENTS:  Repeated Flexion in Standing Pain, no worse   Repeated Extension in Standing Pain with movement, no worse   Repeated Flexion in lying better   Repeated Extension in lying  unable       STATIC TESTS   Sitting slouched  worse   Sitting erect no better   Standing slouched worse   Standing erect  no better   Lying prone in extension  n/t   Long sitting   Not tested       Baseline Isometric Testing on Med X equipment: Testing administered by PT    Baseline IM Testing Results:   Date of testin19  ROM 6-30 deg   max 81   Min Peak Torque 66   Flex/Ext Ratio 1.2   % below normative data 32   Femur 6    Lap belt not utilized today    FOTO: Focus on Therapeutic Outcomes   Category: lumbar   % Impaired: 48%  Current Score  = CK = at least 40% but < 60% impaired, limited or restricted  Goal at Discharge Score = CJ = at least 20% but < 40% impaired, limited or restricted    Score interpretation is as follows:     TEST SCORE  Modifier  Impairment Limitation Restriction    0/50  CH  0 % impaired, limited or restricted   1-9/50  CI  @ least 1% but less than 20% impaired, limited or restricted   10-19/50  CJ  @ least 20%<40% impaired, limited or restricted   20-29/50  CK  @ least 40%<60% impaired, limited or restricted   30-39/50  CL  @ least 60% <80% impaired, limited or restricted   40-49/50  CM  @ least 80%<100% impaired limited or restricted   50/50  CN  100% impaired, limited or restricted       Treatment   Time In: 100pm  Time Out: 230pm    PT Evaluation Completed? Yes  Discussed Plan of Care with patient: Yes      Written Home Exercises Provided:    Handouts were given to the patient. Pt demo good understanding of the education provided. Mila demonstrated good return demonstration of activities.     - Patient received education regarding proper posture and body mechanics.    - Ting roll tried, recommended, and purchase information was provided.    - Patient received a handout regarding anticipated muscular soreness following the isometric test and strategies for management were reviewed with patient including stretching, using ice and scheduled rest.     HEP as follows    Flexion in lying 10x, 3x/day   LTR 10x 3x/day      Mila received therapeutic exercises to develop/improve posture, lumbar/cervical ROM, strength and muscular endurance for 30 minutes including the following exercises: med-x lumbar machine testing    [facility] 1/31/2019   Visit Number 1   VAS Pain Rating 0   Extension in Standing -   Flexion in Lying -   Lumbar Extension Seat Pad 0   Femur Restraint 6   Top Dead Center 24   Counterweight 159   Lumbar Flexion 30   Lumbar Extension 6   Lumbar Peak Torque 81   Min Torque 56   Test Percent Below Normative Data 32   Lumbar Weight 40   Repetitions -   Rating of Perceived Exertion -   Ice - Z Lie (in min.) 10         Assessment   This is a 73 y.o. female referred to [facility] and presents with a medical diagnosis of   Encounter Diagnoses   Name Primary?    Lumbar disc herniation with radiculopathy     Annular tear of lumbar disc     Neuroforaminal stenosis of lumbar spine     Lumbar radiculopathy     Facet arthritis of lumbar region     DDD (degenerative disc disease), lumbar     and demonstrates limitations as described below in the problem list. Pt rehab potential is Good. Pt presents with lumbar dysfunction, poor posture, decreased trunk strength, gait with deviations, decreased balance, decreased motor control and decreased LE strength as well as difficulty with transitional movements.  Pt was previously  enrolled in [facility] program but needed to dc secondary to taking care of her sick family member.  Pain Pattern: 1 PEN       Patient received education on the [facility] program, purpose of the isometric test, progression of back strengthening as well as wellness approach and systemic strengthening.  Details of the program were discussed.  Reviewed that patient should feel support/pressure from med ex restraints but no pain or discomfort and patient expressed understanding.    Based on the above history and physical examination an active physical therapy program is recommended.  Pt will continue to benefit from skilled outpatient physical therapy to address the deficits listed below in the chart, provide pt/family education and to maximize pt's level of independence in the home and community environment. .     No environmental, cultural, spiritual, developmental or education needs expressed or noted    Medical necessity is demonstrated by the following problem list.    Pt presents with the following impairments:   History  Co-morbidities and personal factors that may impact the plan of care Examination  Body Structures and Functions, activity limitations and participation restrictions that may impact the plan of care Clinical Presentation   Decision Making/ Complexity Score   Co-morbidities:   HTN and DM        Personal Factors:   transportation Body Regions:   back  lower extremities    Body Systems:   gross symmetry  ROM  strength  gross coordinated movement  balance  gait  transfers  transitions  motor control  motor learning    Activity limitations:   Learning and applying knowledge  no deficits    General Tasks and Commands  no deficits    Communication  no deficits    Mobility  lifting and carrying objects  walking    Self care  no deficits    Domestic Life  shopping  doing house work (cleaning house, washing dishes, laundry)    Interactions/Relationships  no deficits    Life Areas  no deficits    Community and  Social Life  community life  recreation and leisure    Participation Restrictions:   Walking for exercise/ walking prolonged/ sit to stand/stairs     stable and uncomplicated   low         GOALS: Pt is in agreement with the following goals.    Short term goals:  6 weeks or 10 visits   1.  Pt will demonstrate increased lumbar ROM by at least 3 degrees from the initial ROM value with improvements noted in functional ROM and ability to perform ADLs  2.  Pt will demonstrate increased maximum isometric torque value by 10% when compared to the initial value resulting in improved ability to perform bending, lifting, and carrying activities safely, confidently.  3.  Patient report a reduction in worst pain score by 1-2 points for improved tolerance during work and recreational activities  4.  Pt able to perform HEP correctly with minimal cueing or supervision for therapist    Long term goals: 13 weeks or 20 visits   1. Pt will demonstrate increased lumbar ROM by at least 6 degrees from initial ROM value, resulting in improved ability to perform functional fwd bending while standing and sitting.   2. Pt will demonstrate increased maximum isometric torque value by 20% when compared to the initial value resulting in improved ability to perform bending, lifting, and carrying activities safely, confidently.  3. Pt to demonstrate ability to independently control and reduce their pain through posture positioning and mechanical movements throughout a typical day.  4.  Patient will demonstrate improved overall function per FOTO Survey to CJ = at least 20% but < 40% impaired, limited or restricted score or less.      Plan   Outpatient physical therapy 2x week for 13 weeks or 20 visits to include the following:   - Patient education  - Therapeutic exercise  - Manual therapy  - Performance testing   - Neuromuscular Re-education  - Therapeutic activity   - Modalities    Pt may be seen by PTA as part of the rehabilitation team.  "    Therapist: Isabel Elliott, PT  1/31/2019    "I certify the need for these services furnished under this plan of treatment and while under my care."    ____________________________________  Physician/Referring Practitioner    _______________  Date of Signature              "

## 2019-01-31 NOTE — PATIENT INSTRUCTIONS
Lower Trunk Rotation Stretch        Keeping back flat and feet together, rotate knees to left side. Hold ____ seconds.  Repeat ____ times per set. Do ____ sets per session. Do ____ sessions per day.     [email or web address]://[email or web address]     Copyright © [employer]. All rights reserved.   Knee-to-Chest Stretch: Unilateral        With hand behind right knee, pull knee in to chest until a comfortable stretch is felt in lower back and buttocks. Keep back relaxed. Hold ____ seconds.  Repeat ____ times per set. Do ____ sets per session. Do ____ sessions per day.     [email or web address]://[email or web address]/126     Copyright © [employer]. All rights reserved.

## 2019-02-11 ENCOUNTER — TELEPHONE (OUTPATIENT)
Dept: UROGYNECOLOGY | Facility: CLINIC | Age: 74
End: 2019-02-11

## 2019-02-11 NOTE — TELEPHONE ENCOUNTER
----- Message from Breanna Cheng sent at 2/11/2019 11:54 AM CST -----  Contact: Pt   Name of Who is Calling: ABILIO SIDDIQUI [[id number]]      What is the request in detail: Patient is requesting a call from staff in regards to being seen sooner than the next available appt, possibly next week for a f/u on urinary incontinence. Please contact to further discuss and advise       Can the clinic reply by [facility]: No       What Number to [facility] Back [facility]: [phone]

## 2019-02-11 NOTE — TELEPHONE ENCOUNTER
Spoke with pt ans scheduled her a follow up appointment, appointment reminder placed in the mail, pt voiced understanding and call was ended.

## 2019-02-18 ENCOUNTER — TELEPHONE (OUTPATIENT)
Dept: PAIN MEDICINE | Facility: CLINIC | Age: 74
End: 2019-02-18

## 2019-02-18 NOTE — TELEPHONE ENCOUNTER
Contacted and spoke to patient in regards to her message.Patient stated she spoke to [facility] checking on Physical therapy and they need an Okay from Coby to move foward

## 2019-02-18 NOTE — TELEPHONE ENCOUNTER
----- Message from Syeda Tsang sent at 2/18/2019  3:19 PM CST -----  Contact: Pt  Name of Who is Calling: ABILIO SIDDIQUI [[id number]]      What is the request in detail: Pt is calling in regards to discussing Pt....Please contact to further discuss and advise.       Can the clinic reply by [facility]: N      What Number to Call Back [facility]: [phone]

## 2019-03-06 ENCOUNTER — CLINICAL SUPPORT (OUTPATIENT)
Dept: REHABILITATION | Facility: OTHER | Age: 74
End: 2019-03-06
Attending: NURSE PRACTITIONER
Payer: MEDICARE

## 2019-03-06 DIAGNOSIS — G89.29 CHRONIC RIGHT-SIDED LOW BACK PAIN WITHOUT SCIATICA: ICD-10-CM

## 2019-03-06 DIAGNOSIS — M54.50 CHRONIC RIGHT-SIDED LOW BACK PAIN WITHOUT SCIATICA: ICD-10-CM

## 2019-03-06 PROCEDURE — 97110 THERAPEUTIC EXERCISES: CPT

## 2019-03-06 NOTE — PROGRESS NOTES
"[facility]      Name: Mila Foster  Clinic Number: [id number]  Date of Treatment: 2019   Diagnosis:   Encounter Diagnosis   Name Primary?    Chronic right-sided low back pain without sciatica      Physician: Coby Russo NP    Pain pattern determined: 1 PEN  Plan of care signed: 19   Time in: 850am  Time Out: 940am  Total Treatment time: 50  Precautions: DM/HTN  Visit #: 2 (inc 10% next visit)    POC due: 19  Reassessment due:19    Subjective   Mila reports no change so far. Pt does feel she loosens up after she exercises.  Pt said she has had difficulty with transportation and was also sick which is why she had difficulty making appts since 19    Patient reports their pain to be 6/10 on a 0-10 scale with 0 being no pain and 10 being the worst pain imaginable.    Pain Location: B LB     Occupation:  retired   Leisure: TV/walking                     Pts goals:  "get stronger"       Objective   Baseline Isometric Testing on Med X equipment: Testing administered by PT     Baseline IM Testing Results:   Date of testin19  ROM 6-30 deg   max 81   Min Peak Torque 66   Flex/Ext Ratio 1.2   % below normative data 32   Femur 6    Lap belt not utilized today     FOTO: Focus on Therapeutic Outcomes   Category: lumbar   % Impaired: 48%  Current Score  = CK = at least 40% but < 60% impaired, limited or restricted  Goal at Discharge Score = CJ = at least 20% but < 40% impaired, limited or restricted    Score interpretation is as follows:      TEST SCORE  Modifier  Impairment Limitation Restriction    0/50  CH  0 % impaired, limited or restricted   1-9/50  CI  @ least 1% but less than 20% impaired, limited or restricted   10-/50  CJ  @ least 20%<40% impaired, limited or restricted   20-29/50  CK  @ least 40%<60% impaired, limited or restricted   30-39/50  CL  @ least 60% <80% impaired, limited or restricted   40-49/50  CM  @ least 80%<100% " impaired limited or restricted   50/50  CN  100% impaired, limited or restricted             Treatment    Pt was instructed in and performed the following:     Mila received therapeutic exercises to develop/improved posture, cardiovascular endurance, muscular endurance, lumbar/cervical ROM, strength and muscular endurance for 50 minutes including the following exercises:     [facility] 3/6/2019   Visit Number 2   VAS Pain Rating 6   Time 3   Extension in Standing -   Flexion in Lying 10   Lumbar Extension Seat Pad -   Femur Restraint -   Top Dead Center -   Counterweight -   Lumbar Flexion -   Lumbar Extension -   Lumbar Peak Torque -   Min Torque -   Test Percent Below Normative Data -   Lumbar Weight 40   Repetitions 20   Rating of Perceived Exertion 3   Ice - Z Lie (in min.) 10         Bridge 10x  PPT 10x  LTR  BKTC c/ ball    Peripheral muscle strengthening which included 1 set of 15-20 repetitions at a slow, controlled 7 second per rep pace focused on strengthening supporting musculature for improved body mechanics and functional mobility.  Pt and therapist focused on proper form during treatment to ensure optimal strengthening of each targeted muscle group.  Machines were utilized including torso rotation, leg extension, leg curl, chest press, upright row. Tricep extension, bicep curl, leg press, and hip abduction added on third visit.       .     Home Exercise Program as follows:   Flexion in lying 10x, 3x/day              LTR 10x 3x/day    PPT      Handouts were given to the patient. Pt demo good understanding of the education provided. Mila demonstrated good return demonstration of activities.     Lumbar roll use compliance: unknown  Additional exercises taught this treatment session: PPT/bridge    Assessment   Patient tolerated treatment well and reports she thinks the exercises are helping her back.  Pt initiated CV activity on bike and then states she wanted to stop at the three minute william  because she didn't sleep well last night and her legs feel heavy.  Pt required max cues to correctly perform PPT.  Pt given verbal and tactile cueing  For all exercises.  Pt also required max cueing on the Med X machine to pace, eventually toggle removed.  Pt completed 20 reps at 40ft/lbs with 3/10 exertion level.  Inc 10%  Patient is making good progress towards established goals.  Pt will continue to benefit from skilled outpatient physical therapy to address the deficits stated in the impairment chart, provide pt/family education and to maximize pt's level of independence in the home and community environment.       Pt's spiritual, cultural and educational needs considered and pt agreeable to plan of care and goals as stated below:     Medical necessity is demonstrated by the following problem list.    Pt presents with the following impairments:   History  Co-morbidities and personal factors that may impact the plan of care Examination  Body Structures and Functions, activity limitations and participation restrictions that may impact the plan of care Clinical Presentation    Decision Making/ Complexity Score   Co-morbidities:   HTN and DM           Personal Factors:   transportation Body Regions:   back  lower extremities     Body Systems:   gross symmetry  ROM  strength  gross coordinated movement  balance  gait  transfers  transitions  motor control  motor learning     Activity limitations:   Learning and applying knowledge  no deficits     General Tasks and Commands  no deficits     Communication  no deficits     Mobility  lifting and carrying objects  walking     Self care  no deficits     Domestic Life  shopping  doing house work (cleaning house, washing dishes, laundry)     Interactions/Relationships  no deficits     Life Areas  no deficits     Community and Social Life  community life  recreation and leisure     Participation Restrictions:   Walking for exercise/ walking prolonged/ sit to stand/stairs        stable and uncomplicated    low            GOALS: Pt is in agreement with the following goals.     Short term goals:  6 weeks or 10 visits   1.  Pt will demonstrate increased lumbar ROM by at least 3 degrees from the initial ROM value with improvements noted in functional ROM and ability to perform ADLs  2.  Pt will demonstrate increased maximum isometric torque value by 10% when compared to the initial value resulting in improved ability to perform bending, lifting, and carrying activities safely, confidently.  3.  Patient report a reduction in worst pain score by 1-2 points for improved tolerance during work and recreational activities  4.  Pt able to perform HEP correctly with minimal cueing or supervision for therapist     Long term goals: 13 weeks or 20 visits   1. Pt will demonstrate increased lumbar ROM by at least 6 degrees from initial ROM value, resulting in improved ability to perform functional fwd bending while standing and sitting.   2. Pt will demonstrate increased maximum isometric torque value by 20% when compared to the initial value resulting in improved ability to perform bending, lifting, and carrying activities safely, confidently.  3. Pt to demonstrate ability to independently control and reduce their pain through posture positioning and mechanical movements throughout a typical day.  4.  Patient will demonstrate improved overall function per FOTO Survey to CJ = at least 20% but < 40% impaired, limited or restricted score or less.         Plan   Continue with established Plan of Care towards established PT goals.

## 2019-03-11 ENCOUNTER — OFFICE VISIT (OUTPATIENT)
Dept: PAIN MEDICINE | Facility: CLINIC | Age: 74
End: 2019-03-11
Payer: MEDICARE

## 2019-03-11 VITALS
DIASTOLIC BLOOD PRESSURE: 71 MMHG | WEIGHT: 245.56 LBS | SYSTOLIC BLOOD PRESSURE: 116 MMHG | TEMPERATURE: 101 F | HEIGHT: 68 IN | BODY MASS INDEX: 37.21 KG/M2 | HEART RATE: 79 BPM

## 2019-03-11 DIAGNOSIS — M53.3 SACROILIAC JOINT PAIN: ICD-10-CM

## 2019-03-11 DIAGNOSIS — M47.816 FACET ARTHRITIS OF LUMBAR REGION: ICD-10-CM

## 2019-03-11 DIAGNOSIS — M51.36 ANNULAR TEAR OF LUMBAR DISC: Primary | ICD-10-CM

## 2019-03-11 DIAGNOSIS — M47.816 LUMBAR SPONDYLOSIS: ICD-10-CM

## 2019-03-11 DIAGNOSIS — M48.061 NEUROFORAMINAL STENOSIS OF LUMBAR SPINE: ICD-10-CM

## 2019-03-11 DIAGNOSIS — M51.36 DDD (DEGENERATIVE DISC DISEASE), LUMBAR: ICD-10-CM

## 2019-03-11 PROCEDURE — 1101F PT FALLS ASSESS-DOCD LE1/YR: CPT | Mod: CPTII,S$GLB,, | Performed by: NURSE PRACTITIONER

## 2019-03-11 PROCEDURE — 1101F PR PT FALLS ASSESS DOC 0-1 FALLS W/OUT INJ PAST YR: ICD-10-PCS | Mod: CPTII,S$GLB,, | Performed by: NURSE PRACTITIONER

## 2019-03-11 PROCEDURE — 99213 PR OFFICE/OUTPT VISIT, EST, LEVL III, 20-29 MIN: ICD-10-PCS | Mod: S$GLB,,, | Performed by: NURSE PRACTITIONER

## 2019-03-11 PROCEDURE — 99213 OFFICE O/P EST LOW 20 MIN: CPT | Mod: S$GLB,,, | Performed by: NURSE PRACTITIONER

## 2019-03-11 PROCEDURE — 99999 PR PBB SHADOW E&M-EST. PATIENT-LVL III: CPT | Mod: PBBFAC,,, | Performed by: NURSE PRACTITIONER

## 2019-03-11 PROCEDURE — 99999 PR PBB SHADOW E&M-EST. PATIENT-LVL III: ICD-10-PCS | Mod: PBBFAC,,, | Performed by: NURSE PRACTITIONER

## 2019-03-11 RX ORDER — GABAPENTIN 300 MG/1
CAPSULE ORAL
Qty: 150 CAPSULE | Refills: 5 | Status: SHIPPED | OUTPATIENT
Start: 2019-03-11 | End: 2022-02-16 | Stop reason: SDUPTHER

## 2019-03-11 NOTE — PROGRESS NOTES
Chronic Pain - Established Patient    Referring Physician: No ref. provider found    Chief Complaint:   Chief Complaint   Patient presents with    Low-back Pain        SUBJECTIVE: Disclaimer: This note has been generated using voice-recognition software. There may be typographical errors that have been missed during proof-reading    Interval History 3/11/2019:  The patient returns to clinic today for follow up. She continues to report low back pain that is aching in nature. She reports right sided buttock pain that is sore in nature. She denies any radiating leg pain. Her pain is worse with standing, sitting, and activity. She does endorse morning stiffness. She is currently participating in physical therapy with benefit. She continues to take Gabapentin with benefit and does need a refill today. She denies any other health changes. She denies any bowel or bladder incontinence. Her pain today is 5/10.    Interval History 1/9/2019:  The patient returns to clinic today for follow up. She is s/p bilateral SI joint injections on 12/27/2018. She reports 100% for the first week. She now reports approximately 50% relief of her pain. She reports intermittent low back and bilateral buttock pain. She describes this pain as sore and aching in nature. This pain is worse with prolonged sitting. She denies any radiating leg pain. She often feels as though as she is leaning over while walking. She often feels as though her balance is off. She continues to take Gabapentin with benefit. She denies any other health changes. His pain today is 2/10.     Interval History 12/11/2018:  The patient returns to clinic today for follow up. She reports increased low back and bilateral buttock pain, left greater than right. She describes this pain as sore in nature. This pain is worse with prolonged sitting. She denies any radiating leg pain. She reports that she often feels like her back is locking up on her. She continues to take  Gabapentin with benefit. She denies any other health changes. Her pain today is 5/10.    Interval History 9/25/2018:  The patient returns to clinic today for follow up. She is s/p right L5 and S1 TF LEONEL. She reports 90% relief of her low back and leg pain. She reports intermittent low back pain that is aching in nature. She denies any radiating leg pain today. Her back pain is worse with bending. She also reports that her legs feel heavy with prolonged walking. She denies any other health changes. Her pain today is 4/10.    Interval History 7/27/2018:  The patient returns to clinic today for follow up and image review. She continues to report low back pain that radiates down the posterior aspect of her right leg to her foot. She reports intermittent left leg pain in the same distribution. Her pain is worse with prolonged walking and activity. She often feels like her legs are heavy. She often feels like she will fall. She denies any other health changes. She denies any bowel or bladder incontinence. Her pain today is 2/10.    Interval History 7/20/2018:  The patient returns to clinic today for follow up. She has not been seen in over a year due to her son being diagnosed with cancer. She reports that he is in remission now. She returns today due to three fall recently, last a week ago. She continues to report low back pain that radiates intermittently down the posterior portion of both legs to her feet. She reports that her legs often feel heavy and weak which is why she falls. She denies any bowel or bladder incontinence. Her pain today is 7/10.    Interval History 3/6/2017:  The patient returns to clinic today for follow up. She is s/p bilateral S1 TF LEONEL on 2/8/2017. She reports 80% relief of her radiating leg pain. She continues to have low back pain. She describes the pain as dull and aching. The pain is worse in the morning. She reports that the pain increases with prolonged sitting and standing. She denies  "any other health changes. She denies any bowel or bladder incontinence. Her pain today is 6/10.     Interval History 1/23/2017:  The patient returns to clinic today for follow up of low back pain and bilateral leg pain. She has recently completed a medrol dose pack with some benefit. She still reports radiating pain into both legs. The pain is worse with prolonged sitting and lifting. The pain gets better with walking and rest. The pain radiates down the back of both legs to ankles. She denies any bowel or bladder incontinence or signs of saddle paresthesia. She continues to work with lifting restrictions. She currently takes Gabapentin and Naproxen with benefit. Her pain today is 6/10.     Initial encounter:    Mila Foster presents to the clinic for the evaluation of bilateral leg pain with intermittent lower back pain. 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 She reports recent falling to knees, but she changed her shoes, and the falling has ceased. She reports that she had a "pain flare" 2 weeks ago requiring a walker for 2 days.     Brief history:  70yo F with hx of HTN and DM complicated by neuropathy BL but R>L with tingling, tightness, shooting pain only in feet. Patient reports glucose was 120 this morning and usually runs around 130.    Pain Description:    The pain is located in the posterior leg area and radiates to her ankles and to her knees at its worst.     At BEST  1/10     At WORST  8/10 on the WORST day.      On average pain is rated as 7/10    Today the pain is rated as 5/10    The pain is described as aching, burning, dull, sharp and tight band      Symptoms interfere with work.     Exacerbating factors: Sitting, picking up children, lifting, bending over    Mitigating factors: walking, rest    Patient denies night fever/night sweats, bowel incontinence, significant weight loss, significant motor " weakness and loss of sensations. She does report chronic urinary incontinence unrelated to back/leg pain.  Patient denies any suicidal or homicidal ideations    Pain Medications:  Current:  Gabapentin    Tried in Past:  NSAIDs -yes; reports it helps  TCA -Never  SNRI -Never  Anti-convulsants -gabapentin  Muscle Relaxants -Never  Opioids-Never    Physical Therapy/Home Exercise: yes, in the past       report: N/A    Pain Procedures:   2/8/2017- Bilateral S1 TF LEONEL  3/22/2017- Bilateral L2,3,4,5 MBB  9/11/2018- Right L5 and S1 TF LEONEL   12/27/2018- Bilateral SI joint injections    Chiropractor -never  Acupuncture - never  TENS unit -never  Spinal decompression -never  Joint replacement -never    Imaging:   MRI Lumbar Spine 7/25/2018:  COMPARISON:  12/16/2016    FINDINGS:  There is again normal lumbar lordosis.  No spondylolisthesis or spondylolysis or marrow edema to suggest acute lumbar fractures or neoplastic processes.  The tip of the conus is at L1-2 disc space.  Paraspinal soft tissues are unremarkable.    L5-S1: There is dehydration of the nucleus pulposis associated with disc space narrowing.  There is a right paracentral and right foraminal disc protrusion/herniation with findings suggestive of slight flattening of the descending right S1 root (image 30 series 7 and 8 and image 7 series 4).  In addition there is also a moderate right L5-S1 foraminal stenosis.  In the far lateral neural foramen the inferior surface of the exiting L5 root contacts the disc protrusion.  Clinical correlation for right L5 and S1 radiculopathy suggested.  Left neural foramen is unremarkable.  No significant central canal spinal stenosis.  There is mild facet arthropathy.    L4-5: Dehydration of the nucleus pulposis associated with a mild posterior bulging of the annulus.  No significant central canal stenosis.  There is degenerative hypertrophic facet arthropathy.    L3-4: Dehydration of the nucleus pulposis.  No significant  central canal spinal stenosis.  There is degenerative hypertrophic facet arthropathy and hypertrophy of the ligamentum flava unchanged from the previous study.    L2-3: Mild spondylotic changes associated with mild posterior bulging of the annulus.  No significant spinal stenosis.  There is facet arthropathy.    L1-2: No significant disc herniations or significant spinal stenosis.  Neural foramina are unremarkable.  There is facet arthropathy.      Impression       1. Right L5-S1 foraminal stenosis associated with a disc protrusion/disc herniation.  Clinical correlation for right L5 and S1 radiculopathy suggested.  2. Multilevel facet arthropathy.  3. Milder spondylotic changes at the rest of the disc spaces as above.     Xray Lumbar Spine 7/25/2018:  COMPARISON:  Prior lumbar spine MRI dated 12/16/16    FINDINGS:  There is diffuse osteopenia.  There is mild grade 1 anterolisthesis of L3 on L4 (4 mm) and of L4 on L5 (6 mm).  There is no evidence of translational instability.  There is moderate facet arthropathy at the lower lumbar spine with probable L5-S1 neural foraminal narrowing.  Endplate degenerative changes are present with subchondral sclerosis and marginal osteophyte formation.    There is atherosclerotic calcification of the aorta.      Impression       Osteopenia and degenerative change of the lumbar spine with grade 1 anterolisthesis of L3-4 and L4-5, new from the 2016 MRI.  No evidence of translational instability.     12/12/16 MRI Lumbar Spine WO Contrast   L1-L2: There is no focal disc herniation. No significant spinal canal or neural foraminal narrowing.    L2-L3: There is no focal disc herniation. No significant spinal canal or neural foraminal narrowing.    L3-L4: Mild bilateral facet arthropathy.  No focal disc herniation. No significant spinal canal or neural foraminal narrowing.    L4-L5: Mild disc bulge and bilateral facet arthropathy results in mild right-sided neural foraminal narrowing. No  significant spinal canal stenosis.    L5-S1: Diffuse disc bulge with small superimposed right foraminal disc protrusion.  Findings result in moderate right-sided neuroforaminal narrowing with likely abutment/impingement upon the exited right L5 nerve root. No significant spinal canal stenosis.  Small posterior annular fissure noted.   Impression      Lumbar spondylosis as detailed above, most significant for small right foraminal protrusion at the L5-S1 level resulting in moderate neuroforaminal narrowing and likely abutment/impingement upon the exited right L5 nerve root         Past Medical History:   Diagnosis Date    Diabetes     Hypertension      Past Surgical History:   Procedure Laterality Date    BLOCK-NERVE-MEDIAL BRANCH-LUMBAR Bilateral 4/12/2017    [facility]    BLOCK-NERVE-MEDIAL BRANCH-LUMBAR Bilateral 3/22/2017    [facility] at [facility]    CHOLECYSTECTOMY      COLONOSCOPY Suprep N/A 11/1/2018    [facility] at [facility]    HYSTERECTOMY      Injection, Joint BILATERAL SACROILIAC JOINT INJECTION Bilateral 12/27/2018    Performed by [facility]    Injection,steroid,epidural,transforaminal approach  Right L5 and S1 Right 9/11/2018    [facility]    INJECTION-STEROID-EPIDURAL-TRANSFORAMINAL Bilateral 2/8/2017    [facility] at [facility]     Social History     Socioeconomic History    Marital status: Single     Spouse name: Not on file    Number of children: Not on file    Years of education: Not on file    Highest education level: Not on file   Social Needs    Financial resource strain: Not on file    Food insecurity - worry: Not on file    Food insecurity - inability: Not on file    Transportation needs - medical: Not on file    Transportation needs - non-medical: Not on file   Occupational History    Not  on file   Tobacco Use    Smoking status: Never Smoker   Substance and Sexual Activity    Alcohol use: Not on file    Drug use: Not on file    Sexual activity: Not on file   Other Topics Concern    Not on file   Social History Narrative    Not on file     Family History   Problem Relation Age of Onset    Vaginal cancer Neg Hx     Endometrial cancer Neg Hx     Cervical cancer Neg Hx        Review of patient's allergies indicates:  No Known Allergies    Current Outpatient Medications   Medication Sig    amlodipine (NORVASC) 10 MG tablet 10 mg.    aspirin (ECOTRIN) 81 MG EC tablet 81 mg.    atenolol (TENORMIN) 50 MG tablet 50 mg.    FLUZONE HIGH-DOSE 2018-19, PF, 180 mcg/0.5 mL vaccine ADM 0.5ML IM UTD    gabapentin (NEURONTIN) 300 MG capsule Take 2 tabs in the morning, 1 tab in the afternoon, and 2 tabs in the evening    glipiZIDE (GLUCOTROL) 5 MG tablet 5 mg.    hydrochlorothiazide (HYDRODIURIL) 25 MG tablet 25 mg.    hyoscyamine (ANASPAZ,LEVSIN) 0.125 mg Tab TK 1 T PO Q 4 H PRN    lisinopril (PRINIVIL,ZESTRIL) 40 MG tablet 40 mg.    metformin (GLUCOPHAGE-XR) 500 MG 24 hr tablet 500 mg.    metoprolol succinate (TOPROL-XL) 25 MG 24 hr tablet Take 25 mg by mouth once daily.    metoprolol tartrate (LOPRESSOR) 25 MG tablet     mirabegron (MYRBETRIQ) 25 mg Tb24 ER tablet Take 1 tablet (25 mg total) by mouth once daily.    naproxen (NAPROSYN) 500 MG tablet     omeprazole (PRILOSEC) 20 MG capsule 20 mg.    pravastatin (PRAVACHOL) 40 MG tablet 40 mg.    sodium,potassium,mag sulfates (SUPREP BOWEL PREP KIT) 17.5-3.13-1.6 gram SolR TAKE AS DIRECTED     No current facility-administered medications for this visit.        REVIEW OF SYSTEMS:    GENERAL:  No weight loss, malaise or fevers.  HEENT:   No recent changes in vision or hearing  NECK:  Negative for lumps, no difficulty with swallowing.  RESPIRATORY:  Negative for cough, wheezing or shortness of breath, patient denies any recent  "URI.  CARDIOVASCULAR:  Negative for chest pain, leg swelling or palpitations. HTN.  GI:  Negative for abdominal discomfort, blood in stools or black stools or change in bowel habits.  MUSCULOSKELETAL:  See HPI.  SKIN:  Negative for lesions, rash, and itching.  PSYCH:  No mood disorder or recent psychosocial stressors.  Patient's sleep is occasionally disturbed secondary to pain.  HEMATOLOGY/LYMPHOLOGY:  Negative for prolonged bleeding, bruising easily or swollen nodes.  Patient is not currently taking any anti-coagulants  ENDO: Positive for DM. Negative for thyroid dysfunction  NEURO:   No history of headaches, syncope, paralysis, seizures or tremors.  All other reviewed and negative other than HPI.    OBJECTIVE:    /71   Pulse 79   Temp (!) 100.6 °F (38.1 °C)   Ht 5' 7.5" (1.715 m)   Wt 111.4 kg (245 lb 9.5 oz)   BMI 37.90 kg/m²     PHYSICAL EXAMINATION:    GENERAL: Well appearing, in no acute distress, alert and oriented x3.  PSYCH:  Mood and affect appropriate.  SKIN: Skin color, texture, turgor normal, no rashes or lesions.  HEAD/FACE:  Normocephalic, atraumatic. Cranial nerves grossly intact.   CV: RRR with palpation of the radial artery.  PULM: No evidence of respiratory difficulty, symmetric chest rise.  BACK: Straight leg raising in the siting position is negative for radicular pain bilaterally. There is pain with palpation over lumbar spine. Limited ROM with pain on extension. Positive facet loading bilaterally.  LOWER EXTREMITIES: Peripheral joint ROM is full and pain free without obvious instability or laxity in lower extremities. No deformities, edema, or skin discoloration. Good capillary refill.  MUSCULOSKELETAL: Hip and knee provocative maneuvers are negative.  There is mild pain with palpation over the sacroiliac joints bilaterally.  FABERs test is negative bilaterally.  FADIRs test is negative. 5/5 strength in right ankle with plantar and dorsiflexion. 4/5 strength in left ankle with " plantar and dorsiflexion. 5/5 strength with right knee flexion and extension. 5/5 strength with left knee flexion and extension. 5/5 strength in right EHL, 4/5 strength in left EHL.  No atrophy or tone abnormalities are noted.  NEURO: Bilateral lower extremity coordination and muscle stretch reflexes are physiologic and symmetric.   No loss of sensation is noted.  GAIT: Antalgic- ambulates with cane.     ASSESSMENT: 74 y.o. year old female with back/leg pain, consistent with BL radiculopathy due to L5 disc protrusion.    Encounter Diagnoses   Name Primary?    Annular tear of lumbar disc Yes    Neuroforaminal stenosis of lumbar spine     Facet arthritis of lumbar region     Lumbar spondylosis     Sacroiliac joint pain     DDD (degenerative disc disease), lumbar        PLAN:     - Previous imaging was reviewed and discussed with the patient today.    - She is s/p bilateral SI joint injections with benefit. We can repeat this as needed.     - In the future, she may benefit from repeat MBB.     - She is s/p right L5 and S1 TF LEONEL with benefit. We can repeat this as needed.     - Patient is running low grade fever today. She denies feeling ill. I encouraged her to monitor.     - Continue Gabapentin. Refill provided today.     - Continue physical therapy.     - RTC in 6 weeks.     - Dr. Duncan was consulted on the patient and agrees with this plan.    The above plan and management options were discussed at length with patient. Patient is in agreement with the above and verbalized understanding.     Coby Russo NP  03/11/2019

## 2019-03-21 ENCOUNTER — OFFICE VISIT (OUTPATIENT)
Dept: UROGYNECOLOGY | Facility: CLINIC | Age: 74
End: 2019-03-21
Payer: MEDICARE

## 2019-03-21 VITALS
HEIGHT: 67 IN | BODY MASS INDEX: 38.96 KG/M2 | DIASTOLIC BLOOD PRESSURE: 78 MMHG | SYSTOLIC BLOOD PRESSURE: 110 MMHG | WEIGHT: 248.25 LBS

## 2019-03-21 DIAGNOSIS — N30.00 ACUTE CYSTITIS WITHOUT HEMATURIA: Primary | ICD-10-CM

## 2019-03-21 DIAGNOSIS — N39.41 URGE INCONTINENCE: ICD-10-CM

## 2019-03-21 DIAGNOSIS — R39.15 URINARY URGENCY: ICD-10-CM

## 2019-03-21 PROCEDURE — 87088 URINE BACTERIA CULTURE: CPT

## 2019-03-21 PROCEDURE — 99999 PR PBB SHADOW E&M-EST. PATIENT-LVL V: ICD-10-PCS | Mod: PBBFAC,,, | Performed by: OBSTETRICS & GYNECOLOGY

## 2019-03-21 PROCEDURE — 99213 OFFICE O/P EST LOW 20 MIN: CPT | Mod: S$GLB,,, | Performed by: OBSTETRICS & GYNECOLOGY

## 2019-03-21 PROCEDURE — 87186 SC STD MICRODIL/AGAR DIL: CPT

## 2019-03-21 PROCEDURE — 1101F PR PT FALLS ASSESS DOC 0-1 FALLS W/OUT INJ PAST YR: ICD-10-PCS | Mod: CPTII,S$GLB,, | Performed by: OBSTETRICS & GYNECOLOGY

## 2019-03-21 PROCEDURE — 99999 PR PBB SHADOW E&M-EST. PATIENT-LVL V: CPT | Mod: PBBFAC,,, | Performed by: OBSTETRICS & GYNECOLOGY

## 2019-03-21 PROCEDURE — 1101F PT FALLS ASSESS-DOCD LE1/YR: CPT | Mod: CPTII,S$GLB,, | Performed by: OBSTETRICS & GYNECOLOGY

## 2019-03-21 PROCEDURE — 87077 CULTURE AEROBIC IDENTIFY: CPT

## 2019-03-21 PROCEDURE — 87086 URINE CULTURE/COLONY COUNT: CPT

## 2019-03-21 PROCEDURE — 99213 PR OFFICE/OUTPT VISIT, EST, LEVL III, 20-29 MIN: ICD-10-PCS | Mod: S$GLB,,, | Performed by: OBSTETRICS & GYNECOLOGY

## 2019-03-21 RX ORDER — AMLODIPINE BESYLATE 10 MG/1
TABLET ORAL
COMMUNITY
Start: 2012-06-10 | End: 2019-04-26

## 2019-03-21 RX ORDER — NAPROXEN 375 MG/1
TABLET ORAL
COMMUNITY
Start: 2014-08-10 | End: 2019-04-26

## 2019-03-21 RX ORDER — SULFAMETHOXAZOLE AND TRIMETHOPRIM 800; 160 MG/1; MG/1
1 TABLET ORAL 2 TIMES DAILY
Qty: 10 TABLET | Refills: 0 | Status: SHIPPED | OUTPATIENT
Start: 2019-03-21 | End: 2019-04-26

## 2019-03-21 RX ORDER — HYDROCHLOROTHIAZIDE 25 MG/1
TABLET ORAL
COMMUNITY
Start: 2012-06-10 | End: 2019-11-20

## 2019-03-21 RX ORDER — GABAPENTIN 600 MG/1
600 TABLET ORAL
COMMUNITY
Start: 2014-10-13 | End: 2019-04-26 | Stop reason: SDUPTHER

## 2019-03-21 RX ORDER — METFORMIN HYDROCHLORIDE 1000 MG/1
1000 TABLET ORAL
COMMUNITY
Start: 2012-06-10 | End: 2019-04-26

## 2019-03-21 RX ORDER — SIMVASTATIN 40 MG/1
TABLET, FILM COATED ORAL
COMMUNITY
Start: 2012-06-10 | End: 2019-04-26

## 2019-03-21 RX ORDER — LISINOPRIL 40 MG/1
TABLET ORAL
COMMUNITY
Start: 2012-06-10 | End: 2019-04-26

## 2019-03-21 RX ORDER — GABAPENTIN 300 MG/1
CAPSULE ORAL
COMMUNITY
Start: 2012-06-10 | End: 2019-04-26 | Stop reason: SDUPTHER

## 2019-03-21 NOTE — PROGRESS NOTES
Subjective:       Patient ID: Mila Foster is a 74 y.o. female.    Chief Complaint:  Urinary Incontinence (follow up)      History of Present Illness: 72 Y O F with history of HTN, DM with neuropathy being managed by with Dr. Saeed ( pain management ) and Dr. Martinez ( neurology)  For lumbar disc herniation with radiculopathy, She is s/p two epidural injections however the most recent one was canceled due to severe diarrhea and urinary incontinence. She was referred by Dr. Saeed for consultation. She had colonoscopy last year which was normal.  She has had urinary incontinence more than 5 years, but notes worsening in the past two year. At the initial visit we started Mybetriq which has helped the urinary urgency but she continence to have bothersome incontinence.     Last seen in 2017 here for follow up: overall urinary incontinence much improved with the Myrbetriq, has noticed some pelvic pressure symptoms for the past  Week. Denies burning with urination, fever or chills or back pain.       Interval  HP since the last visit   1)  UI:  (--) LEX  (+) UUI .  (--) pads:.  Daytime frequency: Q 4 -5 hours.  Nocturia: No:    (-) dysuria-  (--) hematuria,  (--) frequent UTIs.  (+) complete bladder emptying.     2)  POP: Symptoms:(--)  .  (--) vaginal bleeding. (--) vaginal discharge. (-) sexually active.  (--) dyspareunia.   (--)  Vaginal dryness.  (--) vaginal estrogen use.    3)  BM:  (+) constipation/straining.  (--) chronic diarrhea. (--) hematochezia.  (+) fecal incontinence.  (+) fecal smearing/urgency.  (--) incomplete evacuation.          HPI   [facility]s Peq Urogyn Hpi      Question    4/17/2017 10:38 AM CDT     General Urogynecology: Are you experiencing the following?       Dysuria (painful urination)  No     Nocturia:  waking up at night to empty your bladder   Yes     If you answered yes to the previous question, how many times does this happen per night?  1-2     Enuresis (urine loss during sleep)  No      "Dribbling urine after you urinate  No     Hematuria (urine appears red)  No     Type of stream  Strong     Urinary Incontinence (General): Are you experiencing the following?       Past consultation for incontinence: Have you ever seen someone for the evaluation of incontinence?  No     If you answered yes to the previous question, please select all the therapies you have tried.   None of the above     Please note the effectiveness of the therapies.  Ineffective     Need to wear protection to keep clothes dry   Yes     If you answered yes to the previous question, please william the protection you use.   Pads       Diaper     If you wear protection, how much wetness is typically on each pad?  Moderate     If you wear protection, how often do you have to change per day, if applicable?   2     Stress Symptoms: Are you experiencing the following?       Leakage of urine with cough, laugh and/or sneeze  No     If you answered yes to the previous question, what is the frequency in days, weeks and/or months?  Never     Leakage of urine with sex  No     Leakage of urine with bending/ lifting  No     Leakage of urine with briskly walking or jogging  No     If you lose urine for any other reason not previously mentioned, please note it below, if applicable.        Urge Symptoms: Are you experiencing the following?       Urgency ("got to go" feeling)  Yes     Urge: How frequently do you feel an urge to urinate (feeling like you "gotta go" to the bathroom and can't wait)  Weekly     Do you experience a leakage of urine when you have a feeling of urgency?   Yes     Leakage of urine when unaware  No     Past use of anticholinergics (medications used to treat overactive bladder)  No     If you answered yes to the previous question, please william the anticholinergics you have used:        Have you ever used Mirbetriq (aka Mirabegron)?   No     Prolapse Symptoms: Are you experiencing any of the following?        Falling out/ Bulging/ " Heaviness in the vagina  No     Vaginal/ Abdominal Pain/ Pressure  No     Need to strain/ Push to void  No     Need to wait on the toilet before you void  No     Unusual position to urinate (using your hands to push back the vaginal bulge)  No     Sensation of incomplete emptying  Yes     Past use of pessary device  No     If you answered yes to the previous question, please list the devices you have used below.        Bowel Symptoms: Are you experiencing any of the following?       Constipation  No     Diarrhea   Yes     Hematochezia (bloody stool)  No     Incomplete evacuation of stool  Yes     Involuntary loss of formed stool  Yes     Fecal smearing/urgency  Yes     Involuntary loss of gas  Yes     Vaginal Symptoms: Are you experiencing any of the following?        Abnormal vaginal bleeding   No     Vaginal dryness  No     Sexually active   No     Dyspareunia (painful intercourse)  No     Estrogen use   No   HPI reviewed and confirmed with patient     GYN & OB History  No LMP recorded. Patient is postmenopausal.   Date of Last Pap: No result found    OB History    Para Term  AB Living   2 2 2         SAB TAB Ectopic Multiple Live Births                  # Outcome Date GA Lbr Narendra/2nd Weight Sex Delivery Anes PTL Lv   2 Term            1 Term                 Past Medical History:   Diagnosis Date    Diabetes     Hypertension      Past Surgical History:   Procedure Laterality Date    BLOCK-NERVE-MEDIAL BRANCH-LUMBAR Bilateral 2017    [facility]    BLOCK-NERVE-MEDIAL BRANCH-LUMBAR Bilateral 3/22/2017    [facility]    CHOLECYSTECTOMY      COLONOSCOPY Suprep N/A 2018    [facility]    HYSTERECTOMY      Injection, Joint BILATERAL SACROILIAC JOINT INJECTION Bilateral 2018    [facility]     Injection,steroid,epidural,transforaminal approach  Right L5 and S1 Right 9/11/2018    Performed [facility] at [facility] T    INJECTION-STEROID-EPIDURAL-TRANSFORAMINAL Bilateral 2/8/2017    Performed by [facility] at [facility] PA[facility] MGT     Review of patient's allergies indicates:  No Known Allergies      Current Outpatient Medications:     amlodipine (NORVASC) 10 MG tablet, 10 mg., Disp: , Rfl: 1    amLODIPine (NORVASC) 10 MG tablet, Take by mouth., Disp: , Rfl:     aspirin (ECOTRIN) 81 MG EC tablet, 81 mg., Disp: , Rfl: 2    aspirin-calcium carbonate 81 mg-300 mg calcium(777 mg) Tab, Take by mouth., Disp: , Rfl:     atenolol (TENORMIN) 12.5 MG tablet, Take 50 mg by mouth., Disp: , Rfl:     atenolol (TENORMIN) 50 MG tablet, 50 mg., Disp: , Rfl: 1    dextran 70-hypromellose (TEARS) ophthalmic solution, Apply 1 drop to eye., Disp: , Rfl:     FLUZONE HIGH-DOSE 2018-19, PF, 180 mcg/0.5 mL vaccine, ADM 0.5ML IM UTD, Disp: , Rfl: 0    gabapentin (NEURONTIN) 300 MG capsule, Take 2 tabs in the morning, 1 tab in the afternoon, and 2 tabs in the evening, Disp: 150 capsule, Rfl: 5    gabapentin (NEURONTIN) 300 MG capsule, TAKE 2 CAPSULES BY MOUTH THREE TIMES DAILY, Disp: , Rfl:     gabapentin (NEURONTIN) 600 MG tablet, Take 600 mg by mouth., Disp: , Rfl:     glipiZIDE (GLUCOTROL) 5 MG tablet, 5 mg., Disp: , Rfl: 1    hydrochlorothiazide (HYDRODIURIL) 25 MG tablet, 25 mg., Disp: , Rfl: 1    hydroCHLOROthiazide (HYDRODIURIL) 25 MG tablet, Take by mouth., Disp: , Rfl:     hyoscyamine (ANASPAZ,LEVSIN) 0.125 mg Tab, TK 1 T PO Q 4 H PRN, Disp: , Rfl: 6    lisinopril (PRINIVIL,ZESTRIL) 40 MG tablet, 40 mg., Disp: , Rfl: 1    lisinopril (PRINIVIL,ZESTRIL) 40 MG tablet, Take by mouth., Disp: , Rfl:     metFORMIN (GLUCOPHAGE) 1000 MG tablet, Take 1,000 mg by mouth., Disp: , Rfl:     metformin (GLUCOPHAGE-XR) 500 MG 24 hr tablet, 500 mg., Disp: , Rfl: 1    metoprolol succinate (TOPROL-XL) 25 MG  24 hr tablet, Take 25 mg by mouth once daily., Disp: , Rfl:     metoprolol tartrate (LOPRESSOR) 25 MG tablet, , Disp: , Rfl: 1    mirabegron (MYRBETRIQ) 25 mg Tb24 ER tablet, Take 1 tablet (25 mg total) by mouth once daily., Disp: 30 tablet, Rfl: 11    naproxen (NAPROSYN) 375 MG tablet, Take by mouth., Disp: , Rfl:     naproxen (NAPROSYN) 500 MG tablet, , Disp: , Rfl: 1    omeprazole (PRILOSEC) 20 MG capsule, 20 mg., Disp: , Rfl: 1    pravastatin (PRAVACHOL) 40 MG tablet, 40 mg., Disp: , Rfl: 1    simvastatin (ZOCOR) 40 MG tablet, Take by mouth., Disp: , Rfl:     sulfamethoxazole-trimethoprim 800-160mg (BACTRIM DS) 800-160 mg Tab, Take 1 tablet by mouth 2 (two) times daily., Disp: 10 tablet, Rfl: 0      Review of Systems  Review of Systems   Constitutional: Negative.  Negative for activity change, appetite change, chills, diaphoresis, fatigue, fever and unexpected weight change.   HENT: Negative.    Eyes: Negative.    Respiratory: Negative.  Negative for apnea, cough and wheezing.    Cardiovascular: Negative.  Negative for chest pain and palpitations.   Gastrointestinal: Positive for constipation. Negative for abdominal distention, abdominal pain, anal bleeding, blood in stool, diarrhea, nausea, rectal pain and vomiting.   Endocrine: Negative.    Genitourinary: Positive for frequency and urgency. Negative for decreased urine volume, difficulty urinating, dyspareunia, dysuria, enuresis, flank pain, genital sores, hematuria, menstrual problem, pelvic pain, vaginal bleeding, vaginal discharge and vaginal pain.   Musculoskeletal: Negative for back pain and gait problem.   Skin: Negative for color change, pallor, rash and wound.   Allergic/Immunologic: Negative for immunocompromised state.   Neurological: Negative.  Negative for dizziness and speech difficulty.   Hematological: Negative for adenopathy.   Psychiatric/Behavioral: Negative for agitation, behavioral problems, confusion and sleep disturbance.            Objective:     Physical Exam   Constitutional: She is oriented to person, place, and time. She appears well-developed.   HENT:   Head: Normocephalic and atraumatic.   Eyes: Conjunctivae and EOM are normal.   Neck: Normal range of motion. Neck supple.   Cardiovascular: Normal rate, regular rhythm, S1 normal, S2 normal, normal heart sounds and intact distal pulses.   Pulmonary/Chest: Effort normal and breath sounds normal. She exhibits no tenderness.   Abdominal: Soft. Bowel sounds are normal. She exhibits no distension and no mass. There is no hepatosplenomegaly, splenomegaly or hepatomegaly. There is no tenderness. There is no rigidity, no rebound, no guarding and no CVA tenderness. Hernia confirmed negative in the right inguinal area and confirmed negative in the left inguinal area.   Genitourinary: Pelvic exam was performed with patient supine. Rectum normal, vagina normal, skenes normal and bartholins normal. Right labia normal and left labia normal. Urethra exhibits hypermobility. Urethra exhibits no urethral caruncle, no urethral diverticulum and no urethral mass. Right bartholin is not enlarged and not tender. Left bartholin is not enlarged and not tender. Rectal exam shows resting tone normal and active tone normal. Rectal exam shows no external hemorrhoid, no fissure, no tenderness, anal tone normal and no dovetailing. Guaiac negative stool. There is atrophy in the vagina. No foreign body, tenderness, bleeding, unspecified prolapse of vaginal walls, fistula, mesh exposure or lavator tenderness in the vagina. No vaginal discharge found. Right adnexum displays no tenderness. Left adnexum displays no tenderness. Cervix exhibits absence. Uterus is absent.   PVR: 70 ML  Empty cough stress test: Negative.  Kegel: 2/5    POP-Q  Aa: -2 Ba: -2 C: -5   GH: 3 PB: 2 TVL: 8   Ap: 0 Bp: 0 D:                      Musculoskeletal: Normal range of motion.   Lymphadenopathy:     She has no axillary adenopathy.        Right:  No inguinal adenopathy present.        Left: No inguinal adenopathy present.   Neurological: She is alert and oriented to person, place, and time. She has normal strength and normal reflexes. Cranial nerves II through XII intact. No cranial nerve deficit.   Skin: Skin is warm, dry and intact.   Psychiatric: She has a normal mood and affect. Her speech is normal and behavior is normal. Judgment normal. Cognition and memory are normal.          Assessment:        1. Acute cystitis without hematuria    2. Urge incontinence    3. Urinary urgency               Plan:          1.  Mixed urinary incontinence, now more stress predominant : much better controlled   --Empty bladder every 3 hours.  Empty well: wait a minute, lean forward on toilet.    --Avoid dietary irritants (see sheet).  Keep diary x 3-5 days to determine your irritants.  --KEGELS: do 10 in AM and 10 in PM, holding each x 10 seconds.  When you feel urge to go, STOP, KEGEL, and when urge has passed, then go to bathroom. Continue pelvic floor therapy .    --URGE:  Continue Myrbetriq 25 mg daily.  SE profile reviewed.       --STRESS:  Overall much better improved     2. UTI  Urine culture  Bactrim bid for 5 days      Approximately 25 min were spent in consult, 90 % in discussion.      Jude Fields DO  Female Pelvic Medicine and Reconstructive Surgery  [facility] [place], [place]

## 2019-03-21 NOTE — PATIENT INSTRUCTIONS
1.  Mixed urinary incontinence, now more stress predominant : much better controlled   --Empty bladder every 3 hours.  Empty well: wait a minute, lean forward on toilet.    --Avoid dietary irritants (see sheet).  Keep diary x 3-5 days to determine your irritants.  --KEGELS: do 10 in AM and 10 in PM, holding each x 10 seconds.  When you feel urge to go, STOP, KEGEL, and when urge has passed, then go to bathroom. Continue pelvic floor therapy .    --URGE:  Continue Myrbetriq 25 mg daily.  SE profile reviewed.       --STRESS:  Overall much better improved     2. UTI  Urine culture  Bactrim bid for 5 days

## 2019-03-22 ENCOUNTER — TELEPHONE (OUTPATIENT)
Dept: PAIN MEDICINE | Facility: CLINIC | Age: 74
End: 2019-03-22

## 2019-03-22 NOTE — TELEPHONE ENCOUNTER
Hello, this is staff I've received your request I'm returning your call from the pain management clinic at [facility]. I just wanted to let you know that I'm working on getting an answer for you by today. ( Please add all other notes here if needed.)    Staff tried to contact patient to reschedule appointment, because provider will not be in clinic, no answer, left a message for patient to call back and reschedule appointment.

## 2019-03-24 LAB — BACTERIA UR CULT: NORMAL

## 2019-03-25 ENCOUNTER — TELEPHONE (OUTPATIENT)
Dept: UROGYNECOLOGY | Facility: CLINIC | Age: 74
End: 2019-03-25

## 2019-03-25 RX ORDER — CEPHALEXIN 500 MG/1
500 CAPSULE ORAL EVERY 12 HOURS
Qty: 10 CAPSULE | Refills: 0 | Status: SHIPPED | OUTPATIENT
Start: 2019-03-25 | End: 2019-03-30

## 2019-03-25 NOTE — TELEPHONE ENCOUNTER
Spoke with pt and relayed message that Please call the patient regarding her abnormal result. Urine is resistant to bactrim please stop and start Keflex for 5 days. Pt knows to stop bactrim and start keflex she voiced understanding and call was ended.

## 2019-03-25 NOTE — TELEPHONE ENCOUNTER
----- Message from Jude Fields DO sent at 3/25/2019  2:23 PM CDT -----  Please call the patient regarding her abnormal result. Urine is resistant to bactrim please stop and start Keflex for 5 days.

## 2019-04-24 ENCOUNTER — CLINICAL SUPPORT (OUTPATIENT)
Dept: REHABILITATION | Facility: OTHER | Age: 74
End: 2019-04-24
Attending: NURSE PRACTITIONER
Payer: MEDICARE

## 2019-04-26 ENCOUNTER — OFFICE VISIT (OUTPATIENT)
Dept: PAIN MEDICINE | Facility: CLINIC | Age: 74
End: 2019-04-26
Payer: MEDICARE

## 2019-04-26 VITALS
WEIGHT: 254.44 LBS | TEMPERATURE: 98 F | HEART RATE: 59 BPM | HEIGHT: 67 IN | BODY MASS INDEX: 39.93 KG/M2 | SYSTOLIC BLOOD PRESSURE: 152 MMHG | DIASTOLIC BLOOD PRESSURE: 83 MMHG

## 2019-04-26 DIAGNOSIS — M47.816 LUMBAR SPONDYLOSIS: Primary | ICD-10-CM

## 2019-04-26 DIAGNOSIS — M51.36 ANNULAR TEAR OF LUMBAR DISC: ICD-10-CM

## 2019-04-26 DIAGNOSIS — M47.816 FACET ARTHRITIS OF LUMBAR REGION: ICD-10-CM

## 2019-04-26 DIAGNOSIS — M48.061 NEUROFORAMINAL STENOSIS OF LUMBAR SPINE: ICD-10-CM

## 2019-04-26 DIAGNOSIS — M53.3 SACROILIAC JOINT PAIN: ICD-10-CM

## 2019-04-26 DIAGNOSIS — M51.36 DDD (DEGENERATIVE DISC DISEASE), LUMBAR: ICD-10-CM

## 2019-04-26 PROCEDURE — 99999 PR PBB SHADOW E&M-EST. PATIENT-LVL III: CPT | Mod: PBBFAC,,, | Performed by: NURSE PRACTITIONER

## 2019-04-26 PROCEDURE — 99999 PR PBB SHADOW E&M-EST. PATIENT-LVL III: ICD-10-PCS | Mod: PBBFAC,,, | Performed by: NURSE PRACTITIONER

## 2019-04-26 PROCEDURE — 1101F PR PT FALLS ASSESS DOC 0-1 FALLS W/OUT INJ PAST YR: ICD-10-PCS | Mod: CPTII,S$GLB,, | Performed by: NURSE PRACTITIONER

## 2019-04-26 PROCEDURE — 1101F PT FALLS ASSESS-DOCD LE1/YR: CPT | Mod: CPTII,S$GLB,, | Performed by: NURSE PRACTITIONER

## 2019-04-26 PROCEDURE — 99213 PR OFFICE/OUTPT VISIT, EST, LEVL III, 20-29 MIN: ICD-10-PCS | Mod: S$GLB,,, | Performed by: NURSE PRACTITIONER

## 2019-04-26 PROCEDURE — 99213 OFFICE O/P EST LOW 20 MIN: CPT | Mod: S$GLB,,, | Performed by: NURSE PRACTITIONER

## 2019-04-26 NOTE — PROGRESS NOTES
Chronic Pain - Established Patient    Referring Physician: Bob Kenney    Chief Complaint:   Chief Complaint   Patient presents with    Follow-up     6 weeks        SUBJECTIVE: Disclaimer: This note has been generated using voice-recognition software. There may be typographical errors that have been missed during proof-reading    Interval History 4/26/2019:  The patient returns to clinic today for follow up. She reports a fall yesterday at home where she tripped over her shoe string. She landed on her buttock. She reports that she hit the back of her head on the carpet. She denies any loss of consciousness. She did not go to the ER. She does report increased low back pain today that is sore in nature which she attributes to the fall. She continues to report low back pain that is sharp and aching in nature. She does report intermittent aching buttock pain. She denies any radiating leg pain. Her pain is worse with prolonged standing and walking. She continues to participate in physical therapy. She continues to take Gabapentin with benefit. She did have lumbar MBB in 2017 with 90% relief for a few days. She denies any other health changes. She does have stress urinary incontinence. She does follow up with Urogyn. Her pain today is 7/10.    Interval History 3/11/2019:  The patient returns to clinic today for follow up. She continues to report low back pain that is aching in nature. She reports right sided buttock pain that is sore in nature. She denies any radiating leg pain. Her pain is worse with standing, sitting, and activity. She does endorse morning stiffness. She is currently participating in physical therapy with benefit. She continues to take Gabapentin with benefit and does need a refill today. She denies any other health changes. She denies any bowel or bladder incontinence. Her pain today is 5/10.    Interval History 1/9/2019:  The patient returns to clinic today for follow up. She is s/p bilateral  SI joint injections on 12/27/2018. She reports 100% for the first week. She now reports approximately 50% relief of her pain. She reports intermittent low back and bilateral buttock pain. She describes this pain as sore and aching in nature. This pain is worse with prolonged sitting. She denies any radiating leg pain. She often feels as though as she is leaning over while walking. She often feels as though her balance is off. She continues to take Gabapentin with benefit. She denies any other health changes. His pain today is 2/10.     Interval History 12/11/2018:  The patient returns to clinic today for follow up. She reports increased low back and bilateral buttock pain, left greater than right. She describes this pain as sore in nature. This pain is worse with prolonged sitting. She denies any radiating leg pain. She reports that she often feels like her back is locking up on her. She continues to take Gabapentin with benefit. She denies any other health changes. Her pain today is 5/10.    Interval History 9/25/2018:  The patient returns to clinic today for follow up. She is s/p right L5 and S1 TF LEONEL. She reports 90% relief of her low back and leg pain. She reports intermittent low back pain that is aching in nature. She denies any radiating leg pain today. Her back pain is worse with bending. She also reports that her legs feel heavy with prolonged walking. She denies any other health changes. Her pain today is 4/10.    Interval History 7/27/2018:  The patient returns to clinic today for follow up and image review. She continues to report low back pain that radiates down the posterior aspect of her right leg to her foot. She reports intermittent left leg pain in the same distribution. Her pain is worse with prolonged walking and activity. She often feels like her legs are heavy. She often feels like she will fall. She denies any other health changes. She denies any bowel or bladder incontinence. Her pain today  is 2/10.    Interval History 7/20/2018:  The patient returns to clinic today for follow up. She has not been seen in over a year due to her son being diagnosed with cancer. She reports that he is in remission now. She returns today due to three fall recently, last a week ago. She continues to report low back pain that radiates intermittently down the posterior portion of both legs to her feet. She reports that her legs often feel heavy and weak which is why she falls. She denies any bowel or bladder incontinence. Her pain today is 7/10.    Interval History 3/6/2017:  The patient returns to clinic today for follow up. She is s/p bilateral S1 TF LEONEL on 2/8/2017. She reports 80% relief of her radiating leg pain. She continues to have low back pain. She describes the pain as dull and aching. The pain is worse in the morning. She reports that the pain increases with prolonged sitting and standing. She denies any other health changes. She denies any bowel or bladder incontinence. Her pain today is 6/10.     Interval History 1/23/2017:  The patient returns to clinic today for follow up of low back pain and bilateral leg pain. She has recently completed a medrol dose pack with some benefit. She still reports radiating pain into both legs. The pain is worse with prolonged sitting and lifting. The pain gets better with walking and rest. The pain radiates down the back of both legs to ankles. She denies any bowel or bladder incontinence or signs of saddle paresthesia. She continues to work with lifting restrictions. She currently takes Gabapentin and Naproxen with benefit. Her pain today is 6/10.     Initial encounter:    Mila Foster presents to the clinic for the evaluation of bilateral leg pain with intermittent lower back pain. 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 She reports recent falling to knees, but she changed her  "shoes, and the falling has ceased. She reports that she had a "pain flare" 2 weeks ago requiring a walker for 2 days.     Brief history:  70yo F with hx of HTN and DM complicated by neuropathy BL but R>L with tingling, tightness, shooting pain only in feet. Patient reports glucose was 120 this morning and usually runs around 130.    Pain Description:    The pain is located in the posterior leg area and radiates to her ankles and to her knees at its worst.     At BEST  1/10     At WORST  8/10 on the WORST day.      On average pain is rated as 7/10    Today the pain is rated as 5/10    The pain is described as aching, burning, dull, sharp and tight band      Symptoms interfere with work.     Exacerbating factors: Sitting, picking up children, lifting, bending over    Mitigating factors: walking, rest    Patient denies night fever/night sweats, bowel incontinence, significant weight loss, significant motor weakness and loss of sensations. She does report chronic urinary incontinence unrelated to back/leg pain.  Patient denies any suicidal or homicidal ideations    Pain Medications:  Current:  Gabapentin    Tried in Past:  NSAIDs -yes Naproxen   TCA -Never  SNRI -Never  Anti-convulsants -gabapentin  Muscle Relaxants -Never  Opioids-Never    Physical Therapy/Home Exercise: yes       report: N/A    Pain Procedures:   2/8/2017- Bilateral S1 TF LEONEL  3/22/2017- Bilateral L2,3,4,5 MBB  9/11/2018- Right L5 and S1 TF LEONEL   12/27/2018- Bilateral SI joint injections    Chiropractor -never  Acupuncture - never  TENS unit -never  Spinal decompression -never  Joint replacement -never    Imaging:   MRI Lumbar Spine 7/25/2018:  COMPARISON:  12/16/2016    FINDINGS:  There is again normal lumbar lordosis.  No spondylolisthesis or spondylolysis or marrow edema to suggest acute lumbar fractures or neoplastic processes.  The tip of the conus is at L1-2 disc space.  Paraspinal soft tissues are unremarkable.    L5-S1: There is dehydration " of the nucleus pulposis associated with disc space narrowing.  There is a right paracentral and right foraminal disc protrusion/herniation with findings suggestive of slight flattening of the descending right S1 root (image 30 series 7 and 8 and image 7 series 4).  In addition there is also a moderate right L5-S1 foraminal stenosis.  In the far lateral neural foramen the inferior surface of the exiting L5 root contacts the disc protrusion.  Clinical correlation for right L5 and S1 radiculopathy suggested.  Left neural foramen is unremarkable.  No significant central canal spinal stenosis.  There is mild facet arthropathy.    L4-5: Dehydration of the nucleus pulposis associated with a mild posterior bulging of the annulus.  No significant central canal stenosis.  There is degenerative hypertrophic facet arthropathy.    L3-4: Dehydration of the nucleus pulposis.  No significant central canal spinal stenosis.  There is degenerative hypertrophic facet arthropathy and hypertrophy of the ligamentum flava unchanged from the previous study.    L2-3: Mild spondylotic changes associated with mild posterior bulging of the annulus.  No significant spinal stenosis.  There is facet arthropathy.    L1-2: No significant disc herniations or significant spinal stenosis.  Neural foramina are unremarkable.  There is facet arthropathy.      Impression       1. Right L5-S1 foraminal stenosis associated with a disc protrusion/disc herniation.  Clinical correlation for right L5 and S1 radiculopathy suggested.  2. Multilevel facet arthropathy.  3. Milder spondylotic changes at the rest of the disc spaces as above.     Xray Lumbar Spine 7/25/2018:  COMPARISON:  Prior lumbar spine MRI dated 12/16/16    FINDINGS:  There is diffuse osteopenia.  There is mild grade 1 anterolisthesis of L3 on L4 (4 mm) and of L4 on L5 (6 mm).  There is no evidence of translational instability.  There is moderate facet arthropathy at the lower lumbar spine with  probable L5-S1 neural foraminal narrowing.  Endplate degenerative changes are present with subchondral sclerosis and marginal osteophyte formation.    There is atherosclerotic calcification of the aorta.      Impression       Osteopenia and degenerative change of the lumbar spine with grade 1 anterolisthesis of L3-4 and L4-5, new from the 2016 MRI.  No evidence of translational instability.     12/12/16 MRI Lumbar Spine WO Contrast   L1-L2: There is no focal disc herniation. No significant spinal canal or neural foraminal narrowing.    L2-L3: There is no focal disc herniation. No significant spinal canal or neural foraminal narrowing.    L3-L4: Mild bilateral facet arthropathy.  No focal disc herniation. No significant spinal canal or neural foraminal narrowing.    L4-L5: Mild disc bulge and bilateral facet arthropathy results in mild right-sided neural foraminal narrowing. No significant spinal canal stenosis.    L5-S1: Diffuse disc bulge with small superimposed right foraminal disc protrusion.  Findings result in moderate right-sided neuroforaminal narrowing with likely abutment/impingement upon the exited right L5 nerve root. No significant spinal canal stenosis.  Small posterior annular fissure noted.   Impression      Lumbar spondylosis as detailed above, most significant for small right foraminal protrusion at the L5-S1 level resulting in moderate neuroforaminal narrowing and likely abutment/impingement upon the exited right L5 nerve root         Past Medical History:   Diagnosis Date    Diabetes     Hypertension      Past Surgical History:   Procedure Laterality Date    BLOCK-NERVE-MEDIAL BRANCH-LUMBAR Bilateral 4/12/2017    [facility]    BLOCK-NERVE-MEDIAL BRANCH-LUMBAR Bilateral 3/22/2017    [facility]    CHOLECYSTECTOMY      COLONOSCOPY Suprep N/A 11/1/2018    [facility]     HYSTERECTOMY      Injection, Joint BILATERAL SACROILIAC JOINT INJECTION Bilateral 12/27/2018    [facility]    Injection,steroid,epidural,transforaminal approach  Right L5 and S1 Right 9/11/2018    [facility] at [facility]    INJECTION-STEROID-EPIDURAL-TRANSFORAMINAL Bilateral 2/8/2017    Per[facility] by [facility] at [facility]     Social History     Socioeconomic History    Marital status: Single     Spouse name: Not on file    Number of children: Not on file    Years of education: Not on file    Highest education level: Not on file   Occupational History    Not on file   Social Needs    Financial resource strain: Not on file    Food insecurity:     Worry: Not on file     Inability: Not on file    Transportation needs:     Medical: Not on file     Non-medical: Not on file   Tobacco Use    Smoking status: Never Smoker    Smokeless tobacco: Never Used   Substance and Sexual Activity    Alcohol use: No     Frequency: Never    Drug use: No    Sexual activity: Not Currently   Lifestyle    Physical activity:     Days per week: Not on file     Minutes per session: Not on file    Stress: Not on file   Relationships    Social connections:     Talks on phone: Not on file     Gets together: Not on file     Attends Jew service: Not on file     Active member of club or organization: Not on file     Attends meetings of clubs or organizations: Not on file     Relationship status: Not on file   Other Topics Concern    Not on file   Social History Narrative    Not on file     Family History   Problem Relation Age of Onset    Vaginal cancer Neg Hx     Endometrial cancer Neg Hx     Cervical cancer Neg Hx        Review of patient's allergies indicates:  No Known Allergies    Current Outpatient Medications   Medication Sig    amlodipine (NORVASC) 10 MG tablet 10 mg.    amLODIPine (NORVASC) 10 MG tablet Take by mouth.     aspirin (ECOTRIN) 81 MG EC tablet 81 mg.    aspirin-calcium carbonate 81 mg-300 mg calcium(777 mg) Tab Take by mouth.    atenolol (TENORMIN) 12.5 MG tablet Take 50 mg by mouth.    atenolol (TENORMIN) 50 MG tablet 50 mg.    dextran 70-hypromellose (TEARS) ophthalmic solution Apply 1 drop to eye.    FLUZONE HIGH-DOSE 2018-19, PF, 180 mcg/0.5 mL vaccine ADM 0.5ML IM UTD    gabapentin (NEURONTIN) 300 MG capsule Take 2 tabs in the morning, 1 tab in the afternoon, and 2 tabs in the evening    gabapentin (NEURONTIN) 300 MG capsule TAKE 2 CAPSULES BY MOUTH THREE TIMES DAILY    gabapentin (NEURONTIN) 600 MG tablet Take 600 mg by mouth.    glipiZIDE (GLUCOTROL) 5 MG tablet 5 mg.    hydrochlorothiazide (HYDRODIURIL) 25 MG tablet 25 mg.    hydroCHLOROthiazide (HYDRODIURIL) 25 MG tablet Take by mouth.    hyoscyamine (ANASPAZ,LEVSIN) 0.125 mg Tab TK 1 T PO Q 4 H PRN    lisinopril (PRINIVIL,ZESTRIL) 40 MG tablet 40 mg.    lisinopril (PRINIVIL,ZESTRIL) 40 MG tablet Take by mouth.    metFORMIN (GLUCOPHAGE) 1000 MG tablet Take 1,000 mg by mouth.    metformin (GLUCOPHAGE-XR) 500 MG 24 hr tablet 500 mg.    metoprolol succinate (TOPROL-XL) 25 MG 24 hr tablet Take 25 mg by mouth once daily.    metoprolol tartrate (LOPRESSOR) 25 MG tablet     mirabegron (MYRBETRIQ) 25 mg Tb24 ER tablet Take 1 tablet (25 mg total) by mouth once daily.    naproxen (NAPROSYN) 375 MG tablet Take by mouth.    naproxen (NAPROSYN) 500 MG tablet     omeprazole (PRILOSEC) 20 MG capsule 20 mg.    pravastatin (PRAVACHOL) 40 MG tablet 40 mg.    simvastatin (ZOCOR) 40 MG tablet Take by mouth.    sulfamethoxazole-trimethoprim 800-160mg (BACTRIM DS) 800-160 mg Tab Take 1 tablet by mouth 2 (two) times daily.     No current facility-administered medications for this visit.        REVIEW OF SYSTEMS:    GENERAL:  No weight loss, malaise or fevers.  HEENT:   No recent changes in vision or hearing  NECK:  Negative for lumps, no difficulty with  "swallowing.  RESPIRATORY:  Negative for cough, wheezing or shortness of breath, patient denies any recent URI.  CARDIOVASCULAR:  Negative for chest pain, leg swelling or palpitations. HTN.  GI:  Negative for abdominal discomfort, blood in stools or black stools or change in bowel habits.  MUSCULOSKELETAL:  See HPI.  SKIN:  Negative for lesions, rash, and itching.  PSYCH:  No mood disorder or recent psychosocial stressors.  Patient's sleep is occasionally disturbed secondary to pain.  HEMATOLOGY/LYMPHOLOGY:  Negative for prolonged bleeding, bruising easily or swollen nodes.  Patient is not currently taking any anti-coagulants  ENDO: Positive for DM. Negative for thyroid dysfunction  NEURO:   No history of headaches, syncope, paralysis, seizures or tremors.  All other reviewed and negative other than HPI.    OBJECTIVE:    BP (!) 152/83   Pulse (!) 59   Temp 98.4 °F (36.9 °C)   Ht 5' 7" (1.702 m)   Wt 115.4 kg (254 lb 6.6 oz)   BMI 39.85 kg/m²     PHYSICAL EXAMINATION:    GENERAL: Well appearing, in no acute distress, alert and oriented x3.  PSYCH:  Mood and affect appropriate.  SKIN: Skin color, texture, turgor normal, no rashes or lesions.  HEAD/FACE:  Normocephalic, atraumatic. Cranial nerves grossly intact.   CV: RRR with palpation of the radial artery.  PULM: No evidence of respiratory difficulty, symmetric chest rise.  BACK: Straight leg raising in the siting position is negative for radicular pain bilaterally. There is pain with palpation over lumbar spine. Limited ROM with pain on extension. Positive facet loading bilaterally, R>L.  LOWER EXTREMITIES: Peripheral joint ROM is full and pain free without obvious instability or laxity in lower extremities. No deformities, edema, or skin discoloration. Good capillary refill.  MUSCULOSKELETAL: Hip and knee provocative maneuvers are negative.  There is mild pain with palpation over the sacroiliac joints bilaterally.  FABERs test is negative bilaterally.  FADIRs " test is negative. 5/5 strength in right ankle with plantar and dorsiflexion. 4/5 strength in left ankle with plantar and dorsiflexion. 5/5 strength with right knee flexion and extension. 5/5 strength with left knee flexion and extension. 5/5 strength in right EHL, 4/5 strength in left EHL.  No atrophy or tone abnormalities are noted.  NEURO: Bilateral lower extremity coordination and muscle stretch reflexes are physiologic and symmetric.   No loss of sensation is noted.  GAIT: Antalgic- ambulates with cane.     ASSESSMENT: 74 y.o. year old female with back/leg pain, consistent with BL radiculopathy due to L5 disc protrusion.    Encounter Diagnoses   Name Primary?    Lumbar spondylosis Yes    Facet arthritis of lumbar region     Neuroforaminal stenosis of lumbar spine     Annular tear of lumbar disc     DDD (degenerative disc disease), lumbar     Sacroiliac joint pain        PLAN:     - Previous imaging was reviewed and discussed with the patient today.    - Schedule for bilateral L2-5 MBB. She previously had relief in 2017.  The procedure, risks, benefits and options were discussed with patient. There are no contraindications to the procedure. The patient expressed understanding and agreed to proceed.      - If significant relief, we can proceed with RFA one side at a time.     - She is s/p bilateral SI joint injections with benefit. We can repeat this as needed.     - She is s/p right L5 and S1 TF LEONEL with benefit. We can repeat this as needed.     - Continue Gabapentin.     - Continue physical therapy.     - RTC 1 week after above procedure.     The above plan and management options were discussed at length with patient. Patient is in agreement with the above and verbalized understanding.     Coby Russo NP  04/26/2019

## 2019-05-01 ENCOUNTER — CLINICAL SUPPORT (OUTPATIENT)
Dept: REHABILITATION | Facility: OTHER | Age: 74
End: 2019-05-01
Attending: NURSE PRACTITIONER
Payer: MEDICARE

## 2019-05-01 ENCOUNTER — TELEPHONE (OUTPATIENT)
Dept: PAIN MEDICINE | Facility: CLINIC | Age: 74
End: 2019-05-01

## 2019-05-01 DIAGNOSIS — M48.061 NEUROFORAMINAL STENOSIS OF LUMBAR SPINE: ICD-10-CM

## 2019-05-01 DIAGNOSIS — G89.29 CHRONIC RIGHT-SIDED LOW BACK PAIN WITHOUT SCIATICA: ICD-10-CM

## 2019-05-01 DIAGNOSIS — M47.816 FACET ARTHRITIS OF LUMBAR REGION: ICD-10-CM

## 2019-05-01 DIAGNOSIS — M53.3 SACROILIAC JOINT PAIN: ICD-10-CM

## 2019-05-01 DIAGNOSIS — M51.36 DDD (DEGENERATIVE DISC DISEASE), LUMBAR: ICD-10-CM

## 2019-05-01 DIAGNOSIS — M54.50 CHRONIC RIGHT-SIDED LOW BACK PAIN WITHOUT SCIATICA: ICD-10-CM

## 2019-05-01 DIAGNOSIS — M47.816 LUMBAR SPONDYLOSIS: Primary | ICD-10-CM

## 2019-05-01 DIAGNOSIS — M51.36 ANNULAR TEAR OF LUMBAR DISC: ICD-10-CM

## 2019-05-01 PROCEDURE — 97110 THERAPEUTIC EXERCISES: CPT

## 2019-05-01 NOTE — PATIENT INSTRUCTIONS
Lower Trunk Rotation Stretch        Keeping back flat and feet together, rotate knees to left side. Hold ____ seconds.  Repeat ____ times per set. Do ____ sets per session. Do ____ sessions per day.     [email or web address]     Copyright © [employer]. All rights reserved.   Knee-to-Chest Stretch: Bilateral        With hands behind knees, pull both knees in to chest until a comfortable stretch is felt in lower back and buttocks. Keep back relaxed. Hold ____ seconds.  Repeat ____ times per set. Do ____ sets per session. Do ____ sessions per day.     [email or web address]     Copyright © [employer]. All rights reserved.   Pelvic Tilt        Flatten back by tightening stomach muscles and buttocks.  Repeat ____ times per set. Do ____ sets per session. Do ____ sessions per day.     [email or web address]     Copyright © [employer]. All rights reserved.   Strengthening: Straight Leg Raise (Phase 1)        Tighten muscles on front of right thigh, then lift leg ____ inches from surface, keeping knee locked.   Repeat ____ times per set. Do ____ sets per session. Do ____ sessions per day.     [email or web address]     Copyright © [employer]. All rights reserved.

## 2019-05-01 NOTE — PROGRESS NOTES
"[facility]      Name: Mila Foster  Clinic Number: [id number]  Date of Treatment: 2019   Diagnosis:   Encounter Diagnosis   Name Primary?    Chronic right-sided low back pain without sciatica      Physician: Coby Russo NP    Pain pattern determined: 1 PEN  Plan of care signed: 19   Time in: 1pm  Time Out: 2pm  Total Treatment time: 60  Precautions: DM/HTN  Visit #: 3 (inc 210% next visit)    POC due: 19  Reassessment due:19    Subjective   Mila reports no change so far. Pt has had difficulty with transportation and attending treatments so far.  Pt also reports today that she is not performing HEP as prescribed    Patient reports their pain to be 2/10 on a 0-10 scale with 0 being no pain and 10 being the worst pain imaginable.    Pain Location: B LB     Occupation:  retired   Leisure: TV/walking                     Pts goals:  "get stronger"       Objective   Baseline Isometric Testing on Med X equipment: Testing administered by PT     Baseline IM Testing Results:   Date of testin19  ROM 6-30 deg   max 81   Min Peak Torque 66   Flex/Ext Ratio 1.2   % below normative data 32   Femur 6    Lap belt not utilized today     FOTO: Focus on Therapeutic Outcomes   Category: lumbar   % Impaired: 48%  Current Score  = CK = at least 40% but < 60% impaired, limited or restricted  Goal at Discharge Score = CJ = at least 20% but < 40% impaired, limited or restricted    Score interpretation is as follows:      TEST SCORE  Modifier  Impairment Limitation Restriction    0/50  CH  0 % impaired, limited or restricted   1-9/50  CI  @ least 1% but less than 20% impaired, limited or restricted   10-/50  CJ  @ least 20%<40% impaired, limited or restricted   20-29/50  CK  @ least 40%<60% impaired, limited or restricted   30-39/50  CL  @ least 60% <80% impaired, limited or restricted   40-49/50  CM  @ least 80%<100% impaired limited or restricted   50/50  CN  " 100% impaired, limited or restricted             Treatment    Pt was instructed in and performed the following:     Mila received therapeutic exercises to develop/improved posture, cardiovascular endurance, muscular endurance, lumbar/cervical ROM, strength and muscular endurance for 60 minutes including the following exercises:   [facility] 5/1/2019   Visit Number 3   VAS Pain Rating 2   Recumbent Bike Seat Pos. 15   Time 8   Extension in Standing -   Flexion in Lying 10   Lumbar Extension Seat Pad -   Femur Restraint -   Top Dead Center -   Counterweight -   Lumbar Flexion -   Lumbar Extension -   Lumbar Peak Torque -   Min Torque -   Test Percent Below Normative Data -   Lumbar Weight 46   Repetitions 20   Rating of Perceived Exertion 2   Ice - Z Lie (in min.) 10         Bridge 10x  PPT 10x  LTR  BKTC c/ ball  SLR  Peripheral muscle strengthening which included 1 set of 15-20 repetitions at a slow, controlled 7 second per rep pace focused on strengthening supporting musculature for improved body mechanics and functional mobility.  Pt and therapist focused on proper form during treatment to ensure optimal strengthening of each targeted muscle group.  Machines were utilized including torso rotation, leg extension, leg curl, chest press, upright row. Tricep extension, bicep curl, leg press, and hip abduction added on third visit.       .     Home Exercise Program as follows:   Flexion in lying 10x, 3x/day              LTR 10x 3x/day    PPT      Handouts were given to the patient. Pt demo good understanding of the education provided. Mila demonstrated good return demonstration of activities.     Lumbar roll use compliance: unknown  Additional exercises taught this treatment session: re printed exercises and reviewed again    Assessment   Patient tolerated treatment well. Pt completed  20 reps at Med X exercise weight of 46ft/lbs, which is a 6 ft/lb increase.  Increase 20% next visit to 54 ft/lbs to  challenge paraspinal musculature. Pt continues to have difficulty with exercises and requires VC and tactile cues to perform correctly.  Educated pt on benefits of exercising daily with HEP.  Also discussed inconsistent attendance and lack of performing HEP being reasons her pain level has not changed as of yet.  Pt will continue to benefit from skilled outpatient physical therapy to address the deficits stated in the impairment chart, provide pt/family education and to maximize pt's level of independence in the home and community environment.       Pt's spiritual, cultural and educational needs considered and pt agreeable to plan of care and goals as stated below:     Medical necessity is demonstrated by the following problem list.    Pt presents with the following impairments:   History  Co-morbidities and personal factors that may impact the plan of care Examination  Body Structures and Functions, activity limitations and participation restrictions that may impact the plan of care Clinical Presentation    Decision Making/ Complexity Score   Co-morbidities:   HTN and DM           Personal Factors:   transportation Body Regions:   back  lower extremities     Body Systems:   gross symmetry  ROM  strength  gross coordinated movement  balance  gait  transfers  transitions  motor control  motor learning     Activity limitations:   Learning and applying knowledge  no deficits     General Tasks and Commands  no deficits     Communication  no deficits     Mobility  lifting and carrying objects  walking     Self care  no deficits     Domestic Life  shopping  doing house work (cleaning house, washing dishes, laundry)     Interactions/Relationships  no deficits     Life Areas  no deficits     Community and Social Life  community life  recreation and leisure     Participation Restrictions:   Walking for exercise/ walking prolonged/ sit to stand/stairs       stable and uncomplicated    low            GOALS: Pt is in agreement  with the following goals.     Short term goals:  6 weeks or 10 visits   1.  Pt will demonstrate increased lumbar ROM by at least 3 degrees from the initial ROM value with improvements noted in functional ROM and ability to perform ADLs  2.  Pt will demonstrate increased maximum isometric torque value by 10% when compared to the initial value resulting in improved ability to perform bending, lifting, and carrying activities safely, confidently.  3.  Patient report a reduction in worst pain score by 1-2 points for improved tolerance during work and recreational activities  4.  Pt able to perform HEP correctly with minimal cueing or supervision for therapist     Long term goals: 13 weeks or 20 visits   1. Pt will demonstrate increased lumbar ROM by at least 6 degrees from initial ROM value, resulting in improved ability to perform functional fwd bending while standing and sitting.   2. Pt will demonstrate increased maximum isometric torque value by 20% when compared to the initial value resulting in improved ability to perform bending, lifting, and carrying activities safely, confidently.  3. Pt to demonstrate ability to independently control and reduce their pain through posture positioning and mechanical movements throughout a typical day.  4.  Patient will demonstrate improved overall function per FOTO Survey to CJ = at least 20% but < 40% impaired, limited or restricted score or less.         Plan   Continue with established Plan of Care towards established PT goals. Inc to 54ft/lbs n ext visit

## 2019-05-07 ENCOUNTER — CLINICAL SUPPORT (OUTPATIENT)
Dept: REHABILITATION | Facility: OTHER | Age: 74
End: 2019-05-07
Attending: NURSE PRACTITIONER
Payer: MEDICARE

## 2019-05-07 DIAGNOSIS — G89.29 CHRONIC LOW BACK PAIN WITHOUT SCIATICA, UNSPECIFIED BACK PAIN LATERALITY: ICD-10-CM

## 2019-05-07 DIAGNOSIS — M54.50 CHRONIC LOW BACK PAIN WITHOUT SCIATICA, UNSPECIFIED BACK PAIN LATERALITY: ICD-10-CM

## 2019-05-07 PROCEDURE — 97110 THERAPEUTIC EXERCISES: CPT | Performed by: PHYSICAL THERAPIST

## 2019-05-07 NOTE — PROGRESS NOTES
"[facility]      Name: Mila Foster  Clinic Number: [id number]  Date of Treatment: 2019   Diagnosis:   Encounter Diagnosis   Name Primary?    Chronic low back pain without sciatica, unspecified back pain laterality      Physician: Coby Russo NP    Pain pattern determined: 1 PEN  Plan of care signed: 19   Time in: 1030am  Time Out: 1130 am  Total Treatment time: 60  Precautions: DM/HTN  Visit #: 4    POC due: 19  Reassessment due:19, done 19  Next due: 19    Subjective   Mila reports she is doing some better. Pt has had difficulty with transportation and attending treatments so far.  Pt notes she is trying to do her HEP more at home.  PPT remain difficult.    Patient reports their pain to be 2/10 on a 0-10 scale with 0 being no pain and 10 being the worst pain imaginable.    Pain Location: B LB     Occupation:  retired   Leisure: TV/walking                     Pts goals:  "get stronger"       Objective   Baseline Isometric Testing on Med X equipment: Testing administered by PT     Baseline IM Testing Results:   Date of testin19  ROM 6-30 deg   max 81   Min Peak Torque 66   Flex/Ext Ratio 1.2   % below normative data 32   Femur 6    Lap belt not utilized today     FOTO: Focus on Therapeutic Outcomes   Category: lumbar   % Impaired: 48%  Current Score  = CK = at least 40% but < 60% impaired, limited or restricted  Goal at Discharge Score = CJ = at least 20% but < 40% impaired, limited or restricted    Score interpretation is as follows:      TEST SCORE  Modifier  Impairment Limitation Restriction    0/50  CH  0 % impaired, limited or restricted   1-9/50  CI  @ least 1% but less than 20% impaired, limited or restricted   10-/50  CJ  @ least 20%<40% impaired, limited or restricted   20-29/50  CK  @ least 40%<60% impaired, limited or restricted   30-39/50  CL  @ least 60% <80% impaired, limited or restricted   40-49/50  CM  @ least " 80%<100% impaired limited or restricted   50/50  CN  100% impaired, limited or restricted             Treatment    Pt was instructed in and performed the following:     Mila received therapeutic exercises to develop/improved posture, cardiovascular endurance, muscular endurance, lumbar/cervical ROM, strength and muscular endurance for 60 minutes including the following exercises:     [facility] 5/7/2019   Visit Number 4   VAS Pain Rating 3   Recumbent Bike Seat Pos. 15   Time 10   Extension in Standing -   Flexion in Lying 10   Lumbar Weight 52   Repetitions 20   Rating of Perceived Exertion 4   Ice - Z Lie (in min.) 10     Bridge 10x  PPT 10x  LTR  BKTC c/ ball  SLR  Peripheral muscle strengthening which included 1 set of 15-20 repetitions at a slow, controlled 7 second per rep pace focused on strengthening supporting musculature for improved body mechanics and functional mobility.  Pt and therapist focused on proper form during treatment to ensure optimal strengthening of each targeted muscle group.  Machines were utilized including torso rotation, leg extension, leg curl, chest press, upright row. Tricep extension, bicep curl, leg press, and hip abduction added on third visit.       .     Home Exercise Program as follows:   Flexion in lying 10x, 3x/day              LTR 10x 3x/day    PPT      Handouts were given to the patient. Pt demo good understanding of the education provided. Mila demonstrated good return demonstration of activities.     Lumbar roll use compliance: unknown  Additional exercises taught this treatment session: re printed exercises and reviewed again    Assessment   Patient tolerated treatment well. Pt completed a significant weight increase to 52 ftlbs, 20 reps and RPE 4.  Pt continues to have difficulty with exercises and requires VC and tactile cues to perform correctly.  Educated pt on benefits of exercising daily with HEP.   Pt will continue to benefit from skilled outpatient  physical therapy to address the deficits stated in the impairment chart, provide pt/family education and to maximize pt's level of independence in the home and community environment.       Pt's spiritual, cultural and educational needs considered and pt agreeable to plan of care and goals as stated below:     Medical necessity is demonstrated by the following problem list.    Pt presents with the following impairments:   History  Co-morbidities and personal factors that may impact the plan of care Examination  Body Structures and Functions, activity limitations and participation restrictions that may impact the plan of care Clinical Presentation    Decision Making/ Complexity Score   Co-morbidities:   HTN and DM           Personal Factors:   transportation Body Regions:   back  lower extremities     Body Systems:   gross symmetry  ROM  strength  gross coordinated movement  balance  gait  transfers  transitions  motor control  motor learning     Activity limitations:   Learning and applying knowledge  no deficits     General Tasks and Commands  no deficits     Communication  no deficits     Mobility  lifting and carrying objects  walking     Self care  no deficits     Domestic Life  shopping  doing house work (cleaning house, washing dishes, laundry)     Interactions/Relationships  no deficits     Life Areas  no deficits     Community and Social Life  community life  recreation and leisure     Participation Restrictions:   Walking for exercise/ walking prolonged/ sit to stand/stairs       stable and uncomplicated    low            GOALS: Pt is in agreement with the following goals.     Short term goals:  6 weeks or 10 visits   1.  Pt will demonstrate increased lumbar ROM by at least 3 degrees from the initial ROM value with improvements noted in functional ROM and ability to perform ADLs  2.  Pt will demonstrate increased maximum isometric torque value by 10% when compared to the initial value resulting in improved  ability to perform bending, lifting, and carrying activities safely, confidently.  3.  Patient report a reduction in worst pain score by 1-2 points for improved tolerance during work and recreational activities  4.  Pt able to perform HEP correctly with minimal cueing or supervision for therapist     Long term goals: 13 weeks or 20 visits   1. Pt will demonstrate increased lumbar ROM by at least 6 degrees from initial ROM value, resulting in improved ability to perform functional fwd bending while standing and sitting.   2. Pt will demonstrate increased maximum isometric torque value by 20% when compared to the initial value resulting in improved ability to perform bending, lifting, and carrying activities safely, confidently.  3. Pt to demonstrate ability to independently control and reduce their pain through posture positioning and mechanical movements throughout a typical day.  4.  Patient will demonstrate improved overall function per FOTO Survey to CJ = at least 20% but < 40% impaired, limited or restricted score or less.         Plan   Continue with established Plan of Care towards established PT goals.

## 2019-05-17 RX ORDER — HYOSCYAMINE SULFATE 0.125 MG
125 TABLET ORAL EVERY 4 HOURS PRN
Qty: 30 TABLET | Refills: 6 | Status: SHIPPED | OUTPATIENT
Start: 2019-05-17 | End: 2022-01-06

## 2019-05-21 ENCOUNTER — HOSPITAL ENCOUNTER (OUTPATIENT)
Facility: OTHER | Age: 74
Discharge: HOME | End: 2019-05-21
Attending: ANESTHESIOLOGY | Admitting: ANESTHESIOLOGY
Payer: MEDICARE

## 2019-05-21 VITALS
HEART RATE: 66 BPM | HEIGHT: 67 IN | RESPIRATION RATE: 18 BRPM | WEIGHT: 250 LBS | TEMPERATURE: 99 F | OXYGEN SATURATION: 98 % | SYSTOLIC BLOOD PRESSURE: 166 MMHG | BODY MASS INDEX: 39.24 KG/M2 | DIASTOLIC BLOOD PRESSURE: 77 MMHG

## 2019-05-21 DIAGNOSIS — M47.816 LUMBAR SPONDYLOSIS: Primary | ICD-10-CM

## 2019-05-21 LAB — POCT GLUCOSE: 183 MG/DL (ref 70–110)

## 2019-05-21 PROCEDURE — 64495 INJ PARAVERT F JNT L/S 3 LEV: CPT | Mod: 50,,, | Performed by: ANESTHESIOLOGY

## 2019-05-21 PROCEDURE — 25000003 PHARM REV CODE 250: Performed by: ANESTHESIOLOGY

## 2019-05-21 PROCEDURE — 64493 PR INJ DX/THER AGNT PARAVERT FACET JOINT,IMG GUIDE,LUMBAR/SAC,1ST LVL: ICD-10-PCS | Mod: 50,,, | Performed by: ANESTHESIOLOGY

## 2019-05-21 PROCEDURE — 64495 INJ PARAVERT F JNT L/S 3 LEV: CPT | Mod: 50 | Performed by: ANESTHESIOLOGY

## 2019-05-21 PROCEDURE — 64494 INJ PARAVERT F JNT L/S 2 LEV: CPT | Mod: 50,,, | Performed by: ANESTHESIOLOGY

## 2019-05-21 PROCEDURE — 64494 PR INJ DX/THER AGNT PARAVERT FACET JOINT,IMG GUIDE,LUMBAR/SAC, 2ND LEVEL: ICD-10-PCS | Mod: 50,,, | Performed by: ANESTHESIOLOGY

## 2019-05-21 PROCEDURE — 64494 INJ PARAVERT F JNT L/S 2 LEV: CPT | Mod: 50 | Performed by: ANESTHESIOLOGY

## 2019-05-21 PROCEDURE — 64493 INJ PARAVERT F JNT L/S 1 LEV: CPT | Mod: 50,,, | Performed by: ANESTHESIOLOGY

## 2019-05-21 PROCEDURE — 64493 INJ PARAVERT F JNT L/S 1 LEV: CPT | Mod: 50 | Performed by: ANESTHESIOLOGY

## 2019-05-21 PROCEDURE — S0020 INJECTION, BUPIVICAINE HYDRO: HCPCS | Performed by: ANESTHESIOLOGY

## 2019-05-21 PROCEDURE — 64495 PR INJ DX/THER AGNT PARAVERT FACET JOINT,IMG GUIDE,LUMBAR/SAC, ADD LEVEL: ICD-10-PCS | Mod: 50,,, | Performed by: ANESTHESIOLOGY

## 2019-05-21 RX ORDER — BUPIVACAINE HYDROCHLORIDE 5 MG/ML
INJECTION, SOLUTION EPIDURAL; INTRACAUDAL
Status: DISCONTINUED | OUTPATIENT
Start: 2019-05-21 | End: 2019-05-21 | Stop reason: HOSPADM

## 2019-05-21 RX ORDER — LIDOCAINE HYDROCHLORIDE 10 MG/ML
INJECTION INFILTRATION; PERINEURAL
Status: DISCONTINUED | OUTPATIENT
Start: 2019-05-21 | End: 2019-05-21 | Stop reason: HOSPADM

## 2019-05-21 NOTE — DISCHARGE SUMMARY
Discharge Note  Short Stay      SUMMARY     Admit Date: 5/21/2019    Attending Physician: Brunilda Ochoa      Discharge Physician: Brunilda Ochoa      Discharge Date: 5/21/2019 10:11 AM    Procedure(s) (LRB):  BLOCK, NERVE, BILATERAL L2,3,4,5 (Bilateral)    Final Diagnosis: Lumbar spondylosis [M47.816]    Disposition: Home or self care    Patient Instructions:   Current Discharge Medication List      CONTINUE these medications which have NOT CHANGED    Details   aspirin (ECOTRIN) 81 MG EC tablet 81 mg.  Refills: 2      aspirin-calcium carbonate 81 mg-300 mg calcium(777 mg) Tab Take by mouth.      FLUZONE HIGH-DOSE 2018-19, PF, 180 mcg/0.5 mL vaccine ADM 0.5ML IM UTD  Refills: 0      gabapentin (NEURONTIN) 300 MG capsule Take 2 tabs in the morning, 1 tab in the afternoon, and 2 tabs in the evening  Qty: 150 capsule, Refills: 5    Associated Diagnoses: Annular tear of lumbar disc; Neuroforaminal stenosis of lumbar spine; Facet arthritis of lumbar region; Lumbar spondylosis; Sacroiliac joint pain; DDD (degenerative disc disease), lumbar      glipiZIDE (GLUCOTROL) 5 MG tablet 5 mg.  Refills: 1      !! hydrochlorothiazide (HYDRODIURIL) 25 MG tablet 25 mg.  Refills: 1      !! hydroCHLOROthiazide (HYDRODIURIL) 25 MG tablet Take by mouth.      hyoscyamine (ANASPAZ,LEVSIN) 0.125 mg Tab Take 1 tablet (125 mcg total) by mouth every 4 (four) hours as needed.  Qty: 30 tablet, Refills: 6      metformin (GLUCOPHAGE-XR) 500 MG 24 hr tablet 500 mg.  Refills: 1      metoprolol tartrate (LOPRESSOR) 25 MG tablet Refills: 1      mirabegron (MYRBETRIQ) 25 mg Tb24 ER tablet Take 1 tablet (25 mg total) by mouth once daily.  Qty: 30 tablet, Refills: 11    Associated Diagnoses: Urge incontinence; Urinary urgency      naproxen (NAPROSYN) 500 MG tablet Refills: 1      omeprazole (PRILOSEC) 20 MG capsule 20 mg.  Refills: 1      pravastatin (PRAVACHOL) 40 MG tablet 40 mg.  Refills: 1       !! - Potential duplicate medications found. Please  discuss with provider.              Discharge Diagnosis: Lumbar spondylosis [M47.816]  Condition on Discharge: Stable with no complications to procedure   Diet on Discharge: Same as before.  Activity: as per instruction sheet.  Discharge to: Home with a responsible adult.  Follow up: 2-4 weeks

## 2019-05-21 NOTE — OP NOTE
lUMBAR Medial Branch Block Under Fluoroscopy  Time-out taken to identify patient and procedure side prior to starting the procedure.            05/21/2019                                                         PROCEDURE:  Bilateral medial branch block at the transverse processes at the level of L3, L4, L5, Sacral ala    REASON FOR PROCEDURE: Lumbar spondylosis [M47.816]    PHYSICIAN: Bonnie Duncan MD  ASSISTANTS: None    MEDICATIONS INJECTED: 0.5% bupivicane, 1mL at each level    LOCAL ANESTHETIC USED: Xylocaine 1% 10ml    SEDATION MEDICATIONS: None    ESTIMATED BLOOD LOSS:  None.    COMPLICATIONS:  None.    TECHNIQUE: Laying in a prone position, the patient was prepped and draped in the usual sterile fashion using ChloraPrep and fenestrated drape.  The level was determined under fluoroscopic guidance.  Local anesthetic was given by going down to the hub of the 27-gauge 1.25in needle and raising a wheel.  A 22-gauge 3.5inch needle was introduced to the anatomic local of the medial branch at each of the above levels using fluoroscopy in the AP, oblique, and lateral views.  After negative aspiration, medication was injected slowly. The patient tolerated the procedure well.       The patient was monitored after the procedure.  Patient was given post procedure and discharge instructions to follow at home.  We will see the patient back in two weeks or the patient may call to inform of status. The patient was discharged in a stable condition    Attending physician was present throughout the entire case and all critical portions.

## 2019-05-21 NOTE — DISCHARGE INSTRUCTIONS
Thank you for allowing us to care for you today. You may receive a survey about the care we provided. Your feedback is valuable and helps us provide excellent care throughout the community.     Home Care Instructions for Pain Management:    1. DIET:   You may resume your normal diet today.   2. BATHING:   You may shower with luke warm water. No tub baths or anything that will soak injection sites under water for the next 24 hours.  3. DRESSING:   You may remove your bandage today.   4. ACTIVITY LEVEL:   You may resume your normal activities 24 hrs after your procedure. Nothing strenuous today.  5. MEDICATIONS:   You may resume your normal medications today. To restart blood thinners, ask your doctor.  6. DRIVING    If you have received any sedatives by mouth today, you may not drive for 12 hours.    If you have received any sedation through your IV, you may not drive for 24 hrs.   7. SPECIAL INSTRUCTIONS:   No heat to the injection site for 24 hrs including, hot bath or shower, heating pad, moist heat, or hot tubs.    Use ice pack to injection site for any pain or discomfort.  Apply ice packs for 20 minute intervals as needed.    IF you have diabetes, be sure to monitor your blood sugar more closely. IF your injection contained steroids your blood sugar levels may become higher than normal.    If you are still having pain upon discharge:  Your pain may improve over the next 48 hours. The anesthetic (numbing medication) works immediately to 48 hours. IF your injection contained a steroid (anti-inflammatory medication), it takes approximately 3 days to start feeling relief and 7-10 days to see your greatest results from the medication. It is possible you may need subsequent injections. This would be discussed at your follow up appointment with pain management or your referring doctor.      PLEASE CALL YOUR DOCTOR IF:  1. Redness or swelling around the injection site.  2. Fever of 101 degrees or more  3. Drainage  (pus) from the injection site.  4. For any continuous bleeding (some dried blood over the incision is normal.)    FOR EMERGENCIES:   If any unusual problems or difficulties occur during clinic hours, call [phone] or [phone].

## 2019-05-22 ENCOUNTER — TELEPHONE (OUTPATIENT)
Dept: PAIN MEDICINE | Facility: CLINIC | Age: 74
End: 2019-05-22

## 2019-05-22 NOTE — TELEPHONE ENCOUNTER
[facility] contacted and spoke to patient regarding her results. She reports she had between % of pain relief from her  NERVE, BILATERAL L2,3,4,5 on 05/21/2019.

## 2019-05-22 NOTE — TELEPHONE ENCOUNTER
----- Message from Wanda Oznua sent at 5/22/2019 11:55 AM CDT -----  Name of Who is Calling: ABILIO SIDDIQUI [[id number]]    What is the request in detail: Please call pt for block diary results.       Can the clinic reply by [facility]:   No       What Number to Call Back if not in [facility]: #[phone]

## 2019-05-24 ENCOUNTER — TELEPHONE (OUTPATIENT)
Dept: PAIN MEDICINE | Facility: CLINIC | Age: 74
End: 2019-05-24

## 2019-05-24 NOTE — TELEPHONE ENCOUNTER
----- Message from Marianne Goldman sent at 5/24/2019 10:10 AM CDT -----  Pt. States she'll wait to schedule RFA's with Dr. Duncan until she see Coby 5/28/19 @ 11:00am .

## 2019-05-28 ENCOUNTER — TELEPHONE (OUTPATIENT)
Dept: PAIN MEDICINE | Facility: CLINIC | Age: 74
End: 2019-05-28

## 2019-05-28 ENCOUNTER — OFFICE VISIT (OUTPATIENT)
Dept: PAIN MEDICINE | Facility: CLINIC | Age: 74
End: 2019-05-28
Payer: MEDICARE

## 2019-05-28 VITALS
WEIGHT: 241.63 LBS | HEIGHT: 67 IN | SYSTOLIC BLOOD PRESSURE: 130 MMHG | RESPIRATION RATE: 18 BRPM | DIASTOLIC BLOOD PRESSURE: 78 MMHG | HEART RATE: 70 BPM | TEMPERATURE: 99 F | BODY MASS INDEX: 37.92 KG/M2

## 2019-05-28 DIAGNOSIS — M48.061 NEUROFORAMINAL STENOSIS OF LUMBAR SPINE: ICD-10-CM

## 2019-05-28 DIAGNOSIS — M51.36 ANNULAR TEAR OF LUMBAR DISC: ICD-10-CM

## 2019-05-28 DIAGNOSIS — M51.36 DDD (DEGENERATIVE DISC DISEASE), LUMBAR: ICD-10-CM

## 2019-05-28 DIAGNOSIS — M47.816 LUMBAR SPONDYLOSIS: Primary | ICD-10-CM

## 2019-05-28 DIAGNOSIS — M53.3 SACROILIAC JOINT PAIN: ICD-10-CM

## 2019-05-28 DIAGNOSIS — M47.816 FACET ARTHRITIS OF LUMBAR REGION: ICD-10-CM

## 2019-05-28 PROCEDURE — 99213 PR OFFICE/OUTPT VISIT, EST, LEVL III, 20-29 MIN: ICD-10-PCS | Mod: S$GLB,,, | Performed by: NURSE PRACTITIONER

## 2019-05-28 PROCEDURE — 1101F PT FALLS ASSESS-DOCD LE1/YR: CPT | Mod: CPTII,S$GLB,, | Performed by: NURSE PRACTITIONER

## 2019-05-28 PROCEDURE — 1101F PR PT FALLS ASSESS DOC 0-1 FALLS W/OUT INJ PAST YR: ICD-10-PCS | Mod: CPTII,S$GLB,, | Performed by: NURSE PRACTITIONER

## 2019-05-28 PROCEDURE — 99999 PR PBB SHADOW E&M-EST. PATIENT-LVL III: CPT | Mod: PBBFAC,,, | Performed by: NURSE PRACTITIONER

## 2019-05-28 PROCEDURE — 99213 OFFICE O/P EST LOW 20 MIN: CPT | Mod: S$GLB,,, | Performed by: NURSE PRACTITIONER

## 2019-05-28 PROCEDURE — 99999 PR PBB SHADOW E&M-EST. PATIENT-LVL III: ICD-10-PCS | Mod: PBBFAC,,, | Performed by: NURSE PRACTITIONER

## 2019-05-28 RX ORDER — AMMONIUM LACTATE 12 G/100G
CREAM TOPICAL
COMMUNITY
Start: 2019-05-23 | End: 2019-07-23

## 2019-05-28 NOTE — PROGRESS NOTES
Chronic Pain - Established Patient    Referring Physician: No ref. provider found    Chief Complaint:   Chief Complaint   Patient presents with    Follow-up     1 week after MBB        SUBJECTIVE: Disclaimer: This note has been generated using voice-recognition software. There may be typographical errors that have been missed during proof-reading    Interval History 5/28/2019:  The patient returns to clinic today for follow up. She is s/p bilateral L2,3,4,5 MBB on 5/21/2019. She reports 80% relief of her low back pain after the block. She did sleep very well the night after the block. Her pain has returned to baseline. She continues to report low back pain that is constant, sharp, and aching in nature. She denies any radiating leg pain. She does report increased tailbone pain. Her pain is worse with prolonged standing and walking. She continues to take Gabapentin with benefit. She denies any other health changes. Her pain today is 2/10.    Interval History 4/26/2019:  The patient returns to clinic today for follow up. She reports a fall yesterday at home where she tripped over her shoe string. She landed on her buttock. She reports that she hit the back of her head on the carpet. She denies any loss of consciousness. She did not go to the ER. She does report increased low back pain today that is sore in nature which she attributes to the fall. She continues to report low back pain that is sharp and aching in nature. She does report intermittent aching buttock pain. She denies any radiating leg pain. Her pain is worse with prolonged standing and walking. She continues to participate in physical therapy. She continues to take Gabapentin with benefit. She did have lumbar MBB in 2017 with 90% relief for a few days. She denies any other health changes. She does have stress urinary incontinence. She does follow up with Urogyn. Her pain today is 7/10.    Interval History 3/11/2019:  The patient returns to clinic today  for follow up. She continues to report low back pain that is aching in nature. She reports right sided buttock pain that is sore in nature. She denies any radiating leg pain. Her pain is worse with standing, sitting, and activity. She does endorse morning stiffness. She is currently participating in physical therapy with benefit. She continues to take Gabapentin with benefit and does need a refill today. She denies any other health changes. She denies any bowel or bladder incontinence. Her pain today is 5/10.    Interval History 1/9/2019:  The patient returns to clinic today for follow up. She is s/p bilateral SI joint injections on 12/27/2018. She reports 100% for the first week. She now reports approximately 50% relief of her pain. She reports intermittent low back and bilateral buttock pain. She describes this pain as sore and aching in nature. This pain is worse with prolonged sitting. She denies any radiating leg pain. She often feels as though as she is leaning over while walking. She often feels as though her balance is off. She continues to take Gabapentin with benefit. She denies any other health changes. His pain today is 2/10.     Interval History 12/11/2018:  The patient returns to clinic today for follow up. She reports increased low back and bilateral buttock pain, left greater than right. She describes this pain as sore in nature. This pain is worse with prolonged sitting. She denies any radiating leg pain. She reports that she often feels like her back is locking up on her. She continues to take Gabapentin with benefit. She denies any other health changes. Her pain today is 5/10.    Interval History 9/25/2018:  The patient returns to clinic today for follow up. She is s/p right L5 and S1 TF LEONEL. She reports 90% relief of her low back and leg pain. She reports intermittent low back pain that is aching in nature. She denies any radiating leg pain today. Her back pain is worse with bending. She also  reports that her legs feel heavy with prolonged walking. She denies any other health changes. Her pain today is 4/10.    Interval History 7/27/2018:  The patient returns to clinic today for follow up and image review. She continues to report low back pain that radiates down the posterior aspect of her right leg to her foot. She reports intermittent left leg pain in the same distribution. Her pain is worse with prolonged walking and activity. She often feels like her legs are heavy. She often feels like she will fall. She denies any other health changes. She denies any bowel or bladder incontinence. Her pain today is 2/10.    Interval History 7/20/2018:  The patient returns to clinic today for follow up. She has not been seen in over a year due to her son being diagnosed with cancer. She reports that he is in remission now. She returns today due to three fall recently, last a week ago. She continues to report low back pain that radiates intermittently down the posterior portion of both legs to her feet. She reports that her legs often feel heavy and weak which is why she falls. She denies any bowel or bladder incontinence. Her pain today is 7/10.    Interval History 3/6/2017:  The patient returns to clinic today for follow up. She is s/p bilateral S1 TF LEONEL on 2/8/2017. She reports 80% relief of her radiating leg pain. She continues to have low back pain. She describes the pain as dull and aching. The pain is worse in the morning. She reports that the pain increases with prolonged sitting and standing. She denies any other health changes. She denies any bowel or bladder incontinence. Her pain today is 6/10.     Interval History 1/23/2017:  The patient returns to clinic today for follow up of low back pain and bilateral leg pain. She has recently completed a medrol dose pack with some benefit. She still reports radiating pain into both legs. The pain is worse with prolonged sitting and lifting. The pain gets better  "with walking and rest. The pain radiates down the back of both legs to ankles. She denies any bowel or bladder incontinence or signs of saddle paresthesia. She continues to work with lifting restrictions. She currently takes Gabapentin and Naproxen with benefit. Her pain today is 6/10.     Initial encounter:    Mila Foster presents to the clinic for the evaluation of bilateral leg pain with intermittent lower back pain. 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 She reports recent falling to knees, but she changed her shoes, and the falling has ceased. She reports that she had a "pain flare" 2 weeks ago requiring a walker for 2 days.     Brief history:  72yo F with hx of HTN and DM complicated by neuropathy BL but R>L with tingling, tightness, shooting pain only in feet. Patient reports glucose was 120 this morning and usually runs around 130.    Pain Description:    The pain is located in the posterior leg area and radiates to her ankles and to her knees at its worst.     At BEST  1/10     At WORST  8/10 on the WORST day.      On average pain is rated as 7/10    Today the pain is rated as 5/10    The pain is described as aching, burning, dull, sharp and tight band      Symptoms interfere with work.     Exacerbating factors: Sitting, picking up children, lifting, bending over    Mitigating factors: walking, rest    Patient denies night fever/night sweats, bowel incontinence, significant weight loss, significant motor weakness and loss of sensations. She does report chronic urinary incontinence unrelated to back/leg pain.  Patient denies any suicidal or homicidal ideations    Pain Medications:  Current:  Gabapentin    Tried in Past:  NSAIDs -yes Naproxen   TCA -Never  SNRI -Never  Anti-convulsants -gabapentin  Muscle Relaxants -Never  Opioids-Never    Physical Therapy/Home Exercise: yes       report: N/A    Pain " Procedures:   2/8/2017- Bilateral S1 TF LEONEL  3/22/2017- Bilateral L2,3,4,5 MBB  9/11/2018- Right L5 and S1 TF LEONEL   12/27/2018- Bilateral SI joint injections    Chiropractor -never  Acupuncture - never  TENS unit -never  Spinal decompression -never  Joint replacement -never    Imaging:   MRI Lumbar Spine 7/25/2018:  COMPARISON:  12/16/2016    FINDINGS:  There is again normal lumbar lordosis.  No spondylolisthesis or spondylolysis or marrow edema to suggest acute lumbar fractures or neoplastic processes.  The tip of the conus is at L1-2 disc space.  Paraspinal soft tissues are unremarkable.    L5-S1: There is dehydration of the nucleus pulposis associated with disc space narrowing.  There is a right paracentral and right foraminal disc protrusion/herniation with findings suggestive of slight flattening of the descending right S1 root (image 30 series 7 and 8 and image 7 series 4).  In addition there is also a moderate right L5-S1 foraminal stenosis.  In the far lateral neural foramen the inferior surface of the exiting L5 root contacts the disc protrusion.  Clinical correlation for right L5 and S1 radiculopathy suggested.  Left neural foramen is unremarkable.  No significant central canal spinal stenosis.  There is mild facet arthropathy.    L4-5: Dehydration of the nucleus pulposis associated with a mild posterior bulging of the annulus.  No significant central canal stenosis.  There is degenerative hypertrophic facet arthropathy.    L3-4: Dehydration of the nucleus pulposis.  No significant central canal spinal stenosis.  There is degenerative hypertrophic facet arthropathy and hypertrophy of the ligamentum flava unchanged from the previous study.    L2-3: Mild spondylotic changes associated with mild posterior bulging of the annulus.  No significant spinal stenosis.  There is facet arthropathy.    L1-2: No significant disc herniations or significant spinal stenosis.  Neural foramina are unremarkable.  There is  facet arthropathy.      Impression       1. Right L5-S1 foraminal stenosis associated with a disc protrusion/disc herniation.  Clinical correlation for right L5 and S1 radiculopathy suggested.  2. Multilevel facet arthropathy.  3. Milder spondylotic changes at the rest of the disc spaces as above.     Xray Lumbar Spine 7/25/2018:  COMPARISON:  Prior lumbar spine MRI dated 12/16/16    FINDINGS:  There is diffuse osteopenia.  There is mild grade 1 anterolisthesis of L3 on L4 (4 mm) and of L4 on L5 (6 mm).  There is no evidence of translational instability.  There is moderate facet arthropathy at the lower lumbar spine with probable L5-S1 neural foraminal narrowing.  Endplate degenerative changes are present with subchondral sclerosis and marginal osteophyte formation.    There is atherosclerotic calcification of the aorta.      Impression       Osteopenia and degenerative change of the lumbar spine with grade 1 anterolisthesis of L3-4 and L4-5, new from the 2016 MRI.  No evidence of translational instability.     12/12/16 MRI Lumbar Spine WO Contrast   L1-L2: There is no focal disc herniation. No significant spinal canal or neural foraminal narrowing.    L2-L3: There is no focal disc herniation. No significant spinal canal or neural foraminal narrowing.    L3-L4: Mild bilateral facet arthropathy.  No focal disc herniation. No significant spinal canal or neural foraminal narrowing.    L4-L5: Mild disc bulge and bilateral facet arthropathy results in mild right-sided neural foraminal narrowing. No significant spinal canal stenosis.    L5-S1: Diffuse disc bulge with small superimposed right foraminal disc protrusion.  Findings result in moderate right-sided neuroforaminal narrowing with likely abutment/impingement upon the exited right L5 nerve root. No significant spinal canal stenosis.  Small posterior annular fissure noted.   Impression      Lumbar spondylosis as detailed above, most significant for small right  foraminal protrusion at the L5-S1 level resulting in moderate neuroforaminal narrowing and likely abutment/impingement upon the exited right L5 nerve root         Past Medical History:   Diagnosis Date    Diabetes     Hypertension      Past Surgical History:   Procedure Laterality Date    BLOCK, NERVE, BILATERAL L2,3,4,5 Bilateral 5/21/2019    [facility]    BLOCK-NERVE-MEDIAL BRANCH-LUMBAR Bilateral 4/12/2017    [facility]    BLOCK-NERVE-MEDIAL BRANCH-LUMBAR Bilateral 3/22/2017    [facility]    CHOLECYSTECTOMY      COLONOSCOPY Suprep N/A 11/1/2018    [facility]    HYSTERECTOMY      Injection, Joint BILATERAL SACROILIAC JOINT INJECTION Bilateral 12/27/2018    [facility]    Injection,steroid,epidural,transforaminal approach  Right L5 and S1 Right 9/11/2018    [facility]    INJECTION-STEROID-EPIDURAL-TRANSFORAMINAL Bilateral 2/8/2017    [facility]     Social History     Socioeconomic History    Marital status: Single     Spouse name: Not on file    Number of children: Not on file    Years of education: Not on file    Highest education level: Not on file   Occupational History    Not on file   Social Needs    Financial resource strain: Not on file    Food insecurity:     Worry: Not on file     Inability: Not on file    Transportation needs:     Medical: Not on file     Non-medical: Not on file   Tobacco Use    Smoking status: Never Smoker    Smokeless tobacco: Never Used   Substance and Sexual Activity    Alcohol use: No     Frequency: Never    Drug use: No    Sexual activity: Not Currently   Lifestyle    Physical activity:     Days per week: Not on file     Minutes per session: Not on file    Stress: Not on file    Relationships    Social connections:     Talks on phone: Not on file     Gets together: Not on file     Attends [employer] service: Not on file     Active member of club or organization: Not on file     Attends meetings of clubs or organizations: Not on file     Relationship status: Not on file   Other Topics Concern    Not on file   Social History Narrative    Not on file     Family History   Problem Relation Age of Onset    Vaginal cancer Neg Hx     Endometrial cancer Neg Hx     Cervical cancer Neg Hx        Review of patient's allergies indicates:  No Known Allergies    Current Outpatient Medications   Medication Sig    ammonium lactate 12 % Crea     aspirin (ECOTRIN) 81 MG EC tablet 81 mg.    aspirin-calcium carbonate 81 mg-300 mg calcium(777 mg) Tab Take by mouth.    FLUZONE HIGH-DOSE 2018-19, PF, 180 mcg/0.5 mL vaccine ADM 0.5ML IM UTD    gabapentin (NEURONTIN) 300 MG capsule Take 2 tabs in the morning, 1 tab in the afternoon, and 2 tabs in the evening    glipiZIDE (GLUCOTROL) 5 MG tablet 5 mg.    hyoscyamine (ANASPAZ,LEVSIN) 0.125 mg Tab Take 1 tablet (125 mcg total) by mouth every 4 (four) hours as needed.    metformin (GLUCOPHAGE-XR) 500 MG 24 hr tablet 500 mg.    metoprolol tartrate (LOPRESSOR) 25 MG tablet     mirabegron (MYRBETRIQ) 25 mg Tb24 ER tablet Take 1 tablet (25 mg total) by mouth once daily.    naproxen (NAPROSYN) 500 MG tablet     omeprazole (PRILOSEC) 20 MG capsule 20 mg.    hydrochlorothiazide (HYDRODIURIL) 25 MG tablet 25 mg.    hydroCHLOROthiazide (HYDRODIURIL) 25 MG tablet Take by mouth.    pravastatin (PRAVACHOL) 40 MG tablet 40 mg.     No current facility-administered medications for this visit.        REVIEW OF SYSTEMS:    GENERAL:  No weight loss, malaise or fevers.  HEENT:   No recent changes in vision or hearing  NECK:  Negative for lumps, no difficulty with swallowing.  RESPIRATORY:  Negative for cough, wheezing or shortness of breath, patient denies any recent  "URI.  CARDIOVASCULAR:  Negative for chest pain, leg swelling or palpitations. HTN.  GI:  Negative for abdominal discomfort, blood in stools or black stools or change in bowel habits.  MUSCULOSKELETAL:  See HPI.  SKIN:  Negative for lesions, rash, and itching.  PSYCH:  No mood disorder or recent psychosocial stressors.  Patient's sleep is occasionally disturbed secondary to pain.  HEMATOLOGY/LYMPHOLOGY:  Negative for prolonged bleeding, bruising easily or swollen nodes.  Patient is not currently taking any anti-coagulants  ENDO: Positive for DM. Negative for thyroid dysfunction  NEURO:   No history of headaches, syncope, paralysis, seizures or tremors.  All other reviewed and negative other than HPI.    OBJECTIVE:    /78   Pulse 70   Temp 98.7 °F (37.1 °C)   Resp 18   Ht 5' 7" (1.702 m)   Wt 109.6 kg (241 lb 10 oz)   BMI 37.84 kg/m²     PHYSICAL EXAMINATION:    GENERAL: Well appearing, in no acute distress, alert and oriented x3.  PSYCH:  Mood and affect appropriate.  SKIN: Skin color, texture, turgor normal, no rashes or lesions.  HEAD/FACE:  Normocephalic, atraumatic. Cranial nerves grossly intact.   CV: RRR with palpation of the radial artery.  PULM: No evidence of respiratory difficulty, symmetric chest rise.  BACK: Straight leg raising in the siting position is negative for radicular pain bilaterally. There is pain with palpation over lumbar spine. Limited ROM with pain on extension. Positive facet loading bilaterally, R>L.  LOWER EXTREMITIES: Peripheral joint ROM is full and pain free without obvious instability or laxity in lower extremities. No deformities, edema, or skin discoloration. Good capillary refill.  MUSCULOSKELETAL: Hip and knee provocative maneuvers are negative.  There is pain with palpation over the sacroiliac joints bilaterally.  FABERs test is negative bilaterally.  FADIRs test is negative. 5/5 strength in right ankle with plantar and dorsiflexion. 4/5 strength in left ankle with " plantar and dorsiflexion. 5/5 strength with right knee flexion and extension. 5/5 strength with left knee flexion and extension. 5/5 strength in right EHL, 4/5 strength in left EHL.  No atrophy or tone abnormalities are noted.  NEURO: Bilateral lower extremity coordination and muscle stretch reflexes are physiologic and symmetric.   No loss of sensation is noted.  GAIT: Antalgic- ambulates with cane.     ASSESSMENT: 74 y.o. year old female with back/leg pain, consistent with BL radiculopathy due to L5 disc protrusion.    Encounter Diagnoses   Name Primary?    Lumbar spondylosis Yes    Facet arthritis of lumbar region     Neuroforaminal stenosis of lumbar spine     Annular tear of lumbar disc     DDD (degenerative disc disease), lumbar     Sacroiliac joint pain        PLAN:     - Previous imaging was reviewed and discussed with the patient today.    - Schedule for right then left L2,3,4,5 RFA, one side at a time, two weeks apart.   The procedure, risks, benefits and options were discussed with patient. There are no contraindications to the procedure. The patient expressed understanding and agreed to proceed.      - She had 80% relief with bilateral MBB. She had 90% relief for 2 days in 2017 at the same levels.      - She is s/p bilateral SI joint injections with benefit. We can repeat this as needed.     - She is s/p right L5 and S1 TF LEONEL with benefit. We can repeat this as needed.     - Continue Gabapentin.     - Continue physical therapy.     - HME order provided for rollator. Patient is unsteady with prolonged walking with 4-point cane.     - RTC 3 weeks after above procedures.     - Dr. Duncan was consulted on the patient and agrees with this plan.    The above plan and management options were discussed at length with patient. Patient is in agreement with the above and verbalized understanding.     Coby Russo NP  05/28/2019

## 2019-05-28 NOTE — TELEPHONE ENCOUNTER
----- Message from Maico Mcmahan sent at 5/28/2019 12:20 PM CDT -----  Contact: ABILIO SIDDIQUI [[id number]]  Name of Who is Calling: ABILIO SIDDIQUI [[id number]]      What is the request in detail: Would like to speak with staff in regards to a Rolator. States she was giving a prescription for the rolator but needs an authorization and signature of physician. Please advise      Can the clinic reply by [facility]: no      What [facility] Back [facility] [phone]

## 2019-05-28 NOTE — TELEPHONE ENCOUNTER
----- Message from [place] sent at 5/28/2019  3:31 PM CDT -----  Contact: pt   Name of Who is Calling: ABILIO SIDDIQUI [[id number]]    What is the request in detail:Patient is requesting a call back regarding [facility]  Pt states she needs a PA ......Please contact to further discuss and advise      Can the clinic reply by [facility]: No     What Number to Call Back if not in [facility]:[phone]

## 2019-05-28 NOTE — TELEPHONE ENCOUNTER
Called and spoke with pt to inform her that staff will be sending necessary paper work to [employer].

## 2019-06-04 ENCOUNTER — TELEPHONE (OUTPATIENT)
Dept: PAIN MEDICINE | Facility: CLINIC | Age: 74
End: 2019-06-04

## 2019-06-04 NOTE — TELEPHONE ENCOUNTER
----- Message from Faith Lainez sent at 6/4/2019 11:00 AM CDT -----  Contact: ABILIO SIDDIQUI   Name of Who is Calling: ABILIO SIDDQIUI       What is the request in detail: Patient was returning a missed call back from the staff       Can the clinic reply by [facility]: no      What [facility] [phone]

## 2019-06-04 NOTE — TELEPHONE ENCOUNTER
Daphnie Jay is a 63 y.o. female seen by diabetes educator 02/19/2018 for training on Freestyle Stephanie flash glucose monitoring system.     Reviewed components of Stephanie system. Reviewed how to set up .  set up in office today.     Instructed patient on how to view trend graph.     Instructed patient on how to properly insert sensor including: skin prep, site selection, site rotation, sensor placement, and how to use insertion device. Patient inserted sensor in office today.     Instructed patient to change Stephanie sensor every 10 days.     Instructed patient to not wear sensor or transmitter in xray, MRI, or CT Scan.    Patient advised to check blood glucose with fingerstick if symptoms do not match Stephanie readings.     Advised patient to call [employer] Customer Support if has a failed sensor or if has technical questions regarding sensor.       Afrezza Training    Reviewed proper storage of Afrezza cartridges and inhaler    Reviewed loading cartridges and proper inhalation     Reviewed cartridges colors and dosing: blue: 4 units, green: 8 units, yellow: 12 units; discussed how to combine cartridges to match insulin needs    Unopened cartridge blister can be at room temperature for 10 days, opened cartridge blister can be at room temperature for 3 days    Instructed patient to place cartridges at room temperature for 10 minutes prior to inhaling    Instructed patient to change inhaler every 15 days.     To clean inhaler, only wipe mouthpiece. Do not submerge in water    Provided patient with handout detailing above information    Cori Enamorado MS, RD, LD, CDE   Returned call and spoke with pt.     Pt infomrmed staff that she got a voicemail from the office informing her that staff havent heard anything from [employer] in regards to Rolator.     Staff informed pt that we havent heard anything back from [facility] However, the paper work was sent to them on 5/29/19.     Pt informed staff she will contact [facility] to see if they have received the paperwork and follow up with staff once she speak with them.

## 2019-06-05 ENCOUNTER — CLINICAL SUPPORT (OUTPATIENT)
Dept: REHABILITATION | Facility: OTHER | Age: 74
End: 2019-06-05
Attending: NURSE PRACTITIONER
Payer: MEDICARE

## 2019-06-05 DIAGNOSIS — G89.29 CHRONIC LOW BACK PAIN WITHOUT SCIATICA, UNSPECIFIED BACK PAIN LATERALITY: ICD-10-CM

## 2019-06-05 DIAGNOSIS — M54.50 CHRONIC LOW BACK PAIN WITHOUT SCIATICA, UNSPECIFIED BACK PAIN LATERALITY: ICD-10-CM

## 2019-06-05 PROCEDURE — 97110 THERAPEUTIC EXERCISES: CPT

## 2019-06-05 NOTE — PLAN OF CARE
"[facility] Treatment /POC     Name: Mila Foster  Clinic Number: [id number]  Date of Treatment: 2019   Diagnosis:   Encounter Diagnosis   Name Primary?    Chronic low back pain without sciatica, unspecified back pain laterality      Physician: Coby Russo NP    Pain pattern determined: 1 PEN  Plan of care signed: 19   Time in: 930am  Time Out: 1030 am  Total Treatment time: 60  Precautions: DM/HTN  Visit #: 5    POC due: 19  Reassessment due:19, done 19, 19  Next due: 19    Subjective   Mila reports she is feeling pretty good, but continues to have difficulty    Patient reports their pain to be 2/10 on a 0-10 scale with 0 being no pain and 10 being the worst pain imaginable.    Pain Location: B LB     Occupation:  retired   Leisure: TV/walking                     Pts goals:  "get stronger"       Objective   Baseline Isometric Testing on Med X equipment: Testing administered by PT     Baseline IM Testing Results:   Date of testin19  ROM 6-30 deg   max 81   Min Peak Torque 66   Flex/Ext Ratio 1.2   % below normative data 32   Femur 6    Lap belt not utilized today     FOTO: Focus on Therapeutic Outcomes   Category: lumbar   % Impaired: 48%  Current Score  = CK = at least 40% but < 60% impaired, limited or restricted  Goal at Discharge Score = CJ = at least 20% but < 40% impaired, limited or restricted    Score interpretation is as follows:      TEST SCORE  Modifier  Impairment Limitation Restriction    0/50  CH  0 % impaired, limited or restricted   1-9/50  CI  @ least 1% but less than 20% impaired, limited or restricted   10-19/50  CJ  @ least 20%<40% impaired, limited or restricted   20-29/50  CK  @ least 40%<60% impaired, limited or restricted   30-39/50  CL  @ least 60% <80% impaired, limited or restricted   40-49/50  CM  @ least 80%<100% impaired limited or restricted   50/50  CN  100% impaired, limited or restricted "             Treatment    Pt was instructed in and performed the following:     Mila received therapeutic exercises to develop/improved posture, cardiovascular endurance, muscular endurance, lumbar/cervical ROM, strength and muscular endurance for 60 minutes including the following exercises:   [facility] 6/5/2019   Visit Number 5   VAS Pain Rating 3   Recumbent Bike Seat Pos. 15   Time 8   Extension in Standing -   Flexion in Lying 10   Lumbar Extension Seat Pad -   Femur Restraint -   Top Dead Center -   Counterweight -   Lumbar Flexion -   Lumbar Extension -   Lumbar Peak Torque -   Min Torque -   Test Percent Below Normative Data -   Lumbar Weight 55   Repetitions 20   Rating of Perceived Exertion 3   Ice - Z Lie (in min.) 10       Bridge 10x  PPT 10x  LTR  BKTC c/ ball  SLR  Peripheral muscle strengthening which included 1 set of 15-20 repetitions at a slow, controlled 7 second per rep pace focused on strengthening supporting musculature for improved body mechanics and functional mobility.  Pt and therapist focused on proper form during treatment to ensure optimal strengthening of each targeted muscle group.  Machines were utilized including torso rotation, leg extension, leg curl, chest press, upright row. Tricep extension, bicep curl, leg press, and hip abduction added on third visit.       .     Home Exercise Program as follows:   Flexion in lying 10x, 3x/day              LTR 10x 3x/day    PPT      Handouts were given to the patient. Pt demo good understanding of the education provided. Mila demonstrated good return demonstration of activities.     Lumbar roll use compliance: unknown  Additional exercises taught this treatment session: re printed exercises and reviewed again    Assessment   Patient tolerated treatment fair. Pt completed 20 reps at 55ft/lbs with 3/10 exertion level. .  Pt continues to have difficulty with exercises and requires VC and tactile cues to perform correctly. Pt has  difficulty with coordination of exercises and required max cueing on Med X to maintain pace. Educated pt on benefits of exercising daily with HEP. Pt would be receiving more benefit from [facility] program if able to attend consistently.  Pt has difficulty with transportation and has not been consistent with attendance because of these reasons.  Pt states she is not going to miss any more appts and will be utilizing [employer].  Pt will continue to benefit from skilled outpatient physical therapy to address the deficits stated in the impairment chart, provide pt/family education and to maximize pt's level of independence in the home and community environment.       Pt's spiritual, cultural and educational needs considered and pt agreeable to plan of care and goals as stated below:     Medical necessity is demonstrated by the following problem list.    Pt presents with the following impairments:   History  Co-morbidities and personal factors that may impact the plan of care Examination  Body Structures and Functions, activity limitations and participation restrictions that may impact the plan of care Clinical Presentation    Decision Making/ Complexity Score   Co-morbidities:   HTN and DM           Personal Factors:   transportation Body Regions:   back  lower extremities     Body Systems:   gross symmetry  ROM  strength  gross coordinated movement  balance  gait  transfers  transitions  motor control  motor learning     Activity limitations:   Learning and applying knowledge  no deficits     General Tasks and Commands  no deficits     Communication  no deficits     Mobility  lifting and carrying objects  walking     Self care  no deficits     Domestic Life  shopping  doing house work (cleaning house, washing dishes, laundry)     Interactions/Relationships  no deficits     Life Areas  no deficits     Community and Social Life  community life  recreation and leisure     Participation Restrictions:   Walking for exercise/ walking  prolonged/ sit to stand/stairs       stable and uncomplicated    low            GOALS: Pt is in agreement with the following goals.     Short term goals:  6 weeks or 10 visits   1.  Pt will demonstrate increased lumbar ROM by at least 3 degrees from the initial ROM value with improvements noted in functional ROM and ability to perform ADLs  2.  Pt will demonstrate increased maximum isometric torque value by 10% when compared to the initial value resulting in improved ability to perform bending, lifting, and carrying activities safely, confidently.  3.  Patient report a reduction in worst pain score by 1-2 points for improved tolerance during work and recreational activities  4.  Pt able to perform HEP correctly with minimal cueing or supervision for therapist     Long term goals: 13 weeks or 20 visits   1. Pt will demonstrate increased lumbar ROM by at least 6 degrees from initial ROM value, resulting in improved ability to perform functional fwd bending while standing and sitting.   2. Pt will demonstrate increased maximum isometric torque value by 20% when compared to the initial value resulting in improved ability to perform bending, lifting, and carrying activities safely, confidently.  3. Pt to demonstrate ability to independently control and reduce their pain through posture positioning and mechanical movements throughout a typical day.  4.  Patient will demonstrate improved overall function per FOTO Survey to CJ = at least 20% but < 40% impaired, limited or restricted score or less.         Plan   Continue with established Plan of Care towards established PT goals. 1x-2x/wk x 4 weeks

## 2019-06-05 NOTE — PROGRESS NOTES
"[facility] /POC     Name: Mila Foster  Clinic Number: [id number]  Date of Treatment: 2019   Diagnosis:   Encounter Diagnosis   Name Primary?    Chronic low back pain without sciatica, unspecified back pain laterality      Physician: Coby Russo NP    Pain pattern determined: 1 PEN  Plan of care signed: 19   Time in: 930am  Time Out: 1030 am  Total Treatment time: 60  Precautions: DM/HTN  Visit #: 5    POC due: 19  Reassessment due:19, done 19, 19  Next due: 19    Subjective   Mila reports she is feeling pretty good, but continues to have difficulty    Patient reports their pain to be 2/10 on a 0-10 scale with 0 being no pain and 10 being the worst pain imaginable.    Pain Location: B LB     Occupation:  retired   Leisure: TV/walking                     Pts goals:  "get stronger"       Objective   Baseline Isometric Testing on Med X equipment: Testing administered by PT     Baseline IM Testing Results:   Date of testin19  ROM 6-30 deg   max 81   Min Peak Torque 66   Flex/Ext Ratio 1.2   % below normative data 32   Femur 6    Lap belt not utilized today     FOTO: Focus on Therapeutic Outcomes   Category: lumbar   % Impaired: 48%  Current Score  = CK = at least 40% but < 60% impaired, limited or restricted  Goal at Discharge Score = CJ = at least 20% but < 40% impaired, limited or restricted    Score interpretation is as follows:      TEST SCORE  Modifier  Impairment Limitation Restriction    0/50  CH  0 % impaired, limited or restricted   1-9/50  CI  @ least 1% but less than 20% impaired, limited or restricted   10-19/50  CJ  @ least 20%<40% impaired, limited or restricted   20-29/50  CK  @ least 40%<60% impaired, limited or restricted   30-39/50  CL  @ least 60% <80% impaired, limited or restricted   40-49/50  CM  @ least 80%<100% impaired limited or restricted   50/50  CN  100% impaired, limited or restricted "             Treatment    Pt was instructed in and performed the following:     Mila received therapeutic exercises to develop/improved posture, cardiovascular endurance, muscular endurance, lumbar/cervical ROM, strength and muscular endurance for 60 minutes including the following exercises:   [facility] 6/5/2019   Visit Number 5   VAS Pain Rating 3   Recumbent Bike Seat Pos. 15   Time 8   Extension in Standing -   Flexion in Lying 10   Lumbar Extension Seat Pad -   Femur Restraint -   Top Dead Center -   Counterweight -   Lumbar Flexion -   Lumbar Extension -   Lumbar Peak Torque -   Min Torque -   Test Percent Below Normative Data -   Lumbar Weight 55   Repetitions 20   Rating of Perceived Exertion 3   Ice - Z Lie (in min.) 10       Bridge 10x  PPT 10x  LTR  BKTC c/ ball  SLR  Peripheral muscle strengthening which included 1 set of 15-20 repetitions at a slow, controlled 7 second per rep pace focused on strengthening supporting musculature for improved body mechanics and functional mobility.  Pt and therapist focused on proper form during treatment to ensure optimal strengthening of each targeted muscle group.  Machines were utilized including torso rotation, leg extension, leg curl, chest press, upright row. Tricep extension, bicep curl, leg press, and hip abduction added on third visit.       .     Home Exercise Program as follows:   Flexion in lying 10x, 3x/day              LTR 10x 3x/day    PPT      Handouts were given to the patient. Pt demo good understanding of the education provided. Mila demonstrated good return demonstration of activities.     Lumbar roll use compliance: unknown  Additional exercises taught this treatment session: re printed exercises and reviewed again    Assessment   Patient tolerated treatment fair. Pt completed 20 reps at 55ft/lbs with 3/10 exertion level. .  Pt continues to have difficulty with exercises and requires VC and tactile cues to perform correctly. Pt has  difficulty with coordination of exercises and required max cueing on Med X to maintain pace. Educated pt on benefits of exercising daily with HEP. Pt would be receiving more benefit from [facility] program if able to attend consistently.  Pt has difficulty with transportation and has not been consistent with attendance because of these reasons.  Pt states she is not going to miss any more appts and will be utilizing [employer].  Pt will continue to benefit from skilled outpatient physical therapy to address the deficits stated in the impairment chart, provide pt/family education and to maximize pt's level of independence in the home and community environment.       Pt's spiritual, cultural and educational needs considered and pt agreeable to plan of care and goals as stated below:     Medical necessity is demonstrated by the following problem list.    Pt presents with the following impairments:   History  Co-morbidities and personal factors that may impact the plan of care Examination  Body Structures and Functions, activity limitations and participation restrictions that may impact the plan of care Clinical Presentation    Decision Making/ Complexity Score   Co-morbidities:   HTN and DM           Personal Factors:   transportation Body Regions:   back  lower extremities     Body Systems:   gross symmetry  ROM  strength  gross coordinated movement  balance  gait  transfers  transitions  motor control  motor learning     Activity limitations:   Learning and applying knowledge  no deficits     General Tasks and Commands  no deficits     Communication  no deficits     Mobility  lifting and carrying objects  walking     Self care  no deficits     Domestic Life  shopping  doing house work (cleaning house, washing dishes, laundry)     Interactions/Relationships  no deficits     Life Areas  no deficits     Community and Social Life  community life  recreation and leisure     Participation Restrictions:   Walking for exercise/ walking  prolonged/ sit to stand/stairs       stable and uncomplicated    low            GOALS: Pt is in agreement with the following goals.     Short term goals:  6 weeks or 10 visits   1.  Pt will demonstrate increased lumbar ROM by at least 3 degrees from the initial ROM value with improvements noted in functional ROM and ability to perform ADLs  2.  Pt will demonstrate increased maximum isometric torque value by 10% when compared to the initial value resulting in improved ability to perform bending, lifting, and carrying activities safely, confidently.  3.  Patient report a reduction in worst pain score by 1-2 points for improved tolerance during work and recreational activities  4.  Pt able to perform HEP correctly with minimal cueing or supervision for therapist     Long term goals: 13 weeks or 20 visits   1. Pt will demonstrate increased lumbar ROM by at least 6 degrees from initial ROM value, resulting in improved ability to perform functional fwd bending while standing and sitting.   2. Pt will demonstrate increased maximum isometric torque value by 20% when compared to the initial value resulting in improved ability to perform bending, lifting, and carrying activities safely, confidently.  3. Pt to demonstrate ability to independently control and reduce their pain through posture positioning and mechanical movements throughout a typical day.  4.  Patient will demonstrate improved overall function per FOTO Survey to CJ = at least 20% but < 40% impaired, limited or restricted score or less.         Plan   Continue with established Plan of Care towards established PT goals. 1x-2x/wk x 4 weeks

## 2019-06-13 ENCOUNTER — CLINICAL SUPPORT (OUTPATIENT)
Dept: REHABILITATION | Facility: OTHER | Age: 74
End: 2019-06-13
Attending: NURSE PRACTITIONER
Payer: MEDICARE

## 2019-06-13 DIAGNOSIS — G89.29 CHRONIC LOW BACK PAIN WITHOUT SCIATICA, UNSPECIFIED BACK PAIN LATERALITY: ICD-10-CM

## 2019-06-13 DIAGNOSIS — M54.50 CHRONIC LOW BACK PAIN WITHOUT SCIATICA, UNSPECIFIED BACK PAIN LATERALITY: ICD-10-CM

## 2019-06-13 PROCEDURE — 97110 THERAPEUTIC EXERCISES: CPT

## 2019-06-13 NOTE — PROGRESS NOTES
"[facility] Treatment /POC     Name: Mila Foster  Clinic Number: [id number]  Date of Treatment: 2019   Diagnosis:   Encounter Diagnosis   Name Primary?    Chronic low back pain without sciatica, unspecified back pain laterality      Physician: Coby Russo NP    Pain pattern determined: 1 PEN  Plan of care signed: 19   Time in: 1:00pm  Time Out: 2:00 pm  Total Treatment time: 60  Precautions: DM/HTN  Visit #: 6    POC due: 19  Reassessment due:19, done 19, 19  Next due: 19    Subjective   Mila reports she is feeling pretty good with no LBP, more pain with R LE.     Patient reports their pain to be 2/10 on a 0-10 scale with 0 being no pain and 10 being the worst pain imaginable.    Pain Location: B LB     Occupation:  retired   Leisure: TV/walking                     Pts goals:  "get stronger"       Objective   Baseline Isometric Testing on Med X equipment: Testing administered by PT     Baseline IM Testing Results:   Date of testin19  ROM 6-30 deg   max 81   Min Peak Torque 66   Flex/Ext Ratio 1.2   % below normative data 32   Femur 6    Lap belt not utilized today     FOTO: Focus on Therapeutic Outcomes   Category: lumbar   % Impaired: 48%  Current Score  = CK = at least 40% but < 60% impaired, limited or restricted  Goal at Discharge Score = CJ = at least 20% but < 40% impaired, limited or restricted    Score interpretation is as follows:      TEST SCORE  Modifier  Impairment Limitation Restriction    0/50  CH  0 % impaired, limited or restricted   1-9/50  CI  @ least 1% but less than 20% impaired, limited or restricted   10-19/50  CJ  @ least 20%<40% impaired, limited or restricted   20-29/50  CK  @ least 40%<60% impaired, limited or restricted   30-39/50  CL  @ least 60% <80% impaired, limited or restricted   40-49/50  CM  @ least 80%<100% impaired limited or restricted   50/50  CN  100% impaired, limited or restricted "             Treatment    Pt was instructed in and performed the following:     Mila received therapeutic exercises to develop/improved posture, cardiovascular endurance, muscular endurance, lumbar/cervical ROM, strength and muscular endurance for 60 minutes including the following exercises:     [facility] 6/13/2019   Visit Number 6   VAS Pain Rating 3   Recumbent Bike Seat Pos. 15   Time 10   Extension in Standing -   Flexion in Lying 10   Lumbar Extension Seat Pad -   Femur Restraint -   Top Dead Center -   Counterweight -   Lumbar Flexion -   Lumbar Extension -   Lumbar Peak Torque -   Min Torque -   Test Percent Below Normative Data -   Lumbar Weight 58   Repetitions 20   Rating of Perceived Exertion 3   Ice - Z Lie (in min.) 10   Bridge 10x  PPT 10x  LTR  BKTC c/ ball  SLR  Peripheral muscle strengthening which included 1 set of 15-20 repetitions at a slow, controlled 7 second per rep pace focused on strengthening supporting musculature for improved body mechanics and functional mobility.  Pt and therapist focused on proper form during treatment to ensure optimal strengthening of each targeted muscle group.  Machines were utilized including torso rotation, leg extension, leg curl, chest press, upright row. Tricep extension, bicep curl, leg press, and hip abduction added on third visit.       .     Home Exercise Program as follows:   Flexion in lying 10x, 3x/day              LTR 10x 3x/day    PPT      Handouts were given to the patient. Pt demo good understanding of the education provided. Mila demonstrated good return demonstration of activities.     Lumbar roll use compliance: unknown  Additional exercises taught this treatment session: re printed exercises and reviewed again    Assessment   Patient tolerated treatment well and her LE pain decreased a little with session.  Pt completed 15 reps with a weight increase  with 3/10 exertion level. .Pt continues to have difficulty with her HEP and  requires VC and tactile cues to perform correctly. Pt has difficulty with coordination with the lumbar machine and she needs vc. No pacer ball.   Educated pt on benefits of exercising daily with HEP. Pt would be receiving more benefit from [facility] program if able to attend consistently.  Pt has difficulty with transportation and has not been consistent with attendance because of these reasons.  Pt states she is not going to miss any more appts and will be utilizing [employer].  Pt will continue to benefit from skilled outpatient physical therapy to address the deficits stated in the impairment chart, provide pt/family education and to maximize pt's level of independence in the home and community environment.       Pt's spiritual, cultural and educational needs considered and pt agreeable to plan of care and goals as stated below:     Medical necessity is demonstrated by the following problem list.    Pt presents with the following impairments:   History  Co-morbidities and personal factors that may impact the plan of care Examination  Body Structures and Functions, activity limitations and participation restrictions that may impact the plan of care Clinical Presentation    Decision Making/ Complexity Score   Co-morbidities:   HTN and DM           Personal Factors:   transportation Body Regions:   back  lower extremities     Body Systems:   gross symmetry  ROM  strength  gross coordinated movement  balance  gait  transfers  transitions  motor control  motor learning     Activity limitations:   Learning and applying knowledge  no deficits     General Tasks and Commands  no deficits     Communication  no deficits     Mobility  lifting and carrying objects  walking     Self care  no deficits     Domestic Life  shopping  doing house work (cleaning house, washing dishes, laundry)     Interactions/Relationships  no deficits     Life Areas  no deficits     Community and Social Life  community life  recreation and  leisure     Participation Restrictions:   Walking for exercise/ walking prolonged/ sit to stand/stairs       stable and uncomplicated    low            GOALS: Pt is in agreement with the following goals.     Short term goals:  6 weeks or 10 visits   1.  Pt will demonstrate increased lumbar ROM by at least 3 degrees from the initial ROM value with improvements noted in functional ROM and ability to perform ADLs  2.  Pt will demonstrate increased maximum isometric torque value by 10% when compared to the initial value resulting in improved ability to perform bending, lifting, and carrying activities safely, confidently.  3.  Patient report a reduction in worst pain score by 1-2 points for improved tolerance during work and recreational activities  4.  Pt able to perform HEP correctly with minimal cueing or supervision for therapist     Long term goals: 13 weeks or 20 visits   1. Pt will demonstrate increased lumbar ROM by at least 6 degrees from initial ROM value, resulting in improved ability to perform functional fwd bending while standing and sitting.   2. Pt will demonstrate increased maximum isometric torque value by 20% when compared to the initial value resulting in improved ability to perform bending, lifting, and carrying activities safely, confidently.  3. Pt to demonstrate ability to independently control and reduce their pain through posture positioning and mechanical movements throughout a typical day.  4.  Patient will demonstrate improved overall function per FOTO Survey to CJ = at least 20% but < 40% impaired, limited or restricted score or less.         Plan   Continue with established Plan of Care towards established PT goals. 1x-2x/wk x 4 weeks

## 2019-06-20 ENCOUNTER — CLINICAL SUPPORT (OUTPATIENT)
Dept: REHABILITATION | Facility: OTHER | Age: 74
End: 2019-06-20
Attending: NURSE PRACTITIONER
Payer: MEDICARE

## 2019-06-20 DIAGNOSIS — G89.29 CHRONIC LOW BACK PAIN WITHOUT SCIATICA, UNSPECIFIED BACK PAIN LATERALITY: ICD-10-CM

## 2019-06-20 DIAGNOSIS — M54.50 CHRONIC LOW BACK PAIN WITHOUT SCIATICA, UNSPECIFIED BACK PAIN LATERALITY: ICD-10-CM

## 2019-06-20 PROCEDURE — 97110 THERAPEUTIC EXERCISES: CPT

## 2019-06-20 NOTE — PROGRESS NOTES
"[facility]      Name: Mila Foster  Clinic Number: [id number]  Date of Treatment: 2019   Diagnosis:   Encounter Diagnosis   Name Primary?    Chronic low back pain without sciatica, unspecified back pain laterality      Physician: Coby Russo NP    Pain pattern determined: 1 PEN  Plan of care signed: 19   Time in: 930  Time Out: 1030am  Total Treatment time: 60  Precautions: DM/HTN  Visit #: 7(inc 5%)    POC due: 19  Reassessment due:19, done 19, 19  Next due: 19    Subjective   Mila reports she is feeling pretty good with no LBP.  Pt reports she is trying to walk more throughout the day    Patient reports their pain to be 2/10 on a 0-10 scale with 0 being no pain and 10 being the worst pain imaginable.    Pain Location: B LB     Occupation:  retired   Leisure: TV/walking                     Pts goals:  "get stronger"       Objective   Baseline Isometric Testing on Med X equipment: Testing administered by PT     Baseline IM Testing Results:   Date of testin19  ROM 6-30 deg   max 81   Min Peak Torque 66   Flex/Ext Ratio 1.2   % below normative data 32   Femur 6    Lap belt not utilized today     FOTO: Focus on Therapeutic Outcomes   Category: lumbar   % Impaired: 48%  Current Score  = CK = at least 40% but < 60% impaired, limited or restricted  Goal at Discharge Score = CJ = at least 20% but < 40% impaired, limited or restricted    Score interpretation is as follows:      TEST SCORE  Modifier  Impairment Limitation Restriction    0/50  CH  0 % impaired, limited or restricted   1-9/50  CI  @ least 1% but less than 20% impaired, limited or restricted   10-/50  CJ  @ least 20%<40% impaired, limited or restricted   20-29/50  CK  @ least 40%<60% impaired, limited or restricted   30-39/50  CL  @ least 60% <80% impaired, limited or restricted   40-49/50  CM  @ least 80%<100% impaired limited or restricted   50/50  CN  100% impaired, " limited or restricted             Treatment    Pt was instructed in and performed the following:     Mila received therapeutic exercises to develop/improved posture, cardiovascular endurance, muscular endurance, lumbar/cervical ROM, strength and muscular endurance for 60 minutes including the following exercises:   [facility] 6/20/2019   Visit Number 7   VAS Pain Rating 2   Recumbent Bike Seat Pos. 15   Time 10   Extension in Standing -   Flexion in Lying 10   Lumbar Extension Seat Pad -   Femur Restraint -   Top Dead Center -   Counterweight -   Lumbar Flexion 30   Lumbar Extension 0   Lumbar Peak Torque -   Min Torque -   Test Percent Below Normative Data -   Lumbar Weight 63   Repetitions 20   Rating of Perceived Exertion 3   Ice - Z Lie (in min.) 10       Bridge 10x  PPT 10x  LTR  BKTC c/ ball  SLR  Peripheral muscle strengthening which included 1 set of 15-20 repetitions at a slow, controlled 7 second per rep pace focused on strengthening supporting musculature for improved body mechanics and functional mobility.  Pt and therapist focused on proper form during treatment to ensure optimal strengthening of each targeted muscle group.  Machines were utilized including torso rotation, leg extension, leg curl, chest press, upright row. Tricep extension, bicep curl, leg press, and hip abduction added on third visit.       .     Home Exercise Program as follows:   Flexion in lying 10x, 3x/day              LTR 10x 3x/day    PPT      Handouts were given to the patient. Pt demo good understanding of the education provided. Mila demonstrated good return demonstration of activities.     Lumbar roll use compliance: unknown  Additional exercises taught this treatment session: re printed exercises and reviewed again    Assessment     Pt arrives today to PT and reports she has been walking more for exercise and feels better.  Pt is making steady improvement with OHB and progressing with peripheral resistance and  Med X.  Pts improved attendance is helping her progress.  Increase 5% next visit.  Pt will continue to benefit from skilled outpatient physical therapy to address the deficits stated in the impairment chart, provide pt/family education and to maximize pt's level of independence in the home and community environment.       Pt's spiritual, cultural and educational needs considered and pt agreeable to plan of care and goals as stated below:       Medical necessity is demonstrated by the following problem list.    Pt presents with the following impairments:   History  Co-morbidities and personal factors that may impact the plan of care Examination  Body Structures and Functions, activity limitations and participation restrictions that may impact the plan of care Clinical Presentation    Decision Making/ Complexity Score   Co-morbidities:   HTN and DM           Personal Factors:   transportation Body Regions:   back  lower extremities     Body Systems:   gross symmetry  ROM  strength  gross coordinated movement  balance  gait  transfers  transitions  motor control  motor learning     Activity limitations:   Learning and applying knowledge  no deficits     General Tasks and Commands  no deficits     Communication  no deficits     Mobility  lifting and carrying objects  walking     Self care  no deficits     Domestic Life  shopping  doing house work (cleaning house, washing dishes, laundry)     Interactions/Relationships  no deficits     Life Areas  no deficits     Community and Social Life  community life  recreation and leisure     Participation Restrictions:   Walking for exercise/ walking prolonged/ sit to stand/stairs       stable and uncomplicated    low            GOALS: Pt is in agreement with the following goals.     Short term goals:  6 weeks or 10 visits   1.  Pt will demonstrate increased lumbar ROM by at least 3 degrees from the initial ROM value with improvements noted in functional ROM and ability to perform  ADLs  2.  Pt will demonstrate increased maximum isometric torque value by 10% when compared to the initial value resulting in improved ability to perform bending, lifting, and carrying activities safely, confidently.  3.  Patient report a reduction in worst pain score by 1-2 points for improved tolerance during work and recreational activities  4.  Pt able to perform HEP correctly with minimal cueing or supervision for therapist     Long term goals: 13 weeks or 20 visits   1. Pt will demonstrate increased lumbar ROM by at least 6 degrees from initial ROM value, resulting in improved ability to perform functional fwd bending while standing and sitting.   2. Pt will demonstrate increased maximum isometric torque value by 20% when compared to the initial value resulting in improved ability to perform bending, lifting, and carrying activities safely, confidently.  3. Pt to demonstrate ability to independently control and reduce their pain through posture positioning and mechanical movements throughout a typical day.  4.  Patient will demonstrate improved overall function per FOTO Survey to CJ = at least 20% but < 40% impaired, limited or restricted score or less.         Plan   Continue with established Plan of Care towards established PT goals. Inc 5%

## 2019-06-28 DIAGNOSIS — M47.816 FACET ARTHRITIS OF LUMBAR REGION: ICD-10-CM

## 2019-06-28 DIAGNOSIS — M48.061 NEUROFORAMINAL STENOSIS OF LUMBAR SPINE: ICD-10-CM

## 2019-06-28 DIAGNOSIS — M51.36 DDD (DEGENERATIVE DISC DISEASE), LUMBAR: ICD-10-CM

## 2019-06-28 DIAGNOSIS — M53.3 SACROILIAC JOINT PAIN: ICD-10-CM

## 2019-06-28 DIAGNOSIS — M51.36 ANNULAR TEAR OF LUMBAR DISC: ICD-10-CM

## 2019-06-28 DIAGNOSIS — M47.816 LUMBAR SPONDYLOSIS: Primary | ICD-10-CM

## 2019-07-23 ENCOUNTER — OFFICE VISIT (OUTPATIENT)
Dept: PAIN MEDICINE | Facility: CLINIC | Age: 74
End: 2019-07-23
Payer: MEDICARE

## 2019-07-23 VITALS
HEART RATE: 71 BPM | WEIGHT: 235.69 LBS | BODY MASS INDEX: 36.99 KG/M2 | DIASTOLIC BLOOD PRESSURE: 68 MMHG | TEMPERATURE: 99 F | HEIGHT: 67 IN | SYSTOLIC BLOOD PRESSURE: 130 MMHG

## 2019-07-23 DIAGNOSIS — M53.3 SACROILIAC JOINT PAIN: ICD-10-CM

## 2019-07-23 DIAGNOSIS — M51.36 DDD (DEGENERATIVE DISC DISEASE), LUMBAR: ICD-10-CM

## 2019-07-23 DIAGNOSIS — M51.36 ANNULAR TEAR OF LUMBAR DISC: ICD-10-CM

## 2019-07-23 DIAGNOSIS — M47.816 FACET ARTHRITIS OF LUMBAR REGION: ICD-10-CM

## 2019-07-23 DIAGNOSIS — M48.061 NEUROFORAMINAL STENOSIS OF LUMBAR SPINE: ICD-10-CM

## 2019-07-23 DIAGNOSIS — M47.816 LUMBAR SPONDYLOSIS: Primary | ICD-10-CM

## 2019-07-23 PROCEDURE — 1101F PT FALLS ASSESS-DOCD LE1/YR: CPT | Mod: CPTII,S$GLB,, | Performed by: NURSE PRACTITIONER

## 2019-07-23 PROCEDURE — 99999 PR PBB SHADOW E&M-EST. PATIENT-LVL III: ICD-10-PCS | Mod: PBBFAC,,, | Performed by: NURSE PRACTITIONER

## 2019-07-23 PROCEDURE — 1101F PR PT FALLS ASSESS DOC 0-1 FALLS W/OUT INJ PAST YR: ICD-10-PCS | Mod: CPTII,S$GLB,, | Performed by: NURSE PRACTITIONER

## 2019-07-23 PROCEDURE — 99213 PR OFFICE/OUTPT VISIT, EST, LEVL III, 20-29 MIN: ICD-10-PCS | Mod: S$GLB,,, | Performed by: NURSE PRACTITIONER

## 2019-07-23 PROCEDURE — 99999 PR PBB SHADOW E&M-EST. PATIENT-LVL III: CPT | Mod: PBBFAC,,, | Performed by: NURSE PRACTITIONER

## 2019-07-23 PROCEDURE — 99213 OFFICE O/P EST LOW 20 MIN: CPT | Mod: S$GLB,,, | Performed by: NURSE PRACTITIONER

## 2019-07-23 RX ORDER — FLUOXETINE HYDROCHLORIDE 20 MG/1
CAPSULE ORAL
Refills: 1 | COMMUNITY
Start: 2019-07-11

## 2019-07-23 NOTE — PROGRESS NOTES
Chronic Pain - Established Patient    Referring Physician: Coby Russo NP    Chief Complaint:   Chief Complaint   Patient presents with    Follow-up     3 weeks after RFA        SUBJECTIVE: Disclaimer: This note has been generated using voice-recognition software. There may be typographical errors that have been missed during proof-reading    Interval History 7/23/2019:  The patient returns to clinic today for follow up. She was previously scheduled for RFA but had to cancel due to a fall. She fell while getting out of her bed and hit her face on her nightstand. She did have xrays which were negative for facial fracture. She did have a facial laceration which was sutured. She has rescheduled her RFA for this week. She continues to report low back pain that is constant, sharp, and aching. She denies any radiating leg pain. She does report intermittent numbness and pain to her buttocks with prolonged sitting. She continues to take Gabapentin. She denies any other health changes. Her pain today is 7/10.      Interval History 5/28/2019:  The patient returns to clinic today for follow up. She is s/p bilateral L2,3,4,5 MBB on 5/21/2019. She reports 80% relief of her low back pain after the block. She did sleep very well the night after the block. Her pain has returned to baseline. She continues to report low back pain that is constant, sharp, and aching in nature. She denies any radiating leg pain. She does report increased tailbone pain. Her pain is worse with prolonged standing and walking. She continues to take Gabapentin with benefit. She denies any other health changes. Her pain today is 2/10.    Interval History 4/26/2019:  The patient returns to clinic today for follow up. She reports a fall yesterday at home where she tripped over her shoe string. She landed on her buttock. She reports that she hit the back of her head on the carpet. She denies any loss of consciousness. She did not go to the ER. She does  report increased low back pain today that is sore in nature which she attributes to the fall. She continues to report low back pain that is sharp and aching in nature. She does report intermittent aching buttock pain. She denies any radiating leg pain. Her pain is worse with prolonged standing and walking. She continues to participate in physical therapy. She continues to take Gabapentin with benefit. She did have lumbar MBB in 2017 with 90% relief for a few days. She denies any other health changes. She does have stress urinary incontinence. She does follow up with Urogyn. Her pain today is 7/10.    Interval History 3/11/2019:  The patient returns to clinic today for follow up. She continues to report low back pain that is aching in nature. She reports right sided buttock pain that is sore in nature. She denies any radiating leg pain. Her pain is worse with standing, sitting, and activity. She does endorse morning stiffness. She is currently participating in physical therapy with benefit. She continues to take Gabapentin with benefit and does need a refill today. She denies any other health changes. She denies any bowel or bladder incontinence. Her pain today is 5/10.    Interval History 1/9/2019:  The patient returns to clinic today for follow up. She is s/p bilateral SI joint injections on 12/27/2018. She reports 100% for the first week. She now reports approximately 50% relief of her pain. She reports intermittent low back and bilateral buttock pain. She describes this pain as sore and aching in nature. This pain is worse with prolonged sitting. She denies any radiating leg pain. She often feels as though as she is leaning over while walking. She often feels as though her balance is off. She continues to take Gabapentin with benefit. She denies any other health changes. His pain today is 2/10.     Interval History 12/11/2018:  The patient returns to clinic today for follow up. She reports increased low back and  bilateral buttock pain, left greater than right. She describes this pain as sore in nature. This pain is worse with prolonged sitting. She denies any radiating leg pain. She reports that she often feels like her back is locking up on her. She continues to take Gabapentin with benefit. She denies any other health changes. Her pain today is 5/10.    Interval History 9/25/2018:  The patient returns to clinic today for follow up. She is s/p right L5 and S1 TF LEONEL. She reports 90% relief of her low back and leg pain. She reports intermittent low back pain that is aching in nature. She denies any radiating leg pain today. Her back pain is worse with bending. She also reports that her legs feel heavy with prolonged walking. She denies any other health changes. Her pain today is 4/10.    Interval History 7/27/2018:  The patient returns to clinic today for follow up and image review. She continues to report low back pain that radiates down the posterior aspect of her right leg to her foot. She reports intermittent left leg pain in the same distribution. Her pain is worse with prolonged walking and activity. She often feels like her legs are heavy. She often feels like she will fall. She denies any other health changes. She denies any bowel or bladder incontinence. Her pain today is 2/10.    Interval History 7/20/2018:  The patient returns to clinic today for follow up. She has not been seen in over a year due to her son being diagnosed with cancer. She reports that he is in remission now. She returns today due to three fall recently, last a week ago. She continues to report low back pain that radiates intermittently down the posterior portion of both legs to her feet. She reports that her legs often feel heavy and weak which is why she falls. She denies any bowel or bladder incontinence. Her pain today is 7/10.    Interval History 3/6/2017:  The patient returns to clinic today for follow up. She is s/p bilateral S1 TF LEONEL on  "2/8/2017. She reports 80% relief of her radiating leg pain. She continues to have low back pain. She describes the pain as dull and aching. The pain is worse in the morning. She reports that the pain increases with prolonged sitting and standing. She denies any other health changes. She denies any bowel or bladder incontinence. Her pain today is 6/10.     Interval History 1/23/2017:  The patient returns to clinic today for follow up of low back pain and bilateral leg pain. She has recently completed a medrol dose pack with some benefit. She still reports radiating pain into both legs. The pain is worse with prolonged sitting and lifting. The pain gets better with walking and rest. The pain radiates down the back of both legs to ankles. She denies any bowel or bladder incontinence or signs of saddle paresthesia. She continues to work with lifting restrictions. She currently takes Gabapentin and Naproxen with benefit. Her pain today is 6/10.     Initial encounter:    Mila Foster presents to the clinic for the evaluation of bilateral leg pain with intermittent lower back pain. 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 She reports recent falling to knees, but she changed her shoes, and the falling has ceased. She reports that she had a "pain flare" 2 weeks ago requiring a walker for 2 days.     Brief history:  72yo F with hx of HTN and DM complicated by neuropathy BL but R>L with tingling, tightness, shooting pain only in feet. Patient reports glucose was 120 this morning and usually runs around 130.    Pain Description:    The pain is located in the posterior leg area and radiates to her ankles and to her knees at its worst.     At BEST  1/10     At WORST  8/10 on the WORST day.      On average pain is rated as 7/10    Today the pain is rated as 5/10    The pain is described as aching, burning, dull, sharp and tight band  "     Symptoms interfere with work.     Exacerbating factors: Sitting, picking up children, lifting, bending over    Mitigating factors: walking, rest    Patient denies night fever/night sweats, bowel incontinence, significant weight loss, significant motor weakness and loss of sensations. She does report chronic urinary incontinence unrelated to back/leg pain.    Patient denies any suicidal or homicidal ideations    Pain Medications:  Current:  Gabapentin    Tried in Past:  NSAIDs -yes Naproxen   TCA -Never  SNRI -Never  Anti-convulsants -gabapentin  Muscle Relaxants -Never  Opioids-Never    Physical Therapy/Home Exercise: yes       report: N/A    Pain Procedures:   2/8/2017- Bilateral S1 TF LEONEL  3/22/2017- Bilateral L2,3,4,5 MBB  9/11/2018- Right L5 and S1 TF LEONEL   12/27/2018- Bilateral SI joint injections  5/21/2019- Bilateral L2,3,4,5 MBB    Chiropractor -never  Acupuncture - never  TENS unit -never  Spinal decompression -never  Joint replacement -never    Imaging:   MRI Lumbar Spine 7/25/2018:  COMPARISON:  12/16/2016    FINDINGS:  There is again normal lumbar lordosis.  No spondylolisthesis or spondylolysis or marrow edema to suggest acute lumbar fractures or neoplastic processes.  The tip of the conus is at L1-2 disc space.  Paraspinal soft tissues are unremarkable.    L5-S1: There is dehydration of the nucleus pulposis associated with disc space narrowing.  There is a right paracentral and right foraminal disc protrusion/herniation with findings suggestive of slight flattening of the descending right S1 root (image 30 series 7 and 8 and image 7 series 4).  In addition there is also a moderate right L5-S1 foraminal stenosis.  In the far lateral neural foramen the inferior surface of the exiting L5 root contacts the disc protrusion.  Clinical correlation for right L5 and S1 radiculopathy suggested.  Left neural foramen is unremarkable.  No significant central canal spinal stenosis.  There is mild facet  arthropathy.    L4-5: Dehydration of the nucleus pulposis associated with a mild posterior bulging of the annulus.  No significant central canal stenosis.  There is degenerative hypertrophic facet arthropathy.    L3-4: Dehydration of the nucleus pulposis.  No significant central canal spinal stenosis.  There is degenerative hypertrophic facet arthropathy and hypertrophy of the ligamentum flava unchanged from the previous study.    L2-3: Mild spondylotic changes associated with mild posterior bulging of the annulus.  No significant spinal stenosis.  There is facet arthropathy.    L1-2: No significant disc herniations or significant spinal stenosis.  Neural foramina are unremarkable.  There is facet arthropathy.      Impression       1. Right L5-S1 foraminal stenosis associated with a disc protrusion/disc herniation.  Clinical correlation for right L5 and S1 radiculopathy suggested.  2. Multilevel facet arthropathy.  3. Milder spondylotic changes at the rest of the disc spaces as above.     Xray Lumbar Spine 7/25/2018:  COMPARISON:  Prior lumbar spine MRI dated 12/16/16    FINDINGS:  There is diffuse osteopenia.  There is mild grade 1 anterolisthesis of L3 on L4 (4 mm) and of L4 on L5 (6 mm).  There is no evidence of translational instability.  There is moderate facet arthropathy at the lower lumbar spine with probable L5-S1 neural foraminal narrowing.  Endplate degenerative changes are present with subchondral sclerosis and marginal osteophyte formation.    There is atherosclerotic calcification of the aorta.      Impression       Osteopenia and degenerative change of the lumbar spine with grade 1 anterolisthesis of L3-4 and L4-5, new from the 2016 MRI.  No evidence of translational instability.     12/12/16 MRI Lumbar Spine WO Contrast   L1-L2: There is no focal disc herniation. No significant spinal canal or neural foraminal narrowing.    L2-L3: There is no focal disc herniation. No significant spinal canal or  neural foraminal narrowing.    L3-L4: Mild bilateral facet arthropathy.  No focal disc herniation. No significant spinal canal or neural foraminal narrowing.    L4-L5: Mild disc bulge and bilateral facet arthropathy results in mild right-sided neural foraminal narrowing. No significant spinal canal stenosis.    L5-S1: Diffuse disc bulge with small superimposed right foraminal disc protrusion.  Findings result in moderate right-sided neuroforaminal narrowing with likely abutment/impingement upon the exited right L5 nerve root. No significant spinal canal stenosis.  Small posterior annular fissure noted.   Impression      Lumbar spondylosis as detailed above, most significant for small right foraminal protrusion at the L5-S1 level resulting in moderate neuroforaminal narrowing and likely abutment/impingement upon the exited right L5 nerve root         Past Medical History:   Diagnosis Date    Diabetes     Hypertension      Past Surgical History:   Procedure Laterality Date    BLOCK, NERVE, BILATERAL L2,3,4,5 Bilateral 5/21/2019    [facility]    BLOCK-NERVE-MEDIAL BRANCH-LUMBAR Bilateral 4/12/2017    [facility]    BLOCK-NERVE-MEDIAL BRANCH-LUMBAR Bilateral 3/22/2017    [facility]    CHOLECYSTECTOMY      COLONOSCOPY Suprep N/A 11/1/2018    [facility]    HYSTERECTOMY      Injection, Joint BILATERAL SACROILIAC JOINT INJECTION Bilateral 12/27/2018    [facility]    Injection,steroid,epidural,transforaminal approach  Right L5 and S1 Right 9/11/2018    [facility]    INJECTION-STEROID-EPIDURAL-TRANSFORAMINAL Bilateral 2/8/2017    [facility]     Social History     Socioeconomic History    Marital status: Single     Spouse name: Not on file     Number of children: Not on file    Years of education: Not on file    Highest education level: Not on file   Occupational History    Not on file   Social Needs    Financial resource strain: Not on file    Food insecurity:     Worry: Not on file     Inability: Not on file    Transportation needs:     Medical: Not on file     Non-medical: Not on file   Tobacco Use    Smoking status: Never Smoker    Smokeless tobacco: Never Used   Substance and Sexual Activity    Alcohol use: No     Frequency: Never    Drug use: No    Sexual activity: Not Currently   Lifestyle    Physical activity:     Days per week: Not on file     Minutes per session: Not on file    Stress: Not on file   Relationships    Social connections:     Talks on phone: Not on file     Gets together: Not on file     Attends [employer] service: Not on file     Active member of club or organization: Not on file     Attends meetings of clubs or organizations: Not on file     Relationship status: Not on file   Other Topics Concern    Not on file   Social History Narrative    Not on file     Family History   Problem Relation Age of Onset    Vaginal cancer Neg Hx     Endometrial cancer Neg Hx     Cervical cancer Neg Hx        Review of patient's allergies indicates:  No Known Allergies    Current Outpatient Medications   Medication Sig    ammonium lactate 12 % Crea     aspirin (ECOTRIN) 81 MG EC tablet 81 mg.    aspirin-calcium carbonate 81 mg-300 mg calcium(777 mg) Tab Take by mouth.    FLUZONE HIGH-DOSE 2018-19, PF, 180 mcg/0.5 mL vaccine ADM 0.5ML IM UTD    gabapentin (NEURONTIN) 300 MG capsule Take 2 tabs in the morning, 1 tab in the afternoon, and 2 tabs in the evening    glipiZIDE (GLUCOTROL) 5 MG tablet 5 mg.    hydrochlorothiazide (HYDRODIURIL) 25 MG tablet 25 mg.    hydroCHLOROthiazide (HYDRODIURIL) 25 MG tablet Take by mouth.    hyoscyamine (ANASPAZ,LEVSIN) 0.125 mg Tab Take 1 tablet (125 mcg total) by mouth every 4 (four)  "hours as needed.    metformin (GLUCOPHAGE-XR) 500 MG 24 hr tablet 500 mg.    metoprolol tartrate (LOPRESSOR) 25 MG tablet     mirabegron (MYRBETRIQ) 25 mg Tb24 ER tablet Take 1 tablet (25 mg total) by mouth once daily.    naproxen (NAPROSYN) 500 MG tablet     omeprazole (PRILOSEC) 20 MG capsule 20 mg.    pravastatin (PRAVACHOL) 40 MG tablet 40 mg.     No current facility-administered medications for this visit.        REVIEW OF SYSTEMS:    GENERAL:  No weight loss, malaise or fevers.  HEENT:   No recent changes in vision or hearing  NECK:  Negative for lumps, no difficulty with swallowing.  RESPIRATORY:  Negative for cough, wheezing or shortness of breath, patient denies any recent URI.  CARDIOVASCULAR:  Negative for chest pain, leg swelling or palpitations. HTN.  GI:  Negative for abdominal discomfort, blood in stools or black stools or change in bowel habits.  MUSCULOSKELETAL:  See HPI.  SKIN:  Negative for lesions, rash, and itching.  PSYCH:  No mood disorder or recent psychosocial stressors.  Patient's sleep is occasionally disturbed secondary to pain.  HEMATOLOGY/LYMPHOLOGY:  Negative for prolonged bleeding, bruising easily or swollen nodes.  Patient is not currently taking any anti-coagulants  ENDO: Positive for DM. Negative for thyroid dysfunction  NEURO:   No history of headaches, syncope, paralysis, seizures or tremors.  All other reviewed and negative other than HPI.    OBJECTIVE:    /68   Pulse 71   Temp 98.9 °F (37.2 °C)   Ht 5' 7" (1.702 m)   Wt 106.9 kg (235 lb 10.8 oz)   BMI 36.91 kg/m²     PHYSICAL EXAMINATION:    GENERAL: Well appearing, in no acute distress, alert and oriented x3.  PSYCH:  Mood and affect appropriate.  SKIN: Skin color, texture, turgor normal, no rashes or lesions.  HEAD/FACE:  Normocephalic, atraumatic. Cranial nerves grossly intact.   CV: RRR with palpation of the radial artery.  PULM: No evidence of respiratory difficulty, symmetric chest rise.  BACK: Straight " leg raising in the siting position is negative for radicular pain bilaterally. There is pain with palpation over lumbar spine. Limited ROM with pain on extension. Positive facet loading bilaterally.  LOWER EXTREMITIES: Peripheral joint ROM is full and pain free without obvious instability or laxity in lower extremities. No deformities, edema, or skin discoloration. Good capillary refill.  MUSCULOSKELETAL: Hip and knee provocative maneuvers are negative.  There is mild pain with palpation over the sacroiliac joints bilaterally.  FABERs test is negative bilaterally.  FADIRs test is negative. 5/5 strength in right ankle with plantar and dorsiflexion. 4/5 strength in left ankle with plantar and dorsiflexion. 5/5 strength with right knee flexion and extension. 5/5 strength with left knee flexion and extension. 5/5 strength in right EHL, 4/5 strength in left EHL.  No atrophy or tone abnormalities are noted.  NEURO: Bilateral lower extremity coordination and muscle stretch reflexes are physiologic and symmetric.   No loss of sensation is noted.  GAIT: Antalgic- ambulates with cane.     ASSESSMENT: 74 y.o. year old female with back/leg pain, consistent with BL radiculopathy due to L5 disc protrusion.    Encounter Diagnoses   Name Primary?    Lumbar spondylosis Yes    Facet arthritis of lumbar region     Neuroforaminal stenosis of lumbar spine     Annular tear of lumbar disc     DDD (degenerative disc disease), lumbar     Sacroiliac joint pain        PLAN:     - Previous imaging was reviewed and discussed with the patient today.    - She is scheduled for RFA this week.     - She had 80% relief with bilateral MBB. She had 90% relief for 2 days in 2017 at the same levels.      - She is s/p bilateral SI joint injections with benefit. We can repeat this as needed.     - She is s/p right L5 and S1 TF LEONEL with benefit. We can repeat this as needed.     - Continue Gabapentin.     - Continue physical therapy.     - RTC 3 weeks  after above procedures.     - Dr. Duncan was consulted on the patient and agrees with this plan.    The above plan and management options were discussed at length with patient. Patient is in agreement with the above and verbalized understanding.     Coby Russo NP  07/23/2019

## 2019-07-23 NOTE — H&P (VIEW-ONLY)
Chronic Pain - Established Patient    Referring Physician: Coby Russo NP    Chief Complaint:   Chief Complaint   Patient presents with    Follow-up     3 weeks after RFA        SUBJECTIVE: Disclaimer: This note has been generated using voice-recognition software. There may be typographical errors that have been missed during proof-reading    Interval History 7/23/2019:  The patient returns to clinic today for follow up. She was previously scheduled for RFA but had to cancel due to a fall. She fell while getting out of her bed and hit her face on her nightstand. She did have xrays which were negative for facial fracture. She did have a facial laceration which was sutured. She has rescheduled her RFA for this week. She continues to report low back pain that is constant, sharp, and aching. She denies any radiating leg pain. She does report intermittent numbness and pain to her buttocks with prolonged sitting. She continues to take Gabapentin. She denies any other health changes. Her pain today is 7/10.      Interval History 5/28/2019:  The patient returns to clinic today for follow up. She is s/p bilateral L2,3,4,5 MBB on 5/21/2019. She reports 80% relief of her low back pain after the block. She did sleep very well the night after the block. Her pain has returned to baseline. She continues to report low back pain that is constant, sharp, and aching in nature. She denies any radiating leg pain. She does report increased tailbone pain. Her pain is worse with prolonged standing and walking. She continues to take Gabapentin with benefit. She denies any other health changes. Her pain today is 2/10.    Interval History 4/26/2019:  The patient returns to clinic today for follow up. She reports a fall yesterday at home where she tripped over her shoe string. She landed on her buttock. She reports that she hit the back of her head on the carpet. She denies any loss of consciousness. She did not go to the ER. She does  report increased low back pain today that is sore in nature which she attributes to the fall. She continues to report low back pain that is sharp and aching in nature. She does report intermittent aching buttock pain. She denies any radiating leg pain. Her pain is worse with prolonged standing and walking. She continues to participate in physical therapy. She continues to take Gabapentin with benefit. She did have lumbar MBB in 2017 with 90% relief for a few days. She denies any other health changes. She does have stress urinary incontinence. She does follow up with Urogyn. Her pain today is 7/10.    Interval History 3/11/2019:  The patient returns to clinic today for follow up. She continues to report low back pain that is aching in nature. She reports right sided buttock pain that is sore in nature. She denies any radiating leg pain. Her pain is worse with standing, sitting, and activity. She does endorse morning stiffness. She is currently participating in physical therapy with benefit. She continues to take Gabapentin with benefit and does need a refill today. She denies any other health changes. She denies any bowel or bladder incontinence. Her pain today is 5/10.    Interval History 1/9/2019:  The patient returns to clinic today for follow up. She is s/p bilateral SI joint injections on 12/27/2018. She reports 100% for the first week. She now reports approximately 50% relief of her pain. She reports intermittent low back and bilateral buttock pain. She describes this pain as sore and aching in nature. This pain is worse with prolonged sitting. She denies any radiating leg pain. She often feels as though as she is leaning over while walking. She often feels as though her balance is off. She continues to take Gabapentin with benefit. She denies any other health changes. His pain today is 2/10.     Interval History 12/11/2018:  The patient returns to clinic today for follow up. She reports increased low back and  bilateral buttock pain, left greater than right. She describes this pain as sore in nature. This pain is worse with prolonged sitting. She denies any radiating leg pain. She reports that she often feels like her back is locking up on her. She continues to take Gabapentin with benefit. She denies any other health changes. Her pain today is 5/10.    Interval History 9/25/2018:  The patient returns to clinic today for follow up. She is s/p right L5 and S1 TF LEONEL. She reports 90% relief of her low back and leg pain. She reports intermittent low back pain that is aching in nature. She denies any radiating leg pain today. Her back pain is worse with bending. She also reports that her legs feel heavy with prolonged walking. She denies any other health changes. Her pain today is 4/10.    Interval History 7/27/2018:  The patient returns to clinic today for follow up and image review. She continues to report low back pain that radiates down the posterior aspect of her right leg to her foot. She reports intermittent left leg pain in the same distribution. Her pain is worse with prolonged walking and activity. She often feels like her legs are heavy. She often feels like she will fall. She denies any other health changes. She denies any bowel or bladder incontinence. Her pain today is 2/10.    Interval History 7/20/2018:  The patient returns to clinic today for follow up. She has not been seen in over a year due to her son being diagnosed with cancer. She reports that he is in remission now. She returns today due to three fall recently, last a week ago. She continues to report low back pain that radiates intermittently down the posterior portion of both legs to her feet. She reports that her legs often feel heavy and weak which is why she falls. She denies any bowel or bladder incontinence. Her pain today is 7/10.    Interval History 3/6/2017:  The patient returns to clinic today for follow up. She is s/p bilateral S1 TF LEONEL on  "2/8/2017. She reports 80% relief of her radiating leg pain. She continues to have low back pain. She describes the pain as dull and aching. The pain is worse in the morning. She reports that the pain increases with prolonged sitting and standing. She denies any other health changes. She denies any bowel or bladder incontinence. Her pain today is 6/10.     Interval History 1/23/2017:  The patient returns to clinic today for follow up of low back pain and bilateral leg pain. She has recently completed a medrol dose pack with some benefit. She still reports radiating pain into both legs. The pain is worse with prolonged sitting and lifting. The pain gets better with walking and rest. The pain radiates down the back of both legs to ankles. She denies any bowel or bladder incontinence or signs of saddle paresthesia. She continues to work with lifting restrictions. She currently takes Gabapentin and Naproxen with benefit. Her pain today is 6/10.     Initial encounter:    Mila Foster presents to the clinic for the evaluation of bilateral leg pain with intermittent lower back pain. 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 She reports recent falling to knees, but she changed her shoes, and the falling has ceased. She reports that she had a "pain flare" 2 weeks ago requiring a walker for 2 days.     Brief history:  72yo F with hx of HTN and DM complicated by neuropathy BL but R>L with tingling, tightness, shooting pain only in feet. Patient reports glucose was 120 this morning and usually runs around 130.    Pain Description:    The pain is located in the posterior leg area and radiates to her ankles and to her knees at its worst.     At BEST  1/10     At WORST  8/10 on the WORST day.      On average pain is rated as 7/10    Today the pain is rated as 5/10    The pain is described as aching, burning, dull, sharp and tight band  "     Symptoms interfere with work.     Exacerbating factors: Sitting, picking up children, lifting, bending over    Mitigating factors: walking, rest    Patient denies night fever/night sweats, bowel incontinence, significant weight loss, significant motor weakness and loss of sensations. She does report chronic urinary incontinence unrelated to back/leg pain.    Patient denies any suicidal or homicidal ideations    Pain Medications:  Current:  Gabapentin    Tried in Past:  NSAIDs -yes Naproxen   TCA -Never  SNRI -Never  Anti-convulsants -gabapentin  Muscle Relaxants -Never  Opioids-Never    Physical Therapy/Home Exercise: yes       report: N/A    Pain Procedures:   2/8/2017- Bilateral S1 TF LEONEL  3/22/2017- Bilateral L2,3,4,5 MBB  9/11/2018- Right L5 and S1 TF LEONEL   12/27/2018- Bilateral SI joint injections  5/21/2019- Bilateral L2,3,4,5 MBB    Chiropractor -never  Acupuncture - never  TENS unit -never  Spinal decompression -never  Joint replacement -never    Imaging:   MRI Lumbar Spine 7/25/2018:  COMPARISON:  12/16/2016    FINDINGS:  There is again normal lumbar lordosis.  No spondylolisthesis or spondylolysis or marrow edema to suggest acute lumbar fractures or neoplastic processes.  The tip of the conus is at L1-2 disc space.  Paraspinal soft tissues are unremarkable.    L5-S1: There is dehydration of the nucleus pulposis associated with disc space narrowing.  There is a right paracentral and right foraminal disc protrusion/herniation with findings suggestive of slight flattening of the descending right S1 root (image 30 series 7 and 8 and image 7 series 4).  In addition there is also a moderate right L5-S1 foraminal stenosis.  In the far lateral neural foramen the inferior surface of the exiting L5 root contacts the disc protrusion.  Clinical correlation for right L5 and S1 radiculopathy suggested.  Left neural foramen is unremarkable.  No significant central canal spinal stenosis.  There is mild facet  arthropathy.    L4-5: Dehydration of the nucleus pulposis associated with a mild posterior bulging of the annulus.  No significant central canal stenosis.  There is degenerative hypertrophic facet arthropathy.    L3-4: Dehydration of the nucleus pulposis.  No significant central canal spinal stenosis.  There is degenerative hypertrophic facet arthropathy and hypertrophy of the ligamentum flava unchanged from the previous study.    L2-3: Mild spondylotic changes associated with mild posterior bulging of the annulus.  No significant spinal stenosis.  There is facet arthropathy.    L1-2: No significant disc herniations or significant spinal stenosis.  Neural foramina are unremarkable.  There is facet arthropathy.      Impression       1. Right L5-S1 foraminal stenosis associated with a disc protrusion/disc herniation.  Clinical correlation for right L5 and S1 radiculopathy suggested.  2. Multilevel facet arthropathy.  3. Milder spondylotic changes at the rest of the disc spaces as above.     Xray Lumbar Spine 7/25/2018:  COMPARISON:  Prior lumbar spine MRI dated 12/16/16    FINDINGS:  There is diffuse osteopenia.  There is mild grade 1 anterolisthesis of L3 on L4 (4 mm) and of L4 on L5 (6 mm).  There is no evidence of translational instability.  There is moderate facet arthropathy at the lower lumbar spine with probable L5-S1 neural foraminal narrowing.  Endplate degenerative changes are present with subchondral sclerosis and marginal osteophyte formation.    There is atherosclerotic calcification of the aorta.      Impression       Osteopenia and degenerative change of the lumbar spine with grade 1 anterolisthesis of L3-4 and L4-5, new from the 2016 MRI.  No evidence of translational instability.     12/12/16 MRI Lumbar Spine WO Contrast   L1-L2: There is no focal disc herniation. No significant spinal canal or neural foraminal narrowing.    L2-L3: There is no focal disc herniation. No significant spinal canal or  neural foraminal narrowing.    L3-L4: Mild bilateral facet arthropathy.  No focal disc herniation. No significant spinal canal or neural foraminal narrowing.    L4-L5: Mild disc bulge and bilateral facet arthropathy results in mild right-sided neural foraminal narrowing. No significant spinal canal stenosis.    L5-S1: Diffuse disc bulge with small superimposed right foraminal disc protrusion.  Findings result in moderate right-sided neuroforaminal narrowing with likely abutment/impingement upon the exited right L5 nerve root. No significant spinal canal stenosis.  Small posterior annular fissure noted.   Impression      Lumbar spondylosis as detailed above, most significant for small right foraminal protrusion at the L5-S1 level resulting in moderate neuroforaminal narrowing and likely abutment/impingement upon the exited right L5 nerve root         Past Medical History:   Diagnosis Date    Diabetes     Hypertension      Past Surgical History:   Procedure Laterality Date    BLOCK, NERVE, BILATERAL L2,3,4,5 Bilateral 5/21/2019    [facility]    BLOCK-NERVE-MEDIAL BRANCH-LUMBAR Bilateral 4/12/2017    [facility]    BLOCK-NERVE-MEDIAL BRANCH-LUMBAR Bilateral 3/22/2017    [facility]    CHOLECYSTECTOMY      COLONOSCOPY Suprep N/A 11/1/2018    [facility]    HYSTERECTOMY      Injection, Joint BILATERAL SACROILIAC JOINT INJECTION Bilateral 12/27/2018    [facility]    Injection,steroid,epidural,transforaminal approach  Right L5 and S1 Right 9/11/2018    [facility]    INJECTION-STEROID-EPIDURAL-TRANSFORAMINAL Bilateral 2/8/2017    [facility]     Social History     Socioeconomic History    Marital status: Single     Spouse name: Not on file     Number of children: Not on file    Years of education: Not on file    Highest education level: Not on file   Occupational History    Not on file   Social Needs    Financial resource strain: Not on file    Food insecurity:     Worry: Not on file     Inability: Not on file    Transportation needs:     Medical: Not on file     Non-medical: Not on file   Tobacco Use    Smoking status: Never Smoker    Smokeless tobacco: Never Used   Substance and Sexual Activity    Alcohol use: No     Frequency: Never    Drug use: No    Sexual activity: Not Currently   Lifestyle    Physical activity:     Days per week: Not on file     Minutes per session: Not on file    Stress: Not on file   Relationships    Social connections:     Talks on phone: Not on file     Gets together: Not on file     Attends [employer] service: Not on file     Active member of club or organization: Not on file     Attends meetings of clubs or organizations: Not on file     Relationship status: Not on file   Other Topics Concern    Not on file   Social History Narrative    Not on file     Family History   Problem Relation Age of Onset    Vaginal cancer Neg Hx     Endometrial cancer Neg Hx     Cervical cancer Neg Hx        Review of patient's allergies indicates:  No Known Allergies    Current Outpatient Medications   Medication Sig    ammonium lactate 12 % Crea     aspirin (ECOTRIN) 81 MG EC tablet 81 mg.    aspirin-calcium carbonate 81 mg-300 mg calcium(777 mg) Tab Take by mouth.    FLUZONE HIGH-DOSE 2018-19, PF, 180 mcg/0.5 mL vaccine ADM 0.5ML IM UTD    gabapentin (NEURONTIN) 300 MG capsule Take 2 tabs in the morning, 1 tab in the afternoon, and 2 tabs in the evening    glipiZIDE (GLUCOTROL) 5 MG tablet 5 mg.    hydrochlorothiazide (HYDRODIURIL) 25 MG tablet 25 mg.    hydroCHLOROthiazide (HYDRODIURIL) 25 MG tablet Take by mouth.    hyoscyamine (ANASPAZ,LEVSIN) 0.125 mg Tab Take 1 tablet (125 mcg total) by mouth every 4 (four)  "hours as needed.    metformin (GLUCOPHAGE-XR) 500 MG 24 hr tablet 500 mg.    metoprolol tartrate (LOPRESSOR) 25 MG tablet     mirabegron (MYRBETRIQ) 25 mg Tb24 ER tablet Take 1 tablet (25 mg total) by mouth once daily.    naproxen (NAPROSYN) 500 MG tablet     omeprazole (PRILOSEC) 20 MG capsule 20 mg.    pravastatin (PRAVACHOL) 40 MG tablet 40 mg.     No current facility-administered medications for this visit.        REVIEW OF SYSTEMS:    GENERAL:  No weight loss, malaise or fevers.  HEENT:   No recent changes in vision or hearing  NECK:  Negative for lumps, no difficulty with swallowing.  RESPIRATORY:  Negative for cough, wheezing or shortness of breath, patient denies any recent URI.  CARDIOVASCULAR:  Negative for chest pain, leg swelling or palpitations. HTN.  GI:  Negative for abdominal discomfort, blood in stools or black stools or change in bowel habits.  MUSCULOSKELETAL:  See HPI.  SKIN:  Negative for lesions, rash, and itching.  PSYCH:  No mood disorder or recent psychosocial stressors.  Patient's sleep is occasionally disturbed secondary to pain.  HEMATOLOGY/LYMPHOLOGY:  Negative for prolonged bleeding, bruising easily or swollen nodes.  Patient is not currently taking any anti-coagulants  ENDO: Positive for DM. Negative for thyroid dysfunction  NEURO:   No history of headaches, syncope, paralysis, seizures or tremors.  All other reviewed and negative other than HPI.    OBJECTIVE:    /68   Pulse 71   Temp 98.9 °F (37.2 °C)   Ht 5' 7" (1.702 m)   Wt 106.9 kg (235 lb 10.8 oz)   BMI 36.91 kg/m²     PHYSICAL EXAMINATION:    GENERAL: Well appearing, in no acute distress, alert and oriented x3.  PSYCH:  Mood and affect appropriate.  SKIN: Skin color, texture, turgor normal, no rashes or lesions.  HEAD/FACE:  Normocephalic, atraumatic. Cranial nerves grossly intact.   CV: RRR with palpation of the radial artery.  PULM: No evidence of respiratory difficulty, symmetric chest rise.  BACK: Straight " leg raising in the siting position is negative for radicular pain bilaterally. There is pain with palpation over lumbar spine. Limited ROM with pain on extension. Positive facet loading bilaterally.  LOWER EXTREMITIES: Peripheral joint ROM is full and pain free without obvious instability or laxity in lower extremities. No deformities, edema, or skin discoloration. Good capillary refill.  MUSCULOSKELETAL: Hip and knee provocative maneuvers are negative.  There is mild pain with palpation over the sacroiliac joints bilaterally.  FABERs test is negative bilaterally.  FADIRs test is negative. 5/5 strength in right ankle with plantar and dorsiflexion. 4/5 strength in left ankle with plantar and dorsiflexion. 5/5 strength with right knee flexion and extension. 5/5 strength with left knee flexion and extension. 5/5 strength in right EHL, 4/5 strength in left EHL.  No atrophy or tone abnormalities are noted.  NEURO: Bilateral lower extremity coordination and muscle stretch reflexes are physiologic and symmetric.   No loss of sensation is noted.  GAIT: Antalgic- ambulates with cane.     ASSESSMENT: 74 y.o. year old female with back/leg pain, consistent with BL radiculopathy due to L5 disc protrusion.    Encounter Diagnoses   Name Primary?    Lumbar spondylosis Yes    Facet arthritis of lumbar region     Neuroforaminal stenosis of lumbar spine     Annular tear of lumbar disc     DDD (degenerative disc disease), lumbar     Sacroiliac joint pain        PLAN:     - Previous imaging was reviewed and discussed with the patient today.    - She is scheduled for RFA this week.     - She had 80% relief with bilateral MBB. She had 90% relief for 2 days in 2017 at the same levels.      - She is s/p bilateral SI joint injections with benefit. We can repeat this as needed.     - She is s/p right L5 and S1 TF LEONEL with benefit. We can repeat this as needed.     - Continue Gabapentin.     - Continue physical therapy.     - RTC 3 weeks  after above procedures.     - Dr. Duncan was consulted on the patient and agrees with this plan.    The above plan and management options were discussed at length with patient. Patient is in agreement with the above and verbalized understanding.     Coby Russo NP  07/23/2019

## 2019-07-25 ENCOUNTER — HOSPITAL ENCOUNTER (OUTPATIENT)
Facility: OTHER | Age: 74
Discharge: HOME | End: 2019-07-25
Attending: ANESTHESIOLOGY | Admitting: ANESTHESIOLOGY
Payer: MEDICARE

## 2019-07-25 VITALS
SYSTOLIC BLOOD PRESSURE: 142 MMHG | RESPIRATION RATE: 14 BRPM | TEMPERATURE: 99 F | DIASTOLIC BLOOD PRESSURE: 74 MMHG | BODY MASS INDEX: 37.67 KG/M2 | WEIGHT: 240 LBS | OXYGEN SATURATION: 99 % | HEIGHT: 67 IN | HEART RATE: 56 BPM

## 2019-07-25 DIAGNOSIS — M47.816 LUMBAR SPONDYLOSIS: ICD-10-CM

## 2019-07-25 DIAGNOSIS — M51.16 LUMBAR DISC HERNIATION WITH RADICULOPATHY: Primary | ICD-10-CM

## 2019-07-25 DIAGNOSIS — M54.50 CHRONIC LOW BACK PAIN WITHOUT SCIATICA, UNSPECIFIED BACK PAIN LATERALITY: ICD-10-CM

## 2019-07-25 DIAGNOSIS — G89.29 CHRONIC LOW BACK PAIN WITHOUT SCIATICA, UNSPECIFIED BACK PAIN LATERALITY: ICD-10-CM

## 2019-07-25 DIAGNOSIS — G89.29 CHRONIC PAIN: ICD-10-CM

## 2019-07-25 LAB — POCT GLUCOSE: 179 MG/DL (ref 70–110)

## 2019-07-25 PROCEDURE — 99152 PR MOD CONSCIOUS SEDATION, SAME PHYS, 5+ YRS, FIRST 15 MIN: ICD-10-PCS | Mod: ,,, | Performed by: ANESTHESIOLOGY

## 2019-07-25 PROCEDURE — 64635 DESTROY LUMB/SAC FACET JNT: CPT | Mod: RT,,, | Performed by: ANESTHESIOLOGY

## 2019-07-25 PROCEDURE — 25000003 PHARM REV CODE 250: Performed by: ANESTHESIOLOGY

## 2019-07-25 PROCEDURE — 64636 DESTROY L/S FACET JNT ADDL: CPT | Mod: RT,,, | Performed by: ANESTHESIOLOGY

## 2019-07-25 PROCEDURE — 64636 DESTROY L/S FACET JNT ADDL: CPT | Performed by: ANESTHESIOLOGY

## 2019-07-25 PROCEDURE — 64636 PR DESTROY L/S FACET JNT ADDL: ICD-10-PCS | Mod: RT,,, | Performed by: ANESTHESIOLOGY

## 2019-07-25 PROCEDURE — 63600175 PHARM REV CODE 636 W HCPCS: Performed by: STUDENT IN AN ORGANIZED HEALTH CARE EDUCATION/TRAINING PROGRAM

## 2019-07-25 PROCEDURE — 63600175 PHARM REV CODE 636 W HCPCS: Performed by: ANESTHESIOLOGY

## 2019-07-25 PROCEDURE — 99152 MOD SED SAME PHYS/QHP 5/>YRS: CPT | Mod: ,,, | Performed by: ANESTHESIOLOGY

## 2019-07-25 PROCEDURE — 64635 PR DESTROY LUMB/SAC FACET JNT: ICD-10-PCS | Mod: RT,,, | Performed by: ANESTHESIOLOGY

## 2019-07-25 PROCEDURE — 64635 DESTROY LUMB/SAC FACET JNT: CPT | Performed by: ANESTHESIOLOGY

## 2019-07-25 RX ORDER — SODIUM CHLORIDE 9 MG/ML
500 INJECTION, SOLUTION INTRAVENOUS CONTINUOUS
Status: DISCONTINUED | OUTPATIENT
Start: 2019-07-25 | End: 2019-07-25 | Stop reason: HOSPADM

## 2019-07-25 RX ORDER — BUPIVACAINE HYDROCHLORIDE 2.5 MG/ML
INJECTION, SOLUTION EPIDURAL; INFILTRATION; INTRACAUDAL
Status: DISCONTINUED | OUTPATIENT
Start: 2019-07-25 | End: 2019-07-25 | Stop reason: HOSPADM

## 2019-07-25 RX ORDER — MIDAZOLAM HYDROCHLORIDE 1 MG/ML
INJECTION INTRAMUSCULAR; INTRAVENOUS
Status: DISCONTINUED | OUTPATIENT
Start: 2019-07-25 | End: 2019-07-25 | Stop reason: HOSPADM

## 2019-07-25 RX ORDER — LIDOCAINE HYDROCHLORIDE 10 MG/ML
INJECTION INFILTRATION; PERINEURAL
Status: DISCONTINUED | OUTPATIENT
Start: 2019-07-25 | End: 2019-07-25 | Stop reason: HOSPADM

## 2019-07-25 RX ORDER — FENTANYL CITRATE 50 UG/ML
INJECTION, SOLUTION INTRAMUSCULAR; INTRAVENOUS
Status: DISCONTINUED | OUTPATIENT
Start: 2019-07-25 | End: 2019-07-25 | Stop reason: HOSPADM

## 2019-07-25 RX ADMIN — SODIUM CHLORIDE 500 ML: 0.9 INJECTION, SOLUTION INTRAVENOUS at 10:07

## 2019-07-25 NOTE — DISCHARGE INSTRUCTIONS
Thank you for allowing us to care for you today. You may receive a survey about the care we provided. Your feedback is valuable and helps us provide excellent care throughout the community.     Home Care Instructions for Pain Management:    1. DIET:   You may resume your normal diet today.   2. BATHING:   You may shower with luke warm water. No tub baths or anything that will soak injection sites under water for the next 24 hours.  3. DRESSING:   You may remove your bandage today.   4. ACTIVITY LEVEL:   You may resume your normal activities 24 hrs after your procedure. Nothing strenuous today.  5. MEDICATIONS:   You may resume your normal medications today. To restart blood thinners, ask your doctor.  6. DRIVING    If you have received any sedatives by mouth today, you may not drive for 12 hours.    If you have received any sedation through your IV, you may not drive for 24 hrs.   7. SPECIAL INSTRUCTIONS:   No heat to the injection site for 24 hrs including, hot bath or shower, heating pad, moist heat, or hot tubs.    Use ice pack to injection site for any pain or discomfort.  Apply ice packs for 20 minute intervals as needed.    IF you have diabetes, be sure to monitor your blood sugar more closely. IF your injection contained steroids your blood sugar levels may become higher than normal.    If you are still having pain upon discharge:  Your pain may improve over the next 48 hours. The anesthetic (numbing medication) works immediately to 48 hours. IF your injection contained a steroid (anti-inflammatory medication), it takes approximately 3 days to start feeling relief and 7-10 days to see your greatest results from the medication. It is possible you may need subsequent injections. This would be discussed at your follow up appointment with pain management or your referring doctor.      PLEASE CALL YOUR DOCTOR IF:  1. Redness or swelling around the injection site.  2. Fever of 101 degrees or more  3. Drainage  (pus) from the injection site.  4. For any continuous bleeding (some dried blood over the incision is normal.)    FOR EMERGENCIES:   If any unusual problems or difficulties occur during clinic hours, call [phone] or [phone]. Adult Procedural Sedation Instructions    Recovery After Procedural Sedation (Adult)  You have been given medicine by vein to make you sleep during your surgery. This may have included both a pain medicine and sleeping medicine. Most of the effects have worn off. But you may still have some drowsiness for the next 6 to 8 hours.  Home care  Follow these guidelines when you get home:  · For the next 8 hours, you should be watched by a responsible adult. This person should make sure your condition is not getting worse.  · Don't drink any alcohol for the next 24 hours.  · Don't drive, operate dangerous machinery, or make important business or personal decisions during the next 24 hours.  Note: Your healthcare provider may tell you not to take any medicine by mouth for pain or sleep in the next 4 hours. These medicines may react with the medicines you were given in the hospital. This could cause a much stronger response than usual.  Follow-up care  Follow up with your healthcare provider if you are not alert and back to your usual level of activity within 12 hours.  When to seek medical advice  Call your healthcare provider right away if any of these occur:  · Drowsiness gets worse  · Weakness or dizziness gets worse  · Repeated vomiting  · You can't be awakened   Date Last Reviewed: 10/18/2016  © 7171-2083 [employer]. [address] [place] [address], [place] [address]. All rights reserved. This information is not intended as a substitute for professional medical care. Always follow your healthcare professional's instructions.

## 2019-07-25 NOTE — OP NOTE
Lumbar Medial nerve branch block radiofrequency ablation Under Fluoroscopy     Time-out taken to identify patient and procedure side prior to starting the procedure.     07/25/2019    PROCEDURE: Right radiofrequency ablation of the the medial branch nerves at the   transverse process of  L3, L4, L5 and sacral ala    2)Conscious sedation provided by MD     REASON FOR PROCEDURE: Lumbar spondylosis [M47.816]     PHYSICIAN: Bonnie Duncan MD     ASSISTANTS: None     MEDICATIONS INJECTED: 0.25% Bupivicaine total 8mL     LOCAL ANESTHETIC USED: Xylocaine 1% 1mL / site     ESTIMATED BLOOD LOSS: None.     COMPLICATIONS: None.     Interval history: Patient reports that he had complete relief of pain for the day of the procedure, we will proceed with the RFA     TECHNIQUE: Laying in a prone position, the patient was prepped and draped in the usual sterile fashion using ChloraPrep and fenestrated drape. The level was determined under fluoroscopic guidance. Local anesthetic was given by going down to the hub of the 27-gauge 1.25in needle and raising a wheel. A 18-gauge 10mm curved active tip needle was introduced to the anatomic local of the medial branch at each of the above levels using fluoroscopy. Then sensory and motor testing was performed to confirm that the needle tips were in the correct location. Then after negative aspiration, 1 mL of 0.25% bupivacaine was injected into each level. This was followed by thermal lesioning at 80 degrees celsius for 90 seconds.     Conscious sedation provided by M.D   The patient was monitored with continuous pulse oximetry, EKG, and intermittent blood pressure monitors. The patient was hemodynamically stable throughout the entire process was responsive to voice, and breathing spontaneously. Supplemental O2 was provided at 2L/min via nasal cannula. Patient was comfortable for the duration of the procedure. (See nurse documentation and case log for sedation time)    There was a total of  2mg IV Midazolam and 100mcg Fentanyl titrated for the procedure    The patient tolerated the procedure well. Did not have any procedure related motor deficit at the conclusion of the procedure    The patient was monitored after the procedure. Patient was given post procedure and discharge instructions to follow at home. We will see the patient back in two weeks or the patient may call to inform of status. The patient was discharged in a stable condition

## 2019-07-25 NOTE — DISCHARGE SUMMARY
Discharge Note  Short Stay      SUMMARY     Admit Date: 7/25/2019    Attending Physician: London Baig      Discharge Physician: London Baig      Discharge Date: 7/25/2019 11:49 AM    Procedure(s) (LRB):  RADIOFREQUENCY ABLATION (Right)    Final Diagnosis: Lumbar spondylosis [M47.816]    Disposition: Home or self care    Patient Instructions:   Current Discharge Medication List      CONTINUE these medications which have NOT CHANGED    Details   aspirin (ECOTRIN) 81 MG EC tablet 81 mg.  Refills: 2      aspirin-calcium carbonate 81 mg-300 mg calcium(777 mg) Tab Take by mouth.      FLUoxetine 20 MG capsule TK ONE C PO  QD  Refills: 1      FLUZONE HIGH-DOSE 2018-19, PF, 180 mcg/0.5 mL vaccine ADM 0.5ML IM UTD  Refills: 0      gabapentin (NEURONTIN) 300 MG capsule Take 2 tabs in the morning, 1 tab in the afternoon, and 2 tabs in the evening  Qty: 150 capsule, Refills: 5    Associated Diagnoses: Annular tear of lumbar disc; Neuroforaminal stenosis of lumbar spine; Facet arthritis of lumbar region; Lumbar spondylosis; Sacroiliac joint pain; DDD (degenerative disc disease), lumbar      glipiZIDE (GLUCOTROL) 5 MG tablet 5 mg.  Refills: 1      !! hydrochlorothiazide (HYDRODIURIL) 25 MG tablet 25 mg.  Refills: 1      !! hydroCHLOROthiazide (HYDRODIURIL) 25 MG tablet Take by mouth.      hyoscyamine (ANASPAZ,LEVSIN) 0.125 mg Tab Take 1 tablet (125 mcg total) by mouth every 4 (four) hours as needed.  Qty: 30 tablet, Refills: 6      metformin (GLUCOPHAGE-XR) 500 MG 24 hr tablet 500 mg.  Refills: 1      metoprolol tartrate (LOPRESSOR) 25 MG tablet Refills: 1      mirabegron (MYRBETRIQ) 25 mg Tb24 ER tablet Take 1 tablet (25 mg total) by mouth once daily.  Qty: 30 tablet, Refills: 11    Associated Diagnoses: Urge incontinence; Urinary urgency      naproxen (NAPROSYN) 500 MG tablet Refills: 1      omeprazole (PRILOSEC) 20 MG capsule 20 mg.  Refills: 1      pravastatin (PRAVACHOL) 40 MG tablet 40 mg.  Refills: 1       !!  - Potential duplicate medications found. Please discuss with provider.              Discharge Diagnosis: Lumbar spondylosis [M47.816]  Condition on Discharge: Stable with no complications to procedure   Diet on Discharge: Same as before.  Activity: as per instruction sheet.  Discharge to: Home with a responsible adult.  Follow up: 2-4 weeks       Please call my office or pager at [phone] if experienced any weakness or loss of sensation, fever > 101.5, pain uncontrolled with oral medications, persistent nausea/vomiting/or diarrhea, redness or drainage from the incisions, or any other worrisome concerns. If physician on call was not reached or could not communicate with our office for any reason please go to the nearest emergency department

## 2019-08-06 ENCOUNTER — HOSPITAL ENCOUNTER (OUTPATIENT)
Facility: OTHER | Age: 74
Discharge: HOME | End: 2019-08-06
Attending: ANESTHESIOLOGY | Admitting: ANESTHESIOLOGY
Payer: MEDICARE

## 2019-08-06 VITALS
OXYGEN SATURATION: 97 % | HEART RATE: 59 BPM | WEIGHT: 240 LBS | HEIGHT: 67 IN | DIASTOLIC BLOOD PRESSURE: 91 MMHG | TEMPERATURE: 99 F | SYSTOLIC BLOOD PRESSURE: 134 MMHG | RESPIRATION RATE: 18 BRPM | BODY MASS INDEX: 37.67 KG/M2

## 2019-08-06 DIAGNOSIS — G89.29 CHRONIC PAIN: ICD-10-CM

## 2019-08-06 DIAGNOSIS — M47.816 LUMBAR SPONDYLOSIS: Primary | ICD-10-CM

## 2019-08-06 LAB — POCT GLUCOSE: 189 MG/DL (ref 70–110)

## 2019-08-06 PROCEDURE — 25000003 PHARM REV CODE 250: Performed by: ANESTHESIOLOGY

## 2019-08-06 PROCEDURE — 64635 DESTROY LUMB/SAC FACET JNT: CPT | Performed by: ANESTHESIOLOGY

## 2019-08-06 PROCEDURE — 99152 MOD SED SAME PHYS/QHP 5/>YRS: CPT | Mod: ,,, | Performed by: ANESTHESIOLOGY

## 2019-08-06 PROCEDURE — 99152 PR MOD CONSCIOUS SEDATION, SAME PHYS, 5+ YRS, FIRST 15 MIN: ICD-10-PCS | Mod: ,,, | Performed by: ANESTHESIOLOGY

## 2019-08-06 PROCEDURE — 64636 PR DESTROY L/S FACET JNT ADDL: ICD-10-PCS | Mod: LT,,, | Performed by: ANESTHESIOLOGY

## 2019-08-06 PROCEDURE — 64636 DESTROY L/S FACET JNT ADDL: CPT | Mod: LT,,, | Performed by: ANESTHESIOLOGY

## 2019-08-06 PROCEDURE — 64635 DESTROY LUMB/SAC FACET JNT: CPT | Mod: LT,,, | Performed by: ANESTHESIOLOGY

## 2019-08-06 PROCEDURE — 82947 ASSAY GLUCOSE BLOOD QUANT: CPT | Performed by: ANESTHESIOLOGY

## 2019-08-06 PROCEDURE — 64636 DESTROY L/S FACET JNT ADDL: CPT | Performed by: ANESTHESIOLOGY

## 2019-08-06 PROCEDURE — 64635 PR DESTROY LUMB/SAC FACET JNT: ICD-10-PCS | Mod: LT,,, | Performed by: ANESTHESIOLOGY

## 2019-08-06 PROCEDURE — 63600175 PHARM REV CODE 636 W HCPCS: Performed by: ANESTHESIOLOGY

## 2019-08-06 RX ORDER — FENTANYL CITRATE 50 UG/ML
INJECTION, SOLUTION INTRAMUSCULAR; INTRAVENOUS
Status: DISCONTINUED | OUTPATIENT
Start: 2019-08-06 | End: 2019-08-06 | Stop reason: HOSPADM

## 2019-08-06 RX ORDER — LIDOCAINE HYDROCHLORIDE 10 MG/ML
INJECTION INFILTRATION; PERINEURAL
Status: DISCONTINUED | OUTPATIENT
Start: 2019-08-06 | End: 2019-08-06 | Stop reason: HOSPADM

## 2019-08-06 RX ORDER — MIDAZOLAM HYDROCHLORIDE 1 MG/ML
INJECTION INTRAMUSCULAR; INTRAVENOUS
Status: DISCONTINUED | OUTPATIENT
Start: 2019-08-06 | End: 2019-08-06 | Stop reason: HOSPADM

## 2019-08-06 RX ORDER — SODIUM CHLORIDE 9 MG/ML
500 INJECTION, SOLUTION INTRAVENOUS CONTINUOUS
Status: ACTIVE | OUTPATIENT
Start: 2019-08-06 | End: 2019-08-07

## 2019-08-06 RX ORDER — BUPIVACAINE HYDROCHLORIDE 2.5 MG/ML
INJECTION, SOLUTION EPIDURAL; INFILTRATION; INTRACAUDAL
Status: DISCONTINUED | OUTPATIENT
Start: 2019-08-06 | End: 2019-08-06 | Stop reason: HOSPADM

## 2019-08-06 RX ADMIN — SODIUM CHLORIDE 500 ML: 0.9 INJECTION, SOLUTION INTRAVENOUS at 02:08

## 2019-08-06 NOTE — DISCHARGE INSTRUCTIONS
Recovery After Procedural Sedation (Adult)  You have been given medicine by vein to make you sleep during your surgery. This may have included both a pain medicine and sleeping medicine. Most of the effects have worn off. But you may still have some drowsiness for the next 6 to 8 hours.  Home care  Follow these guidelines when you get home:  · For the next 8 hours, you should be watched by a responsible adult. This person should make sure your condition is not getting worse.  · Don't drink any alcohol for the next 24 hours.  · Don't drive, operate dangerous machinery, or make important business or personal decisions during the next 24 hours.  Note: Your healthcare provider may tell you not to take any medicine by mouth for pain or sleep in the next 4 hours. These medicines may react with the medicines you were given in the hospital. This could cause a much stronger response than usual.  Follow-up care  Follow up with your healthcare provider if you are not alert and back to your usual level of activity within 12 hours.  When to seek medical advice  Call your healthcare provider right away if any of these occur:  · Drowsiness gets worse  · Weakness or dizziness gets worse  · Repeated vomiting  · You can't be awakened   Date Last Reviewed: 10/18/2016  © 9539-9320 [employer]. [address] [place] [address], [place] [address]. All rights reserved. This information is not intended as a substitute for professional medical care. Always follow your healthcare professional's instructions.     Thank you for allowing us to care for you today. You may receive a survey about the care we provided. Your feedback is valuable and helps us provide excellent care throughout the community.     Home Care Instructions for Pain Management:    1. DIET:   You may resume your normal diet today.   2. BATHING:   You may shower with luke warm water. No tub baths or anything that will soak injection sites under water for the next 24  hours.  3. DRESSING:   You may remove your bandage today.   4. ACTIVITY LEVEL:   You may resume your normal activities 24 hrs after your procedure. Nothing strenuous today.  5. MEDICATIONS:   You may resume your normal medications today. To restart blood thinners, ask your doctor.  6. DRIVING    If you have received any sedatives by mouth today, you may not drive for 12 hours.    If you have received any sedation through your IV, you may not drive for 24 hrs.   7. SPECIAL INSTRUCTIONS:   No heat to the injection site for 24 hrs including, hot bath or shower, heating pad, moist heat, or hot tubs.    Use ice pack to injection site for any pain or discomfort.  Apply ice packs for 20 minute intervals as needed.    IF you have diabetes, be sure to monitor your blood sugar more closely. IF your injection contained steroids your blood sugar levels may become higher than normal.    If you are still having pain upon discharge:  Your pain may improve over the next 48 hours. The anesthetic (numbing medication) works immediately to 48 hours. IF your injection contained a steroid (anti-inflammatory medication), it takes approximately 3 days to start feeling relief and 7-10 days to see your greatest results from the medication. It is possible you may need subsequent injections. This would be discussed at your follow up appointment with pain management or your referring doctor.      PLEASE CALL YOUR DOCTOR IF:  1. Redness or swelling around the injection site.  2. Fever of 101 degrees or more  3. Drainage (pus) from the injection site.  4. For any continuous bleeding (some dried blood over the incision is normal.)    FOR EMERGENCIES:   If any unusual problems or difficulties occur during clinic hours, call [phone] or [phone].

## 2019-08-06 NOTE — DISCHARGE SUMMARY
Discharge Note  Short Stay      SUMMARY     Admit Date: 8/6/2019    Attending Physician: Josiah Johnson      Discharge Physician: Josiah Johnson      Discharge Date: 8/6/2019 3:16 PM    Procedure(s) (LRB):  RADIOFREQUENCY ABLATION (Left)    Final Diagnosis: Lumbar spondylosis [M47.816]    Disposition: Home or self care    Patient Instructions:   Current Discharge Medication List      CONTINUE these medications which have NOT CHANGED    Details   aspirin (ECOTRIN) 81 MG EC tablet 81 mg.  Refills: 2      aspirin-calcium carbonate 81 mg-300 mg calcium(777 mg) Tab Take by mouth.      FLUoxetine 20 MG capsule TK ONE C PO  QD  Refills: 1      FLUZONE HIGH-DOSE 2018-19, PF, 180 mcg/0.5 mL vaccine ADM 0.5ML IM UTD  Refills: 0      gabapentin (NEURONTIN) 300 MG capsule Take 2 tabs in the morning, 1 tab in the afternoon, and 2 tabs in the evening  Qty: 150 capsule, Refills: 5    Associated Diagnoses: Annular tear of lumbar disc; Neuroforaminal stenosis of lumbar spine; Facet arthritis of lumbar region; Lumbar spondylosis; Sacroiliac joint pain; DDD (degenerative disc disease), lumbar      glipiZIDE (GLUCOTROL) 5 MG tablet 5 mg.  Refills: 1      !! hydrochlorothiazide (HYDRODIURIL) 25 MG tablet 25 mg.  Refills: 1      !! hydroCHLOROthiazide (HYDRODIURIL) 25 MG tablet Take by mouth.      hyoscyamine (ANASPAZ,LEVSIN) 0.125 mg Tab Take 1 tablet (125 mcg total) by mouth every 4 (four) hours as needed.  Qty: 30 tablet, Refills: 6      metformin (GLUCOPHAGE-XR) 500 MG 24 hr tablet 500 mg.  Refills: 1      metoprolol tartrate (LOPRESSOR) 25 MG tablet Refills: 1      mirabegron (MYRBETRIQ) 25 mg Tb24 ER tablet Take 1 tablet (25 mg total) by mouth once daily.  Qty: 30 tablet, Refills: 11    Associated Diagnoses: Urge incontinence; Urinary urgency      naproxen (NAPROSYN) 500 MG tablet Refills: 1      omeprazole (PRILOSEC) 20 MG capsule 20 mg.  Refills: 1      pravastatin (PRAVACHOL) 40 MG tablet 40 mg.  Refills: 1       !! -  Potential duplicate medications found. Please discuss with provider.              Discharge Diagnosis: Lumbar spondylosis [M47.816]  Condition on Discharge: Stable with no complications to procedure   Diet on Discharge: Same as before.  Activity: as per instruction sheet.  Discharge to: Home with a responsible adult.  Follow up: 2-4 weeks       Please call my office or pager at [phone] if experienced any weakness or loss of sensation, fever > 101.5, pain uncontrolled with oral medications, persistent nausea/vomiting/or diarrhea, redness or drainage from the incisions, or any other worrisome concerns. If physician on call was not reached or could not communicate with our office for any reason please go to the nearest emergency department

## 2019-08-06 NOTE — OP NOTE
Lumbar Medial nerve branch block radiofrequency ablation Under Fluoroscopy     Time-out taken to identify patient and procedure side prior to starting the procedure.     08/06/2019    PROCEDURE: Left radiofrequency ablation of the the medial branch nerves at the   transverse process of  L3, L4, L5 and sacral ala    2)Conscious sedation provided by MD     REASON FOR PROCEDURE: Lumbar spondylosis [M47.816]     PHYSICIAN: Bnonie Duncan MD     ASSISTANTS: Josiah Johnson MD       MEDICATIONS INJECTED: 0.25% Bupivicaine total 8mL     LOCAL ANESTHETIC USED: Xylocaine 1% 1mL / site     ESTIMATED BLOOD LOSS: None.     COMPLICATIONS: None.     Interval history: Patient reports that he had complete relief of pain for the day of the procedure, we will proceed with the RFA     TECHNIQUE: Laying in a prone position, the patient was prepped and draped in the usual sterile fashion using ChloraPrep and fenestrated drape. The level was determined under fluoroscopic guidance. Local anesthetic was given by going down to the hub of the 27-gauge 1.25in needle and raising a wheel. A 18-gauge 10mm curved active tip needle was introduced to the anatomic local of the medial branch at each of the above levels using fluoroscopy. Then sensory and motor testing was performed to confirm that the needle tips were in the correct location. Then after negative aspiration, 1 mL of 0.25% bupivacaine was injected into each level. This was followed by thermal lesioning at 80 degrees celsius for 90 seconds.     Conscious sedation provided by M.D   The patient was monitored with continuous pulse oximetry, EKG, and intermittent blood pressure monitors. The patient was hemodynamically stable throughout the entire process was responsive to voice, and breathing spontaneously. Supplemental O2 was provided at 2L/min via nasal cannula. Patient was comfortable for the duration of the procedure. (See nurse documentation and case log for sedation time)    There  was a total of 2mg IV Midazolam and 50mcg Fentanyl titrated for the procedure    The patient tolerated the procedure well. Did not have any procedure related motor deficit at the conclusion of the procedure    The patient was monitored after the procedure. Patient was given post procedure and discharge instructions to follow at home. We will see the patient back in two weeks or the patient may call to inform of status. The patient was discharged in a stable condition

## 2019-08-27 ENCOUNTER — OFFICE VISIT (OUTPATIENT)
Dept: PAIN MEDICINE | Facility: CLINIC | Age: 74
End: 2019-08-27
Payer: MEDICARE

## 2019-08-27 VITALS
BODY MASS INDEX: 35.99 KG/M2 | HEART RATE: 75 BPM | WEIGHT: 229.31 LBS | HEIGHT: 67 IN | RESPIRATION RATE: 18 BRPM | TEMPERATURE: 99 F | DIASTOLIC BLOOD PRESSURE: 69 MMHG | SYSTOLIC BLOOD PRESSURE: 133 MMHG

## 2019-08-27 DIAGNOSIS — M51.36 ANNULAR TEAR OF LUMBAR DISC: ICD-10-CM

## 2019-08-27 DIAGNOSIS — R53.81 PHYSICAL DECONDITIONING: ICD-10-CM

## 2019-08-27 DIAGNOSIS — M53.3 SACROILIAC JOINT PAIN: ICD-10-CM

## 2019-08-27 DIAGNOSIS — Z91.81 HISTORY OF FALL: ICD-10-CM

## 2019-08-27 DIAGNOSIS — R26.89 SHUFFLING GAIT: ICD-10-CM

## 2019-08-27 DIAGNOSIS — M48.061 NEUROFORAMINAL STENOSIS OF LUMBAR SPINE: ICD-10-CM

## 2019-08-27 DIAGNOSIS — M47.816 FACET ARTHRITIS OF LUMBAR REGION: ICD-10-CM

## 2019-08-27 DIAGNOSIS — M51.36 DDD (DEGENERATIVE DISC DISEASE), LUMBAR: ICD-10-CM

## 2019-08-27 DIAGNOSIS — M47.816 LUMBAR SPONDYLOSIS: Primary | ICD-10-CM

## 2019-08-27 PROCEDURE — 3288F FALL RISK ASSESSMENT DOCD: CPT | Mod: CPTII,S$GLB,, | Performed by: NURSE PRACTITIONER

## 2019-08-27 PROCEDURE — 1100F PTFALLS ASSESS-DOCD GE2>/YR: CPT | Mod: CPTII,S$GLB,, | Performed by: NURSE PRACTITIONER

## 2019-08-27 PROCEDURE — 99999 PR PBB SHADOW E&M-EST. PATIENT-LVL III: CPT | Mod: PBBFAC,,, | Performed by: NURSE PRACTITIONER

## 2019-08-27 PROCEDURE — 99999 PR PBB SHADOW E&M-EST. PATIENT-LVL III: ICD-10-PCS | Mod: PBBFAC,,, | Performed by: NURSE PRACTITIONER

## 2019-08-27 PROCEDURE — 99213 PR OFFICE/OUTPT VISIT, EST, LEVL III, 20-29 MIN: ICD-10-PCS | Mod: S$GLB,,, | Performed by: NURSE PRACTITIONER

## 2019-08-27 PROCEDURE — 1100F PR PT FALLS ASSESS DOC 2+ FALLS/FALL W/INJURY/YR: ICD-10-PCS | Mod: CPTII,S$GLB,, | Performed by: NURSE PRACTITIONER

## 2019-08-27 PROCEDURE — 99213 OFFICE O/P EST LOW 20 MIN: CPT | Mod: S$GLB,,, | Performed by: NURSE PRACTITIONER

## 2019-08-27 PROCEDURE — 3288F PR FALLS RISK ASSESSMENT DOCUMENTED: ICD-10-PCS | Mod: CPTII,S$GLB,, | Performed by: NURSE PRACTITIONER

## 2019-08-27 NOTE — PROGRESS NOTES
"  Chronic Pain - Established Patient    Referring Physician: No ref. provider found    Chief Complaint:   Chief Complaint   Patient presents with    Low-back Pain        SUBJECTIVE: Disclaimer: This note has been generated using voice-recognition software. There may be typographical errors that have been missed during proof-reading    Interval History 8/27/2019:  The patient returns to clinic today for follow up. She is s/p right L3,4,5 RFA on 7/25/2019. She is s/p left L3,4,5 RFA on 8/6/2019. She reports limited relief at this time. She continues to report low back pain that is sharp and aching. She denies any radicular leg pain. She denies any numbness or tingling to her feet. She does report episodes of "shuffling" gait where she feels like she will fall. She has had two significant falls recently. She continues to take Gabapentin with benefit. She denies any other health changes. Her pain today is 2/10.    Interval History 7/23/2019:  The patient returns to clinic today for follow up. She was previously scheduled for RFA but had to cancel due to a fall. She fell while getting out of her bed and hit her face on her nightstand. She did have xrays which were negative for facial fracture. She did have a facial laceration which was sutured. She has rescheduled her RFA for this week. She continues to report low back pain that is constant, sharp, and aching. She denies any radiating leg pain. She does report intermittent numbness and pain to her buttocks with prolonged sitting. She continues to take Gabapentin. She denies any other health changes. Her pain today is 7/10.      Interval History 5/28/2019:  The patient returns to clinic today for follow up. She is s/p bilateral L2,3,4,5 MBB on 5/21/2019. She reports 80% relief of her low back pain after the block. She did sleep very well the night after the block. Her pain has returned to baseline. She continues to report low back pain that is constant, sharp, and aching " in nature. She denies any radiating leg pain. She does report increased tailbone pain. Her pain is worse with prolonged standing and walking. She continues to take Gabapentin with benefit. She denies any other health changes. Her pain today is 2/10.    Interval History 4/26/2019:  The patient returns to clinic today for follow up. She reports a fall yesterday at home where she tripped over her shoe string. She landed on her buttock. She reports that she hit the back of her head on the carpet. She denies any loss of consciousness. She did not go to the ER. She does report increased low back pain today that is sore in nature which she attributes to the fall. She continues to report low back pain that is sharp and aching in nature. She does report intermittent aching buttock pain. She denies any radiating leg pain. Her pain is worse with prolonged standing and walking. She continues to participate in physical therapy. She continues to take Gabapentin with benefit. She did have lumbar MBB in 2017 with 90% relief for a few days. She denies any other health changes. She does have stress urinary incontinence. She does follow up with Urogyn. Her pain today is 7/10.    Interval History 3/11/2019:  The patient returns to clinic today for follow up. She continues to report low back pain that is aching in nature. She reports right sided buttock pain that is sore in nature. She denies any radiating leg pain. Her pain is worse with standing, sitting, and activity. She does endorse morning stiffness. She is currently participating in physical therapy with benefit. She continues to take Gabapentin with benefit and does need a refill today. She denies any other health changes. She denies any bowel or bladder incontinence. Her pain today is 5/10.    Interval History 1/9/2019:  The patient returns to clinic today for follow up. She is s/p bilateral SI joint injections on 12/27/2018. She reports 100% for the first week. She now reports  approximately 50% relief of her pain. She reports intermittent low back and bilateral buttock pain. She describes this pain as sore and aching in nature. This pain is worse with prolonged sitting. She denies any radiating leg pain. She often feels as though as she is leaning over while walking. She often feels as though her balance is off. She continues to take Gabapentin with benefit. She denies any other health changes. His pain today is 2/10.     Interval History 12/11/2018:  The patient returns to clinic today for follow up. She reports increased low back and bilateral buttock pain, left greater than right. She describes this pain as sore in nature. This pain is worse with prolonged sitting. She denies any radiating leg pain. She reports that she often feels like her back is locking up on her. She continues to take Gabapentin with benefit. She denies any other health changes. Her pain today is 5/10.    Interval History 9/25/2018:  The patient returns to clinic today for follow up. She is s/p right L5 and S1 TF LEONEL. She reports 90% relief of her low back and leg pain. She reports intermittent low back pain that is aching in nature. She denies any radiating leg pain today. Her back pain is worse with bending. She also reports that her legs feel heavy with prolonged walking. She denies any other health changes. Her pain today is 4/10.    Interval History 7/27/2018:  The patient returns to clinic today for follow up and image review. She continues to report low back pain that radiates down the posterior aspect of her right leg to her foot. She reports intermittent left leg pain in the same distribution. Her pain is worse with prolonged walking and activity. She often feels like her legs are heavy. She often feels like she will fall. She denies any other health changes. She denies any bowel or bladder incontinence. Her pain today is 2/10.    Interval History 7/20/2018:  The patient returns to clinic today for follow  "up. She has not been seen in over a year due to her son being diagnosed with cancer. She reports that he is in remission now. She returns today due to three fall recently, last a week ago. She continues to report low back pain that radiates intermittently down the posterior portion of both legs to her feet. She reports that her legs often feel heavy and weak which is why she falls. She denies any bowel or bladder incontinence. Her pain today is 7/10.    Interval History 3/6/2017:  The patient returns to clinic today for follow up. She is s/p bilateral S1 TF LEONEL on 2/8/2017. She reports 80% relief of her radiating leg pain. She continues to have low back pain. She describes the pain as dull and aching. The pain is worse in the morning. She reports that the pain increases with prolonged sitting and standing. She denies any other health changes. She denies any bowel or bladder incontinence. Her pain today is 6/10.     Interval History 1/23/2017:  The patient returns to clinic today for follow up of low back pain and bilateral leg pain. She has recently completed a medrol dose pack with some benefit. She still reports radiating pain into both legs. The pain is worse with prolonged sitting and lifting. The pain gets better with walking and rest. The pain radiates down the back of both legs to ankles. She denies any bowel or bladder incontinence or signs of saddle paresthesia. She continues to work with lifting restrictions. She currently takes Gabapentin and Naproxen with benefit. Her pain today is 6/10.     Initial encounter:    Mila Foster presents to the clinic for the evaluation of bilateral leg pain with intermittent lower back pain. 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 She reports recent falling to knees, but she changed her shoes, and the falling has ceased. She reports that she had a "pain flare" 2 weeks ago " requiring a walker for 2 days.     Brief history:  72yo F with hx of HTN and DM complicated by neuropathy BL but R>L with tingling, tightness, shooting pain only in feet. Patient reports glucose was 120 this morning and usually runs around 130.    Pain Description:    The pain is located in the posterior leg area and radiates to her ankles and to her knees at its worst.     At BEST  1/10     At WORST  8/10 on the WORST day.      On average pain is rated as 7/10    Today the pain is rated as 5/10    The pain is described as aching, burning, dull, sharp and tight band      Symptoms interfere with work.     Exacerbating factors: Sitting, picking up children, lifting, bending over    Mitigating factors: walking, rest    Patient denies night fever/night sweats, bowel incontinence, significant weight loss, significant motor weakness and loss of sensations. She does report chronic urinary incontinence unrelated to back/leg pain.    Patient denies any suicidal or homicidal ideations    Pain Medications:  Current:  Gabapentin    Tried in Past:  NSAIDs -yes Naproxen   TCA -Never  SNRI -Never  Anti-convulsants -gabapentin  Muscle Relaxants -Never  Opioids-Never    Physical Therapy/Home Exercise: yes       report: N/A    Pain Procedures:   2/8/2017- Bilateral S1 TF LEONEL  3/22/2017- Bilateral L2,3,4,5 MBB  9/11/2018- Right L5 and S1 TF LEONEL   12/27/2018- Bilateral SI joint injections  5/21/2019- Bilateral L2,3,4,5 MBB  7/25/2019- Right L3,4,5 RFA  8/6/2019- Left L3,4,5 RFA    Chiropractor -never  Acupuncture - never  TENS unit -never  Spinal decompression -never  Joint replacement -never    Imaging:   MRI Lumbar Spine 7/25/2018:  COMPARISON:  12/16/2016    FINDINGS:  There is again normal lumbar lordosis.  No spondylolisthesis or spondylolysis or marrow edema to suggest acute lumbar fractures or neoplastic processes.  The tip of the conus is at L1-2 disc space.  Paraspinal soft tissues are unremarkable.    L5-S1: There is  dehydration of the nucleus pulposis associated with disc space narrowing.  There is a right paracentral and right foraminal disc protrusion/herniation with findings suggestive of slight flattening of the descending right S1 root (image 30 series 7 and 8 and image 7 series 4).  In addition there is also a moderate right L5-S1 foraminal stenosis.  In the far lateral neural foramen the inferior surface of the exiting L5 root contacts the disc protrusion.  Clinical correlation for right L5 and S1 radiculopathy suggested.  Left neural foramen is unremarkable.  No significant central canal spinal stenosis.  There is mild facet arthropathy.    L4-5: Dehydration of the nucleus pulposis associated with a mild posterior bulging of the annulus.  No significant central canal stenosis.  There is degenerative hypertrophic facet arthropathy.    L3-4: Dehydration of the nucleus pulposis.  No significant central canal spinal stenosis.  There is degenerative hypertrophic facet arthropathy and hypertrophy of the ligamentum flava unchanged from the previous study.    L2-3: Mild spondylotic changes associated with mild posterior bulging of the annulus.  No significant spinal stenosis.  There is facet arthropathy.    L1-2: No significant disc herniations or significant spinal stenosis.  Neural foramina are unremarkable.  There is facet arthropathy.      Impression       1. Right L5-S1 foraminal stenosis associated with a disc protrusion/disc herniation.  Clinical correlation for right L5 and S1 radiculopathy suggested.  2. Multilevel facet arthropathy.  3. Milder spondylotic changes at the rest of the disc spaces as above.     Xray Lumbar Spine 7/25/2018:  COMPARISON:  Prior lumbar spine MRI dated 12/16/16    FINDINGS:  There is diffuse osteopenia.  There is mild grade 1 anterolisthesis of L3 on L4 (4 mm) and of L4 on L5 (6 mm).  There is no evidence of translational instability.  There is moderate facet arthropathy at the lower lumbar  spine with probable L5-S1 neural foraminal narrowing.  Endplate degenerative changes are present with subchondral sclerosis and marginal osteophyte formation.    There is atherosclerotic calcification of the aorta.      Impression       Osteopenia and degenerative change of the lumbar spine with grade 1 anterolisthesis of L3-4 and L4-5, new from the 2016 MRI.  No evidence of translational instability.     12/12/16 MRI Lumbar Spine WO Contrast   L1-L2: There is no focal disc herniation. No significant spinal canal or neural foraminal narrowing.    L2-L3: There is no focal disc herniation. No significant spinal canal or neural foraminal narrowing.    L3-L4: Mild bilateral facet arthropathy.  No focal disc herniation. No significant spinal canal or neural foraminal narrowing.    L4-L5: Mild disc bulge and bilateral facet arthropathy results in mild right-sided neural foraminal narrowing. No significant spinal canal stenosis.    L5-S1: Diffuse disc bulge with small superimposed right foraminal disc protrusion.  Findings result in moderate right-sided neuroforaminal narrowing with likely abutment/impingement upon the exited right L5 nerve root. No significant spinal canal stenosis.  Small posterior annular fissure noted.   Impression      Lumbar spondylosis as detailed above, most significant for small right foraminal protrusion at the L5-S1 level resulting in moderate neuroforaminal narrowing and likely abutment/impingement upon the exited right L5 nerve root         Past Medical History:   Diagnosis Date    Diabetes     Hypertension      Past Surgical History:   Procedure Laterality Date    BLOCK, NERVE, BILATERAL L2,3,4,5 Bilateral 5/21/2019    [facility]    BLOCK-NERVE-MEDIAL BRANCH-LUMBAR Bilateral 4/12/2017    [facility]    BLOCK-NERVE-MEDIAL BRANCH-LUMBAR Bilateral 3/22/2017    [facility]     CHOLECYSTECTOMY      COLONOSCOPY Suprep N/A 11/1/2018    Performed [facility]uc[facility] at [facility]    HYSTERECTOMY      Injection, Joint BILATERAL SACROILIAC JOINT INJECTION Bilateral 12/27/2018    [facility]    Injection,steroid,epidural,transforaminal approach  Right L5 and S1 Right 9/11/2018    [facility]    INJECTION-STEROID-EPIDURAL-TRANSFORAMINAL Bilateral 2/8/2017    [facility]    RADIOFREQUENCY ABLATION Left 8/6/2019    [facility]    RADIOFREQUENCY ABLATION Right 7/25/2019    [facility]     Social History     Socioeconomic History    Marital status: Single     Spouse name: Not on file    Number of children: Not on file    Years of education: Not on file    Highest education level: Not on file   Occupational History    Not on file   Social Needs    Financial resource strain: Not on file    Food insecurity:     Worry: Not on file     Inability: Not on file    Transportation needs:     Medical: Not on file     Non-medical: Not on file   Tobacco Use    Smoking status: Never Smoker    Smokeless tobacco: Never Used   Substance and Sexual Activity    Alcohol use: No     Frequency: Never    Drug use: No    Sexual activity: Not Currently   Lifestyle    Physical activity:     Days per week: Not on file     Minutes per session: Not on file    Stress: Not on file   Relationships    Social connections:     Talks on phone: Not on file     Gets together: Not on file     Attends Anabaptism service: Not on file     Active member of club or organization: Not on file     Attends meetings of clubs or organizations: Not on file     Relationship status: Not on file   Other Topics Concern    Not on file   Social History Narrative    Not on file     Family History   Problem Relation Age of Onset     Vaginal cancer Neg Hx     Endometrial cancer Neg Hx     Cervical cancer Neg Hx        Review of patient's allergies indicates:  No Known Allergies    Current Outpatient Medications   Medication Sig    aspirin (ECOTRIN) 81 MG EC tablet 81 mg.    aspirin-calcium carbonate 81 mg-300 mg calcium(777 mg) Tab Take by mouth.    FLUoxetine 20 MG capsule TK ONE C PO  QD    FLUZONE HIGH-DOSE 2018-19, PF, 180 mcg/0.5 mL vaccine ADM 0.5ML IM UTD    gabapentin (NEURONTIN) 300 MG capsule Take 2 tabs in the morning, 1 tab in the afternoon, and 2 tabs in the evening    metformin (GLUCOPHAGE-XR) 500 MG 24 hr tablet 500 mg.    metoprolol tartrate (LOPRESSOR) 25 MG tablet     mirabegron (MYRBETRIQ) 25 mg Tb24 ER tablet Take 1 tablet (25 mg total) by mouth once daily.    naproxen (NAPROSYN) 500 MG tablet     omeprazole (PRILOSEC) 20 MG capsule 20 mg.    pravastatin (PRAVACHOL) 40 MG tablet 40 mg.    glipiZIDE (GLUCOTROL) 5 MG tablet 5 mg.    hydrochlorothiazide (HYDRODIURIL) 25 MG tablet 25 mg.    hydroCHLOROthiazide (HYDRODIURIL) 25 MG tablet Take by mouth.    hyoscyamine (ANASPAZ,LEVSIN) 0.125 mg Tab Take 1 tablet (125 mcg total) by mouth every 4 (four) hours as needed.     No current facility-administered medications for this visit.        REVIEW OF SYSTEMS:    GENERAL:  No weight loss, malaise or fevers.  HEENT:   No recent changes in vision or hearing  NECK:  Negative for lumps, no difficulty with swallowing.  RESPIRATORY:  Negative for cough, wheezing or shortness of breath, patient denies any recent URI.  CARDIOVASCULAR:  Negative for chest pain, leg swelling or palpitations. HTN.  GI:  Negative for abdominal discomfort, blood in stools or black stools or change in bowel habits.  MUSCULOSKELETAL:  See HPI.  SKIN:  Negative for lesions, rash, and itching.  PSYCH:  No mood disorder or recent psychosocial stressors.  Patient's sleep is occasionally disturbed secondary to pain.  HEMATOLOGY/LYMPHOLOGY:  Negative for  "prolonged bleeding, bruising easily or swollen nodes.  Patient is not currently taking any anti-coagulants  ENDO: Positive for DM. Negative for thyroid dysfunction  NEURO:   No history of headaches, syncope, paralysis, seizures or tremors.  All other reviewed and negative other than HPI.    OBJECTIVE:    /69   Pulse 75   Temp 99.2 °F (37.3 °C) (Oral)   Resp 18   Ht 5' 7" (1.702 m)   Wt 104 kg (229 lb 4.8 oz)   BMI 35.91 kg/m²     PHYSICAL EXAMINATION:    GENERAL: Well appearing, in no acute distress, alert and oriented x3.  PSYCH:  Mood and affect appropriate.  SKIN: Skin color, texture, turgor normal, no rashes or lesions.  HEAD/FACE:  Normocephalic, atraumatic. Cranial nerves grossly intact.   CV: RRR with palpation of the radial artery.  PULM: No evidence of respiratory difficulty, symmetric chest rise.  BACK: Straight leg raising in the siting position is negative for radicular pain bilaterally. There is pain with palpation over lumbar spine. Limited ROM with pain on extension. Positive facet loading bilaterally.  LOWER EXTREMITIES: Peripheral joint ROM is full and pain free without obvious instability or laxity in lower extremities. No deformities, edema, or skin discoloration. Good capillary refill.  MUSCULOSKELETAL: Hip and knee provocative maneuvers are negative.  There is pain with palpation over the sacroiliac joints bilaterally.  FABERs test is negative bilaterally.  FADIRs test is negative. 5/5 strength in right ankle with plantar and dorsiflexion. 4/5 strength in left ankle with plantar and dorsiflexion. 5/5 strength with right knee flexion and extension. 5/5 strength with left knee flexion and extension. 5/5 strength in right EHL, 4/5 strength in left EHL.  No atrophy or tone abnormalities are noted.  NEURO: Bilateral lower extremity coordination and muscle stretch reflexes are physiologic and symmetric.   No loss of sensation is noted.  GAIT: Antalgic- ambulates with cane.     ASSESSMENT: " 74 y.o. year old female with back/leg pain, consistent with BL radiculopathy due to L5 disc protrusion.    Encounter Diagnoses   Name Primary?    Lumbar spondylosis Yes    Facet arthritis of lumbar region     Neuroforaminal stenosis of lumbar spine     Annular tear of lumbar disc     DDD (degenerative disc disease), lumbar     Sacroiliac joint pain     Physical deconditioning     Shuffling gait     History of fall        PLAN:     - Previous imaging was reviewed and discussed with the patient today.    - Obtain lumbar RFA given falls.     - Depending on imaging results, we may consider neurology referral.     - She is s/p lumbar RFA with some benefit. We will continue to monitor progress.    - She is s/p bilateral SI joint injections with benefit. We can repeat this as needed.     - She is s/p right L5 and S1 TF LEONEL with benefit. We can repeat this as needed.     - Continue Gabapentin.     - Continue physical therapy.     - RTC after imaging.     - Dr. Duncan was consulted on the patient and agrees with this plan.    The above plan and management options were discussed at length with patient. Patient is in agreement with the above and verbalized understanding.     Coby Russo NP  08/27/2019

## 2019-09-07 ENCOUNTER — HOSPITAL ENCOUNTER (OUTPATIENT)
Dept: RADIOLOGY | Facility: OTHER | Age: 74
Discharge: HOME | End: 2019-09-07
Attending: NURSE PRACTITIONER
Payer: MEDICARE

## 2019-09-07 DIAGNOSIS — R26.89 SHUFFLING GAIT: ICD-10-CM

## 2019-09-07 DIAGNOSIS — M48.061 NEUROFORAMINAL STENOSIS OF LUMBAR SPINE: ICD-10-CM

## 2019-09-07 DIAGNOSIS — M51.36 ANNULAR TEAR OF LUMBAR DISC: ICD-10-CM

## 2019-09-07 DIAGNOSIS — Z91.81 HISTORY OF FALL: ICD-10-CM

## 2019-09-07 DIAGNOSIS — M51.36 DDD (DEGENERATIVE DISC DISEASE), LUMBAR: ICD-10-CM

## 2019-09-07 DIAGNOSIS — M47.816 LUMBAR SPONDYLOSIS: ICD-10-CM

## 2019-09-07 DIAGNOSIS — M53.3 SACROILIAC JOINT PAIN: ICD-10-CM

## 2019-09-07 DIAGNOSIS — M47.816 FACET ARTHRITIS OF LUMBAR REGION: ICD-10-CM

## 2019-09-07 DIAGNOSIS — R53.81 PHYSICAL DECONDITIONING: ICD-10-CM

## 2019-09-07 PROCEDURE — 72148 MRI LUMBAR SPINE W/O DYE: CPT | Mod: TC

## 2019-09-07 PROCEDURE — 72148 MRI LUMBAR SPINE WITHOUT CONTRAST: ICD-10-PCS | Mod: 26,,, | Performed by: RADIOLOGY

## 2019-09-07 PROCEDURE — 72148 MRI LUMBAR SPINE W/O DYE: CPT | Mod: 26,,, | Performed by: RADIOLOGY

## 2019-09-11 ENCOUNTER — OFFICE VISIT (OUTPATIENT)
Dept: PAIN MEDICINE | Facility: CLINIC | Age: 74
End: 2019-09-11
Payer: MEDICARE

## 2019-09-11 VITALS
WEIGHT: 229 LBS | HEART RATE: 64 BPM | BODY MASS INDEX: 35.94 KG/M2 | TEMPERATURE: 99 F | RESPIRATION RATE: 18 BRPM | DIASTOLIC BLOOD PRESSURE: 85 MMHG | HEIGHT: 67 IN | SYSTOLIC BLOOD PRESSURE: 134 MMHG

## 2019-09-11 DIAGNOSIS — M47.816 FACET ARTHRITIS OF LUMBAR REGION: ICD-10-CM

## 2019-09-11 DIAGNOSIS — R53.81 PHYSICAL DECONDITIONING: ICD-10-CM

## 2019-09-11 DIAGNOSIS — R26.89 SHUFFLING GAIT: ICD-10-CM

## 2019-09-11 DIAGNOSIS — M51.36 ANNULAR TEAR OF LUMBAR DISC: Primary | ICD-10-CM

## 2019-09-11 DIAGNOSIS — M48.061 NEUROFORAMINAL STENOSIS OF LUMBAR SPINE: ICD-10-CM

## 2019-09-11 DIAGNOSIS — M51.36 DDD (DEGENERATIVE DISC DISEASE), LUMBAR: ICD-10-CM

## 2019-09-11 DIAGNOSIS — M47.816 LUMBAR SPONDYLOSIS: ICD-10-CM

## 2019-09-11 DIAGNOSIS — R29.6 MULTIPLE FALLS: ICD-10-CM

## 2019-09-11 DIAGNOSIS — M53.3 SACROILIAC JOINT PAIN: ICD-10-CM

## 2019-09-11 DIAGNOSIS — Z91.81 HISTORY OF FALL: ICD-10-CM

## 2019-09-11 PROCEDURE — 99999 PR PBB SHADOW E&M-EST. PATIENT-LVL IV: CPT | Mod: PBBFAC,,, | Performed by: NURSE PRACTITIONER

## 2019-09-11 PROCEDURE — 3288F FALL RISK ASSESSMENT DOCD: CPT | Mod: CPTII,S$GLB,, | Performed by: NURSE PRACTITIONER

## 2019-09-11 PROCEDURE — 1100F PR PT FALLS ASSESS DOC 2+ FALLS/FALL W/INJURY/YR: ICD-10-PCS | Mod: CPTII,S$GLB,, | Performed by: NURSE PRACTITIONER

## 2019-09-11 PROCEDURE — 1100F PTFALLS ASSESS-DOCD GE2>/YR: CPT | Mod: CPTII,S$GLB,, | Performed by: NURSE PRACTITIONER

## 2019-09-11 PROCEDURE — 99213 PR OFFICE/OUTPT VISIT, EST, LEVL III, 20-29 MIN: ICD-10-PCS | Mod: S$GLB,,, | Performed by: NURSE PRACTITIONER

## 2019-09-11 PROCEDURE — 99213 OFFICE O/P EST LOW 20 MIN: CPT | Mod: S$GLB,,, | Performed by: NURSE PRACTITIONER

## 2019-09-11 PROCEDURE — 3288F PR FALLS RISK ASSESSMENT DOCUMENTED: ICD-10-PCS | Mod: CPTII,S$GLB,, | Performed by: NURSE PRACTITIONER

## 2019-09-11 PROCEDURE — 99999 PR PBB SHADOW E&M-EST. PATIENT-LVL IV: ICD-10-PCS | Mod: PBBFAC,,, | Performed by: NURSE PRACTITIONER

## 2019-09-11 NOTE — PROGRESS NOTES
"  Chronic Pain - Established Patient    Referring Physician: No ref. provider found    Chief Complaint:   Chief Complaint   Patient presents with    Back Pain        SUBJECTIVE: Disclaimer: This note has been generated using voice-recognition software. There may be typographical errors that have been missed during proof-reading    Interval History 9/11/2019:  The patient returns to clinic today for follow up and image review. She continues to report low back and buttock pain that is sharp and aching. She reports intermittent radiating pain into the posterior aspect of her right leg to her ankle. She denies any left leg pain. She denies any numbness or tingling to her feet. Her worst pain at this time is her bilateral buttocks and tailbone. This pain is worse with prolonged sitting and moving from seated to standing. She continues to report shuffling gait. She has had multiple falls recently. She does take Gabapentin with benefit. She denies any other health changes. Her pain today is 8/10.    Interval History 8/27/2019:  The patient returns to clinic today for follow up. She is s/p right L3,4,5 RFA on 7/25/2019. She is s/p left L3,4,5 RFA on 8/6/2019. She reports limited relief at this time. She continues to report low back pain that is sharp and aching. She denies any radicular leg pain. She denies any numbness or tingling to her feet. She does report episodes of "shuffling" gait where she feels like she will fall. She has had two significant falls recently. She continues to take Gabapentin with benefit. She denies any other health changes. Her pain today is 2/10.    Interval History 7/23/2019:  The patient returns to clinic today for follow up. She was previously scheduled for RFA but had to cancel due to a fall. She fell while getting out of her bed and hit her face on her nightstand. She did have xrays which were negative for facial fracture. She did have a facial laceration which was sutured. She has " rescheduled her RFA for this week. She continues to report low back pain that is constant, sharp, and aching. She denies any radiating leg pain. She does report intermittent numbness and pain to her buttocks with prolonged sitting. She continues to take Gabapentin. She denies any other health changes. Her pain today is 7/10.      Interval History 5/28/2019:  The patient returns to clinic today for follow up. She is s/p bilateral L2,3,4,5 MBB on 5/21/2019. She reports 80% relief of her low back pain after the block. She did sleep very well the night after the block. Her pain has returned to baseline. She continues to report low back pain that is constant, sharp, and aching in nature. She denies any radiating leg pain. She does report increased tailbone pain. Her pain is worse with prolonged standing and walking. She continues to take Gabapentin with benefit. She denies any other health changes. Her pain today is 2/10.    Interval History 4/26/2019:  The patient returns to clinic today for follow up. She reports a fall yesterday at home where she tripped over her shoe string. She landed on her buttock. She reports that she hit the back of her head on the carpet. She denies any loss of consciousness. She did not go to the ER. She does report increased low back pain today that is sore in nature which she attributes to the fall. She continues to report low back pain that is sharp and aching in nature. She does report intermittent aching buttock pain. She denies any radiating leg pain. Her pain is worse with prolonged standing and walking. She continues to participate in physical therapy. She continues to take Gabapentin with benefit. She did have lumbar MBB in 2017 with 90% relief for a few days. She denies any other health changes. She does have stress urinary incontinence. She does follow up with Urogyn. Her pain today is 7/10.    Interval History 3/11/2019:  The patient returns to clinic today for follow up. She  continues to report low back pain that is aching in nature. She reports right sided buttock pain that is sore in nature. She denies any radiating leg pain. Her pain is worse with standing, sitting, and activity. She does endorse morning stiffness. She is currently participating in physical therapy with benefit. She continues to take Gabapentin with benefit and does need a refill today. She denies any other health changes. She denies any bowel or bladder incontinence. Her pain today is 5/10.    Interval History 1/9/2019:  The patient returns to clinic today for follow up. She is s/p bilateral SI joint injections on 12/27/2018. She reports 100% for the first week. She now reports approximately 50% relief of her pain. She reports intermittent low back and bilateral buttock pain. She describes this pain as sore and aching in nature. This pain is worse with prolonged sitting. She denies any radiating leg pain. She often feels as though as she is leaning over while walking. She often feels as though her balance is off. She continues to take Gabapentin with benefit. She denies any other health changes. His pain today is 2/10.     Interval History 12/11/2018:  The patient returns to clinic today for follow up. She reports increased low back and bilateral buttock pain, left greater than right. She describes this pain as sore in nature. This pain is worse with prolonged sitting. She denies any radiating leg pain. She reports that she often feels like her back is locking up on her. She continues to take Gabapentin with benefit. She denies any other health changes. Her pain today is 5/10.    Interval History 9/25/2018:  The patient returns to clinic today for follow up. She is s/p right L5 and S1 TF LEONEL. She reports 90% relief of her low back and leg pain. She reports intermittent low back pain that is aching in nature. She denies any radiating leg pain today. Her back pain is worse with bending. She also reports that her legs  feel heavy with prolonged walking. She denies any other health changes. Her pain today is 4/10.    Interval History 7/27/2018:  The patient returns to clinic today for follow up and image review. She continues to report low back pain that radiates down the posterior aspect of her right leg to her foot. She reports intermittent left leg pain in the same distribution. Her pain is worse with prolonged walking and activity. She often feels like her legs are heavy. She often feels like she will fall. She denies any other health changes. She denies any bowel or bladder incontinence. Her pain today is 2/10.    Interval History 7/20/2018:  The patient returns to clinic today for follow up. She has not been seen in over a year due to her son being diagnosed with cancer. She reports that he is in remission now. She returns today due to three fall recently, last a week ago. She continues to report low back pain that radiates intermittently down the posterior portion of both legs to her feet. She reports that her legs often feel heavy and weak which is why she falls. She denies any bowel or bladder incontinence. Her pain today is 7/10.    Interval History 3/6/2017:  The patient returns to clinic today for follow up. She is s/p bilateral S1 TF LEONEL on 2/8/2017. She reports 80% relief of her radiating leg pain. She continues to have low back pain. She describes the pain as dull and aching. The pain is worse in the morning. She reports that the pain increases with prolonged sitting and standing. She denies any other health changes. She denies any bowel or bladder incontinence. Her pain today is 6/10.     Interval History 1/23/2017:  The patient returns to clinic today for follow up of low back pain and bilateral leg pain. She has recently completed a medrol dose pack with some benefit. She still reports radiating pain into both legs. The pain is worse with prolonged sitting and lifting. The pain gets better with walking and rest.  "The pain radiates down the back of both legs to ankles. She denies any bowel or bladder incontinence or signs of saddle paresthesia. She continues to work with lifting restrictions. She currently takes Gabapentin and Naproxen with benefit. Her pain today is 6/10.     Initial encounter:    Mila Foster presents to the clinic for the evaluation of bilateral leg pain with intermittent lower back pain. 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 She reports recent falling to knees, but she changed her shoes, and the falling has ceased. She reports that she had a "pain flare" 2 weeks ago requiring a walker for 2 days.     Brief history:  70yo F with hx of HTN and DM complicated by neuropathy BL but R>L with tingling, tightness, shooting pain only in feet. Patient reports glucose was 120 this morning and usually runs around 130.    Pain Description:    The pain is located in the posterior leg area and radiates to her ankles and to her knees at its worst.     At BEST  1/10     At WORST  8/10 on the WORST day.      On average pain is rated as 7/10    Today the pain is rated as 5/10    The pain is described as aching, burning, dull, sharp and tight band      Symptoms interfere with work.     Exacerbating factors: Sitting, picking up children, lifting, bending over    Mitigating factors: walking, rest    Patient denies night fever/night sweats, bowel incontinence, significant weight loss, significant motor weakness and loss of sensations. She does report chronic urinary incontinence unrelated to back/leg pain.    Patient denies any suicidal or homicidal ideations    Pain Medications:  Current:  Gabapentin    Tried in Past:  NSAIDs -yes Naproxen   TCA -Never  SNRI -Never  Anti-convulsants -gabapentin  Muscle Relaxants -Never  Opioids-Never    Physical Therapy/Home Exercise: yes       report: N/A    Pain Procedures:   2/8/2017- " Bilateral S1 TF LEONEL  3/22/2017- Bilateral L2,3,4,5 MBB  9/11/2018- Right L5 and S1 TF LEONEL   12/27/2018- Bilateral SI joint injections  5/21/2019- Bilateral L2,3,4,5 MBB  7/25/2019- Right L3,4,5 RFA  8/6/2019- Left L3,4,5 RFA    Chiropractor -never  Acupuncture - never  TENS unit -never  Spinal decompression -never  Joint replacement -never    Imaging:   MRI Lumbar Spine 9/7/2019:  COMPARISON:  07/25/2018    FINDINGS:  The distal cord/conus demonstrates normal size and signal.  No evidence of osteomyelitis, marrow replacement process, or acute fracture.  No paraspinal masses.    L1-2 is unremarkable.    At L2-3, there is mild facet arthropathy and disc bulging.  No spinal canal stenosis or significant neural foraminal narrowing.    At L3-4, there is mild disc bulging and bilateral facet arthropathy.  No spinal canal stenosis or significant neural foraminal narrowing.    At L4-5, there is bilateral facet arthropathy with slight L4-5 anterolisthesis.  No spinal canal stenosis..  There is mild right-sided neural foraminal narrowing.    At L5-S1, there is mild bilateral facet arthropathy and disc bulging.  Small posterior annular tear with right paracentral/lateral recess disc protrusion, possibly impinging upon the right L5 or S1 nerve roots.  No spinal canal stenosis or significant neural foraminal narrowing.      Impression       Small right paracentral/ lateral recess disc protrusion at L5-S1 and mild neural foraminal narrowing at L4-5, as above.  No spinal canal stenosis.  Findings are similar to the 07/25/2018 exam.     Xray Lumbar Spine 7/25/2018:  COMPARISON:  Prior lumbar spine MRI dated 12/16/16    FINDINGS:  There is diffuse osteopenia.  There is mild grade 1 anterolisthesis of L3 on L4 (4 mm) and of L4 on L5 (6 mm).  There is no evidence of translational instability.  There is moderate facet arthropathy at the lower lumbar spine with probable L5-S1 neural foraminal narrowing.  Endplate degenerative changes  are present with subchondral sclerosis and marginal osteophyte formation.    There is atherosclerotic calcification of the aorta.      Impression       Osteopenia and degenerative change of the lumbar spine with grade 1 anterolisthesis of L3-4 and L4-5, new from the 2016 MRI.  No evidence of translational instability.             Past Medical History:   Diagnosis Date    Diabetes     Hypertension      Past Surgical History:   Procedure Laterality Date    BLOCK, NERVE, BILATERAL L2,3,4,5 Bilateral 5/21/2019    [facility]    BLOCK-NERVE-MEDIAL BRANCH-LUMBAR Bilateral 4/12/2017    [facility]    BLOCK-NERVE-MEDIAL BRANCH-LUMBAR Bilateral 3/22/2017    [facility]    CHOLECYSTECTOMY      COLONOSCOPY Suprep N/A 11/1/2018    [facility]    HYSTERECTOMY      Injection, Joint BILATERAL SACROILIAC JOINT INJECTION Bilateral 12/27/2018    [facility]    Injection,steroid,epidural,transforaminal approach  Right L5 and S1 Right 9/11/2018    [facility]    INJECTION-STEROID-EPIDURAL-TRANSFORAMINAL Bilateral 2/8/2017    [facility]    RADIOFREQUENCY ABLATION Left 8/6/2019    [facility]    RADIOFREQUENCY ABLATION Right 7/25/2019    [facility]     Social History     Socioeconomic History    Marital status: Single     Spouse name: Not on file    Number of children: Not on file    Years of education: Not on file    Highest education level: Not on file   Occupational History    Not on file   Social Needs    Financial resource strain: Not on file    Food insecurity:     Worry: Not on file     Inability: Not on file    Transportation needs:     Medical: Not on file      Non-medical: Not on file   Tobacco Use    Smoking status: Never Smoker    Smokeless tobacco: Never Used   Substance and Sexual Activity    Alcohol use: No     Frequency: Never    Drug use: No    Sexual activity: Not Currently   Lifestyle    Physical activity:     Days per week: Not on file     Minutes per session: Not on file    Stress: Not on file   Relationships    Social connections:     Talks on phone: Not on file     Gets together: Not on file     Attends Church service: Not on file     Active member of club or organization: Not on file     Attends meetings of clubs or organizations: Not on file     Relationship status: Not on file   Other Topics Concern    Not on file   Social History Narrative    Not on file     Family History   Problem Relation Age of Onset    Vaginal cancer Neg Hx     Endometrial cancer Neg Hx     Cervical cancer Neg Hx        Review of patient's allergies indicates:  No Known Allergies    Current Outpatient Medications   Medication Sig    aspirin (ECOTRIN) 81 MG EC tablet 81 mg.    aspirin-calcium carbonate 81 mg-300 mg calcium(777 mg) Tab Take by mouth.    FLUoxetine 20 MG capsule TK ONE C PO  QD    FLUZONE HIGH-DOSE 2018-19, PF, 180 mcg/0.5 mL vaccine ADM 0.5ML IM UTD    gabapentin (NEURONTIN) 300 MG capsule Take 2 tabs in the morning, 1 tab in the afternoon, and 2 tabs in the evening    glipiZIDE (GLUCOTROL) 5 MG tablet 5 mg.    hydrochlorothiazide (HYDRODIURIL) 25 MG tablet 25 mg.    hydroCHLOROthiazide (HYDRODIURIL) 25 MG tablet Take by mouth.    metformin (GLUCOPHAGE-XR) 500 MG 24 hr tablet 500 mg.    metoprolol tartrate (LOPRESSOR) 25 MG tablet     mirabegron (MYRBETRIQ) 25 mg Tb24 ER tablet Take 1 tablet (25 mg total) by mouth once daily.    naproxen (NAPROSYN) 500 MG tablet     omeprazole (PRILOSEC) 20 MG capsule 20 mg.    pravastatin (PRAVACHOL) 40 MG tablet 40 mg.    hyoscyamine (ANASPAZ,LEVSIN) 0.125 mg Tab Take 1 tablet (125 mcg total) by mouth  "every 4 (four) hours as needed.     No current facility-administered medications for this visit.        REVIEW OF SYSTEMS:    GENERAL:  No weight loss, malaise or fevers.  HEENT:   No recent changes in vision or hearing  NECK:  Negative for lumps, no difficulty with swallowing.  RESPIRATORY:  Negative for cough, wheezing or shortness of breath, patient denies any recent URI.  CARDIOVASCULAR:  Negative for chest pain, leg swelling or palpitations. HTN.  GI:  Negative for abdominal discomfort, blood in stools or black stools or change in bowel habits.  MUSCULOSKELETAL:  See HPI.  SKIN:  Negative for lesions, rash, and itching.  PSYCH:  No mood disorder or recent psychosocial stressors.  Patient's sleep is occasionally disturbed secondary to pain.  HEMATOLOGY/LYMPHOLOGY:  Negative for prolonged bleeding, bruising easily or swollen nodes.  Patient is not currently taking any anti-coagulants  ENDO: Positive for DM. Negative for thyroid dysfunction  NEURO:   No history of headaches, syncope, paralysis, seizures or tremors.  All other reviewed and negative other than HPI.    OBJECTIVE:    /85   Pulse 64   Temp 98.9 °F (37.2 °C)   Resp 18   Ht 5' 7" (1.702 m)   Wt 103.9 kg (229 lb)   BMI 35.87 kg/m²     PHYSICAL EXAMINATION:    GENERAL: Well appearing, in no acute distress, alert and oriented x3.  PSYCH:  Mood and affect appropriate.  SKIN: Skin color, texture, turgor normal, no rashes or lesions.  HEAD/FACE:  Normocephalic, atraumatic. Cranial nerves grossly intact.   CV: RRR with palpation of the radial artery.  PULM: No evidence of respiratory difficulty, symmetric chest rise.  BACK: Straight leg raising in the siting position is negative for radicular pain bilaterally. There is pain with palpation over lumbar spine. Limited ROM with pain on flexion and extension. Positive facet loading bilaterally.  LOWER EXTREMITIES: Peripheral joint ROM is full and pain free without obvious instability or laxity in lower " extremities. No deformities, edema, or skin discoloration. Good capillary refill.  MUSCULOSKELETAL: Hip and knee provocative maneuvers are negative.  There is pain with palpation over the sacroiliac joints bilaterally.  FABERs test is negative bilaterally.  FADIRs test is negative. 5/5 strength in right ankle with plantar and dorsiflexion. 4/5 strength in left ankle with plantar and dorsiflexion. 5/5 strength with right knee flexion and extension. 5/5 strength with left knee flexion and extension. 5/5 strength in right EHL, 4/5 strength in left EHL.  No atrophy or tone abnormalities are noted.  NEURO: Bilateral lower extremity coordination and muscle stretch reflexes are physiologic and symmetric.   No loss of sensation is noted.  GAIT: Antalgic, shuffling gait- ambulates with cane.     ASSESSMENT: 74 y.o. year old female with back/leg pain, consistent with BL radiculopathy due to L5 disc protrusion.    Encounter Diagnoses   Name Primary?    Annular tear of lumbar disc Yes    Neuroforaminal stenosis of lumbar spine     Lumbar spondylosis     Facet arthritis of lumbar region     DDD (degenerative disc disease), lumbar     Sacroiliac joint pain     Physical deconditioning     Shuffling gait     History of fall     Multiple falls        PLAN:     - Previous imaging was reviewed and discussed with the patient today.    - Schedule for bilateral SI joint injections.   The procedure, risks, benefits and options were discussed with patient. There are no contraindications to the procedure. The patient expressed understanding and agreed to proceed.  Consent obtained today.    - If short term relief, consider sacral RFA. She may be a candidate for current research study.     - Consider repeat LEONEL.     - She is s/p lumbar RFA with some benefit. We will continue to monitor progress.    - Continue Gabapentin.     - Continue physical therapy.     - Consult neurology given gait change and multiple falls.     - We  discussed using a walker instead of a cane due to multiple falls. She does have one at home. I encouraged her to use this.     - RTC 2 weeks after above procedure.     - Dr. Duncan was consulted on the patient and agrees with this plan.    The above plan and management options were discussed at length with patient. Patient is in agreement with the above and verbalized understanding.     Coby Russo NP  09/11/2019

## 2019-09-12 ENCOUNTER — TELEPHONE (OUTPATIENT)
Dept: PAIN MEDICINE | Facility: CLINIC | Age: 74
End: 2019-09-12

## 2019-09-25 ENCOUNTER — TELEPHONE (OUTPATIENT)
Dept: NEUROLOGY | Facility: CLINIC | Age: 74
End: 2019-09-25

## 2019-09-25 ENCOUNTER — TELEPHONE (OUTPATIENT)
Dept: PAIN MEDICINE | Facility: CLINIC | Age: 74
End: 2019-09-25

## 2019-09-25 NOTE — TELEPHONE ENCOUNTER
I called patient in regards to a sooner appt. With Dr. Bhatti. She decided to keep the 10/29 appt. Because the next available is in November. And she is placed on the wait list

## 2019-09-25 NOTE — TELEPHONE ENCOUNTER
Staff returned call to patient      Assisted pt with getting in sooner appt with Neurosurgery with Tom Collier    Was not a sooner appointments    pt stated she will keep  appt she has and asked to remain on waiting list

## 2019-09-25 NOTE — TELEPHONE ENCOUNTER
----- Message from Maico Mcmahan sent at 9/25/2019  4:04 PM CDT -----  Contact: ABILIO SIDDIQUI [[id number]]  Name of Who is Calling: ABILIO SIDDIQUI [[id number]]      What is the request in detail: Would like to speak with NP in regards to knowing if she can go back to Dr. Parra. The [facility] doesn't have anything until 10/29 and Dr. Parra might have something sooner. Please advise      Can the clinic reply by [facility]: no      What Number to Call Back if not in [facility]: [phone]

## 2019-09-25 NOTE — TELEPHONE ENCOUNTER
Staff returned call to pt     Pt did not answer. lvm to follow up with staff regarding being scheduled.

## 2019-09-25 NOTE — TELEPHONE ENCOUNTER
----- Message from Quyen Michaud sent at 9/25/2019  1:19 PM CDT -----  Contact: pt: [phone]  Pt would like a appt date sooner than 10/29      Please contact pt: [phone]

## 2019-09-25 NOTE — TELEPHONE ENCOUNTER
----- Message from Eric Cabello, Patient Care Assistant sent at 9/25/2019  4:51 PM CDT -----  Contact: ABILIO SIDDIQUI [[id number]]  Type:  Patient Returning Call    Who Called:ABILIO SIDDIQUI [[id number]]     Who Left Message for Patient: Nela Castañeda MA  Does the patient know what this is regarding?:Yes    Best Call Back Number:[phone]    Additional Information:   None

## 2019-10-29 ENCOUNTER — LAB VISIT (OUTPATIENT)
Dept: LAB | Facility: HOSPITAL | Age: 74
End: 2019-10-29
Attending: PSYCHIATRY & NEUROLOGY
Payer: MEDICARE

## 2019-10-29 ENCOUNTER — OFFICE VISIT (OUTPATIENT)
Dept: NEUROLOGY | Facility: CLINIC | Age: 74
End: 2019-10-29
Payer: MEDICARE

## 2019-10-29 VITALS
HEIGHT: 67 IN | DIASTOLIC BLOOD PRESSURE: 73 MMHG | SYSTOLIC BLOOD PRESSURE: 129 MMHG | WEIGHT: 228.38 LBS | HEART RATE: 65 BPM | BODY MASS INDEX: 35.84 KG/M2

## 2019-10-29 DIAGNOSIS — G62.9 POLYNEUROPATHY: ICD-10-CM

## 2019-10-29 DIAGNOSIS — M54.50 CHRONIC LOW BACK PAIN, UNSPECIFIED BACK PAIN LATERALITY, UNSPECIFIED WHETHER SCIATICA PRESENT: ICD-10-CM

## 2019-10-29 DIAGNOSIS — G62.9 POLYNEUROPATHY: Primary | ICD-10-CM

## 2019-10-29 DIAGNOSIS — R26.81 UNSTEADINESS ON FEET: ICD-10-CM

## 2019-10-29 DIAGNOSIS — G89.29 CHRONIC LOW BACK PAIN, UNSPECIFIED BACK PAIN LATERALITY, UNSPECIFIED WHETHER SCIATICA PRESENT: ICD-10-CM

## 2019-10-29 LAB
FOLATE SERPL-MCNC: 17.4 NG/ML (ref 4–24)
VIT B12 SERPL-MCNC: 1140 PG/ML (ref 210–950)

## 2019-10-29 PROCEDURE — 82746 ASSAY OF FOLIC ACID SERUM: CPT

## 2019-10-29 PROCEDURE — 3288F FALL RISK ASSESSMENT DOCD: CPT | Mod: CPTII,S$GLB,, | Performed by: PSYCHIATRY & NEUROLOGY

## 2019-10-29 PROCEDURE — 99214 OFFICE O/P EST MOD 30 MIN: CPT | Mod: S$GLB,,, | Performed by: PSYCHIATRY & NEUROLOGY

## 2019-10-29 PROCEDURE — 99214 PR OFFICE/OUTPT VISIT, EST, LEVL IV, 30-39 MIN: ICD-10-PCS | Mod: S$GLB,,, | Performed by: PSYCHIATRY & NEUROLOGY

## 2019-10-29 PROCEDURE — 84165 PROTEIN E-PHORESIS SERUM: CPT

## 2019-10-29 PROCEDURE — 82607 VITAMIN B-12: CPT

## 2019-10-29 PROCEDURE — 86334 IMMUNOFIX E-PHORESIS SERUM: CPT | Mod: 26,,, | Performed by: PATHOLOGY

## 2019-10-29 PROCEDURE — 84165 PROTEIN E-PHORESIS SERUM: CPT | Mod: 26,,, | Performed by: PATHOLOGY

## 2019-10-29 PROCEDURE — 3288F PR FALLS RISK ASSESSMENT DOCUMENTED: ICD-10-PCS | Mod: CPTII,S$GLB,, | Performed by: PSYCHIATRY & NEUROLOGY

## 2019-10-29 PROCEDURE — 36415 COLL VENOUS BLD VENIPUNCTURE: CPT

## 2019-10-29 PROCEDURE — 86334 PATHOLOGIST INTERPRETATION IFE: ICD-10-PCS | Mod: 26,,, | Performed by: PATHOLOGY

## 2019-10-29 PROCEDURE — 1100F PR PT FALLS ASSESS DOC 2+ FALLS/FALL W/INJURY/YR: ICD-10-PCS | Mod: CPTII,S$GLB,, | Performed by: PSYCHIATRY & NEUROLOGY

## 2019-10-29 PROCEDURE — 99999 PR PBB SHADOW E&M-EST. PATIENT-LVL IV: CPT | Mod: PBBFAC,,, | Performed by: PSYCHIATRY & NEUROLOGY

## 2019-10-29 PROCEDURE — 84165 PATHOLOGIST INTERPRETATION SPE: ICD-10-PCS | Mod: 26,,, | Performed by: PATHOLOGY

## 2019-10-29 PROCEDURE — 86334 IMMUNOFIX E-PHORESIS SERUM: CPT

## 2019-10-29 PROCEDURE — 1100F PTFALLS ASSESS-DOCD GE2>/YR: CPT | Mod: CPTII,S$GLB,, | Performed by: PSYCHIATRY & NEUROLOGY

## 2019-10-29 PROCEDURE — 99999 PR PBB SHADOW E&M-EST. PATIENT-LVL IV: ICD-10-PCS | Mod: PBBFAC,,, | Performed by: PSYCHIATRY & NEUROLOGY

## 2019-10-30 LAB
ALBUMIN SERPL ELPH-MCNC: 4.05 G/DL (ref 3.35–5.55)
ALPHA1 GLOB SERPL ELPH-MCNC: 0.29 G/DL (ref 0.17–0.41)
ALPHA2 GLOB SERPL ELPH-MCNC: 0.76 G/DL (ref 0.43–0.99)
B-GLOBULIN SERPL ELPH-MCNC: 0.94 G/DL (ref 0.5–1.1)
GAMMA GLOB SERPL ELPH-MCNC: 0.95 G/DL (ref 0.67–1.58)
INTERPRETATION SERPL IFE-IMP: NORMAL
PATHOLOGIST INTERPRETATION IFE: NORMAL
PATHOLOGIST INTERPRETATION SPE: NORMAL
PROT SERPL-MCNC: 7 G/DL (ref 6–8.4)

## 2019-11-06 ENCOUNTER — TELEPHONE (OUTPATIENT)
Dept: PAIN MEDICINE | Facility: CLINIC | Age: 74
End: 2019-11-06

## 2019-11-06 DIAGNOSIS — R53.81 PHYSICAL DECONDITIONING: ICD-10-CM

## 2019-11-06 DIAGNOSIS — M51.36 ANNULAR TEAR OF LUMBAR DISC: ICD-10-CM

## 2019-11-06 DIAGNOSIS — M47.816 FACET ARTHRITIS OF LUMBAR REGION: ICD-10-CM

## 2019-11-06 DIAGNOSIS — M46.1 SACROILIITIS: Primary | ICD-10-CM

## 2019-11-06 DIAGNOSIS — M53.3 SACROILIAC JOINT PAIN: ICD-10-CM

## 2019-11-06 DIAGNOSIS — M48.061 NEUROFORAMINAL STENOSIS OF LUMBAR SPINE: ICD-10-CM

## 2019-11-06 DIAGNOSIS — M47.816 LUMBAR SPONDYLOSIS: ICD-10-CM

## 2019-11-06 DIAGNOSIS — R26.89 SHUFFLING GAIT: ICD-10-CM

## 2019-11-06 DIAGNOSIS — M51.36 DDD (DEGENERATIVE DISC DISEASE), LUMBAR: ICD-10-CM

## 2019-11-06 NOTE — TELEPHONE ENCOUNTER
Patient stopped by clinic yesterday (11/5/2019), left message to speak with NP.     Phoned patient to discuss her questions. She reports that she has seen Neurology who has recommended EMG testing. She also would like to try PT again. Orders placed today. She is scheduled for procedure tomorrow. She will follow up in office after the procedure.

## 2019-11-07 ENCOUNTER — HOSPITAL ENCOUNTER (OUTPATIENT)
Facility: OTHER | Age: 74
Discharge: HOME | End: 2019-11-07
Attending: ANESTHESIOLOGY | Admitting: ANESTHESIOLOGY
Payer: MEDICARE

## 2019-11-07 VITALS
DIASTOLIC BLOOD PRESSURE: 74 MMHG | BODY MASS INDEX: 33.9 KG/M2 | RESPIRATION RATE: 18 BRPM | OXYGEN SATURATION: 97 % | HEIGHT: 67 IN | HEART RATE: 63 BPM | WEIGHT: 216 LBS | SYSTOLIC BLOOD PRESSURE: 167 MMHG | TEMPERATURE: 99 F

## 2019-11-07 DIAGNOSIS — M46.1 SACROILIITIS: Primary | ICD-10-CM

## 2019-11-07 LAB — POCT GLUCOSE: 157 MG/DL (ref 70–110)

## 2019-11-07 PROCEDURE — 25000003 PHARM REV CODE 250: Performed by: PHYSICAL MEDICINE & REHABILITATION

## 2019-11-07 PROCEDURE — 27096 INJECT SACROILIAC JOINT: CPT | Mod: 50,,, | Performed by: ANESTHESIOLOGY

## 2019-11-07 PROCEDURE — 27096 INJECT SACROILIAC JOINT: CPT | Mod: 50 | Performed by: ANESTHESIOLOGY

## 2019-11-07 PROCEDURE — 25500020 PHARM REV CODE 255: Performed by: ANESTHESIOLOGY

## 2019-11-07 PROCEDURE — 25000003 PHARM REV CODE 250: Performed by: ANESTHESIOLOGY

## 2019-11-07 PROCEDURE — 27096 PR INJECTION,SACROILIAC JOINT: ICD-10-PCS | Mod: 50,,, | Performed by: ANESTHESIOLOGY

## 2019-11-07 PROCEDURE — 63600175 PHARM REV CODE 636 W HCPCS: Performed by: ANESTHESIOLOGY

## 2019-11-07 RX ORDER — SODIUM CHLORIDE 9 MG/ML
500 INJECTION, SOLUTION INTRAVENOUS CONTINUOUS
Status: ACTIVE | OUTPATIENT
Start: 2019-11-07

## 2019-11-07 RX ORDER — METHYLPREDNISOLONE ACETATE 40 MG/ML
INJECTION, SUSPENSION INTRA-ARTICULAR; INTRALESIONAL; INTRAMUSCULAR; SOFT TISSUE
Status: DISCONTINUED | OUTPATIENT
Start: 2019-11-07 | End: 2019-11-07 | Stop reason: HOSPADM

## 2019-11-07 RX ORDER — ALPRAZOLAM 0.5 MG/1
0.5 TABLET ORAL
Status: DISCONTINUED | OUTPATIENT
Start: 2019-11-07 | End: 2019-11-07 | Stop reason: HOSPADM

## 2019-11-07 RX ORDER — BUPIVACAINE HYDROCHLORIDE 2.5 MG/ML
INJECTION, SOLUTION EPIDURAL; INFILTRATION; INTRACAUDAL
Status: DISCONTINUED | OUTPATIENT
Start: 2019-11-07 | End: 2019-11-07 | Stop reason: HOSPADM

## 2019-11-07 RX ADMIN — ALPRAZOLAM 0.5 MG: 0.5 TABLET ORAL at 08:11

## 2019-11-07 NOTE — H&P
"HPI: Patient with known SIJ pain. Presents today for injection.  Patient presenting for Procedure(s) (LRB):  INJECTION, JOINT, SI (Bilateral)     Patient on Anti-coagulation No    No health changes since previous encounter    Past Medical History:   Diagnosis Date    Diabetes     Hypertension      Past Surgical History:   Procedure Laterality Date    CHOLECYSTECTOMY      COLONOSCOPY N/A 11/1/2018    Procedure: COLONOSCOPY Suprep;  Surgeon: Azucena Hood MD;  Location: [facility] ENDO;  Service: Endoscopy;  Laterality: N/A;    HYSTERECTOMY      INJECTION OF ANESTHETIC AGENT AROUND NERVE Bilateral 5/21/2019    Procedure: BLOCK, NERVE, BILATERAL L2,3,4,5;  Surgeon: Bonnie Duncan MD;  Location: [facility] PAIN MGT;  Service: Pain Management;  Laterality: Bilateral;  BLOCK, NERVE, BILATERAL L2,3,4,5    INJECTION OF JOINT Bilateral 12/27/2018    Procedure: Injection, Joint BILATERAL SACROILIAC JOINT INJECTION;  Surgeon: Bonnie Duncan MD;  Location: [facility] PAIN MGT;  Service: Pain Management;  Laterality: Bilateral;    RADIOFREQUENCY ABLATION Right 7/25/2019    Procedure: RADIOFREQUENCY ABLATION;  Surgeon: Bonnie Duncan MD;  Location: [facility] PAIN MGT;  Service: Pain Management;  Laterality: Right;  Right RFA L2,3,4,5; THERMAL  1 of 2    RADIOFREQUENCY ABLATION Left 8/6/2019    Procedure: RADIOFREQUENCY ABLATION;  Surgeon: Bonnie Duncan MD;  Location: [facility] PAIN MGT;  Service: Pain Management;  Laterality: Left;  Left RFA L2,3,4,5 LUMBAR  1 of 2     Review of patient's allergies indicates:  No Known Allergies   Current Facility-Administered Medications   Medication    0.9%  NaCl infusion    ALPRAZolam tablet 0.5 mg       PMHx, PSHx, Allergies, Medications reviewed in epic    ROS negative except pain complaints in HPI    OBJECTIVE:    BP (!) 199/99 (Patient Position: Lying)   Pulse 74   Temp 98.6 °F (37 °C) (Oral)   Resp 18   Ht 5' 7" (1.702 m)   Wt 98 kg (216 lb)   SpO2 95%   Breastfeeding? No   BMI " 33.83 kg/m²     PHYSICAL EXAMINATION:    GENERAL: Well appearing, in no acute distress, alert and oriented x3.  PSYCH:  Mood and affect appropriate.  SKIN: Skin color, texture, turgor normal, no rashes or lesions which will impact the procedure.  CV: RRR with palpation of the radial artery.  PULM: No evidence of respiratory difficulty, symmetric chest rise. Clear to auscultation.  NEURO: Cranial nerves grossly intact.    Plan:    Proceed with procedure as planned Procedure(s) (LRB):  INJECTION, JOINT, SI (Bilateral)    Demetrius Gunn  11/07/2019

## 2019-11-07 NOTE — DISCHARGE SUMMARY
Discharge Note  Short Stay      SUMMARY     Admit Date: 11/7/2019    Attending Physician: Bonnie Duncan      Discharge Physician: Bonnie Duncan      Discharge Date: 11/7/2019 9:06 AM    Procedure(s) (LRB):  INJECTION, JOINT, SI (Bilateral)    Final Diagnosis: Bilateral sacroiliitis [M46.1]    Disposition: Home or self care    Patient Instructions:   Current Discharge Medication List      CONTINUE these medications which have NOT CHANGED    Details   aspirin (ECOTRIN) 81 MG EC tablet 81 mg.  Refills: 2      aspirin-calcium carbonate 81 mg-300 mg calcium(777 mg) Tab Take by mouth.      FLUoxetine 20 MG capsule TK ONE C PO  QD  Refills: 1      FLUZONE HIGH-DOSE 2018-19, PF, 180 mcg/0.5 mL vaccine ADM 0.5ML IM UTD  Refills: 0      gabapentin (NEURONTIN) 300 MG capsule Take 2 tabs in the morning, 1 tab in the afternoon, and 2 tabs in the evening  Qty: 150 capsule, Refills: 5    Associated Diagnoses: Annular tear of lumbar disc; Neuroforaminal stenosis of lumbar spine; Facet arthritis of lumbar region; Lumbar spondylosis; Sacroiliac joint pain; DDD (degenerative disc disease), lumbar      glipiZIDE (GLUCOTROL) 5 MG tablet 5 mg.  Refills: 1      !! hydrochlorothiazide (HYDRODIURIL) 25 MG tablet 25 mg.  Refills: 1      !! hydroCHLOROthiazide (HYDRODIURIL) 25 MG tablet Take by mouth.      hyoscyamine (ANASPAZ,LEVSIN) 0.125 mg Tab Take 1 tablet (125 mcg total) by mouth every 4 (four) hours as needed.  Qty: 30 tablet, Refills: 6      metformin (GLUCOPHAGE-XR) 500 MG 24 hr tablet 500 mg.  Refills: 1      metoprolol tartrate (LOPRESSOR) 25 MG tablet Refills: 1      mirabegron (MYRBETRIQ) 25 mg Tb24 ER tablet Take 1 tablet (25 mg total) by mouth once daily.  Qty: 30 tablet, Refills: 11    Associated Diagnoses: Urge incontinence; Urinary urgency      naproxen (NAPROSYN) 500 MG tablet Refills: 1      omeprazole (PRILOSEC) 20 MG capsule 20 mg.  Refills: 1      pravastatin (PRAVACHOL) 40 MG tablet 40 mg.  Refills: 1       !! -  Potential duplicate medications found. Please discuss with provider.              Discharge Diagnosis: Bilateral sacroiliitis [M46.1]  Condition on Discharge: Stable with no complications to procedure   Diet on Discharge: Same as before.  Activity: as per instruction sheet.  Discharge to: Home with a responsible adult.  Follow up: 2-4 weeks       Please call my office or pager at [phone] if experienced any weakness or loss of sensation, fever > 101.5, pain uncontrolled with oral medications, persistent nausea/vomiting/or diarrhea, redness or drainage from the incisions, or any other worrisome concerns. If physician on call was not reached or could not communicate with our office for any reason please go to the nearest emergency department

## 2019-11-07 NOTE — DISCHARGE INSTRUCTIONS

## 2019-11-07 NOTE — OP NOTE
Sacroiliac Joint Injection under Fluoroscopy    Date of procedure 11/07/2019    Time-out taken to identify patient and procedure side prior to starting the procedure.                                                                 PROCEDURE:  Bilateral sacroiliac joint injection under fluoroscopy.    1) Right  sacroiliac joint injection under fluoroscopy.    2) Left  sacroiliac joint injection under fluoroscopy.    REASON FOR PROCEDURE: Bilateral sacroiliitis [M46.1]    PHYSICIAN: Bonnie Duncan MD  ASSISTANTS: >None    MEDICATIONS INJECTED:  Depo-Medrol 40mg and 4 mL Bupivacaine 0.25% into each joint    LOCAL ANESTHETIC USED: Xylocaine 1% 5mL    SEDATION MEDICATIONS: None    ESTIMATED BLOOD LOSS:  None.    COMPLICATIONS:  None.    TECHNIQUE:   Laying in the prone position, the patient was prepped and draped in the usual sterile fashion using ChloraPrep and fenestrated drape.  The area was determined under fluoroscopy.  Local Xylocaine was injected by raising a wheel and going down to the periosteum using a 27-gauge hypodermic needle.  The 3.5 inch 22-gauge spinal needle was introduce into bilateral sacroiliac joints.  Negative pressure applied to confirm no intravascular placement.  Omnipaque was injected to confirm placement and to confirm that there was no vascular runoff.  The medication was then injected slowly.  The patient tolerated the procedure well.    The patient was monitored for approximately 30 minutes after the procedure.  Patient was given post procedure and discharge instructions to follow at home.  We will see the patient back in two weeks or the patient may call to inform of status. The patient was discharged in a stable condition

## 2019-11-08 ENCOUNTER — TELEPHONE (OUTPATIENT)
Dept: PAIN MEDICINE | Facility: CLINIC | Age: 74
End: 2019-11-08

## 2019-11-08 NOTE — TELEPHONE ENCOUNTER
----- Message from Consuelo Freeman sent at 11/8/2019  4:06 PM CST -----  Contact: Self            Name of Who is Calling: ABILIO SIDDIQUI [[id number]]      What is the request in detail: Pt states she needs to provide her pain diary after her procedure on 11/7. Please contact to further discuss and advise.        Can the clinic reply by [facility]: N      What Number to Call Back if not in [facility]:  [phone]

## 2019-11-10 NOTE — PROGRESS NOTES
Neurology Clinic Visit  Primary Care Provider: Dinesh Connors MD   Referring Provider: No ref. provider found   Date of Visit:  10/29/2019     chief complaint:   Chief Complaint   Patient presents with    Gait Problem       History of Present Illness  Mila Foster is a 74 y.o. female I have been asked to consult for evaluation of unsteadiness on her feet.  The patient reports for the past 4 months she feels that time she loses her balance.  She does report chronic lower back pain for the past 7-8 months that radiate down her right more than left leg.  It is primarily on the side of her leg and stop at her knee.  She also reports chronic knee pain. She has a history of diabetes mellitus for at least the past 10 years which is managed by her PCP.  She was evaluated by Neurology in the past for similar symptoms.      Patient Active Problem List    Diagnosis Date Noted    Chronic pain 07/25/2019    Lumbar spondylosis 05/21/2019    Chronic low back pain 03/06/2019    Sacroiliitis 12/27/2018    Screening for malignant neoplasm of colon 11/01/2018    Urinary urgency 05/23/2017    Urge incontinence 04/17/2017    Irritable bowel syndrome with diarrhea 04/17/2017    Fecal smearing 04/17/2017    Annular tear of lumbar disc 01/09/2017    Facet arthritis of lumbar region 01/09/2017    Lumbar disc herniation with radiculopathy 01/09/2017     Past Medical History:   Diagnosis Date    Diabetes     Hypertension      Past Surgical History:   Procedure Laterality Date    CHOLECYSTECTOMY      COLONOSCOPY N/A 11/1/2018    Procedure: COLONOSCOPY Suprep;  Surgeon: Azucena Hood MD;  Location: [facility] ENDO;  Service: Endoscopy;  Laterality: N/A;    HYSTERECTOMY      INJECTION OF ANESTHETIC AGENT AROUND NERVE Bilateral 5/21/2019    Procedure: BLOCK, NERVE, BILATERAL L2,3,4,5;  Surgeon: Bonnie Duncan MD;  Location: [facility] PAIN MGT;  Service: Pain Management;  Laterality: Bilateral;  BLOCK, NERVE, BILATERAL L2,3,4,5     INJECTION OF JOINT Bilateral 12/27/2018    Procedure: Injection, Joint BILATERAL SACROILIAC JOINT INJECTION;  Surgeon: Bonnie Duncan MD;  Location: [facility] PAIN MGT;  Service: Pain Management;  Laterality: Bilateral;    INJECTION OF JOINT Bilateral 11/7/2019    Procedure: INJECTION, JOINT, SI;  Surgeon: Bonnie Duncan MD;  Location: [facility] PAIN MGT;  Service: Pain Management;  Laterality: Bilateral;  B/L SI Joint Injections    RADIOFREQUENCY ABLATION Right 7/25/2019    Procedure: RADIOFREQUENCY ABLATION;  Surgeon: Bonnie Duncan MD;  Location: [facility] PAIN MGT;  Service: Pain Management;  Laterality: Right;  Right RFA L2,3,4,5; THERMAL  1 of 2    RADIOFREQUENCY ABLATION Left 8/6/2019    Procedure: RADIOFREQUENCY ABLATION;  Surgeon: Bonnie Duncan MD;  Location: [facility] PAIN MGT;  Service: Pain Management;  Laterality: Left;  Left RFA L2,3,4,5 LUMBAR  1 of 2     Family History   Problem Relation Age of Onset    Vaginal cancer Neg Hx     Endometrial cancer Neg Hx     Cervical cancer Neg Hx          Current Outpatient Medications   Medication Sig    aspirin (ECOTRIN) 81 MG EC tablet 81 mg.    FLUoxetine 20 MG capsule TK ONE C PO  QD    FLUZONE HIGH-DOSE 2018-19, PF, 180 mcg/0.5 mL vaccine ADM 0.5ML IM UTD    gabapentin (NEURONTIN) 300 MG capsule Take 2 tabs in the morning, 1 tab in the afternoon, and 2 tabs in the evening    hydroCHLOROthiazide (HYDRODIURIL) 25 MG tablet Take by mouth.    metformin (GLUCOPHAGE-XR) 500 MG 24 hr tablet 500 mg.    metoprolol tartrate (LOPRESSOR) 25 MG tablet     mirabegron (MYRBETRIQ) 25 mg Tb24 ER tablet Take 1 tablet (25 mg total) by mouth once daily.    naproxen (NAPROSYN) 500 MG tablet     omeprazole (PRILOSEC) 20 MG capsule 20 mg.    pravastatin (PRAVACHOL) 40 MG tablet 40 mg.    aspirin-calcium carbonate 81 mg-300 mg calcium(777 mg) Tab Take by mouth.    glipiZIDE (GLUCOTROL) 5 MG tablet 5 mg.    hydrochlorothiazide (HYDRODIURIL) 25 MG tablet 25 mg.     hyoscyamine (ANASPAZ,LEVSIN) 0.125 mg Tab Take 1 tablet (125 mcg total) by mouth every 4 (four) hours as needed.     No current facility-administered medications for this visit.      Facility-Administered Medications Ordered in Other Visits   Medication    0.9%  NaCl infusion       Review of patient's allergies indicates:  No Known Allergies  Social History     Socioeconomic History    Marital status: Single     Spouse name: Not on file    Number of children: Not on file    Years of education: Not on file    Highest education level: Not on file   Occupational History    Not on file   Social Needs    Financial resource strain: Not on file    Food insecurity:     Worry: Not on file     Inability: Not on file    Transportation needs:     Medical: Not on file     Non-medical: Not on file   Tobacco Use    Smoking status: Never Smoker    Smokeless tobacco: Never Used   Substance and Sexual Activity    Alcohol use: No     Frequency: Never    Drug use: No    Sexual activity: Not Currently   Lifestyle    Physical activity:     Days per week: Not on file     Minutes per session: Not on file    Stress: Not on file   Relationships    Social connections:     Talks on phone: Not on file     Gets together: Not on file     Attends Yazidism service: Not on file     Active member of club or organization: Not on file     Attends meetings of clubs or organizations: Not on file     Relationship status: Not on file   Other Topics Concern    Not on file   Social History Narrative    Not on file       Review of Systems    Constitutional: negative  Eyes: negative  Ears, nose, mouth, throat, and face: negative  Respiratory: negative  Cardiovascular: negative  Gastrointestinal: negative  Genitourinary:negative  Integument/breast: negative  Hematologic/lymphatic: negative  Musculoskeletal:negative  Neurological: negative  Behavioral/Psych: negative  Endocrine: negative  Allergic/Immunologic: negative    Objective:  Vital  "signs in last 24 hours:    Vitals:    10/29/19 1017   BP: 129/73   Pulse: 65   Weight: 103.6 kg (228 lb 6.3 oz)   Height: 5' 7" (1.702 m)       Body mass index is 35.77 kg/m².     General: no acute distress, well nourished, well-groomed  CVS: RRR, no murmur, gallops or rubs  Respiratory: Clear to ausculation  Extremities: no edema    Neurological Examination:    HIGHER INTEGRATIVE FUNCTIONS:  -Normal attention span and concentration; immediately responds to questions and commands  -Oriented to time, place and person  -Recent and remote memory intact  -Language normal (no aphasia or dysarthria)  -Normal fund of knowledge    CN:  -PERRLA, visual fields full, unable to visualize optic discs due to small pupils on fundus exam   -EOMI with normal saccades and smooth pursuit  -Facial sensation intact bilaterally  -Facial strength/movement intact bilaterally  -Hearing intact to voice  -Palate elevates symmetrically  -Normal shoulder shrug and head turn  -Tongue protrudes midline    MOTOR: (left/right graded 1-5)  -UE: 5/5 deltoids; 5/5 biceps, triceps; 5/5 wrist flexors, extensors; 5/5 interosseous; 5/5   -LEs: 5/5 hip flexion, extension; 5/5 knee flexion, extension; 5/5 ankle flexion, extension  -Tone: normal  -No pronator drift, no orbiting    SENSORY:  -Light touch, temperature sensation intact bilaterally; decreased vibration sense in the feet distally.    REFLEXES:  -2+ upper and lower bilaterally  -Flexor plantar reflex bilaterally  -No clonus    COORDINATION:  -FNF, HKS, JEANNETTE intact bilaterally    GAIT:  -Normal casual gait     Assessment/Plan:    1.  Unsteadiness on the feet:  Likely multifactorial secondary to age, chronic lower back pain, knee pain, and peripheral neuropathy.  2.  Peripheral neuropathy likely secondary to diabetes  3.  Chronic lower back pain, per pain management  4.  Diabetes mellitus for PCP    Plan:  Will obtain EMG nerve conduction study of the lower extremities.  Will obtain neuropathy " labs.    I discussed assessment and plan with patient and answered the questions that she had.     Part of patient note might have been created using Dragon Dictation.  Any errors in syntax or even information may not have been identified and edited on initial review prior to signing this note.

## 2019-11-11 ENCOUNTER — TELEPHONE (OUTPATIENT)
Dept: NEUROLOGY | Facility: CLINIC | Age: 74
End: 2019-11-11

## 2019-11-11 NOTE — TELEPHONE ENCOUNTER
MD Enio Vazquez MA 7 hours ago (8:22 AM)      Thank you, can you just leave that as a note in the chart.  We will see her in follow up and see if the steroid offered any further benefit.    Routing comment       NUNO Lama MD 3 days ago      Patient stated she received 98% out of 100% relief from the B/L SI Joint Injections that was done on yesterday 11/07/19     Please advise on how you would like to proceed?     Thanks,   AW

## 2019-11-11 NOTE — TELEPHONE ENCOUNTER
I called patient to schedule a 1 month F/U with Dr. Bhatti. Appt scheduled for 12/04/2019 at 9:40. There was no answer and I left a message for a return call.

## 2019-11-20 ENCOUNTER — OFFICE VISIT (OUTPATIENT)
Dept: PAIN MEDICINE | Facility: CLINIC | Age: 74
End: 2019-11-20
Payer: MEDICARE

## 2019-11-20 VITALS
TEMPERATURE: 99 F | BODY MASS INDEX: 34.91 KG/M2 | DIASTOLIC BLOOD PRESSURE: 56 MMHG | HEART RATE: 76 BPM | HEIGHT: 67 IN | RESPIRATION RATE: 18 BRPM | WEIGHT: 222.44 LBS | OXYGEN SATURATION: 100 % | SYSTOLIC BLOOD PRESSURE: 121 MMHG

## 2019-11-20 DIAGNOSIS — M47.816 LUMBAR SPONDYLOSIS: ICD-10-CM

## 2019-11-20 DIAGNOSIS — M51.36 ANNULAR TEAR OF LUMBAR DISC: ICD-10-CM

## 2019-11-20 DIAGNOSIS — M48.061 NEUROFORAMINAL STENOSIS OF LUMBAR SPINE: ICD-10-CM

## 2019-11-20 DIAGNOSIS — M51.36 DDD (DEGENERATIVE DISC DISEASE), LUMBAR: ICD-10-CM

## 2019-11-20 DIAGNOSIS — M46.1 SACROILIITIS: Primary | ICD-10-CM

## 2019-11-20 DIAGNOSIS — M47.816 FACET ARTHRITIS OF LUMBAR REGION: ICD-10-CM

## 2019-11-20 PROCEDURE — 99999 PR PBB SHADOW E&M-EST. PATIENT-LVL III: ICD-10-PCS | Mod: PBBFAC,,, | Performed by: NURSE PRACTITIONER

## 2019-11-20 PROCEDURE — 1159F MED LIST DOCD IN RCRD: CPT | Mod: S$GLB,,, | Performed by: NURSE PRACTITIONER

## 2019-11-20 PROCEDURE — 1101F PR PT FALLS ASSESS DOC 0-1 FALLS W/OUT INJ PAST YR: ICD-10-PCS | Mod: CPTII,S$GLB,, | Performed by: NURSE PRACTITIONER

## 2019-11-20 PROCEDURE — 1101F PT FALLS ASSESS-DOCD LE1/YR: CPT | Mod: CPTII,S$GLB,, | Performed by: NURSE PRACTITIONER

## 2019-11-20 PROCEDURE — 1125F AMNT PAIN NOTED PAIN PRSNT: CPT | Mod: S$GLB,,, | Performed by: NURSE PRACTITIONER

## 2019-11-20 PROCEDURE — 99213 OFFICE O/P EST LOW 20 MIN: CPT | Mod: S$GLB,,, | Performed by: NURSE PRACTITIONER

## 2019-11-20 PROCEDURE — 99999 PR PBB SHADOW E&M-EST. PATIENT-LVL III: CPT | Mod: PBBFAC,,, | Performed by: NURSE PRACTITIONER

## 2019-11-20 PROCEDURE — 99213 PR OFFICE/OUTPT VISIT, EST, LEVL III, 20-29 MIN: ICD-10-PCS | Mod: S$GLB,,, | Performed by: NURSE PRACTITIONER

## 2019-11-20 PROCEDURE — 1159F PR MEDICATION LIST DOCUMENTED IN MEDICAL RECORD: ICD-10-PCS | Mod: S$GLB,,, | Performed by: NURSE PRACTITIONER

## 2019-11-20 PROCEDURE — 1125F PR PAIN SEVERITY QUANTIFIED, PAIN PRESENT: ICD-10-PCS | Mod: S$GLB,,, | Performed by: NURSE PRACTITIONER

## 2019-11-20 NOTE — PROGRESS NOTES
Chronic Pain - Established Patient    Referring Physician: No ref. provider found    Chief Complaint:   Chief Complaint   Patient presents with    Low-back Pain        SUBJECTIVE: Disclaimer: This note has been generated using voice-recognition software. There may be typographical errors that have been missed during proof-reading    Interval History 11/20/2019:  The patient returns to clinic today for follow up. She is s/p bilateral SI joint injections on 11/7/2019. She reports 100% relief of her low back and buttock pain. She reports intermittent low back pain that is tolerable. She denies any radiating leg pain. She has seen neurology who is working her up regarding her balance and gait. She is scheduled for EMG in January. She continues to take Gabapentin with benefit. She is currently participating in physical therapy. She denies any other health changes. Her pain today is 1/10.    Interval History 9/11/2019:  The patient returns to clinic today for follow up and image review. She continues to report low back and buttock pain that is sharp and aching. She reports intermittent radiating pain into the posterior aspect of her right leg to her ankle. She denies any left leg pain. She denies any numbness or tingling to her feet. Her worst pain at this time is her bilateral buttocks and tailbone. This pain is worse with prolonged sitting and moving from seated to standing. She continues to report shuffling gait. She has had multiple falls recently. She does take Gabapentin with benefit. She denies any other health changes. Her pain today is 8/10.    Interval History 8/27/2019:  The patient returns to clinic today for follow up. She is s/p right L3,4,5 RFA on 7/25/2019. She is s/p left L3,4,5 RFA on 8/6/2019. She reports limited relief at this time. She continues to report low back pain that is sharp and aching. She denies any radicular leg pain. She denies any numbness or tingling to her feet. She does report  "episodes of "shuffling" gait where she feels like she will fall. She has had two significant falls recently. She continues to take Gabapentin with benefit. She denies any other health changes. Her pain today is 2/10.    Interval History 7/23/2019:  The patient returns to clinic today for follow up. She was previously scheduled for RFA but had to cancel due to a fall. She fell while getting out of her bed and hit her face on her nightstand. She did have xrays which were negative for facial fracture. She did have a facial laceration which was sutured. She has rescheduled her RFA for this week. She continues to report low back pain that is constant, sharp, and aching. She denies any radiating leg pain. She does report intermittent numbness and pain to her buttocks with prolonged sitting. She continues to take Gabapentin. She denies any other health changes. Her pain today is 7/10.      Interval History 5/28/2019:  The patient returns to clinic today for follow up. She is s/p bilateral L2,3,4,5 MBB on 5/21/2019. She reports 80% relief of her low back pain after the block. She did sleep very well the night after the block. Her pain has returned to baseline. She continues to report low back pain that is constant, sharp, and aching in nature. She denies any radiating leg pain. She does report increased tailbone pain. Her pain is worse with prolonged standing and walking. She continues to take Gabapentin with benefit. She denies any other health changes. Her pain today is 2/10.    Interval History 4/26/2019:  The patient returns to clinic today for follow up. She reports a fall yesterday at home where she tripped over her shoe string. She landed on her buttock. She reports that she hit the back of her head on the carpet. She denies any loss of consciousness. She did not go to the ER. She does report increased low back pain today that is sore in nature which she attributes to the fall. She continues to report low back pain " that is sharp and aching in nature. She does report intermittent aching buttock pain. She denies any radiating leg pain. Her pain is worse with prolonged standing and walking. She continues to participate in physical therapy. She continues to take Gabapentin with benefit. She did have lumbar MBB in 2017 with 90% relief for a few days. She denies any other health changes. She does have stress urinary incontinence. She does follow up with Urogyn. Her pain today is 7/10.    Interval History 3/11/2019:  The patient returns to clinic today for follow up. She continues to report low back pain that is aching in nature. She reports right sided buttock pain that is sore in nature. She denies any radiating leg pain. Her pain is worse with standing, sitting, and activity. She does endorse morning stiffness. She is currently participating in physical therapy with benefit. She continues to take Gabapentin with benefit and does need a refill today. She denies any other health changes. She denies any bowel or bladder incontinence. Her pain today is 5/10.    Interval History 1/9/2019:  The patient returns to clinic today for follow up. She is s/p bilateral SI joint injections on 12/27/2018. She reports 100% for the first week. She now reports approximately 50% relief of her pain. She reports intermittent low back and bilateral buttock pain. She describes this pain as sore and aching in nature. This pain is worse with prolonged sitting. She denies any radiating leg pain. She often feels as though as she is leaning over while walking. She often feels as though her balance is off. She continues to take Gabapentin with benefit. She denies any other health changes. His pain today is 2/10.     Interval History 12/11/2018:  The patient returns to clinic today for follow up. She reports increased low back and bilateral buttock pain, left greater than right. She describes this pain as sore in nature. This pain is worse with prolonged  sitting. She denies any radiating leg pain. She reports that she often feels like her back is locking up on her. She continues to take Gabapentin with benefit. She denies any other health changes. Her pain today is 5/10.    Interval History 9/25/2018:  The patient returns to clinic today for follow up. She is s/p right L5 and S1 TF LEONEL. She reports 90% relief of her low back and leg pain. She reports intermittent low back pain that is aching in nature. She denies any radiating leg pain today. Her back pain is worse with bending. She also reports that her legs feel heavy with prolonged walking. She denies any other health changes. Her pain today is 4/10.    Interval History 7/27/2018:  The patient returns to clinic today for follow up and image review. She continues to report low back pain that radiates down the posterior aspect of her right leg to her foot. She reports intermittent left leg pain in the same distribution. Her pain is worse with prolonged walking and activity. She often feels like her legs are heavy. She often feels like she will fall. She denies any other health changes. She denies any bowel or bladder incontinence. Her pain today is 2/10.    Interval History 7/20/2018:  The patient returns to clinic today for follow up. She has not been seen in over a year due to her son being diagnosed with cancer. She reports that he is in remission now. She returns today due to three fall recently, last a week ago. She continues to report low back pain that radiates intermittently down the posterior portion of both legs to her feet. She reports that her legs often feel heavy and weak which is why she falls. She denies any bowel or bladder incontinence. Her pain today is 7/10.    Interval History 3/6/2017:  The patient returns to clinic today for follow up. She is s/p bilateral S1 TF LEONEL on 2/8/2017. She reports 80% relief of her radiating leg pain. She continues to have low back pain. She describes the pain as  "dull and aching. The pain is worse in the morning. She reports that the pain increases with prolonged sitting and standing. She denies any other health changes. She denies any bowel or bladder incontinence. Her pain today is 6/10.     Interval History 1/23/2017:  The patient returns to clinic today for follow up of low back pain and bilateral leg pain. She has recently completed a medrol dose pack with some benefit. She still reports radiating pain into both legs. The pain is worse with prolonged sitting and lifting. The pain gets better with walking and rest. The pain radiates down the back of both legs to ankles. She denies any bowel or bladder incontinence or signs of saddle paresthesia. She continues to work with lifting restrictions. She currently takes Gabapentin and Naproxen with benefit. Her pain today is 6/10.     Initial encounter:    Mila Foster presents to the clinic for the evaluation of bilateral leg pain with intermittent lower back pain. 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 She reports recent falling to knees, but she changed her shoes, and the falling has ceased. She reports that she had a "pain flare" 2 weeks ago requiring a walker for 2 days.     Brief history:  70yo F with hx of HTN and DM complicated by neuropathy BL but R>L with tingling, tightness, shooting pain only in feet. Patient reports glucose was 120 this morning and usually runs around 130.    Pain Description:    The pain is located in the posterior leg area and radiates to her ankles and to her knees at its worst.     At BEST  1/10     At WORST  8/10 on the WORST day.      On average pain is rated as 7/10    Today the pain is rated as 5/10    The pain is described as aching, burning, dull, sharp and tight band      Symptoms interfere with work.     Exacerbating factors: Sitting, picking up children, lifting, bending " over    Mitigating factors: walking, rest    Patient denies night fever/night sweats, bowel incontinence, significant weight loss, significant motor weakness and loss of sensations. She does report chronic urinary incontinence unrelated to back/leg pain.    Patient denies any suicidal or homicidal ideations    Pain Medications:  Current:  Gabapentin    Tried in Past:  NSAIDs -yes Naproxen   TCA -Never  SNRI -Never  Anti-convulsants -gabapentin  Muscle Relaxants -Never  Opioids-Never    Physical Therapy/Home Exercise: yes       report: N/A    Pain Procedures:   2/8/2017- Bilateral S1 TF LEONEL  3/22/2017- Bilateral L2,3,4,5 MBB  9/11/2018- Right L5 and S1 TF LEONEL   12/27/2018- Bilateral SI joint injections  5/21/2019- Bilateral L2,3,4,5 MBB  7/25/2019- Right L3,4,5 RFA  8/6/2019- Left L3,4,5 RFA  11/7/2019- Bilateral SI joint injection     Chiropractor -never  Acupuncture - never  TENS unit -never  Spinal decompression -never  Joint replacement -never    Imaging:   MRI Lumbar Spine 9/7/2019:  COMPARISON:  07/25/2018    FINDINGS:  The distal cord/conus demonstrates normal size and signal.  No evidence of osteomyelitis, marrow replacement process, or acute fracture.  No paraspinal masses.    L1-2 is unremarkable.    At L2-3, there is mild facet arthropathy and disc bulging.  No spinal canal stenosis or significant neural foraminal narrowing.    At L3-4, there is mild disc bulging and bilateral facet arthropathy.  No spinal canal stenosis or significant neural foraminal narrowing.    At L4-5, there is bilateral facet arthropathy with slight L4-5 anterolisthesis.  No spinal canal stenosis..  There is mild right-sided neural foraminal narrowing.    At L5-S1, there is mild bilateral facet arthropathy and disc bulging.  Small posterior annular tear with right paracentral/lateral recess disc protrusion, possibly impinging upon the right L5 or S1 nerve roots.  No spinal canal stenosis or significant neural foraminal narrowing.       Impression       Small right paracentral/ lateral recess disc protrusion at L5-S1 and mild neural foraminal narrowing at L4-5, as above.  No spinal canal stenosis.  Findings are similar to the 07/25/2018 exam.     Xray Lumbar Spine 7/25/2018:  COMPARISON:  Prior lumbar spine MRI dated 12/16/16    FINDINGS:  There is diffuse osteopenia.  There is mild grade 1 anterolisthesis of L3 on L4 (4 mm) and of L4 on L5 (6 mm).  There is no evidence of translational instability.  There is moderate facet arthropathy at the lower lumbar spine with probable L5-S1 neural foraminal narrowing.  Endplate degenerative changes are present with subchondral sclerosis and marginal osteophyte formation.    There is atherosclerotic calcification of the aorta.      Impression       Osteopenia and degenerative change of the lumbar spine with grade 1 anterolisthesis of L3-4 and L4-5, new from the 2016 MRI.  No evidence of translational instability.             Past Medical History:   Diagnosis Date    Diabetes     Hypertension      Past Surgical History:   Procedure Laterality Date    CHOLECYSTECTOMY      COLONOSCOPY N/A 11/1/2018    Procedure: COLONOSCOPY Suprep;  Surgeon: Azucena Hood MD;  Location: [facility];  Service: Endoscopy;  Laterality: N/A;    HYSTERECTOMY      INJECTION OF ANESTHETIC AGENT AROUND NERVE Bilateral 5/21/2019    Procedure: BLOCK, NERVE, BILATERAL L2,3,4,5;  Surgeon: Bonnie Duncan MD;  Location: [facility] PAIN T;  Service: Pain Management;  Laterality: Bilateral;  BLOCK, NERVE, BILATERAL L2,3,4,5    INJECTION OF JOINT Bilateral 12/27/2018    Procedure: Injection, Joint BILATERAL SACROILIAC JOINT INJECTION;  Surgeon: Bonnie Duncan MD;  Location: [facility] PAIN MGT;  Service: Pain Management;  Laterality: Bilateral;    INJECTION OF JOINT Bilateral 11/7/2019    Procedure: INJECTION, JOINT, SI;  Surgeon: Bonnie Duncan MD;  Location: [facility] PAIN MGT;  Service: Pain Management;  Laterality: Bilateral;  B/L  SI Joint Injections    RADIOFREQUENCY ABLATION Right 7/25/2019    Procedure: RADIOFREQUENCY ABLATION;  Surgeon: Bonnie Duncan MD;  Location: [facility] PAIN MGT;  Service: Pain Management;  Laterality: Right;  Right RFA L2,3,4,5; THERMAL  1 of 2    RADIOFREQUENCY ABLATION Left 8/6/2019    Procedure: RADIOFREQUENCY ABLATION;  Surgeon: Bonnie Duncan MD;  Location: [facility] PAIN MGT;  Service: Pain Management;  Laterality: Left;  Left RFA L2,3,4,5 LUMBAR  1 of 2     Social History     Socioeconomic History    Marital status: Single     Spouse name: Not on file    Number of children: Not on file    Years of education: Not on file    Highest education level: Not on file   Occupational History    Not on file   Social Needs    Financial resource strain: Not on file    Food insecurity:     Worry: Not on file     Inability: Not on file    Transportation needs:     Medical: Not on file     Non-medical: Not on file   Tobacco Use    Smoking status: Never Smoker    Smokeless tobacco: Never Used   Substance and Sexual Activity    Alcohol use: No     Frequency: Never    Drug use: No    Sexual activity: Not Currently   Lifestyle    Physical activity:     Days per week: Not on file     Minutes per session: Not on file    Stress: Not on file   Relationships    Social connections:     Talks on phone: Not on file     Gets together: Not on file     Attends [employer] service: Not on file     Active member of club or organization: Not on file     Attends meetings of clubs or organizations: Not on file     Relationship status: Not on file   Other Topics Concern    Not on file   Social History Narrative    Not on file     Family History   Problem Relation Age of Onset    Vaginal cancer Neg Hx     Endometrial cancer Neg Hx     Cervical cancer Neg Hx        Review of patient's allergies indicates:  No Known Allergies    Current Outpatient Medications   Medication Sig    aspirin (ECOTRIN) 81 MG EC tablet 81 mg.     aspirin-calcium carbonate 81 mg-300 mg calcium(777 mg) Tab Take by mouth.    FLUoxetine 20 MG capsule TK ONE C PO  QD    FLUZONE HIGH-DOSE 2018-19, PF, 180 mcg/0.5 mL vaccine ADM 0.5ML IM UTD    gabapentin (NEURONTIN) 300 MG capsule Take 2 tabs in the morning, 1 tab in the afternoon, and 2 tabs in the evening    glipiZIDE (GLUCOTROL) 5 MG tablet 5 mg.    hydrochlorothiazide (HYDRODIURIL) 25 MG tablet 25 mg.    hydroCHLOROthiazide (HYDRODIURIL) 25 MG tablet Take by mouth.    hyoscyamine (ANASPAZ,LEVSIN) 0.125 mg Tab Take 1 tablet (125 mcg total) by mouth every 4 (four) hours as needed.    metformin (GLUCOPHAGE-XR) 500 MG 24 hr tablet 500 mg.    metoprolol tartrate (LOPRESSOR) 25 MG tablet     mirabegron (MYRBETRIQ) 25 mg Tb24 ER tablet Take 1 tablet (25 mg total) by mouth once daily.    naproxen (NAPROSYN) 500 MG tablet     omeprazole (PRILOSEC) 20 MG capsule 20 mg.    pravastatin (PRAVACHOL) 40 MG tablet 40 mg.     No current facility-administered medications for this visit.      Facility-Administered Medications Ordered in Other Visits   Medication    0.9%  NaCl infusion       REVIEW OF SYSTEMS:    GENERAL:  No weight loss, malaise or fevers.  HEENT:   No recent changes in vision or hearing  NECK:  Negative for lumps, no difficulty with swallowing.  RESPIRATORY:  Negative for cough, wheezing or shortness of breath, patient denies any recent URI.  CARDIOVASCULAR:  Negative for chest pain, leg swelling or palpitations. HTN.  GI:  Negative for abdominal discomfort, blood in stools or black stools or change in bowel habits.  MUSCULOSKELETAL:  See HPI.  SKIN:  Negative for lesions, rash, and itching.  PSYCH:  No mood disorder or recent psychosocial stressors.  Patient's sleep is occasionally disturbed secondary to pain.  HEMATOLOGY/LYMPHOLOGY:  Negative for prolonged bleeding, bruising easily or swollen nodes.  Patient is not currently taking any anti-coagulants  ENDO: Positive for DM. Negative for thyroid  "dysfunction  NEURO:   No history of headaches, syncope, paralysis, seizures or tremors.  All other reviewed and negative other than HPI.    OBJECTIVE:    BP (!) 121/56   Pulse 76   Temp 99.2 °F (37.3 °C)   Resp 18   Ht 5' 7" (1.702 m)   Wt 100.9 kg (222 lb 7.1 oz)   SpO2 100%   BMI 34.84 kg/m²     PHYSICAL EXAMINATION:    GENERAL: Well appearing, in no acute distress, alert and oriented x3.  PSYCH:  Mood and affect appropriate.  SKIN: Skin color, texture, turgor normal, no rashes or lesions.  HEAD/FACE:  Normocephalic, atraumatic. Cranial nerves grossly intact.   CV: RRR with palpation of the radial artery.  PULM: No evidence of respiratory difficulty, symmetric chest rise.  BACK: Straight leg raising in the siting position is negative for radicular pain bilaterally. There is mild pain with palpation over lumbar spine. Limited ROM with mild pin on extension. Positive facet loading bilaterally.  LOWER EXTREMITIES: Peripheral joint ROM is full and pain free without obvious instability or laxity in lower extremities. No deformities, edema, or skin discoloration. Good capillary refill.  MUSCULOSKELETAL: Hip and knee provocative maneuvers are negative.  There is no pain with palpation over the sacroiliac joints bilaterally.  FABERs test is negative bilaterally.  FADIRs test is negative. 5/5 strength in right ankle with plantar and dorsiflexion. 4/5 strength in left ankle with plantar and dorsiflexion. 5/5 strength with right knee flexion and extension. 5/5 strength with left knee flexion and extension. 5/5 strength in right EHL, 4/5 strength in left EHL.  No atrophy or tone abnormalities are noted.  NEURO: Bilateral lower extremity coordination and muscle stretch reflexes are physiologic and symmetric.   No loss of sensation is noted.  GAIT: Antalgic, shuffling gait- ambulates with walker.     ASSESSMENT: 74 y.o. year old female with back/leg pain, consistent with BL radiculopathy due to L5 disc " protrusion.    Encounter Diagnoses   Name Primary?    Sacroiliitis Yes    Annular tear of lumbar disc     Neuroforaminal stenosis of lumbar spine     Lumbar spondylosis     Facet arthritis of lumbar region     DDD (degenerative disc disease), lumbar        PLAN:     - Previous imaging was reviewed and discussed with the patient today.    - She is s/p bilateral SI joint injections with relief.     - If short term relief, consider sacral RFA. She may be a candidate for current research study.     - Consider repeat LEONEL.     - She is s/p lumbar RFA with some benefit. We will continue to monitor progress.    - Continue Gabapentin.     - Continue physical therapy.     - RTC in 2 months or sooner if needed.     - Dr. Duncan was consulted on the patient and agrees with this plan.    The above plan and management options were discussed at length with patient. Patient is in agreement with the above and verbalized understanding.     Coby Russo NP  11/20/2019

## 2019-11-29 ENCOUNTER — CLINICAL SUPPORT (OUTPATIENT)
Dept: REHABILITATION | Facility: HOSPITAL | Age: 74
End: 2019-11-29
Payer: MEDICARE

## 2019-11-29 DIAGNOSIS — G89.29 CHRONIC BILATERAL LOW BACK PAIN, UNSPECIFIED WHETHER SCIATICA PRESENT: ICD-10-CM

## 2019-11-29 DIAGNOSIS — Z74.09 IMPAIRED FUNCTIONAL MOBILITY, BALANCE, AND ENDURANCE: ICD-10-CM

## 2019-11-29 DIAGNOSIS — R29.898 DECREASED STRENGTH OF LOWER EXTREMITY: ICD-10-CM

## 2019-11-29 DIAGNOSIS — M54.50 CHRONIC BILATERAL LOW BACK PAIN, UNSPECIFIED WHETHER SCIATICA PRESENT: ICD-10-CM

## 2019-11-29 PROCEDURE — 97162 PT EVAL MOD COMPLEX 30 MIN: CPT | Mod: PO

## 2019-11-29 PROCEDURE — G8978 MOBILITY CURRENT STATUS: HCPCS | Mod: CK,PO

## 2019-11-29 PROCEDURE — G8979 MOBILITY GOAL STATUS: HCPCS | Mod: CK,PO

## 2019-11-29 NOTE — PLAN OF CARE
[facility]  Physical Therapy Initial Evaluation    Name: Mila Foster  Clinic Number: [id number]    Therapy Diagnosis:   Encounter Diagnoses   Name Primary?    Decreased strength of lower extremity     Impaired functional mobility, balance, and endurance     Chronic bilateral low back pain, unspecified whether sciatica present      Physician: Coby Russo, NP    Physician Orders: PT Eval and Treat   Medical Diagnosis from Referral:    Sacroiliitis   Annular tear of lumbar disc   Neuroforaminal stenosis of lumbar spine    Lumbar spondylosis    Facet arthritis of lumbar region    DDD (degenerative disc disease), lumbar   Sacroiliac joint pain    Physical deconditioning    Shuffling gait       Evaluation Date: 11/29/2019  Authorization Period Expiration: 12/13/2019  Plan of Care Expiration: 1/24/2020  Visit # / Visits authorized: 1/6    Time In: 8:20  Time Out: 9:05    Total Billable Time: 45 minutes    Precautions: Standard, Diabetes and Fall    Subjective   Date of onset: chronic   History of current condition - Mila reports: has had chronic issues with her back. She said that they found that she had herniated discs so they sent her to pain management. She was doing therapy at [facility]. She just stopped going about a month ago because she continued to have problems with falls. When she told doctor she was continuing to have falls, they scheduled her for an upcoming EMG (she thinks) in January to see if this could be related to her falls. She recently got an injection in the joint in November (probably SI joint). She got relief from this but now she feels like it is wearing off. She gets these shots regularly.  She was doing PT at [facility], [place], and now she is going to come here. She is wanting to target her balance and unsteadiness. She feels that the therapy at [facility], [place] has helped her back a whole lot. She is still keeping up with her HEP from there. Her last fall was last  month. She was walking and her leg gave out (it is usually the right one). In the last year, she has had a lot of falls. She says that she does not get numbness in her legs but has some tingling but she says the doctors told her this is probably from neuropathy. She uses her rollator in community and anytime she leaves the house but does not normally use it at home.      Medical History:   Past Medical History:   Diagnosis Date    Diabetes     Hypertension        Surgical History:   Mila Foster  has a past surgical history that includes Hysterectomy; Cholecystectomy; Colonoscopy (N/A, 11/1/2018); Injection of joint (Bilateral, 12/27/2018); Injection of anesthetic agent around nerve (Bilateral, 5/21/2019); Radiofrequency ablation (Right, 7/25/2019); Radiofrequency ablation (Left, 8/6/2019); and Injection of joint (Bilateral, 11/7/2019).    Medications:   Mila has a current medication list which includes the following prescription(s): aspirin, fluoxetine, gabapentin, hyoscyamine, metformin, metoprolol tartrate, mirabegron, naproxen, omeprazole, and pravastatin, and the following Facility-Administered Medications: sodium chloride 0.9%.    Allergies:   Review of patient's allergies indicates:  No Known Allergies     Imaging:  MRI Lumbar Spine 8.27.2019:   The distal cord/conus demonstrates normal size and signal.  No evidence of osteomyelitis, marrow replacement process, or acute fracture.  No paraspinal masses.    L1-2 is unremarkable.    At L2-3, there is mild facet arthropathy and disc bulging.  No spinal canal stenosis or significant neural foraminal narrowing.    At L3-4, there is mild disc bulging and bilateral facet arthropathy.  No spinal canal stenosis or significant neural foraminal narrowing.    At L4-5, there is bilateral facet arthropathy with slight L4-5 anterolisthesis.  No spinal canal stenosis..  There is mild right-sided neural foraminal narrowing.    At L5-S1, there is mild bilateral facet  "arthropathy and disc bulging.  Small posterior annular tear with right paracentral/lateral recess disc protrusion, possibly impinging upon the right L5 or S1 nerve roots.  No spinal canal stenosis or significant neural foraminal narrowing.      Impression       Small right paracentral/ lateral recess disc protrusion at L5-S1 and mild neural foraminal narrowing at L4-5, as above.  No spinal canal stenosis.  Findings are similar to the 07/25/2018 exam.         Prior Therapy: PT for back for discs   Social History: she lives alone, just about 3 steps to enter, single story  Occupation: retired   Prior Level of Function: chronic back issues  Current Level of Function: she does not feel like she is limited in a lot; independent; uses rollator     Pain:  Current 0/10, worst 7/10, best 0/10   Location: bilateral low back ("taillbone")   Description: Dull ache  Aggravating Factors: prolonged standing, static positions, transitional movements; prolonged sitting   Easing Factors: injections, rest, elevate legs    Pts goals: get stronger in legs    Objective       Observation: enters clinic with rollator     Posture: rounded shoulders, kyphotic     Gait: small step and stride length, shuffling steps, decreased hip and knee flexion, decreased pelvic rotation, and decreased heel strike bilaterally     Lumbar ROM: %AROM   Degrees Quality   Flexion   90% Hamstring stretch    Extension 60%      Left Side Bending 70%    Right Side Bending 70%    Left rotation 50%    Right Rotation 50% Pain on left side when rotating right      Dermatomes: intact to Light touch BLE but more dull on the right       Reflexes: 1+ BLE    Lower Extremity Strength (graded 0-5 out of 5)   RLE LLE   Hip flexion: 4/5 5/5   Hip ER 4/5 4/5   Hip IR 4+/5 4+/5   Knee extension: 4/5 5/5   Ankle dorsiflexion: 4/5 4+/5   Posterior fibers of Gluteus medius 4-/5 4-/5   Knee flexion 4-/5 4+/5   Hip extension: 4-/5 4/5     Special Tests: ((+): pos.; (-): neg.) "   · Quadrant test: + for bilateral but more right sided low back/tailbone pain  · Slump Test:  (-) B  · SLR Test: (-) B   · Bridge Test: (+) core weakness   · Pirformis Test: (+) B    Flexibility: (min, mod, severe limitation)  Hamstrings: mod B, more restriction on R   Iliopspas: mod B       Balance:   NBOS eyes open: 10 second, min-mod sway  NBOS eyes closed: 10 second, CGA, mod sway  SLS: <5 seconds, CGA-Iliana      Palpation for condition: TTP @ S1-2, sacrum, B piriformis      Five time sit to stand: 22 seconds with UE assistance      TU seconds with rollator         CMS Impairment/Limitation/Restriction for FOTO Survey    Therapist reviewed FOTO scores for Mila Foster on 2019.   FOTO documents entered into [email or web address] - see Media section.    Limitation Score: 54%  Category: Mobility    Current : CK = at least 40% but < 60% impaired, limited or restricted  Goal: CK = at least 40% but < 60% impaired, limited or restricted           TREATMENT     Home Exercises and Patient Education Provided    Education provided:   - Educated on sit to stand performance  - Educated on log rolling    Written Home Exercises Provided: will be issued at first treatment session.    Assessment   Mila is a 74 y.o. female referred to outpatient Physical Therapy with a medical diagnosis of    Sacroiliitis   Annular tear of lumbar disc   Neuroforaminal stenosis of lumbar spine    Lumbar spondylosis    Facet arthritis of lumbar region    DDD (degenerative disc disease), lumbar   Sacroiliac joint pain    Physical deconditioning    Shuffling gait   . Pt presents with impaired gait and balance today. Her score on the TUG marks her as a fall risk. She requires increased time for sit to stand from chair with no arms during TUG. She presents with increased time and UE assist needed for performance of five time sit to stand test. Bilaterally, she is weak in the lower extremity, moreso on the right. Her gait presents with small stride and  step length, decreased hip flexion, decreased knee flexion, decreased heel strike, and shuffling steps. She presents with moderate sway and tendency to lean to the right during balance today. Is unable to perform SLS balance for >3-5 seconds without UE assist. At this time, she is managing her low back pain with her HEP from recent PT. In addition, pt will benefit from gait and balance training as well as LE  strengthening to address all above deficits and improve safety as well as decrease risk of falls.         Pt prognosis is Good.   Pt will benefit from skilled outpatient Physical Therapy to address the deficits stated above and in the chart below, provide pt/family education, and to maximize pt's level of independence.     Plan of care discussed with patient: Yes  Pt's spiritual, cultural and educational needs considered and patient is agreeable to the plan of care and goals as stated below:     Anticipated Barriers for therapy:n/a    Medical Necessity is demonstrated by the following  History  Co-morbidities and personal factors that may impact the plan of care Co-morbidities:   diabetes, HTN and back issues    Personal Factors:   no deficits     high   Examination  Body Structures and Functions, activity limitations and participation restrictions that may impact the plan of care Body Regions:   back  lower extremities    Body Systems:    strength  balance  gait  transfers  transitions    Participation Restrictions:   Increased falls    Activity limitations:   Learning and applying knowledge  no deficits    General Tasks and Commands  no deficits    Communication  no deficits    Mobility  walking  balance    Self care  no deficits    Domestic Life  no deficits    Interactions/Relationships  no deficits    Life Areas  no deficits    Community and Social Life  no deficits         moderate   Clinical Presentation evolving clinical presentation with changing clinical characteristics moderate   Decision Making/  Complexity Score: moderate     Goals:    Short Term Goals: 4 weeks   1. Patient will perform sit <> stand with min VC for proper technique from PT in order to encourage independence in transfers.  2. Pt will demonstrate proper use of rollator with min VC from PT for improved safety and ambulation.   3. Pt will improve TUG score to </=22 seconds in order to improve fall risk.    4. Patient will improve 5 time sit to stand to </= 18 seconds in order to improve safety, transfers, and functional independence.  5. Pt will improve impaired LE MMTs by 1/2 MMT to improve strength for functional tasks.    Long Term Goals: 8 weeks  1. Patient will perform sit <> stand with or without UE with no VC for proper technique from PT in order to encourage independence in transfers.  2. Pt will demonstrate proper use of rollator with no VC from PT for improved safety and ambulation.  3. Pt will improve TUG score to </=18 in order to improve fall risk.   4. Pt will perform 5 time sit to stand with or without UE assist in </=15  in order to improve safety, transfers, and functional independence.   5. Pt will improve impaired LE MMTs by 1 MMT to improve strength for functional tasks.            Plan   Plan of care Certification: 11/29/2019 to 1/24/2020.    Outpatient Physical Therapy 2 times weekly for 8 weeks to include the following interventions: Gait Training, Manual Therapy, Moist Heat/ Ice, Neuromuscular Re-ed, Orthotic Management and Training, Patient Education, Self Care, Therapeutic Activites and Therapeutic Exercise.     Ruth Ann Haider, PT

## 2019-12-04 ENCOUNTER — TELEPHONE (OUTPATIENT)
Dept: NEUROLOGY | Facility: CLINIC | Age: 74
End: 2019-12-04

## 2019-12-04 DIAGNOSIS — D89.2 PARAPROTEINEMIA: Primary | ICD-10-CM

## 2019-12-04 NOTE — TELEPHONE ENCOUNTER
I called the patient to discuss laboratory results.  I did notify her about the abnormal SPEP and serum RAMANDEEP.  I will refer her to Hematology for further evaluation.  She has a nerve study test scheduled for January 28, 2019.  She notified me that she will follow-up with her previous neurologist as I will no longer be at this location after this week.

## 2019-12-04 NOTE — TELEPHONE ENCOUNTER
I returned patient in regards to missing her appointment today due to transportation issues. She stated that she spoke to Dr. Bhatti and she already has an appointment with Dr. Otero. She will just keep that appointment.

## 2019-12-04 NOTE — TELEPHONE ENCOUNTER
----- Message from Doyle Beckwith sent at 12/4/2019  9:36 AM CST -----  Contact: [phone]  Patient states that her transportation did not come pick her up today, so she is not going to be able to make it today. Please Advise.

## 2019-12-18 ENCOUNTER — OFFICE VISIT (OUTPATIENT)
Dept: NEUROLOGY | Facility: CLINIC | Age: 74
End: 2019-12-18
Payer: MEDICARE

## 2019-12-18 VITALS — BODY MASS INDEX: 34.84 KG/M2 | HEIGHT: 67 IN | WEIGHT: 222 LBS

## 2019-12-18 DIAGNOSIS — M25.361 RIGHT KNEE GIVES WAY: Primary | ICD-10-CM

## 2019-12-18 PROCEDURE — 99999 PR PBB SHADOW E&M-EST. PATIENT-LVL III: ICD-10-PCS | Mod: PBBFAC,,, | Performed by: PSYCHIATRY & NEUROLOGY

## 2019-12-18 PROCEDURE — 99213 PR OFFICE/OUTPT VISIT, EST, LEVL III, 20-29 MIN: ICD-10-PCS | Mod: S$GLB,,, | Performed by: PSYCHIATRY & NEUROLOGY

## 2019-12-18 PROCEDURE — 1101F PR PT FALLS ASSESS DOC 0-1 FALLS W/OUT INJ PAST YR: ICD-10-PCS | Mod: CPTII,S$GLB,, | Performed by: PSYCHIATRY & NEUROLOGY

## 2019-12-18 PROCEDURE — 1159F PR MEDICATION LIST DOCUMENTED IN MEDICAL RECORD: ICD-10-PCS | Mod: S$GLB,,, | Performed by: PSYCHIATRY & NEUROLOGY

## 2019-12-18 PROCEDURE — 1159F MED LIST DOCD IN RCRD: CPT | Mod: S$GLB,,, | Performed by: PSYCHIATRY & NEUROLOGY

## 2019-12-18 PROCEDURE — 1126F AMNT PAIN NOTED NONE PRSNT: CPT | Mod: S$GLB,,, | Performed by: PSYCHIATRY & NEUROLOGY

## 2019-12-18 PROCEDURE — 99999 PR PBB SHADOW E&M-EST. PATIENT-LVL III: CPT | Mod: PBBFAC,,, | Performed by: PSYCHIATRY & NEUROLOGY

## 2019-12-18 PROCEDURE — 1101F PT FALLS ASSESS-DOCD LE1/YR: CPT | Mod: CPTII,S$GLB,, | Performed by: PSYCHIATRY & NEUROLOGY

## 2019-12-18 PROCEDURE — 99213 OFFICE O/P EST LOW 20 MIN: CPT | Mod: S$GLB,,, | Performed by: PSYCHIATRY & NEUROLOGY

## 2019-12-18 PROCEDURE — 1126F PR PAIN SEVERITY QUANTIFIED, NO PAIN PRESENT: ICD-10-PCS | Mod: S$GLB,,, | Performed by: PSYCHIATRY & NEUROLOGY

## 2019-12-18 NOTE — PROGRESS NOTES
[facility] - NEUROLOGY  [facility] [place]    Date: December 18, 2019   Patient Name: Mila Foster   MRN: [id number]   PCP: Dinesh Connors  Referring Provider: Coby Russo NP    Assessment:      This is Mila Foster, 74 y.o. female with falls and gait changes, recent MRI L-spine with mild degenerative changes only - do not suspect radiculopathy as etiology of falls.     Plan:      -  Referral to orthopedics re: right knee  -  Follow up for EMG, possible contribution of diabetic neuropathy  -  Continue gabapentin to 600/300/600, notes benefit    Follow up 6 months     I discussed side effects of the medications. I asked the patient to stop the medication if she notices serious adverse effects as we discussed and to seek immediate medical attention at an ER.     Dami Parra MD  [facility]   Department of Neurology    Subjective:   Presents back due to recent falls and near falls, notes that she will get sharp pains in right knee causing it to give out, has resumed healthy back    11/2019  Experienced relief after starting [employer] but had to take hiatus after her son's diagnosis of cancer, planning on resuming soon  Using can but recently had a fall when she tripped on loose fitting shoes she was wearing - feels she tends to lean forward while walking  Continued relief from GBP and naproxen  6/2017  Good effect of GBP, LEONEL x2 with some relief, often feels herself leaning forward while walking but no falls  Recent dx IBS with good effect of medications  Plans to start [facility] and begin water aerobics  2/2017  Primary concern on this visit is centered on back pain and sciatica.  Some improvement since injections this month but continued pain, notably with prolonged sitting.  Notices herself lean forward when walking.  Currently on leave from work due to restrictions with lifting.  Memory troubles largely unchanged.     HPI 11/2016:   Ms.  Mila Foster is a 74 y.o. female with HTN and DM with related neuropathy presents for memory trouble     She has noticed trouble completing sentences and reversing word order over the past several months but does not think other people have appreciated this very much. She works at a  center and does not feel that her problems interfere with her work. She does note significant stress related to one of her children going through a divorce and younger co workers leaving additional work for her to do. She began taking fluoxetine a few months ago with some improvement noted. She lives alone and continues to manage her own finances and drive without accidents or trouble getting lost. She has some difficulty with sleep related to crawling sensation in right much more than left leg which improves if she gets up and walks around, takes gabapentin at bed time only with mild relief. She completed high school.     Over the past few months she has also notices low back pain radiating down the right leg which is frequently brought on by prolonged sitting, gabapentin has not helped with this. Also notes pain in R>L knee.    PAST MEDICAL HISTORY:  Past Medical History:   Diagnosis Date    Diabetes     Hypertension        PAST SURGICAL HISTORY:  Past Surgical History:   Procedure Laterality Date    CHOLECYSTECTOMY      COLONOSCOPY N/A 11/1/2018    Procedure: COLONOSCOPY Suprep;  Surgeon: Azucena Hood MD;  Location: [facility];  Service: Endoscopy;  Laterality: N/A;    HYSTERECTOMY      INJECTION OF ANESTHETIC AGENT AROUND NERVE Bilateral 5/21/2019    Procedure: BLOCK, NERVE, BILATERAL L2,3,4,5;  Surgeon: Bonnie Duncan MD;  [facility];  Service: Pain Management;  Laterality: Bilateral;  BLOCK, NERVE, BILATERAL L2,3,4,5    INJECTION OF JOINT Bilateral 12/27/2018    Procedure: Injection, Joint BILATERAL SACROILIAC JOINT INJECTION;  Surgeon: Bonnie Duncan MD;  [facility];   Service: Pain Management;  Laterality: Bilateral;    INJECTION OF JOINT Bilateral 11/7/2019    Procedure: INJECTION, JOINT, SI;  Surgeon: Bonnie Duncan MD;  Location: [facility];  Service: Pain Management;  Laterality: Bilateral;  B/L SI Joint Injections    RADIOFREQUENCY ABLATION Right 7/25/2019    Procedure: RADIOFREQUENCY ABLATION;  Surgeon: Bonnie Duncan MD;  Location: [facility];  Service: Pain Management;  Laterality: Right;  Right RFA L2,3,4,5; THERMAL  1 of 2    RADIOFREQUENCY ABLATION Left 8/6/2019    Procedure: RADIOFREQUENCY ABLATION;  Surgeon: Bonnie Duncan MD;  Location: [facility];  Service: Pain Management;  Laterality: Left;  Left RFA L2,3,4,5 LUMBAR  1 of 2       CURRENT MEDS:  Current Outpatient Medications   Medication Sig Dispense Refill    aspirin (ECOTRIN) 81 MG EC tablet 81 mg.  2    FLUoxetine 20 MG capsule TK ONE C PO  QD  1    gabapentin (NEURONTIN) 300 MG capsule Take 2 tabs in the morning, 1 tab in the afternoon, and 2 tabs in the evening 150 capsule 5    metformin (GLUCOPHAGE-XR) 500 MG 24 hr tablet 500 mg.  1    metoprolol tartrate (LOPRESSOR) 25 MG tablet   1    mirabegron (MYRBETRIQ) 25 mg Tb24 ER tablet Take 1 tablet (25 mg total) by mouth once daily. 30 tablet 11    naproxen (NAPROSYN) 500 MG tablet   1    omeprazole (PRILOSEC) 20 MG capsule 20 mg.  1    pravastatin (PRAVACHOL) 40 MG tablet 40 mg.  1    hyoscyamine (ANASPAZ,LEVSIN) 0.125 mg Tab Take 1 tablet (125 mcg total) by mouth every 4 (four) hours as needed. 30 tablet 6     No current facility-administered medications for this visit.      Facility-Administered Medications Ordered in Other Visits   Medication Dose Route Frequency Provider Last Rate Last Dose    0.9%  NaCl infusion  500 mL Intravenous Continuous Kapil aBum MD           ALLERGIES:  Review of patient's allergies indicates:  No Known Allergies    FAMILY HISTORY:  Family History   Problem Relation Age of Onset    Vaginal  "cancer Neg Hx     Endometrial cancer Neg Hx     Cervical cancer Neg Hx        SOCIAL HISTORY:  Social History     Tobacco Use    Smoking status: Never Smoker    Smokeless tobacco: Never Used   Substance Use Topics    Alcohol use: No     Frequency: Never    Drug use: No       Review of Systems:  12 review of systems is negative except for the symptoms mentioned in HPI.        Objective:     Vitals:    12/18/19 0918   Weight: 100.7 kg (222 lb)   Height: 5' 7" (1.702 m)       General: NAD, well nourished   Eyes: no tearing, discharge, no erythema   ENT: moist mucous membranes of the oral cavity, nares patent    Neck: Supple, full range of motion  Cardiovascular: Warm and well perfused, pulses equal and symmetrical  Lungs: Normal work of breathing, normal chest wall excursions  Skin: No rash, lesions, or breakdown on exposed skin  Psychiatry: Mood and affect are appropriate   Abdomen: soft, non tender, non distended  Extremeties: noted crepitus in right not left knee.    Neurological   MENTAL STATUS: Alert and oriented to person, place, and time. Speech without dysarthria, able to name and repeat without difficulty.   CRANIAL NERVES: Visual fields intact. PERRL. EOMI. Facial sensation intact. Face symmetrical. Hearing grossly intact. Full shoulder shrug bilaterally. Tongue protrudes midline   SENSORY: Sensation is intact to light touch throughout.    MOTOR: Normal bulk and tone. No pronator drift.  5/5 iliopsoas, knee extension/flexion, foot dorsi/plantarflexion bilaterally.    CEREBELLAR/COORDINATION/GAIT: Gait steady with mild decreased clearance, presents with walker.    "

## 2019-12-18 NOTE — LETTER
December 18, 2019      Coby Russo, NP  [address]  [address]  [facility] [address]           [facility]  [address]  [place] [address]  Phone: [phone]  Fax: [phone]          Patient: Mila Foster   MR Number: [id number]   YOB: 1945   Date of Visit: 12/18/2019       Dear Coby Russo:    Thank you for referring Mila Foster to me for evaluation. Attached you will find relevant portions of my assessment and plan of care.    If you have questions, please do not hesitate to call me. I look forward to following Mila Foster along with you.    Sincerely,    Dami Parra MD    Enclosure  CC:  No Recipients    If you would like to receive this communication electronically, please contact [email or web address] or [phone] to request more information on [employer] Link access.    For providers and/or their staff who would like to refer a patient to [facility], please contact us through our one-stop-shop provider referral line, [facility], at [phone].    If you feel you have received this communication in error or would no longer like to receive these types of communications, please e-mail [email or web address]

## 2019-12-26 NOTE — PROGRESS NOTES
Physical Therapy Daily Treatment Note/ Reassessment/Updated POC     Name: Mila Foster  Clinic Number: [id number]    Therapy Diagnosis:   Encounter Diagnoses   Name Primary?    Decreased strength of lower extremity     Impaired functional mobility, balance, and endurance     Chronic bilateral low back pain, unspecified whether sciatica present      Physician: Coby Russo NP    Visit Date: 2019    Physician Orders: PT Eval and Treat   Medical Diagnosis from Referral:    Sacroiliitis   Annular tear of lumbar disc   Neuroforaminal stenosis of lumbar spine    Lumbar spondylosis    Facet arthritis of lumbar region    DDD (degenerative disc disease), lumbar   Sacroiliac joint pain    Physical deconditioning    Shuffling gait         Evaluation Date: 2019  Authorization Period Expiration: 2020  Plan of Care Expiration: 2020  Visit # / Visits authorized:     Time In: 10:00 AM  Time Out: 10: 50  AM  Total Billable Time: 50 minutes (te 3)    Precautions: Standard, Diabetes and Fall    Subjective     Pt reports: she has had issues with transportation so she has had to cancel visits. She says the walking and balance potential are about the same. She said she still has her HEP from when she was getting treated for low back pain over the summer, she just has to find it.  She was not compliant with home exercise program.  Response to previous treatment: n/a  Functional change: n/a    Pain: 7/10  Location: bilateral low back/tailbone    Objective      Lower Extremity Strength (graded 0-5 out of 5)    RLE LLE   Hip flexion: 4+/5 4+/5   Hip ER 4/5 4+/5   Hip IR 4+/5 4/5   Knee extension: 4+/5 4+/5   Ankle dorsiflexion: 4+/5 5/5   Posterior fibers of Gluteus medius 4-/5 4-/5   Knee flexion 4+/5 4-/5   Hip extension: 4-/5 4-/5         Five time sit to stand: 25 seconds with UE assistance  from hi-low mat      TU seconds with rollator           ______________________________________________________  Mila received therapeutic exercises to develop strength and endurance for 50 minutes including:  Sit to stands from hi-low mat with UE assist 2x5   SLR 2x10  Standing marches 2x10  UE assist on counter  Supine clams OTB 2x10   Bridges 2x10       Mila participated in gait training to improve functional mobility and safety - not performed today   Not performed today, will initiate next visit.         Home Exercises Provided and Patient Education Provided     Education provided:   - exercise execution  - HEP  - safety with sit to stands    Written Home Exercises Provided: yes.  Exercises were reviewed and Mila was able to demonstrate them prior to the end of the session.  Mila demonstrated good  understanding of the education provided.     See EMR under Patient Instructions for exercises provided 12/31/2019.    Assessment     Mila presents to first treatment session today after cancelling many of her appts. She said this is due to transportation issues as she does not have a car. At today's reassessment, her TUG and 5 time sit to stand score william her as a fall risk. She presents with weakness in the hip muscles bilaterally as well. In addition, her sit to stands are unsafe with plopping and UE reliance on rollator. She is educated on safety today for sit<> stand transfers. She requires assistance for mat mobility as well. Mila will benefit from skilled outpatient physical therapy to address all deficits, improve ambulation, improve balance, and decreased fall risk.       Mila is progressing well towards her goals.   Pt prognosis is Good.     Pt will continue to benefit from skilled outpatient physical therapy to address the deficits listed in the problem list box on initial evaluation, provide pt/family education and to maximize pt's level of independence in the home and community environment.     Pt's spiritual, cultural and educational  needs considered and pt agreeable to plan of care and goals.     Anticipated barriers to physical therapy: transportation, compliance of therapy     Goals:   Short Term Goals: 4 weeks   1. Patient will perform sit <> stand with min VC for proper technique from PT in order to encourage independence in transfers.-in progress, not met   2. Pt will demonstrate proper use of rollator with min VC from PT for improved safety and ambulation. -in progress, not met  3. Pt will improve TUG score to </=22 seconds in order to improve fall risk.    4. Patient will improve 5 time sit to stand to </= 18 seconds in order to improve safety, transfers, and functional independence.  5. Pt will improve impaired LE MMTs by 1/2 MMT to improve strength for functional tasks.     Long Term Goals: 8 weeks  1. Patient will perform sit <> stand with or without UE with no VC for proper technique from PT in order to encourage independence in transfers.  2. Pt will demonstrate proper use of rollator with no VC from PT for improved safety and ambulation.  3. Pt will improve TUG score to </=18 in order to improve fall risk.   4. Pt will perform 5 time sit to stand with or without UE assist in </=15  in order to improve safety, transfers, and functional independence.   5. Pt will improve impaired LE MMTs by 1 MMT to improve strength for functional tasks.       Plan   Plan of care Certification until 2/26/2020.     Outpatient Physical Therapy 2 times weekly for 8 weeks to include the following interventions: Gait Training, Manual Therapy, Moist Heat/ Ice, Neuromuscular Re-ed, Orthotic Management and Training, Patient Education, Self Care, Therapeutic Activites and Therapeutic Exercise.      Ruth Ann Haider, PT

## 2019-12-26 NOTE — PROGRESS NOTES
"  Physical Therapy Daily Treatment Note/Updated POC/ Progress Note      Name: Mila Foster  Clinic Number: [id number]    Therapy Diagnosis:   No diagnosis found.  Physician: Coby Russo NP    Visit Date: 1/2/2020    Physician Orders: PT Eval and Treat   Medical Diagnosis from Referral:    Sacroiliitis   Annular tear of lumbar disc   Neuroforaminal stenosis of lumbar spine    Lumbar spondylosis    Facet arthritis of lumbar region    DDD (degenerative disc disease), lumbar   Sacroiliac joint pain    Physical deconditioning    Shuffling gait         Evaluation Date: 11/29/2019  Authorization Period Expiration: 1/13/2020***  Plan of Care Expiration: 1/24/2020***  Visit # / Visits authorized: 1/5***    Time In: ***  Time Out: ***  Total Billable Time: *** minutes    Precautions: Standard, Diabetes and Fall    Subjective     Pt reports: ***.  She {Actions; was/was not:97707} compliant with home exercise program.  Response to previous treatment: ***  Functional change: ***    Pain: {0-10:14199::"0"}/10  Location: bilateral low back/tailbone***      Objective       Lumbar ROM: %AROM    Degrees Quality   Flexion    90% Hamstring stretch    Extension 60%        Left Side Bending 70%     Right Side Bending 70%     Left rotation 50%     Right Rotation 50% Pain on left side when rotating right       Dermatomes: intact to Light touch BLE but more dull on the right         Reflexes: 1+ BLE     Lower Extremity Strength (graded 0-5 out of 5)    RLE LLE   Hip flexion: 4/5 5/5   Hip ER 4/5 4/5   Hip IR 4+/5 4+/5   Knee extension: 4/5 5/5   Ankle dorsiflexion: 4/5 4+/5   Posterior fibers of Gluteus medius 4-/5 4-/5   Knee flexion 4-/5 4+/5   Hip extension: 4-/5 4/5      Special Tests: ((+): pos.; (-): neg.)   · Quadrant test: + for bilateral but more right sided low back/tailbone pain  · Slump Test:  (-) B  · SLR Test: (-) B   · Bridge Test: (+) core weakness   · Pirformis Test: (+) B     Flexibility: (min, mod, severe " limitation)  Hamstrings: mod B, more restriction on R   Iliopspas: mod B         Balance:   NBOS eyes open: 10 second, min-mod sway  NBOS eyes closed: 10 second, CGA, mod sway  SLS: <5 seconds, CGA-Iliana        Palpation for condition: TTP @ S1-2, sacrum, B piriformis        Five time sit to stand: 22 seconds with UE assistance       TU seconds with rollator              Mila received therapeutic exercises to develop {AMB PT PROGRESS OBJECTIVE:35860} for *** minutes including:  ****  Supine clams 2x10   Bridges  SLR   Sit to stands from hi-low mat with UE assist  Recumbent bike       Mila participated in gait training to improve functional mobility and safety for ***  minutes, including:  ***    Mila received the following manual therapy techniques: {AMB PT PROGRESS MANUAL THERAPY:27162} were applied to the: *** for *** minutes, including:  ***    Mila received hot pack for *** minutes to ***.    Mila received cold pack for *** minutes to ***.      Home Exercises Provided and Patient Education Provided     Education provided:   - exercise execution  - HEP    Written Home Exercises Provided: yes.  Exercises were reviewed and Mila was able to demonstrate them prior to the end of the session.  Mila demonstrated good  understanding of the education provided.     See EMR under Patient Instructions for exercises provided 2019.    Assessment     ***  Mila is progressing well towards her goals.   Pt prognosis is Good.     Pt will continue to benefit from skilled outpatient physical therapy to address the deficits listed in the problem list box on initial evaluation, provide pt/family education and to maximize pt's level of independence in the home and community environment.     Pt's spiritual, cultural and educational needs considered and pt agreeable to plan of care and goals.     Anticipated barriers to physical therapy: n/a    Goals:   Short Term Goals: 4 weeks   1. Patient will perform sit <>  stand with min VC for proper technique from PT in order to encourage independence in transfers.  2. Pt will demonstrate proper use of rollator with min VC from PT for improved safety and ambulation.   3. Pt will improve TUG score to </=22 seconds in order to improve fall risk.    4. Patient will improve 5 time sit to stand to </= 18 seconds in order to improve safety, transfers, and functional independence.  5. Pt will improve impaired LE MMTs by 1/2 MMT to improve strength for functional tasks.     Long Term Goals: 8 weeks  1. Patient will perform sit <> stand with or without UE with no VC for proper technique from PT in order to encourage independence in transfers.  2. Pt will demonstrate proper use of rollator with no VC from PT for improved safety and ambulation.  3. Pt will improve TUG score to </=18 in order to improve fall risk.   4. Pt will perform 5 time sit to stand with or without UE assist in </=15  in order to improve safety, transfers, and functional independence.   5. Pt will improve impaired LE MMTs by 1 MMT to improve strength for functional tasks.       Plan     Continue current PT POC.    Ruth Ann Haider, PT

## 2019-12-31 ENCOUNTER — CLINICAL SUPPORT (OUTPATIENT)
Dept: REHABILITATION | Facility: HOSPITAL | Age: 74
End: 2019-12-31
Payer: MEDICARE

## 2019-12-31 DIAGNOSIS — Z74.09 IMPAIRED FUNCTIONAL MOBILITY, BALANCE, AND ENDURANCE: ICD-10-CM

## 2019-12-31 DIAGNOSIS — R29.898 DECREASED STRENGTH OF LOWER EXTREMITY: ICD-10-CM

## 2019-12-31 DIAGNOSIS — G89.29 CHRONIC BILATERAL LOW BACK PAIN, UNSPECIFIED WHETHER SCIATICA PRESENT: ICD-10-CM

## 2019-12-31 DIAGNOSIS — M54.50 CHRONIC BILATERAL LOW BACK PAIN, UNSPECIFIED WHETHER SCIATICA PRESENT: ICD-10-CM

## 2019-12-31 PROCEDURE — 97110 THERAPEUTIC EXERCISES: CPT | Mod: PO

## 2019-12-31 NOTE — PROGRESS NOTES
Physical Therapy Daily Treatment Note     Name: Mila Foster  Clinic Number: [id number]    Therapy Diagnosis:   Encounter Diagnoses   Name Primary?    Decreased strength of lower extremity     Impaired functional mobility, balance, and endurance     Chronic bilateral low back pain, unspecified whether sciatica present      Physician: Coby uRsso NP    Visit Date: 1/2/2020    Physician Orders: PT Eval and Treat   Medical Diagnosis from Referral:    Sacroiliitis   Annular tear of lumbar disc   Neuroforaminal stenosis of lumbar spine    Lumbar spondylosis    Facet arthritis of lumbar region    DDD (degenerative disc disease), lumbar   Sacroiliac joint pain    Physical deconditioning    Shuffling gait         Evaluation Date: 11/29/2019  Authorization Period Expiration: 1/13/2020  Plan of Care Expiration: 2/26/2020  Visit # / Visits authorized: 2/5    Time In: 10:03 AM  Time Out: 10: 50  AM  Total Billable Time: 47 minutes (te 2, gait training 1)    Precautions: Standard, Diabetes and Fall    Subjective     Pt reports: she feels sore because she might have overdone it last night. She has been trying to continue sitting and standing with hands.   She was compliant with home exercise program.  Response to previous treatment: n/a  Functional change: n/a    Pain: 5-6/10  Location: bilateral low back/tailbone    Objective       ______________________________________________________  Mila received therapeutic exercises to develop strength and endurance for 37 minutes including:    Sit to stands from hi-low mat with UE assist 4x5  SLR 2x10  Supine clams OTB 2x10   Bridges 2x10   Recumbent bike 8 min      Mila participated in gait training to improve functional mobility and safety for 10 minutes:  400 feet emphasizing heel strike, hip and knee flexion, and proper use of rollator         Home Exercises Provided and Patient Education Provided     Education provided:   - exercise execution  - HEP  - safety with sit  to stands    Written Home Exercises Provided: Patient instructed to cont prior HEP.  Exercises were reviewed and Mila was able to demonstrate them prior to the end of the session.  Mila demonstrated good  understanding of the education provided.     See EMR under Patient Instructions for exercises provided prior visit.    Assessment     Mila presents to treatment session with no adverse effects to activity. She requires mod VC for proper use of rollator to decrease forward thoracic flexion, for adequate heel strike, and for proper hip and knee flexion. She does well with sit to stand executions with min VC for safety. She is fearful with mat mobility but is educated on log rolling. Progress as per pt tolerance.       Mila is progressing well towards her goals.   Pt prognosis is Good.     Pt will continue to benefit from skilled outpatient physical therapy to address the deficits listed in the problem list box on initial evaluation, provide pt/family education and to maximize pt's level of independence in the home and community environment.     Pt's spiritual, cultural and educational needs considered and pt agreeable to plan of care and goals.     Anticipated barriers to physical therapy: transportation, compliance of therapy     Goals:   Short Term Goals: 4 weeks   1. Patient will perform sit <> stand with min VC for proper technique from PT in order to encourage independence in transfers.-in progress, not met   2. Pt will demonstrate proper use of rollator with min VC from PT for improved safety and ambulation. -in progress, not met  3. Pt will improve TUG score to </=22 seconds in order to improve fall risk.    4. Patient will improve 5 time sit to stand to </= 18 seconds in order to improve safety, transfers, and functional independence.  5. Pt will improve impaired LE MMTs by 1/2 MMT to improve strength for functional tasks.     Long Term Goals: 8 weeks  1. Patient will perform sit <> stand with or  without UE with no VC for proper technique from PT in order to encourage independence in transfers.  2. Pt will demonstrate proper use of rollator with no VC from PT for improved safety and ambulation.  3. Pt will improve TUG score to </=18 in order to improve fall risk.   4. Pt will perform 5 time sit to stand with or without UE assist in </=15  in order to improve safety, transfers, and functional independence.   5. Pt will improve impaired LE MMTs by 1 MMT to improve strength for functional tasks.       Plan   Plan of care Certification until 2/26/2020.     Outpatient Physical Therapy 2 times weekly for 8 weeks to include the following interventions: Gait Training, Manual Therapy, Moist Heat/ Ice, Neuromuscular Re-ed, Orthotic Management and Training, Patient Education, Self Care, Therapeutic Activites and Therapeutic Exercise.  Continue above plan.      Ruth Ann Haider, PT

## 2019-12-31 NOTE — PLAN OF CARE
Physical Therapy Daily Treatment Note/ Reassessment/Updated POC     Name: Mila Foster  Clinic Number: [id number]    Therapy Diagnosis:   Encounter Diagnoses   Name Primary?    Decreased strength of lower extremity     Impaired functional mobility, balance, and endurance     Chronic bilateral low back pain, unspecified whether sciatica present      Physician: Coby Russo NP    Visit Date: 2019    Physician Orders: PT Eval and Treat   Medical Diagnosis from Referral:    Sacroiliitis   Annular tear of lumbar disc   Neuroforaminal stenosis of lumbar spine    Lumbar spondylosis    Facet arthritis of lumbar region    DDD (degenerative disc disease), lumbar   Sacroiliac joint pain    Physical deconditioning    Shuffling gait         Evaluation Date: 2019  Authorization Period Expiration: 2020  Plan of Care Expiration: 2020  Visit # / Visits authorized:     Time In: 10:00 AM  Time Out: 10: 50  AM  Total Billable Time: 50 minutes (te 3)    Precautions: Standard, Diabetes and Fall    Subjective     Pt reports: she has had issues with transportation so she has had to cancel visits. She says the walking and balance potential are about the same. She said she still has her HEP from when she was getting treated for low back pain over the summer, she just has to find it.  She was not compliant with home exercise program.  Response to previous treatment: n/a  Functional change: n/a    Pain: 7/10  Location: bilateral low back/tailbone    Objective      Lower Extremity Strength (graded 0-5 out of 5)    RLE LLE   Hip flexion: 4+/5 4+/5   Hip ER 4/5 4+/5   Hip IR 4+/5 4/5   Knee extension: 4+/5 4+/5   Ankle dorsiflexion: 4+/5 5/5   Posterior fibers of Gluteus medius 4-/5 4-/5   Knee flexion 4+/5 4-/5   Hip extension: 4-/5 4-/5         Five time sit to stand: 25 seconds with UE assistance  from hi-low mat      TU seconds with rollator           ______________________________________________________  Mila received therapeutic exercises to develop strength and endurance for 50 minutes including:  Sit to stands from hi-low mat with UE assist 2x5   SLR 2x10  Standing marches 2x10  UE assist on counter  Supine clams OTB 2x10   Bridges 2x10       Mila participated in gait training to improve functional mobility and safety - not performed today   Not performed today, will initiate next visit.         Home Exercises Provided and Patient Education Provided     Education provided:   - exercise execution  - HEP  - safety with sit to stands    Written Home Exercises Provided: yes.  Exercises were reviewed and Mila was able to demonstrate them prior to the end of the session.  Mila demonstrated good  understanding of the education provided.     See EMR under Patient Instructions for exercises provided 12/31/2019.    Assessment     Mila presents to first treatment session today after cancelling many of her appts. She said this is due to transportation issues as she does not have a car. At today's reassessment, her TUG and 5 time sit to stand score william her as a fall risk. She presents with weakness in the hip muscles bilaterally as well. In addition, her sit to stands are unsafe with plopping and UE reliance on rollator. She is educated on safety today for sit<> stand transfers. She requires assistance for mat mobility as well. Mila will benefit from skilled outpatient physical therapy to address all deficits, improve ambulation, improve balance, and decreased fall risk.       Mila is progressing well towards her goals.   Pt prognosis is Good.     Pt will continue to benefit from skilled outpatient physical therapy to address the deficits listed in the problem list box on initial evaluation, provide pt/family education and to maximize pt's level of independence in the home and community environment.     Pt's spiritual, cultural and educational  needs considered and pt agreeable to plan of care and goals.     Anticipated barriers to physical therapy: transportation, compliance of therapy     Goals:   Short Term Goals: 4 weeks   1. Patient will perform sit <> stand with min VC for proper technique from PT in order to encourage independence in transfers.-in progress, not met   2. Pt will demonstrate proper use of rollator with min VC from PT for improved safety and ambulation. -in progress, not met  3. Pt will improve TUG score to </=22 seconds in order to improve fall risk.    4. Patient will improve 5 time sit to stand to </= 18 seconds in order to improve safety, transfers, and functional independence.  5. Pt will improve impaired LE MMTs by 1/2 MMT to improve strength for functional tasks.     Long Term Goals: 8 weeks  1. Patient will perform sit <> stand with or without UE with no VC for proper technique from PT in order to encourage independence in transfers.  2. Pt will demonstrate proper use of rollator with no VC from PT for improved safety and ambulation.  3. Pt will improve TUG score to </=18 in order to improve fall risk.   4. Pt will perform 5 time sit to stand with or without UE assist in </=15  in order to improve safety, transfers, and functional independence.   5. Pt will improve impaired LE MMTs by 1 MMT to improve strength for functional tasks.       Plan   Plan of care Certification until 2/26/2020.     Outpatient Physical Therapy 2 times weekly for 8 weeks to include the following interventions: Gait Training, Manual Therapy, Moist Heat/ Ice, Neuromuscular Re-ed, Orthotic Management and Training, Patient Education, Self Care, Therapeutic Activites and Therapeutic Exercise.      Ruth Ann Haider, PT

## 2020-01-02 ENCOUNTER — CLINICAL SUPPORT (OUTPATIENT)
Dept: REHABILITATION | Facility: HOSPITAL | Age: 75
End: 2020-01-02
Payer: MEDICARE

## 2020-01-02 DIAGNOSIS — R29.898 DECREASED STRENGTH OF LOWER EXTREMITY: ICD-10-CM

## 2020-01-02 DIAGNOSIS — M54.50 CHRONIC BILATERAL LOW BACK PAIN, UNSPECIFIED WHETHER SCIATICA PRESENT: ICD-10-CM

## 2020-01-02 DIAGNOSIS — G89.29 CHRONIC BILATERAL LOW BACK PAIN, UNSPECIFIED WHETHER SCIATICA PRESENT: ICD-10-CM

## 2020-01-02 DIAGNOSIS — Z74.09 IMPAIRED FUNCTIONAL MOBILITY, BALANCE, AND ENDURANCE: ICD-10-CM

## 2020-01-02 PROCEDURE — 97116 GAIT TRAINING THERAPY: CPT | Mod: PO

## 2020-01-02 PROCEDURE — 97110 THERAPEUTIC EXERCISES: CPT | Mod: PO

## 2020-01-06 ENCOUNTER — HOSPITAL ENCOUNTER (OUTPATIENT)
Dept: RADIOLOGY | Facility: HOSPITAL | Age: 75
Discharge: HOME | End: 2020-01-06
Attending: PHYSICIAN ASSISTANT
Payer: MEDICARE

## 2020-01-06 ENCOUNTER — OFFICE VISIT (OUTPATIENT)
Dept: ORTHOPEDICS | Facility: CLINIC | Age: 75
End: 2020-01-06
Payer: MEDICARE

## 2020-01-06 VITALS — BODY MASS INDEX: 35.38 KG/M2 | WEIGHT: 225.44 LBS | HEIGHT: 67 IN

## 2020-01-06 DIAGNOSIS — M25.561 PAIN IN BOTH KNEES, UNSPECIFIED CHRONICITY: ICD-10-CM

## 2020-01-06 DIAGNOSIS — M17.0 PRIMARY OSTEOARTHRITIS OF BOTH KNEES: Primary | ICD-10-CM

## 2020-01-06 DIAGNOSIS — M25.562 PAIN IN BOTH KNEES, UNSPECIFIED CHRONICITY: ICD-10-CM

## 2020-01-06 PROCEDURE — 1101F PR PT FALLS ASSESS DOC 0-1 FALLS W/OUT INJ PAST YR: ICD-10-PCS | Mod: CPTII,S$GLB,, | Performed by: PHYSICIAN ASSISTANT

## 2020-01-06 PROCEDURE — 99999 PR PBB SHADOW E&M-EST. PATIENT-LVL III: CPT | Mod: PBBFAC,,, | Performed by: PHYSICIAN ASSISTANT

## 2020-01-06 PROCEDURE — 1101F PT FALLS ASSESS-DOCD LE1/YR: CPT | Mod: CPTII,S$GLB,, | Performed by: PHYSICIAN ASSISTANT

## 2020-01-06 PROCEDURE — 1125F PR PAIN SEVERITY QUANTIFIED, PAIN PRESENT: ICD-10-PCS | Mod: S$GLB,,, | Performed by: PHYSICIAN ASSISTANT

## 2020-01-06 PROCEDURE — 73564 X-RAY EXAM KNEE 4 OR MORE: CPT | Mod: TC,50

## 2020-01-06 PROCEDURE — 73564 XR KNEE ORTHO BILAT WITH FLEXION: ICD-10-PCS | Mod: 26,50,, | Performed by: RADIOLOGY

## 2020-01-06 PROCEDURE — 73564 X-RAY EXAM KNEE 4 OR MORE: CPT | Mod: 26,50,, | Performed by: RADIOLOGY

## 2020-01-06 PROCEDURE — 1159F PR MEDICATION LIST DOCUMENTED IN MEDICAL RECORD: ICD-10-PCS | Mod: S$GLB,,, | Performed by: PHYSICIAN ASSISTANT

## 2020-01-06 PROCEDURE — 99203 PR OFFICE/OUTPT VISIT, NEW, LEVL III, 30-44 MIN: ICD-10-PCS | Mod: S$GLB,,, | Performed by: PHYSICIAN ASSISTANT

## 2020-01-06 PROCEDURE — 99999 PR PBB SHADOW E&M-EST. PATIENT-LVL III: ICD-10-PCS | Mod: PBBFAC,,, | Performed by: PHYSICIAN ASSISTANT

## 2020-01-06 PROCEDURE — 1125F AMNT PAIN NOTED PAIN PRSNT: CPT | Mod: S$GLB,,, | Performed by: PHYSICIAN ASSISTANT

## 2020-01-06 PROCEDURE — 1159F MED LIST DOCD IN RCRD: CPT | Mod: S$GLB,,, | Performed by: PHYSICIAN ASSISTANT

## 2020-01-06 PROCEDURE — 99203 OFFICE O/P NEW LOW 30 MIN: CPT | Mod: S$GLB,,, | Performed by: PHYSICIAN ASSISTANT

## 2020-01-06 NOTE — PROGRESS NOTES
Subjective:      Patient ID: Mila Foster is a 74 y.o. female.    Chief Complaint: Pain of the Right Knee and Pain of the Left Knee    HPI  74 year old female presents with chief complaint of bilateral knee pain R>L x couple of weeks. She reports h/o falls but denies having a recent fall. Pain is diffuse at the knee. It is worse if she walks a lot. She takes otc nsaids which help. She reports right knee giving way. She is doing PT now for leg strengthening. She denies popping, locking, and catching. She alternates cane and rollator. She is seeing neurology and is scheduled for an EMG. She denies swelling.   Review of Systems   Constitution: Negative for chills, fever and night sweats.   Cardiovascular: Negative for chest pain.   Respiratory: Negative for cough and shortness of breath.    Hematologic/Lymphatic: Does not bruise/bleed easily.   Skin: Negative for color change.   Gastrointestinal: Negative for heartburn.   Genitourinary: Negative for dysuria.   Neurological: Negative for numbness and paresthesias.   Psychiatric/Behavioral: Negative for altered mental status.   Allergic/Immunologic: Negative for persistent infections.         Objective:            General    Vitals reviewed.  Constitutional: She is oriented to person, place, and time. She appears well-developed and well-nourished.   Cardiovascular: Normal rate.    Neurological: She is alert and oriented to person, place, and time.             Bilateral Knee Exam:  ROM 0-120 degrees  +crepitus  No effusion  TTP medial and lateral joint line      X-ray: ordered and reviewed by myself.  Mild OA.       Assessment:       Encounter Diagnosis   Name Primary?    Primary osteoarthritis of both knees Yes          Plan:       Discussed treatment options with patient. Recommend to continue PT/HEP for leg strengthening. She will take aleve as needed for pain. She declined injection. RTC prn.

## 2020-01-06 NOTE — LETTER
January 6, 2020      Dami Parra MD  [address]  [facility] [address]           [facility] - Orthopedics  [address]  [facility] [address]  Phone: [phone]          Patient: Mila Foster   MR Number: [id number]   YOB: 1945   Date of Visit: 1/6/2020       Dear Dr. Dami Parra:    Thank you for referring Mila Foster to me for evaluation. Attached you will find relevant portions of my assessment and plan of care.    If you have questions, please do not hesitate to call me. I look forward to following Mila Foster along with you.    Sincerely,    Lisa Phillips PA-C    Enclosure  CC:  No Recipients    If you would like to receive this communication electronically, please contact [email or web address] or [phone] to request more information on [employer] access.    For providers and/or their staff who would like to refer a patient to [facility], please contact us through our one-stop-shop provider referral line, [facility], at [phone].    If you feel you have received this communication in error or would no longer like to receive these types of communications, please e-mail [email or web address]

## 2020-01-09 ENCOUNTER — TELEPHONE (OUTPATIENT)
Dept: HEMATOLOGY/ONCOLOGY | Facility: CLINIC | Age: 75
End: 2020-01-09

## 2020-01-09 ENCOUNTER — INITIAL CONSULT (OUTPATIENT)
Dept: HEMATOLOGY/ONCOLOGY | Facility: CLINIC | Age: 75
End: 2020-01-09
Payer: MEDICARE

## 2020-01-09 VITALS
WEIGHT: 227.06 LBS | SYSTOLIC BLOOD PRESSURE: 173 MMHG | TEMPERATURE: 98 F | DIASTOLIC BLOOD PRESSURE: 83 MMHG | OXYGEN SATURATION: 98 % | RESPIRATION RATE: 18 BRPM | BODY MASS INDEX: 35.56 KG/M2 | HEART RATE: 63 BPM

## 2020-01-09 DIAGNOSIS — D47.2 MGUS (MONOCLONAL GAMMOPATHY OF UNKNOWN SIGNIFICANCE): Primary | ICD-10-CM

## 2020-01-09 DIAGNOSIS — Z71.89 ADVANCE CARE PLANNING: ICD-10-CM

## 2020-01-09 PROCEDURE — 99204 PR OFFICE/OUTPT VISIT, NEW, LEVL IV, 45-59 MIN: ICD-10-PCS | Mod: S$GLB,,, | Performed by: INTERNAL MEDICINE

## 2020-01-09 PROCEDURE — 99999 PR PBB SHADOW E&M-EST. PATIENT-LVL III: ICD-10-PCS | Mod: PBBFAC,,, | Performed by: INTERNAL MEDICINE

## 2020-01-09 PROCEDURE — 1101F PR PT FALLS ASSESS DOC 0-1 FALLS W/OUT INJ PAST YR: ICD-10-PCS | Mod: CPTII,S$GLB,, | Performed by: INTERNAL MEDICINE

## 2020-01-09 PROCEDURE — 99999 PR PBB SHADOW E&M-EST. PATIENT-LVL III: CPT | Mod: PBBFAC,,, | Performed by: INTERNAL MEDICINE

## 2020-01-09 PROCEDURE — 1126F PR PAIN SEVERITY QUANTIFIED, NO PAIN PRESENT: ICD-10-PCS | Mod: S$GLB,,, | Performed by: INTERNAL MEDICINE

## 2020-01-09 PROCEDURE — 1126F AMNT PAIN NOTED NONE PRSNT: CPT | Mod: S$GLB,,, | Performed by: INTERNAL MEDICINE

## 2020-01-09 PROCEDURE — 99497 PR ADVNCD CARE PLAN 30 MIN: ICD-10-PCS | Mod: S$GLB,,, | Performed by: INTERNAL MEDICINE

## 2020-01-09 PROCEDURE — 99497 ADVNCD CARE PLAN 30 MIN: CPT | Mod: S$GLB,,, | Performed by: INTERNAL MEDICINE

## 2020-01-09 PROCEDURE — 99204 OFFICE O/P NEW MOD 45 MIN: CPT | Mod: S$GLB,,, | Performed by: INTERNAL MEDICINE

## 2020-01-09 PROCEDURE — 1159F MED LIST DOCD IN RCRD: CPT | Mod: S$GLB,,, | Performed by: INTERNAL MEDICINE

## 2020-01-09 PROCEDURE — 1159F PR MEDICATION LIST DOCUMENTED IN MEDICAL RECORD: ICD-10-PCS | Mod: S$GLB,,, | Performed by: INTERNAL MEDICINE

## 2020-01-09 PROCEDURE — 1101F PT FALLS ASSESS-DOCD LE1/YR: CPT | Mod: CPTII,S$GLB,, | Performed by: INTERNAL MEDICINE

## 2020-01-09 NOTE — TELEPHONE ENCOUNTER
Coming at 11. confirmed----- Message from Camryn Rowan sent at 1/9/2020  9:12 AM CST -----  Pt requesting a call back regarding a scheduled appt for today    Pt states that she was told her appt is for 11:00am today, not 3:40pm    Please call    Phone [phone]

## 2020-01-09 NOTE — PROGRESS NOTES
PATIENT: Mila Foster  MRN: [id number]  DATE: 1/9/2020    Diagnosis:   1. MGUS (monoclonal gammopathy of unknown significance)    2. Advance care planning      Chief Complaint: paraproteinemia    Subjective:     History of Present Illness: 74-year-old female referred for abnormal SPEP.    She saw Neurology for abnormal gait.  SPEP was done for further evaluation on 10/29/2019.  It showed 0.19 grams/deciliter IgG lambda specific monoclonal protein.    Past Medical History:   Past Medical History:   Diagnosis Date    Diabetes     Hypertension        Past Surgical History:   Past Surgical History:   Procedure Laterality Date    CHOLECYSTECTOMY      COLONOSCOPY N/A 11/1/2018    Procedure: COLONOSCOPY Suprep;  Surgeon: Azucena Hood MD;  Location: [facility] ENDO;  Service: Endoscopy;  Laterality: N/A;    HYSTERECTOMY      INJECTION OF ANESTHETIC AGENT AROUND NERVE Bilateral 5/21/2019    Procedure: BLOCK, NERVE, BILATERAL L2,3,4,5;  Surgeon: Bonnie Duncan MD;  Location: [facility] PAIN T;  Service: Pain Management;  Laterality: Bilateral;  BLOCK, NERVE, BILATERAL L2,3,4,5    INJECTION OF JOINT Bilateral 12/27/2018    Procedure: Injection, Joint BILATERAL SACROILIAC JOINT INJECTION;  Surgeon: Bonnie Duncan MD;  Location: [facility] PAIN MGT;  Service: Pain Management;  Laterality: Bilateral;    INJECTION OF JOINT Bilateral 11/7/2019    Procedure: INJECTION, JOINT, SI;  Surgeon: Bonnie Duncan MD;  Location: [facility] PAIN MGT;  Service: Pain Management;  Laterality: Bilateral;  B/L SI Joint Injections    RADIOFREQUENCY ABLATION Right 7/25/2019    Procedure: RADIOFREQUENCY ABLATION;  Surgeon: Bonnie Dunacn MD;  Location: [facility] PAIN T;  Service: Pain Management;  Laterality: Right;  Right RFA L2,3,4,5; THERMAL  1 of 2    RADIOFREQUENCY ABLATION Left 8/6/2019    Procedure: RADIOFREQUENCY ABLATION;  Surgeon: Bonnie Duncan MD;  Location: [facility] PAIN T;  Service: Pain Management;  Laterality: Left;  Left RFA  L2,3,4,5 LUMBAR  1 of 2       Family History:   Family History   Problem Relation Age of Onset    Vaginal cancer Neg Hx     Endometrial cancer Neg Hx     Cervical cancer Neg Hx        Social History:  reports that she has never smoked. She has never used smokeless tobacco. She reports that she does not drink alcohol or use drugs.    Allergies:  Review of patient's allergies indicates:  No Known Allergies    Medications:  Current Outpatient Medications   Medication Sig Dispense Refill    aspirin (ECOTRIN) 81 MG EC tablet 81 mg.  2    FLUoxetine 20 MG capsule TK ONE C PO  QD  1    FLUZONE HIGH-DOSE 2019-20, PF, 180 mcg/0.5 mL Syrg ADM 0.5ML IM UTD  0    gabapentin (NEURONTIN) 300 MG capsule Take 2 tabs in the morning, 1 tab in the afternoon, and 2 tabs in the evening 150 capsule 5    hyoscyamine (ANASPAZ,LEVSIN) 0.125 mg Tab Take 1 tablet (125 mcg total) by mouth every 4 (four) hours as needed. 30 tablet 6    metformin (GLUCOPHAGE-XR) 500 MG 24 hr tablet 500 mg.  1    metoprolol tartrate (LOPRESSOR) 25 MG tablet   1    mirabegron (MYRBETRIQ) 25 mg Tb24 ER tablet Take 1 tablet (25 mg total) by mouth once daily. 30 tablet 11    naproxen (NAPROSYN) 500 MG tablet   1    omeprazole (PRILOSEC) 20 MG capsule 20 mg.  1    pravastatin (PRAVACHOL) 40 MG tablet 40 mg.  1     No current facility-administered medications for this visit.      Facility-Administered Medications Ordered in Other Visits   Medication Dose Route Frequency Provider Last Rate Last Dose    0.9%  NaCl infusion  500 mL Intravenous Continuous Kapil Baum MD           Review of Systems  CONSTITUTIONAL: Weight stable. Appetite good. No fevers.  EYES: No eye pain or redness.  ENT/MOUTH: No sore throat or bleeding gums.  RESPIRATORY: No cough or congestion.  CARDIOVASCULAR: No chest pain or breathing difficulty.  GASTROINTESTINAL: No abdominal pain or nausea. No change in bowel habits.  GENITOURINARY: No hematuria or dysuria.  HEME/LYMPH: No  swollen glands or bruises.  NEUROLOGIC: No headaches or tremors.  MUSCULOSKELETAL: No muscle pains or joint pains. No back pain.      Objective:     Vitals:    01/09/20 1139   BP: (!) 173/83   Pulse: 63   Resp: 18   Temp: 98 °F (36.7 °C)   SpO2: 98%   Weight: 103 kg (227 lb 1.2 oz)     BMI: Body mass index is 35.56 kg/m².    Physical Exam  Vitals:    01/09/20 1139   BP: (!) 173/83   Pulse: 63   Resp: 18   Temp: 98 °F (36.7 °C)   SpO2: 98%   Weight: 103 kg (227 lb 1.2 oz)     General appearance: well-groomed. no acute distress. conversant  Eyes: no icterus. no lid-lag. pupils equal and reactive.  Head and Face: atraumatic. no sinus tenderness.  Ear, Nose, Mouth, Throat: auditory canals clear. mucous membranes moist without ulcerations. oropharynx clear. no gum bleeding. good dental hygiene.  Neck: no masses or enlarged lymph nodes. thyroid non-tender.  Lymph Nodes: no enlarged lymph nodes felt in the neck, supraclavicular areas or axillae.  Lungs: clear to auscultation. no wheeze or rales. breathing nonlabored  Heart: rhythm regular. no gallops. no edema.  Abdomen: soft and nontender. no ascites. no masses. no hepatosplenomegaly.  Skin: no rashes or bruises. no nodules felt.  Extremities: no clubbing or cyanosis.  Neurologic: alert. oriented to time, place and person.  Psychiatric: good judgement. recent and remote memory intact. no anxiety or agitation.    Assessment:       1. MGUS (monoclonal gammopathy of unknown significance)    2. Advance care planning      Plan:   She has MGUS - 0.19 gram/deciliter IgG lambda specific paraprotein band    Discussed diagnosis with her in detail.  Discussed difference between MGUS and multiple myeloma.    At this time she does not require any further workup.  Her MGUS can be monitored.    Will see her back for follow-up in 4 months with CBC, CMP, repeat SPEP and free light chain assay.    I discussed the importance of advance care planning today and explained the process to the  patient. I reviewed the role for advance directives and their purpose in directing future healthcare if the patient is unable to speak for him/herself. The patient was then provided with detailed information about the importance of designating a Health Care Power of  (HCPOA) and was instructed to communicate with this person about their wishes for future healthcare, should he/she become sick and lose decision-making capacity. At this point in time, the patient does have full decision-making capacity. The patient hasn't previously appointed a HCPOA. After our discussion, the patient completed a HCPOA. The form was witnessed and will be scanned into EPIC.

## 2020-01-09 NOTE — LETTER
January 9, 2020      Carlito Bhatti MD  [address]  [facility] [address]           [facility]  [address] [facility] [address]  [place] [address]  Phone: [phone]          Patient: Mila Foster   MR Number: [id number]   YOB: 1945   Date of Visit: 1/9/2020       Dear Dr. Carlito Bhatti:    Thank you for referring Mila Foster to me for evaluation. Attached you will find relevant portions of my assessment and plan of care.    If you have questions, please do not hesitate to call me. I look forward to following Mila Foster along with you.    Sincerely,    Thong Pittman MD    Enclosure  CC:  No Recipients    If you would like to receive this communication electronically, please contact [email or web address] or [phone] to request more information on [facility] Link access.    For providers and/or their staff who would like to refer a patient to [facility], please contact us through our one-stop-shop provider referral line, [facility], at [phone].    If you feel you have received this communication in error or would no longer like to receive these types of communications, please e-mail [email or web address]

## 2020-01-14 NOTE — PROGRESS NOTES
"  Physical Therapy Daily Treatment Note     Name: Mila Foster  Clinic Number: [id number]    Therapy Diagnosis:   Encounter Diagnoses   Name Primary?    Decreased strength of lower extremity     Impaired functional mobility, balance, and endurance     Chronic bilateral low back pain, unspecified whether sciatica present      Physician: Coby Russo NP    Visit Date: 1/17/2020    Physician Orders: PT Eval and Treat   Medical Diagnosis from Referral:    Sacroiliitis   Annular tear of lumbar disc   Neuroforaminal stenosis of lumbar spine    Lumbar spondylosis    Facet arthritis of lumbar region    DDD (degenerative disc disease), lumbar   Sacroiliac joint pain    Physical deconditioning    Shuffling gait         Evaluation Date: 11/29/2019  Authorization Period Expiration: 2/14/2020  Plan of Care Expiration: 2/26/2020   Visit # / Visits authorized: 3/     Time In: 11:00 AM  Time Out: 10: 50  AM  Total Billable Time: 50 minutes (te 3)    Precautions: Standard, Diabetes and Fall    Subjective     Pt reports: she feels a little stiff and sore in both knees this morning but more on the right. She thinks she needs to warm up.   She was compliant with home exercise program. (some of them)  Response to previous treatment: okay   Functional change: n/a    Pain: 6/10  Location: bilateral knees    Objective       ______________________________________________________  Mila received therapeutic exercises to develop strength and endurance for 50 minutes including:    Sit to stands from hi-low mat with UE assist 3x5  SLR 2x10  Supine clams OTB 2x10   Bridges 2x10   Recumbent bike 10 min (performed with shoes off)  Hamstring stretch 3x30" B         Mila participated in gait training to improve functional mobility and safety for 0 minutes:- NP today  400 feet emphasizing heel strike, hip and knee flexion, and proper use of rollator         Home Exercises Provided and Patient Education Provided     Education provided:   - " exercise execution  - safety with sit to stands    Written Home Exercises Provided: Patient instructed to cont prior HEP.  Exercises were reviewed and Mila was able to demonstrate them prior to the end of the session.  Mila demonstrated good  understanding of the education provided.     See EMR under Patient Instructions for exercises provided prior visit.     Assessment     Pt presents to treatment session with some soreness today. She feels loosened up with increased movement and pain is marked around 2/10 in knees upon leaving.  She tolerates all exercises well but requires mod VC for proper execution. She is cued to perform more hip dominant sit<>stands. She presents with bilateral hamstring tightness so hamstring stretch is added today. Progress as pepe.       Mila is progressing well towards her goals.   Pt prognosis is Good.     Pt will continue to benefit from skilled outpatient physical therapy to address the deficits listed in the problem list box on initial evaluation, provide pt/family education and to maximize pt's level of independence in the home and community environment.     Pt's spiritual, cultural and educational needs considered and pt agreeable to plan of care and goals.     Anticipated barriers to physical therapy: transportation, compliance of therapy     Goals:   Short Term Goals: 4 weeks   1. Patient will perform sit <> stand with min VC for proper technique from PT in order to encourage independence in transfers.-in progress, not met   2. Pt will demonstrate proper use of rollator with min VC from PT for improved safety and ambulation. -in progress, not met  3. Pt will improve TUG score to </=22 seconds in order to improve fall risk.    4. Patient will improve 5 time sit to stand to </= 18 seconds in order to improve safety, transfers, and functional independence.  5. Pt will improve impaired LE MMTs by 1/2 MMT to improve strength for functional tasks.     Long Term Goals: 8 weeks  1.  Patient will perform sit <> stand with or without UE with no VC for proper technique from PT in order to encourage independence in transfers.  2. Pt will demonstrate proper use of rollator with no VC from PT for improved safety and ambulation.  3. Pt will improve TUG score to </=18 in order to improve fall risk.   4. Pt will perform 5 time sit to stand with or without UE assist in </=15  in order to improve safety, transfers, and functional independence.   5. Pt will improve impaired LE MMTs by 1 MMT to improve strength for functional tasks.       Plan   Plan of care Certification until 2/26/2020.     Outpatient Physical Therapy 2 times weekly for 8 weeks to include the following interventions: Gait Training, Manual Therapy, Moist Heat/ Ice, Neuromuscular Re-ed, Orthotic Management and Training, Patient Education, Self Care, Therapeutic Activites and Therapeutic Exercise.  Continue above plan.    Ruth Ann Haider, PT

## 2020-01-17 ENCOUNTER — CLINICAL SUPPORT (OUTPATIENT)
Dept: REHABILITATION | Facility: HOSPITAL | Age: 75
End: 2020-01-17
Payer: MEDICARE

## 2020-01-17 DIAGNOSIS — M54.50 CHRONIC BILATERAL LOW BACK PAIN, UNSPECIFIED WHETHER SCIATICA PRESENT: ICD-10-CM

## 2020-01-17 DIAGNOSIS — G89.29 CHRONIC BILATERAL LOW BACK PAIN, UNSPECIFIED WHETHER SCIATICA PRESENT: ICD-10-CM

## 2020-01-17 DIAGNOSIS — R29.898 DECREASED STRENGTH OF LOWER EXTREMITY: ICD-10-CM

## 2020-01-17 DIAGNOSIS — Z74.09 IMPAIRED FUNCTIONAL MOBILITY, BALANCE, AND ENDURANCE: ICD-10-CM

## 2020-01-17 PROCEDURE — 97110 THERAPEUTIC EXERCISES: CPT | Mod: PO

## 2020-01-20 ENCOUNTER — OFFICE VISIT (OUTPATIENT)
Dept: PAIN MEDICINE | Facility: CLINIC | Age: 75
End: 2020-01-20
Payer: MEDICARE

## 2020-01-20 VITALS
HEART RATE: 61 BPM | SYSTOLIC BLOOD PRESSURE: 160 MMHG | WEIGHT: 227.31 LBS | TEMPERATURE: 98 F | HEIGHT: 67 IN | OXYGEN SATURATION: 100 % | BODY MASS INDEX: 35.68 KG/M2 | DIASTOLIC BLOOD PRESSURE: 78 MMHG | RESPIRATION RATE: 18 BRPM

## 2020-01-20 DIAGNOSIS — R26.89 SHUFFLING GAIT: ICD-10-CM

## 2020-01-20 DIAGNOSIS — M48.061 NEUROFORAMINAL STENOSIS OF LUMBAR SPINE: ICD-10-CM

## 2020-01-20 DIAGNOSIS — M51.36 DDD (DEGENERATIVE DISC DISEASE), LUMBAR: ICD-10-CM

## 2020-01-20 DIAGNOSIS — R53.81 PHYSICAL DECONDITIONING: ICD-10-CM

## 2020-01-20 DIAGNOSIS — M46.1 SACROILIITIS: Primary | ICD-10-CM

## 2020-01-20 DIAGNOSIS — R29.6 MULTIPLE FALLS: ICD-10-CM

## 2020-01-20 DIAGNOSIS — M51.36 ANNULAR TEAR OF LUMBAR DISC: ICD-10-CM

## 2020-01-20 DIAGNOSIS — M47.816 LUMBAR SPONDYLOSIS: ICD-10-CM

## 2020-01-20 PROCEDURE — 3288F PR FALLS RISK ASSESSMENT DOCUMENTED: ICD-10-PCS | Mod: CPTII,S$GLB,, | Performed by: NURSE PRACTITIONER

## 2020-01-20 PROCEDURE — 1125F AMNT PAIN NOTED PAIN PRSNT: CPT | Mod: S$GLB,,, | Performed by: NURSE PRACTITIONER

## 2020-01-20 PROCEDURE — 99213 PR OFFICE/OUTPT VISIT, EST, LEVL III, 20-29 MIN: ICD-10-PCS | Mod: S$GLB,,, | Performed by: NURSE PRACTITIONER

## 2020-01-20 PROCEDURE — 1125F PR PAIN SEVERITY QUANTIFIED, PAIN PRESENT: ICD-10-PCS | Mod: S$GLB,,, | Performed by: NURSE PRACTITIONER

## 2020-01-20 PROCEDURE — 1100F PTFALLS ASSESS-DOCD GE2>/YR: CPT | Mod: CPTII,S$GLB,, | Performed by: NURSE PRACTITIONER

## 2020-01-20 PROCEDURE — 1159F MED LIST DOCD IN RCRD: CPT | Mod: S$GLB,,, | Performed by: NURSE PRACTITIONER

## 2020-01-20 PROCEDURE — 1159F PR MEDICATION LIST DOCUMENTED IN MEDICAL RECORD: ICD-10-PCS | Mod: S$GLB,,, | Performed by: NURSE PRACTITIONER

## 2020-01-20 PROCEDURE — 1100F PR PT FALLS ASSESS DOC 2+ FALLS/FALL W/INJURY/YR: ICD-10-PCS | Mod: CPTII,S$GLB,, | Performed by: NURSE PRACTITIONER

## 2020-01-20 PROCEDURE — 99999 PR PBB SHADOW E&M-EST. PATIENT-LVL III: ICD-10-PCS | Mod: PBBFAC,,, | Performed by: NURSE PRACTITIONER

## 2020-01-20 PROCEDURE — 3288F FALL RISK ASSESSMENT DOCD: CPT | Mod: CPTII,S$GLB,, | Performed by: NURSE PRACTITIONER

## 2020-01-20 PROCEDURE — 99999 PR PBB SHADOW E&M-EST. PATIENT-LVL III: CPT | Mod: PBBFAC,,, | Performed by: NURSE PRACTITIONER

## 2020-01-20 PROCEDURE — 99213 OFFICE O/P EST LOW 20 MIN: CPT | Mod: S$GLB,,, | Performed by: NURSE PRACTITIONER

## 2020-01-20 NOTE — PROGRESS NOTES
Chronic Pain - Established Patient    Referring Physician: No ref. provider found    Chief Complaint:   Chief Complaint   Patient presents with    Low-back Pain        SUBJECTIVE: Disclaimer: This note has been generated using voice-recognition software. There may be typographical errors that have been missed during proof-reading    Interval History 1/20/2020:  The patient returns to clinic today for follow up. She continues to report intermittent low back and buttock pain. She denies any radiating leg pain. She is currently participating in physical therapy which has been helpful. She continues to report intermittent falls. She has seen neurology who is obtaining a nerve conduction study. She continues to follow up with Neurology. She continues to take Gabapentin with benefit. She denies any other health changes. Her pain today is 1/10.    Interval History 11/20/2019:  The patient returns to clinic today for follow up. She is s/p bilateral SI joint injections on 11/7/2019. She reports 100% relief of her low back and buttock pain. She reports intermittent low back pain that is tolerable. She denies any radiating leg pain. She has seen neurology who is working her up regarding her balance and gait. She is scheduled for EMG in January. She continues to take Gabapentin with benefit. She is currently participating in physical therapy. She denies any other health changes. Her pain today is 1/10.    Interval History 9/11/2019:  The patient returns to clinic today for follow up and image review. She continues to report low back and buttock pain that is sharp and aching. She reports intermittent radiating pain into the posterior aspect of her right leg to her ankle. She denies any left leg pain. She denies any numbness or tingling to her feet. Her worst pain at this time is her bilateral buttocks and tailbone. This pain is worse with prolonged sitting and moving from seated to standing. She continues to report shuffling  "gait. She has had multiple falls recently. She does take Gabapentin with benefit. She denies any other health changes. Her pain today is 8/10.    Interval History 8/27/2019:  The patient returns to clinic today for follow up. She is s/p right L3,4,5 RFA on 7/25/2019. She is s/p left L3,4,5 RFA on 8/6/2019. She reports limited relief at this time. She continues to report low back pain that is sharp and aching. She denies any radicular leg pain. She denies any numbness or tingling to her feet. She does report episodes of "shuffling" gait where she feels like she will fall. She has had two significant falls recently. She continues to take Gabapentin with benefit. She denies any other health changes. Her pain today is 2/10.    Interval History 7/23/2019:  The patient returns to clinic today for follow up. She was previously scheduled for RFA but had to cancel due to a fall. She fell while getting out of her bed and hit her face on her nightstand. She did have xrays which were negative for facial fracture. She did have a facial laceration which was sutured. She has rescheduled her RFA for this week. She continues to report low back pain that is constant, sharp, and aching. She denies any radiating leg pain. She does report intermittent numbness and pain to her buttocks with prolonged sitting. She continues to take Gabapentin. She denies any other health changes. Her pain today is 7/10.      Interval History 5/28/2019:  The patient returns to clinic today for follow up. She is s/p bilateral L2,3,4,5 MBB on 5/21/2019. She reports 80% relief of her low back pain after the block. She did sleep very well the night after the block. Her pain has returned to baseline. She continues to report low back pain that is constant, sharp, and aching in nature. She denies any radiating leg pain. She does report increased tailbone pain. Her pain is worse with prolonged standing and walking. She continues to take Gabapentin with benefit. " She denies any other health changes. Her pain today is 2/10.    Interval History 4/26/2019:  The patient returns to clinic today for follow up. She reports a fall yesterday at home where she tripped over her shoe string. She landed on her buttock. She reports that she hit the back of her head on the carpet. She denies any loss of consciousness. She did not go to the ER. She does report increased low back pain today that is sore in nature which she attributes to the fall. She continues to report low back pain that is sharp and aching in nature. She does report intermittent aching buttock pain. She denies any radiating leg pain. Her pain is worse with prolonged standing and walking. She continues to participate in physical therapy. She continues to take Gabapentin with benefit. She did have lumbar MBB in 2017 with 90% relief for a few days. She denies any other health changes. She does have stress urinary incontinence. She does follow up with Urogyn. Her pain today is 7/10.    Interval History 3/11/2019:  The patient returns to clinic today for follow up. She continues to report low back pain that is aching in nature. She reports right sided buttock pain that is sore in nature. She denies any radiating leg pain. Her pain is worse with standing, sitting, and activity. She does endorse morning stiffness. She is currently participating in physical therapy with benefit. She continues to take Gabapentin with benefit and does need a refill today. She denies any other health changes. She denies any bowel or bladder incontinence. Her pain today is 5/10.    Interval History 1/9/2019:  The patient returns to clinic today for follow up. She is s/p bilateral SI joint injections on 12/27/2018. She reports 100% for the first week. She now reports approximately 50% relief of her pain. She reports intermittent low back and bilateral buttock pain. She describes this pain as sore and aching in nature. This pain is worse with prolonged  sitting. She denies any radiating leg pain. She often feels as though as she is leaning over while walking. She often feels as though her balance is off. She continues to take Gabapentin with benefit. She denies any other health changes. His pain today is 2/10.     Interval History 12/11/2018:  The patient returns to clinic today for follow up. She reports increased low back and bilateral buttock pain, left greater than right. She describes this pain as sore in nature. This pain is worse with prolonged sitting. She denies any radiating leg pain. She reports that she often feels like her back is locking up on her. She continues to take Gabapentin with benefit. She denies any other health changes. Her pain today is 5/10.    Interval History 9/25/2018:  The patient returns to clinic today for follow up. She is s/p right L5 and S1 TF LEONEL. She reports 90% relief of her low back and leg pain. She reports intermittent low back pain that is aching in nature. She denies any radiating leg pain today. Her back pain is worse with bending. She also reports that her legs feel heavy with prolonged walking. She denies any other health changes. Her pain today is 4/10.    Interval History 7/27/2018:  The patient returns to clinic today for follow up and image review. She continues to report low back pain that radiates down the posterior aspect of her right leg to her foot. She reports intermittent left leg pain in the same distribution. Her pain is worse with prolonged walking and activity. She often feels like her legs are heavy. She often feels like she will fall. She denies any other health changes. She denies any bowel or bladder incontinence. Her pain today is 2/10.    Interval History 7/20/2018:  The patient returns to clinic today for follow up. She has not been seen in over a year due to her son being diagnosed with cancer. She reports that he is in remission now. She returns today due to three fall recently, last a week ago.  "She continues to report low back pain that radiates intermittently down the posterior portion of both legs to her feet. She reports that her legs often feel heavy and weak which is why she falls. She denies any bowel or bladder incontinence. Her pain today is 7/10.    Interval History 3/6/2017:  The patient returns to clinic today for follow up. She is s/p bilateral S1 TF LEONEL on 2/8/2017. She reports 80% relief of her radiating leg pain. She continues to have low back pain. She describes the pain as dull and aching. The pain is worse in the morning. She reports that the pain increases with prolonged sitting and standing. She denies any other health changes. She denies any bowel or bladder incontinence. Her pain today is 6/10.     Interval History 1/23/2017:  The patient returns to clinic today for follow up of low back pain and bilateral leg pain. She has recently completed a medrol dose pack with some benefit. She still reports radiating pain into both legs. The pain is worse with prolonged sitting and lifting. The pain gets better with walking and rest. The pain radiates down the back of both legs to ankles. She denies any bowel or bladder incontinence or signs of saddle paresthesia. She continues to work with lifting restrictions. She currently takes Gabapentin and Naproxen with benefit. Her pain today is 6/10.     Initial encounter:    Mila Foster presents to the clinic for the evaluation of bilateral leg pain with intermittent lower back pain. 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 She reports recent falling to knees, but she changed her shoes, and the falling has ceased. She reports that she had a "pain flare" 2 weeks ago requiring a walker for 2 days.     Brief history:  72yo F with hx of HTN and DM complicated by neuropathy BL but R>L with tingling, tightness, shooting pain only in feet. Patient reports " glucose was 120 this morning and usually runs around 130.    Pain Description:    The pain is located in the posterior leg area and radiates to her ankles and to her knees at its worst.     At BEST  1/10     At WORST  8/10 on the WORST day.      On average pain is rated as 7/10    Today the pain is rated as 5/10    The pain is described as aching, burning, dull, sharp and tight band      Symptoms interfere with work.     Exacerbating factors: Sitting, picking up children, lifting, bending over    Mitigating factors: walking, rest    Patient denies night fever/night sweats, bowel incontinence, significant weight loss, significant motor weakness and loss of sensations. She does report chronic urinary incontinence unrelated to back/leg pain.    Patient denies any suicidal or homicidal ideations    Pain Medications:  Current:  Gabapentin    Tried in Past:  NSAIDs -yes Naproxen   TCA -Never  SNRI -Never  Anti-convulsants -gabapentin  Muscle Relaxants -Never  Opioids-Never    Physical Therapy/Home Exercise: yes       report: N/A    Pain Procedures:   2/8/2017- Bilateral S1 TF LEONEL  3/22/2017- Bilateral L2,3,4,5 MBB  9/11/2018- Right L5 and S1 TF LEONEL   12/27/2018- Bilateral SI joint injections  5/21/2019- Bilateral L2,3,4,5 MBB  7/25/2019- Right L3,4,5 RFA  8/6/2019- Left L3,4,5 RFA  11/7/2019- Bilateral SI joint injection     Chiropractor -never  Acupuncture - never  TENS unit -never  Spinal decompression -never  Joint replacement -never    Imaging:   MRI Lumbar Spine 9/7/2019:  COMPARISON:  07/25/2018    FINDINGS:  The distal cord/conus demonstrates normal size and signal.  No evidence of osteomyelitis, marrow replacement process, or acute fracture.  No paraspinal masses.    L1-2 is unremarkable.    At L2-3, there is mild facet arthropathy and disc bulging.  No spinal canal stenosis or significant neural foraminal narrowing.    At L3-4, there is mild disc bulging and bilateral facet arthropathy.  No spinal canal  stenosis or significant neural foraminal narrowing.    At L4-5, there is bilateral facet arthropathy with slight L4-5 anterolisthesis.  No spinal canal stenosis..  There is mild right-sided neural foraminal narrowing.    At L5-S1, there is mild bilateral facet arthropathy and disc bulging.  Small posterior annular tear with right paracentral/lateral recess disc protrusion, possibly impinging upon the right L5 or S1 nerve roots.  No spinal canal stenosis or significant neural foraminal narrowing.      Impression       Small right paracentral/ lateral recess disc protrusion at L5-S1 and mild neural foraminal narrowing at L4-5, as above.  No spinal canal stenosis.  Findings are similar to the 07/25/2018 exam.     Xray Lumbar Spine 7/25/2018:  COMPARISON:  Prior lumbar spine MRI dated 12/16/16    FINDINGS:  There is diffuse osteopenia.  There is mild grade 1 anterolisthesis of L3 on L4 (4 mm) and of L4 on L5 (6 mm).  There is no evidence of translational instability.  There is moderate facet arthropathy at the lower lumbar spine with probable L5-S1 neural foraminal narrowing.  Endplate degenerative changes are present with subchondral sclerosis and marginal osteophyte formation.    There is atherosclerotic calcification of the aorta.      Impression       Osteopenia and degenerative change of the lumbar spine with grade 1 anterolisthesis of L3-4 and L4-5, new from the 2016 MRI.  No evidence of translational instability.             Past Medical History:   Diagnosis Date    Diabetes     Hypertension      Past Surgical History:   Procedure Laterality Date    CHOLECYSTECTOMY      COLONOSCOPY N/A 11/1/2018    Procedure: COLONOSCOPY Suprep;  Surgeon: Azucena Hood MD;  [facility];  Service: Endoscopy;  Laterality: N/A;    HYSTERECTOMY      INJECTION OF ANESTHETIC AGENT AROUND NERVE Bilateral 5/21/2019    Procedure: BLOCK, NERVE, BILATERAL L2,3,4,5;  Surgeon: Bonnie Duncan MD;  Location: [facility];   Service: Pain Management;  Laterality: Bilateral;  BLOCK, NERVE, BILATERAL L2,3,4,5    INJECTION OF JOINT Bilateral 12/27/2018    Procedure: Injection, Joint BILATERAL SACROILIAC JOINT INJECTION;  Surgeon: Bonnie Duncan MD;  Location: [facility];  Service: Pain Management;  Laterality: Bilateral;    INJECTION OF JOINT Bilateral 11/7/2019    Procedure: INJECTION, JOINT, SI;  Surgeon: Bonnie Duncan MD;  Location: [facility];  Service: Pain Management;  Laterality: Bilateral;  B/L SI Joint Injections    RADIOFREQUENCY ABLATION Right 7/25/2019    Procedure: RADIOFREQUENCY ABLATION;  Surgeon: Bonnie Duncan MD;  Location: [facility];  Service: Pain Management;  Laterality: Right;  Right RFA L2,3,4,5; THERMAL  1 of 2    RADIOFREQUENCY ABLATION Left 8/6/2019    Procedure: RADIOFREQUENCY ABLATION;  Surgeon: Bonnie Duncan MD;  Location: [facility];  Service: Pain Management;  Laterality: Left;  Left RFA L2,3,4,5 LUMBAR  1 of 2     Social History     Socioeconomic History    Marital status: Single     Spouse name: Not on file    Number of children: Not on file    Years of education: Not on file    Highest education level: Not on file   Occupational History    Not on file   Social Needs    Financial resource strain: Not on file    Food insecurity:     Worry: Not on file     Inability: Not on file    Transportation needs:     Medical: Not on file     Non-medical: Not on file   Tobacco Use    Smoking status: Never Smoker    Smokeless tobacco: Never Used   Substance and Sexual Activity    Alcohol use: No     Frequency: Never    Drug use: No    Sexual activity: Not Currently   Lifestyle    Physical activity:     Days per week: Not on file     Minutes per session: Not on file    Stress: Not on file   Relationships    Social connections:     Talks on phone: Not on file     Gets together: Not on file     Attends Spiritism service: Not on file     Active member of club or  organization: Not on file     Attends meetings of clubs or organizations: Not on file     Relationship status: Not on file   Other Topics Concern    Not on file   Social History Narrative    Not on file     Family History   Problem Relation Age of Onset    Vaginal cancer Neg Hx     Endometrial cancer Neg Hx     Cervical cancer Neg Hx        Review of patient's allergies indicates:  No Known Allergies    Current Outpatient Medications   Medication Sig    aspirin (ECOTRIN) 81 MG EC tablet 81 mg.    FLUoxetine 20 MG capsule TK ONE C PO  QD    gabapentin (NEURONTIN) 300 MG capsule Take 2 tabs in the morning, 1 tab in the afternoon, and 2 tabs in the evening    metformin (GLUCOPHAGE-XR) 500 MG 24 hr tablet 500 mg.    metoprolol tartrate (LOPRESSOR) 25 MG tablet     mirabegron (MYRBETRIQ) 25 mg Tb24 ER tablet Take 1 tablet (25 mg total) by mouth once daily.    naproxen (NAPROSYN) 500 MG tablet     omeprazole (PRILOSEC) 20 MG capsule 20 mg.    pravastatin (PRAVACHOL) 40 MG tablet 40 mg.    FLUZONE HIGH-DOSE 2019-20, PF, 180 mcg/0.5 mL Syrg ADM 0.5ML IM UTD    hyoscyamine (ANASPAZ,LEVSIN) 0.125 mg Tab Take 1 tablet (125 mcg total) by mouth every 4 (four) hours as needed.     No current facility-administered medications for this visit.      Facility-Administered Medications Ordered in Other Visits   Medication    0.9%  NaCl infusion       REVIEW OF SYSTEMS:    GENERAL:  No weight loss, malaise or fevers.  HEENT:   No recent changes in vision or hearing  NECK:  Negative for lumps, no difficulty with swallowing.  RESPIRATORY:  Negative for cough, wheezing or shortness of breath, patient denies any recent URI.  CARDIOVASCULAR:  Negative for chest pain, leg swelling or palpitations. HTN.  GI:  Negative for abdominal discomfort, blood in stools or black stools or change in bowel habits.  MUSCULOSKELETAL:  See HPI.  SKIN:  Negative for lesions, rash, and itching.  PSYCH:  No mood disorder or recent psychosocial  "stressors.  Patient's sleep is occasionally disturbed secondary to pain.  HEMATOLOGY/LYMPHOLOGY:  Negative for prolonged bleeding, bruising easily or swollen nodes.  Patient is not currently taking any anti-coagulants  ENDO: Positive for DM. Negative for thyroid dysfunction  NEURO:   No history of headaches, syncope, paralysis, seizures or tremors.  All other reviewed and negative other than HPI.    OBJECTIVE:    BP (!) 160/78   Pulse 61   Temp 98.4 °F (36.9 °C)   Resp 18   Ht 5' 7" (1.702 m)   Wt 103.1 kg (227 lb 4.7 oz)   SpO2 100%   BMI 35.60 kg/m²     PHYSICAL EXAMINATION:    GENERAL: Well appearing, in no acute distress, alert and oriented x3.  PSYCH:  Mood and affect appropriate.  SKIN: Skin color, texture, turgor normal, no rashes or lesions.  HEAD/FACE:  Normocephalic, atraumatic. Cranial nerves grossly intact.   CV: RRR with palpation of the radial artery.  PULM: No evidence of respiratory difficulty, symmetric chest rise.  BACK: Straight leg raising in the siting position is negative for radicular pain bilaterally. There is mild pain with palpation over lumbar spine. Limited ROM with mild pain on extension. Positive facet loading bilaterally.  LOWER EXTREMITIES: Peripheral joint ROM is full and pain free without obvious instability or laxity in lower extremities. No deformities, edema, or skin discoloration. Good capillary refill.  MUSCULOSKELETAL: Hip and knee provocative maneuvers are negative.  There is mild pain with palpation over the sacroiliac joints bilaterally.  FABERs test is negative bilaterally.  FADIRs test is negative. 5/5 strength in right ankle with plantar and dorsiflexion. 4/5 strength in left ankle with plantar and dorsiflexion. 5/5 strength with right knee flexion and extension. 5/5 strength with left knee flexion and extension. 5/5 strength in right EHL, 4/5 strength in left EHL.  No atrophy or tone abnormalities are noted.  NEURO: Bilateral lower extremity coordination and " muscle stretch reflexes are physiologic and symmetric.   No loss of sensation is noted.  GAIT: Antalgic, shuffling gait- ambulates with walker.     ASSESSMENT: 74 y.o. year old female with back/leg pain, consistent with the following:    Encounter Diagnoses   Name Primary?    Sacroiliitis Yes    Annular tear of lumbar disc     Neuroforaminal stenosis of lumbar spine     Lumbar spondylosis     DDD (degenerative disc disease), lumbar     Physical deconditioning     Shuffling gait     Multiple falls        PLAN:     - Previous imaging was reviewed and discussed with the patient today.    - She is s/p bilateral SI joint injections with relief.     - If short term relief, consider sacral RFA. She may be a candidate for current research study.     - Consider repeat LEONEL.     - She is s/p lumbar RFA with some benefit. We will continue to monitor progress.    - Continue Gabapentin.     - Continue physical therapy.     - Continue follow up with Neurology.     - RTC in 2 months or sooner if needed.     - Dr. Duncan was consulted on the patient and agrees with this plan.    The above plan and management options were discussed at length with patient. Patient is in agreement with the above and verbalized understanding.     Coby Russo NP  01/20/2020

## 2020-01-21 NOTE — PROGRESS NOTES
"  Physical Therapy Daily Treatment Note     Name: Mila Foster  Clinic Number: [id number]    Therapy Diagnosis:   Encounter Diagnoses   Name Primary?    Decreased strength of lower extremity     Impaired functional mobility, balance, and endurance     Chronic bilateral low back pain, unspecified whether sciatica present      Physician: Coby Russo NP    Visit Date: 1/24/2020    Physician Orders: PT Eval and Treat   Medical Diagnosis from Referral:    Sacroiliitis   Annular tear of lumbar disc   Neuroforaminal stenosis of lumbar spine    Lumbar spondylosis    Facet arthritis of lumbar region    DDD (degenerative disc disease), lumbar   Sacroiliac joint pain    Physical deconditioning    Shuffling gait         Evaluation Date: 11/29/2019  Authorization Period Expiration: 2/7/2020  Plan of Care Expiration: 2/26/2020   Visit # / Visits authorized: 4/ (2/3)    Time In: 11:09 AM  Time Out: 12:00  PM  Total Billable Time: 50 minutes (te 3)    Precautions: Standard, Diabetes and Fall    Subjective     Pt reports: had a bad night last night. She feels like her walking has been okay. She said the pain is on and off in the knees and sometimes they give out on her. On the 28th, she is going to get a "test on the muscle" (EMG) to see if they can find out what is causing the falls and the legs giving out on her. Her last fall was about 3-4 weeks ago she says.  She was compliant with home exercise program. (some of them)  Response to previous treatment:fine  Functional change: n/a    Pain: 7/10  Location: bilateral knees    Objective       ______________________________________________________    Bold- performed     Mila received therapeutic exercises to develop strength and endurance for 50 minutes including:    Sit to stands from hi-low mat with UE assist 3x5  SLR 2x10  Supine clams OTB 2x10   Bridges 2x10   Recumbent bike 10 min (performed with shoes off)  Hamstring stretch 3x30" B - NP        Mila participated in " gait training to improve functional mobility and safety for 0 minutes:- NP today  400 feet emphasizing heel strike, hip and knee flexion, and proper use of rollator         Home Exercises Provided and Patient Education Provided     Education provided:   - exercise execution  - safety with sit to stands  -ambulation with rollator     Written Home Exercises Provided: Patient instructed to cont prior HEP.  Exercises were reviewed and Mila was able to demonstrate them prior to the end of the session.  Mila demonstrated good  understanding of the education provided.     See EMR under Patient Instructions for exercises provided prior visit.     Assessment     Pt presents to treatment session with soreness and pain in B knees. She continues to require VC and tactile cuing for hip dominant sit <> stands and for proper ambulation using rollator. She does not have adverse effects with exercises and notes that she feels better at the end of session.  Progress as per tolerated and reassess next visit.     Mila is progressing well towards her goals.   Pt prognosis is Good.     Pt will continue to benefit from skilled outpatient physical therapy to address the deficits listed in the problem list box on initial evaluation, provide pt/family education and to maximize pt's level of independence in the home and community environment.     Pt's spiritual, cultural and educational needs considered and pt agreeable to plan of care and goals.     Anticipated barriers to physical therapy: transportation, compliance of therapy     Goals:   Short Term Goals: 4 weeks   1. Patient will perform sit <> stand with min VC for proper technique from PT in order to encourage independence in transfers.-in progress, not met   2. Pt will demonstrate proper use of rollator with min VC from PT for improved safety and ambulation. -in progress, not met  3. Pt will improve TUG score to </=22 seconds in order to improve fall risk.    4. Patient will  improve 5 time sit to stand to </= 18 seconds in order to improve safety, transfers, and functional independence.  5. Pt will improve impaired LE MMTs by 1/2 MMT to improve strength for functional tasks.     Long Term Goals: 8 weeks  1. Patient will perform sit <> stand with or without UE with no VC for proper technique from PT in order to encourage independence in transfers.  2. Pt will demonstrate proper use of rollator with no VC from PT for improved safety and ambulation.  3. Pt will improve TUG score to </=18 in order to improve fall risk.   4. Pt will perform 5 time sit to stand with or without UE assist in </=15  in order to improve safety, transfers, and functional independence.   5. Pt will improve impaired LE MMTs by 1 MMT to improve strength for functional tasks.       Plan   Plan of care Certification until 2/26/2020.     Outpatient Physical Therapy 2 times weekly for 8 weeks to include the following interventions: Gait Training, Manual Therapy, Moist Heat/ Ice, Neuromuscular Re-ed, Orthotic Management and Training, Patient Education, Self Care, Therapeutic Activites and Therapeutic Exercise.    Reassess objective measures next visit.     Ruth Ann Haider, PT

## 2020-01-24 ENCOUNTER — CLINICAL SUPPORT (OUTPATIENT)
Dept: REHABILITATION | Facility: HOSPITAL | Age: 75
End: 2020-01-24
Payer: MEDICARE

## 2020-01-24 DIAGNOSIS — G89.29 CHRONIC BILATERAL LOW BACK PAIN, UNSPECIFIED WHETHER SCIATICA PRESENT: ICD-10-CM

## 2020-01-24 DIAGNOSIS — R29.898 DECREASED STRENGTH OF LOWER EXTREMITY: ICD-10-CM

## 2020-01-24 DIAGNOSIS — M54.50 CHRONIC BILATERAL LOW BACK PAIN, UNSPECIFIED WHETHER SCIATICA PRESENT: ICD-10-CM

## 2020-01-24 DIAGNOSIS — Z74.09 IMPAIRED FUNCTIONAL MOBILITY, BALANCE, AND ENDURANCE: ICD-10-CM

## 2020-01-24 PROCEDURE — 97110 THERAPEUTIC EXERCISES: CPT | Mod: PO

## 2020-01-24 NOTE — PROGRESS NOTES
Physical Therapy Daily Treatment Note/Progress Note     Name: Mila Foster  Clinic Number: [id number]    Therapy Diagnosis:   Encounter Diagnoses   Name Primary?    Decreased strength of lower extremity     Impaired functional mobility, balance, and endurance     Chronic bilateral low back pain, unspecified whether sciatica present      Physician: Coby Russo NP    Visit Date: 2020    Physician Orders: PT Eval and Treat   Medical Diagnosis from Referral:    Sacroiliitis   Annular tear of lumbar disc   Neuroforaminal stenosis of lumbar spine    Lumbar spondylosis    Facet arthritis of lumbar region    DDD (degenerative disc disease), lumbar   Sacroiliac joint pain    Physical deconditioning    Shuffling gait         Evaluation Date: 2019  Authorization Period Expiration: 2021  Plan of Care Expiration: 2020   Visit # / Visits authorized:  (5th visit)    Time In: 1:01 PM  Time Out: 1:53 PM  Total Billable Time: 51 minutes (te 3)    Precautions: Standard, Diabetes and Fall    Subjective     Pt reports: she was walking in her home a short distance without the rollator and she fell because her right knee gave out on her. She tried to catch herself on the rocking chair but it fell back with her. She says she hit her head on the wall. She does not report loss of consciousness or dizziness. She says she barely slept last night.   She was compliant with home exercise program. (some of them)  Response to previous treatment: good   Functional change: n/a    Pain: 4/10  Location: bilateral knees (more on the right)    Objective       Lower Extremity Strength (graded 0-5 out of 5)    RLE LLE   Hip flexion: 4+/5 4+/5   Hip ER 4+/5 4/5   Hip IR 4+/5 4+/5   Knee extension: 4+/5 5/5   Ankle dorsiflexion: 5/5 5/5   Posterior fibers of Gluteus medius 4-/5 4/5   Knee flexion 4+/5 4+/5   Hip extension: 4-5 4/5         Five time sit to stand: 42 seconds with UE assistance  from hi-low mat      TU  "seconds with rollator     _________________________________________________________________________________    Bold- performed     Mila received therapeutic exercises to develop strength and endurance for 51 minutes including: (including reassessment today)  Sit to stands from hi-low mat with UE assist 3x5- not performed   SLR 2x10  Supine clams GTB 2x10   Bridges 2x10   Recumbent bike 10 min (performed with shoes off)  Hamstring stretch 3x30" B         Mila participated in gait training to improve functional mobility and safety for 0 minutes:- NP today  400 feet emphasizing heel strike, hip and knee flexion, and proper use of rollator - not performed         Home Exercises Provided and Patient Education Provided     Education provided:   - exercise execution  - safety with sit to stands      Written Home Exercises Provided: Patient instructed to cont prior HEP.  Exercises were reviewed and Mila was able to demonstrate them prior to the end of the session.  Mila demonstrated good  understanding of the education provided.     See EMR under Patient Instructions for exercises provided prior visit.     Assessment     Pt presents to treatment session with soreness and pain in B knees after she suffered a fall last night. She shows improvements in sit to stand safety and performance. She is able to guide her descent with her hands and exhibits safer overall movement with this transfer. Her score on five time sit to stand exhibits longer time needed to complete this test but her transfers are safer and more time is used to ensure full ascent and descent. She has improved in her TUG performance as well. She continues to improve in lower extremity strength. She also shows improvement with use of rollator requiring min VC for making sure she ambulates closer to the walker and performing proper heel to toe gait pattern. She will continue to benefit from skilled outpatient PT to address all deficits and progress towards " all goals.     Mila is progressing well towards her goals.   Pt prognosis is Good.     Pt will continue to benefit from skilled outpatient physical therapy to address the deficits listed in the problem list box on initial evaluation, provide pt/family education and to maximize pt's level of independence in the home and community environment.     Pt's spiritual, cultural and educational needs considered and pt agreeable to plan of care and goals.     Anticipated barriers to physical therapy: transportation, compliance of therapy     Goals:   Short Term Goals: 4 weeks   1. Patient will perform sit <> stand with min VC for proper technique from PT in order to encourage independence in transfers.-almost met   2. Pt will demonstrate proper use of rollator with min VC from PT for improved safety and ambulation. -almost met   3. Pt will improve TUG score to </=22 seconds in order to improve fall risk.  -in progress   4. Patient will improve 5 time sit to stand to </= 18 seconds in order to improve safety, transfers, and functional independence.  5. Pt will improve impaired LE MMTs by 1/2 MMT to improve strength for functional tasks.-in progress      Long Term Goals: 8 weeks  1. Patient will perform sit <> stand with or without UE with no VC for proper technique from PT in order to encourage independence in transfers.  2. Pt will demonstrate proper use of rollator with no VC from PT for improved safety and ambulation.  3. Pt will improve TUG score to </=18 in order to improve fall risk.   4. Pt will perform 5 time sit to stand with or without UE assist in </=15  in order to improve safety, transfers, and functional independence.   5. Pt will improve impaired LE MMTs by 1 MMT to improve strength for functional tasks.       Plan   Plan of care Certification until 2/26/2020.     Outpatient Physical Therapy 2 times weekly for 8 weeks until 2/26/2020  to include the following interventions: Gait Training, Manual Therapy, Moist  Heat/ Ice, Neuromuscular Re-ed, Orthotic Management and Training, Patient Education, Self Care, Therapeutic Activites and Therapeutic Exercise.      Ruth Ann Haider, PT

## 2020-01-27 ENCOUNTER — CLINICAL SUPPORT (OUTPATIENT)
Dept: REHABILITATION | Facility: HOSPITAL | Age: 75
End: 2020-01-27
Payer: MEDICARE

## 2020-01-27 DIAGNOSIS — G89.29 CHRONIC BILATERAL LOW BACK PAIN, UNSPECIFIED WHETHER SCIATICA PRESENT: ICD-10-CM

## 2020-01-27 DIAGNOSIS — M54.50 CHRONIC BILATERAL LOW BACK PAIN, UNSPECIFIED WHETHER SCIATICA PRESENT: ICD-10-CM

## 2020-01-27 DIAGNOSIS — Z74.09 IMPAIRED FUNCTIONAL MOBILITY, BALANCE, AND ENDURANCE: ICD-10-CM

## 2020-01-27 DIAGNOSIS — R29.898 DECREASED STRENGTH OF LOWER EXTREMITY: ICD-10-CM

## 2020-01-27 PROCEDURE — 97110 THERAPEUTIC EXERCISES: CPT | Mod: PO

## 2020-01-27 NOTE — PLAN OF CARE
Physical Therapy Daily Treatment Note/Progress Note     Name: Mila Foster  Clinic Number: [id number]    Therapy Diagnosis:   Encounter Diagnoses   Name Primary?    Decreased strength of lower extremity     Impaired functional mobility, balance, and endurance     Chronic bilateral low back pain, unspecified whether sciatica present      Physician: Coby Russo NP    Visit Date: 2020    Physician Orders: PT Eval and Treat   Medical Diagnosis from Referral:    Sacroiliitis   Annular tear of lumbar disc   Neuroforaminal stenosis of lumbar spine    Lumbar spondylosis    Facet arthritis of lumbar region    DDD (degenerative disc disease), lumbar   Sacroiliac joint pain    Physical deconditioning    Shuffling gait         Evaluation Date: 2019  Authorization Period Expiration: 2021  Plan of Care Expiration: 2020   Visit # / Visits authorized:  (5th visit)    Time In: 1:01 PM  Time Out: 1:53 PM  Total Billable Time: 51 minutes (te 3)    Precautions: Standard, Diabetes and Fall    Subjective     Pt reports: she was walking in her home a short distance without the rollator and she fell because her right knee gave out on her. She tried to catch herself on the rocking chair but it fell back with her. She says she hit her head on the wall. She does not report loss of consciousness or dizziness. She says she barely slept last night.   She was compliant with home exercise program. (some of them)  Response to previous treatment: good   Functional change: n/a    Pain: 4/10  Location: bilateral knees (more on the right)    Objective       Lower Extremity Strength (graded 0-5 out of 5)    RLE LLE   Hip flexion: 4+/5 4+/5   Hip ER 4+/5 4/5   Hip IR 4+/5 4+/5   Knee extension: 4+/5 5/5   Ankle dorsiflexion: 5/5 5/5   Posterior fibers of Gluteus medius 4-/5 4/5   Knee flexion 4+/5 4+/5   Hip extension: 4-5 4/5         Five time sit to stand: 42 seconds with UE assistance  from hi-low mat      TU  "seconds with rollator     _________________________________________________________________________________    Bold- performed     Mila received therapeutic exercises to develop strength and endurance for 51 minutes including: (including reassessment today)  Sit to stands from hi-low mat with UE assist 3x5- not performed   SLR 2x10  Supine clams GTB 2x10   Bridges 2x10   Recumbent bike 10 min (performed with shoes off)  Hamstring stretch 3x30" B         Mila participated in gait training to improve functional mobility and safety for 0 minutes:- NP today  400 feet emphasizing heel strike, hip and knee flexion, and proper use of rollator - not performed         Home Exercises Provided and Patient Education Provided     Education provided:   - exercise execution  - safety with sit to stands      Written Home Exercises Provided: Patient instructed to cont prior HEP.  Exercises were reviewed and Mila was able to demonstrate them prior to the end of the session.  Mila demonstrated good  understanding of the education provided.     See EMR under Patient Instructions for exercises provided prior visit.     Assessment     Pt presents to treatment session with soreness and pain in B knees after she suffered a fall last night. She shows improvements in sit to stand safety and performance. She is able to guide her descent with her hands and exhibits safer overall movement with this transfer. Her score on five time sit to stand exhibits longer time needed to complete this test but her transfers are safer and more time is used to ensure full ascent and descent. She has improved in her TUG performance as well. She continues to improve in lower extremity strength. She also shows improvement with use of rollator requiring min VC for making sure she ambulates closer to the walker and performing proper heel to toe gait pattern. She will continue to benefit from skilled outpatient PT to address all deficits and progress towards " all goals.     Mila is progressing well towards her goals.   Pt prognosis is Good.     Pt will continue to benefit from skilled outpatient physical therapy to address the deficits listed in the problem list box on initial evaluation, provide pt/family education and to maximize pt's level of independence in the home and community environment.     Pt's spiritual, cultural and educational needs considered and pt agreeable to plan of care and goals.     Anticipated barriers to physical therapy: transportation, compliance of therapy     Goals:   Short Term Goals: 4 weeks   1. Patient will perform sit <> stand with min VC for proper technique from PT in order to encourage independence in transfers.-almost met   2. Pt will demonstrate proper use of rollator with min VC from PT for improved safety and ambulation. -almost met   3. Pt will improve TUG score to </=22 seconds in order to improve fall risk.  -in progress   4. Patient will improve 5 time sit to stand to </= 18 seconds in order to improve safety, transfers, and functional independence.  5. Pt will improve impaired LE MMTs by 1/2 MMT to improve strength for functional tasks.-in progress      Long Term Goals: 8 weeks  1. Patient will perform sit <> stand with or without UE with no VC for proper technique from PT in order to encourage independence in transfers.  2. Pt will demonstrate proper use of rollator with no VC from PT for improved safety and ambulation.  3. Pt will improve TUG score to </=18 in order to improve fall risk.   4. Pt will perform 5 time sit to stand with or without UE assist in </=15  in order to improve safety, transfers, and functional independence.   5. Pt will improve impaired LE MMTs by 1 MMT to improve strength for functional tasks.       Plan   Plan of care Certification until 2/26/2020.     Outpatient Physical Therapy 2 times weekly for 8 weeks until 2/26/2020  to include the following interventions: Gait Training, Manual Therapy, Moist  Heat/ Ice, Neuromuscular Re-ed, Orthotic Management and Training, Patient Education, Self Care, Therapeutic Activites and Therapeutic Exercise.      Ruth Ann Haider, PT

## 2020-01-28 ENCOUNTER — PROCEDURE VISIT (OUTPATIENT)
Dept: NEUROLOGY | Facility: CLINIC | Age: 75
End: 2020-01-28
Payer: MEDICARE

## 2020-01-28 DIAGNOSIS — G62.9 POLYNEUROPATHY: ICD-10-CM

## 2020-01-28 DIAGNOSIS — R26.81 UNSTEADINESS ON FEET: ICD-10-CM

## 2020-01-28 PROCEDURE — 95886 MUSC TEST DONE W/N TEST COMP: CPT | Mod: S$GLB,,, | Performed by: PSYCHIATRY & NEUROLOGY

## 2020-01-28 PROCEDURE — 95886 PR EMG COMPLETE, W/ NERVE CONDUCTION STUDIES, 5+ MUSCLES: ICD-10-PCS | Mod: S$GLB,,, | Performed by: PSYCHIATRY & NEUROLOGY

## 2020-01-28 PROCEDURE — 95911 PR NERVE CONDUCTION STUDY; 9-10 STUDIES: ICD-10-PCS | Mod: S$GLB,,, | Performed by: PSYCHIATRY & NEUROLOGY

## 2020-01-28 PROCEDURE — 95911 NRV CNDJ TEST 9-10 STUDIES: CPT | Mod: S$GLB,,, | Performed by: PSYCHIATRY & NEUROLOGY

## 2020-01-29 NOTE — PROGRESS NOTES
"  Physical Therapy Daily Treatment Note     Name: Mila Foster  Clinic Number: [id number]    Therapy Diagnosis:   Encounter Diagnoses   Name Primary?    Decreased strength of lower extremity     Impaired functional mobility, balance, and endurance     Chronic bilateral low back pain, unspecified whether sciatica present      Physician: Coby Russo NP    Visit Date: 1/31/2020    Physician Orders: PT Eval and Treat   Medical Diagnosis from Referral:    Sacroiliitis   Annular tear of lumbar disc   Neuroforaminal stenosis of lumbar spine    Lumbar spondylosis    Facet arthritis of lumbar region    DDD (degenerative disc disease), lumbar   Sacroiliac joint pain    Physical deconditioning    Shuffling gait         Evaluation Date: 11/29/2019  Authorization Period Expiration: 1/23/2021  Plan of Care Expiration: 2/26/2020   Visit # / Visits authorized: 1/ (6th visit)    Time In: 11:02 AM  Time Out: 11:55 AM  Total Billable Time: 53 minutes (te 4)    Precautions: Standard, Diabetes and Fall    Subjective     Pt reports: he got her  EMG done yesterday and she will have the results soon. She feels alright but she thinks she is coming down with a cold maybe.   She was compliant with home exercise program. (some of them)  Response to previous treatment: good   Functional change: she feels more sturdy when walking     Pain: 4/10  Location: bilateral knees (more on the right)    Objective          _________________________________________________________________________________    Bold- performed     Mila received therapeutic exercises to develop strength and endurance for 53 minutes including:  Sit to stands from hi-low mat with UE assist 2x8  SLR 2x10 2#  Supine clams BTB 2x10   Bridges 2x10 BTB  Recumbent bike 10 min (performed with shoes off)  Hamstring stretch 3x30" B   Standing at counter marches 2x10 (SBA of PT)        Mila participated in gait training to improve functional mobility and safety for 0 minutes:- " NP today  400 feet emphasizing heel strike, hip and knee flexion, and proper use of rollator - not performed         Home Exercises Provided and Patient Education Provided     Education provided:   - exercise execution  - safety with sit to stands      Written Home Exercises Provided: Patient instructed to cont prior HEP.  Exercises were reviewed and Mila was able to demonstrate them prior to the end of the session.  Mila demonstrated good  understanding of the education provided.     See EMR under Patient Instructions for exercises provided prior visit.     Assessment     Pt presents to treatment session with good tolerance and ability to progress some exercises. No complaints of pain during session. Improved ambulation with rollator and sit to stand performance. Continue progressing as pepe.       Mila is progressing well towards her goals.   Pt prognosis is Good.     Pt will continue to benefit from skilled outpatient physical therapy to address the deficits listed in the problem list box on initial evaluation, provide pt/family education and to maximize pt's level of independence in the home and community environment.     Pt's spiritual, cultural and educational needs considered and pt agreeable to plan of care and goals.     Anticipated barriers to physical therapy: transportation, compliance of therapy     Goals:   Short Term Goals: 4 weeks   1. Patient will perform sit <> stand with min VC for proper technique from PT in order to encourage independence in transfers.-almost met   2. Pt will demonstrate proper use of rollator with min VC from PT for improved safety and ambulation. -almost met   3. Pt will improve TUG score to </=22 seconds in order to improve fall risk.  -in progress   4. Patient will improve 5 time sit to stand to </= 18 seconds in order to improve safety, transfers, and functional independence.  5. Pt will improve impaired LE MMTs by 1/2 MMT to improve strength for functional tasks.-in  progress      Long Term Goals: 8 weeks  1. Patient will perform sit <> stand with or without UE with no VC for proper technique from PT in order to encourage independence in transfers.  2. Pt will demonstrate proper use of rollator with no VC from PT for improved safety and ambulation.  3. Pt will improve TUG score to </=18 in order to improve fall risk.   4. Pt will perform 5 time sit to stand with or without UE assist in </=15  in order to improve safety, transfers, and functional independence.   5. Pt will improve impaired LE MMTs by 1 MMT to improve strength for functional tasks.       Plan   Plan of care Certification until 2/26/2020.     Outpatient Physical Therapy 2 times weekly for 8 weeks until 2/26/2020  to include the following interventions: Gait Training, Manual Therapy, Moist Heat/ Ice, Neuromuscular Re-ed, Orthotic Management and Training, Patient Education, Self Care, Therapeutic Activites and Therapeutic Exercise.      Ruth Ann Haider, PT

## 2020-01-31 ENCOUNTER — CLINICAL SUPPORT (OUTPATIENT)
Dept: REHABILITATION | Facility: HOSPITAL | Age: 75
End: 2020-01-31
Payer: MEDICARE

## 2020-01-31 DIAGNOSIS — R29.898 DECREASED STRENGTH OF LOWER EXTREMITY: ICD-10-CM

## 2020-01-31 DIAGNOSIS — G89.29 CHRONIC BILATERAL LOW BACK PAIN, UNSPECIFIED WHETHER SCIATICA PRESENT: ICD-10-CM

## 2020-01-31 DIAGNOSIS — Z74.09 IMPAIRED FUNCTIONAL MOBILITY, BALANCE, AND ENDURANCE: ICD-10-CM

## 2020-01-31 DIAGNOSIS — M54.50 CHRONIC BILATERAL LOW BACK PAIN, UNSPECIFIED WHETHER SCIATICA PRESENT: ICD-10-CM

## 2020-01-31 PROCEDURE — 97110 THERAPEUTIC EXERCISES: CPT | Mod: PO

## 2020-02-13 NOTE — PROGRESS NOTES
"  Physical Therapy Daily Treatment Note     Name: Mila Foster  Clinic Number: [id number]    Therapy Diagnosis:   Encounter Diagnoses   Name Primary?    Decreased strength of lower extremity     Impaired functional mobility, balance, and endurance     Chronic bilateral low back pain, unspecified whether sciatica present      Physician: Coby Russo NP    Visit Date: 2/14/2020    Physician Orders: PT Eval and Treat   Medical Diagnosis from Referral:    Sacroiliitis   Annular tear of lumbar disc   Neuroforaminal stenosis of lumbar spine    Lumbar spondylosis    Facet arthritis of lumbar region    DDD (degenerative disc disease), lumbar   Sacroiliac joint pain    Physical deconditioning    Shuffling gait         Evaluation Date: 11/29/2019  Authorization Period Expiration: 3/11/2020  Plan of Care Expiration: 2/26/2020   Visit # / Visits authorized: 1/6 (7th visit)    Time In: 1:07 PM  Time Out: 1:57 PM  Total Billable Time: 50 minutes (te 3)    Precautions: Standard, Diabetes and Fall    Subjective     Pt reports: she was sick last week she thinks she might have had the flu. She is having pain in the right knee. She has not yet gotten the results of the nerve test.   She was compliant with home exercise program. (some of them)  Response to previous treatment: felt good   Functional change: got new shoes and she feels more sturdy     Pain: 7/10  Location: right knee     Objective          _________________________________________________________________________________    Bold- performed     Mila received therapeutic exercises to develop strength and endurance for 50 minutes including:  Sit to stands from hi-low mat no UE assist for ascent but UE assist for descent 2x8  SLR 2x10 2#  Supine clams BTB 2x10   Bridges 2x10 BTB  Recumbent bike 10 min   Hamstring stretch 3x30" B   Standing at counter marches 2x10 (SBA of PT)  Standing at counter hip abd 2x10 (SBA of PT)  Standing at counter hip extension 2x10 (SBA " of PT)        Mila participated in gait training to improve functional mobility and safety for 0 minutes:- NP today  400 feet emphasizing heel strike, hip and knee flexion, and proper use of rollator - not performed         Home Exercises Provided and Patient Education Provided     Education provided:   - exercise execution  - safety with sit to stands      Written Home Exercises Provided: Patient instructed to cont prior HEP.  Exercises were reviewed and Mila was able to demonstrate them prior to the end of the session.  Mila demonstrated good  understanding of the education provided.     See EMR under Patient Instructions for exercises provided prior visit.     Assessment   Pt requires mod VC today for proper use of rollator. VC needed to rollator close to body and avoid increased thoracic flexion and also to decrease shuffling gait and encourage heel strike and increased hip and knee flexion. She needs cuing for proper sit to stand form. She is able to perform sit to stand ascent with no UE assist but needs to use UE for stand to sit descent. Denies pain at the end of session but has soreness in R knee. Progress as pepe to improve functional mobility.     Mila is progressing well towards her goals.   Pt prognosis is Good.     Pt will continue to benefit from skilled outpatient physical therapy to address the deficits listed in the problem list box on initial evaluation, provide pt/family education and to maximize pt's level of independence in the home and community environment.     Pt's spiritual, cultural and educational needs considered and pt agreeable to plan of care and goals.     Anticipated barriers to physical therapy: transportation, compliance of therapy     Goals:   Short Term Goals: 4 weeks   1. Patient will perform sit <> stand with min VC for proper technique from PT in order to encourage independence in transfers.-almost met   2. Pt will demonstrate proper use of rollator with min VC from PT  for improved safety and ambulation. -in progress   3. Pt will improve TUG score to </=22 seconds in order to improve fall risk.  -in progress   4. Patient will improve 5 time sit to stand to </= 18 seconds in order to improve safety, transfers, and functional independence.  5. Pt will improve impaired LE MMTs by 1/2 MMT to improve strength for functional tasks.-in progress      Long Term Goals: 8 weeks  1. Patient will perform sit <> stand with or without UE with no VC for proper technique from PT in order to encourage independence in transfers.  2. Pt will demonstrate proper use of rollator with no VC from PT for improved safety and ambulation.  3. Pt will improve TUG score to </=18 in order to improve fall risk.   4. Pt will perform 5 time sit to stand with or without UE assist in </=15  in order to improve safety, transfers, and functional independence.   5. Pt will improve impaired LE MMTs by 1 MMT to improve strength for functional tasks.       Plan   Plan of care Certification until 2/26/2020.     Outpatient Physical Therapy 2 times weekly for 8 weeks until 2/26/2020  to include the following interventions: Gait Training, Manual Therapy, Moist Heat/ Ice, Neuromuscular Re-ed, Orthotic Management and Training, Patient Education, Self Care, Therapeutic Activites and Therapeutic Exercise.      Ruth Ann Haider, PT

## 2020-02-14 ENCOUNTER — CLINICAL SUPPORT (OUTPATIENT)
Dept: REHABILITATION | Facility: HOSPITAL | Age: 75
End: 2020-02-14
Payer: MEDICARE

## 2020-02-14 DIAGNOSIS — R29.898 DECREASED STRENGTH OF LOWER EXTREMITY: ICD-10-CM

## 2020-02-14 DIAGNOSIS — G89.29 CHRONIC BILATERAL LOW BACK PAIN, UNSPECIFIED WHETHER SCIATICA PRESENT: ICD-10-CM

## 2020-02-14 DIAGNOSIS — M54.50 CHRONIC BILATERAL LOW BACK PAIN, UNSPECIFIED WHETHER SCIATICA PRESENT: ICD-10-CM

## 2020-02-14 DIAGNOSIS — Z74.09 IMPAIRED FUNCTIONAL MOBILITY, BALANCE, AND ENDURANCE: ICD-10-CM

## 2020-02-14 PROCEDURE — 97110 THERAPEUTIC EXERCISES: CPT | Mod: PO

## 2020-03-20 ENCOUNTER — TELEPHONE (OUTPATIENT)
Dept: PAIN MEDICINE | Facility: CLINIC | Age: 75
End: 2020-03-20

## 2020-03-20 NOTE — TELEPHONE ENCOUNTER
Phoned patient. We had a telephone call instead of a face to face visit due to COVID concerns.     She reports that she is doing well. She continues to report low back pain intermittently that is tolerable. She was participating in physical therapy but this is now on hold. She continues to take Gabapentin with benefit. She denies any other health changes.     We will tentatively plan for 1 month follow up visit. Appointment made and letter mailed. She verbalized understanding and thanked me for the call.

## 2020-03-24 DIAGNOSIS — R39.15 URINARY URGENCY: ICD-10-CM

## 2020-03-24 DIAGNOSIS — N39.41 URGE INCONTINENCE: ICD-10-CM

## 2020-03-25 ENCOUNTER — TELEPHONE (OUTPATIENT)
Dept: REHABILITATION | Facility: HOSPITAL | Age: 75
End: 2020-03-25

## 2020-03-25 ENCOUNTER — DOCUMENTATION ONLY (OUTPATIENT)
Dept: REHABILITATION | Facility: HOSPITAL | Age: 75
End: 2020-03-25

## 2020-03-25 DIAGNOSIS — Z74.09 IMPAIRED FUNCTIONAL MOBILITY, BALANCE, AND ENDURANCE: ICD-10-CM

## 2020-03-25 DIAGNOSIS — R29.898 DECREASED STRENGTH OF LOWER EXTREMITY: ICD-10-CM

## 2020-03-25 DIAGNOSIS — M54.50 CHRONIC BILATERAL LOW BACK PAIN, UNSPECIFIED WHETHER SCIATICA PRESENT: ICD-10-CM

## 2020-03-25 DIAGNOSIS — G89.29 CHRONIC BILATERAL LOW BACK PAIN, UNSPECIFIED WHETHER SCIATICA PRESENT: ICD-10-CM

## 2020-03-25 NOTE — TELEPHONE ENCOUNTER
PT calls to d/c patient and let her know that she will need a new referral to come back if she chooses to do so. Pt says she will get PA to write referral to come back after COVID19 pandemic.     Patient: Mila Foster  Date: 3/25/2020  Diagnosis: No diagnosis found.  MRN: [id number]    Spoke with patient due to therapy following updates regarding COVID-19 closely and taking every precaution to ensure the safety of our patients, staff and community.  In an abundance of caution and in an effort to help reduce risk and limit community spread, we have decided to temporarily postpone appointments for patients who may be at increased risk to attend in-person therapy or where therapy is not critically needed at this time. Plan of care and home exercise program were reviewed and patient has what they need to continue therapy at home. All patient questions were answered.  Patient verbalized understanding to all.    Ruth Ann Haider, PT, DPT    3/25/2020

## 2020-03-25 NOTE — PROGRESS NOTES
Outpatient Therapy Discharge Summary     Name: Mila Foster  Clinic Number: [id number]    Therapy Diagnosis:   Encounter Diagnoses   Name Primary?    Decreased strength of lower extremity     Impaired functional mobility, balance, and endurance     Chronic bilateral low back pain, unspecified whether sciatica present      Physician: No ref. provider found    Physician Orders: PT Eval and Treat   Medical Diagnosis from Referral:    Sacroiliitis   Annular tear of lumbar disc   Neuroforaminal stenosis of lumbar spine    Lumbar spondylosis    Facet arthritis of lumbar region    DDD (degenerative disc disease), lumbar   Sacroiliac joint pain    Physical deconditioning    Shuffling gait         Evaluation Date: 11/29/2019        Date of Last visit: 2/14/2020  Total Visits Received: 7   Cancelled/ No Show Visits: 4    Assessment    Goals:     Short Term Goals: 4 weeks   1. Patient will perform sit <> stand with min VC for proper technique from PT in order to encourage independence in transfers.-almost met   2. Pt will demonstrate proper use of rollator with min VC from PT for improved safety and ambulation. -in progress   3. Pt will improve TUG score to </=22 seconds in order to improve fall risk.  -in progress   4. Patient will improve 5 time sit to stand to </= 18 seconds in order to improve safety, transfers, and functional independence.  5. Pt will improve impaired LE MMTs by 1/2 MMT to improve strength for functional tasks.-in progress      Long Term Goals: 8 weeks  1. Patient will perform sit <> stand with or without UE with no VC for proper technique from PT in order to encourage independence in transfers.  2. Pt will demonstrate proper use of rollator with no VC from PT for improved safety and ambulation.  3. Pt will improve TUG score to </=18 in order to improve fall risk.   4. Pt will perform 5 time sit to stand with or without UE assist in </=15  in order to improve safety, transfers, and functional  independence.   5. Pt will improve impaired LE MMTs by 1 MMT to improve strength for functional tasks.     Discharge reason: Patient has not attended therapy since 2/14/2020.      Plan   This patient is discharged from Physical Therapy. She will get a new referral to come back to PT after COVID19 pandemic.     Ruth Ann Haider, PT,  DPT

## 2020-03-31 ENCOUNTER — TELEPHONE (OUTPATIENT)
Dept: ADMINISTRATIVE | Facility: OTHER | Age: 75
End: 2020-03-31

## 2020-04-20 ENCOUNTER — OFFICE VISIT (OUTPATIENT)
Dept: PAIN MEDICINE | Facility: CLINIC | Age: 75
End: 2020-04-20
Attending: ANESTHESIOLOGY
Payer: MEDICARE

## 2020-04-20 DIAGNOSIS — G89.29 CHRONIC LOW BACK PAIN, UNSPECIFIED BACK PAIN LATERALITY, UNSPECIFIED WHETHER SCIATICA PRESENT: ICD-10-CM

## 2020-04-20 DIAGNOSIS — M54.50 CHRONIC LOW BACK PAIN, UNSPECIFIED BACK PAIN LATERALITY, UNSPECIFIED WHETHER SCIATICA PRESENT: ICD-10-CM

## 2020-04-20 DIAGNOSIS — G62.9 POLYNEUROPATHY: ICD-10-CM

## 2020-04-20 DIAGNOSIS — M46.1 SACROILIITIS: ICD-10-CM

## 2020-04-20 DIAGNOSIS — G89.4 CHRONIC PAIN SYNDROME: ICD-10-CM

## 2020-04-20 DIAGNOSIS — M51.16 LUMBAR DISC HERNIATION WITH RADICULOPATHY: Primary | ICD-10-CM

## 2020-04-20 DIAGNOSIS — E11.65 TYPE 2 DIABETES MELLITUS WITH HYPERGLYCEMIA, UNSPECIFIED WHETHER LONG TERM INSULIN USE: ICD-10-CM

## 2020-04-20 DIAGNOSIS — M51.36 ANNULAR TEAR OF LUMBAR DISC: ICD-10-CM

## 2020-04-20 DIAGNOSIS — M47.816 LUMBAR SPONDYLOSIS: ICD-10-CM

## 2020-04-20 PROCEDURE — 99443 PR PHYSICIAN TELEPHONE EVALUATION 21-30 MIN: ICD-10-PCS | Mod: 95,GC,, | Performed by: ANESTHESIOLOGY

## 2020-04-20 PROCEDURE — 99443 PR PHYSICIAN TELEPHONE EVALUATION 21-30 MIN: CPT | Mod: 95,GC,, | Performed by: ANESTHESIOLOGY

## 2020-04-20 NOTE — PROGRESS NOTES
Chronic Pain-Tele-Medicine-Established Note (Follow up visit)      The patient location is: home  The chief complaint leading to consultation is: back pain  Visit type: Virtual visit with synchronous audio and video  Total time spent with patient: 25mins  Each patient to whom he or she provides medical services by telemedicine is:  (1) informed of the relationship between the physician and patient and the respective role of any other health care provider with respect to management of the patient; and (2) notified that he or she may decline to receive medical services by telemedicine and may withdraw from such care at any time.      Chronic Pain - Established Patient    Referring Physician: No ref. provider found    Chief Complaint:   No chief complaint on file.       SUBJECTIVE: Disclaimer: This note has been generated using voice-recognition software. There may be typographical errors that have been missed during proof-reading    Interval Hx 4/20/20:  Follow up for LBP. She continues to have lower back and buttock pain. The pain does not radiate below the buttocks. She continues to take gabapentin 600/300/600mg per day. She does home PT and is waiting to start formal PT again after the outbreak ends. She saw Neurology after the last visit and they ordered an EMG that was c/w diabetic polyneuropathy. She feels her medications are controlling her pain well. Her primary complaint is gait instability.     Interval History 1/20/2020:  The patient returns to clinic today for follow up. She continues to report intermittent low back and buttock pain. She also may take naproxen sometimes for pain but she is unsure.  She denies any radiating leg pain. She is currently participating in physical therapy which has been helpful. She continues to report intermittent falls. She has seen neurology who is obtaining a nerve conduction study. She continues to follow up with Neurology. She continues to take Gabapentin with benefit. She  "denies any other health changes. Her pain today is 1/10.    Interval History 11/20/2019:  The patient returns to clinic today for follow up. She is s/p bilateral SI joint injections on 11/7/2019. She reports 100% relief of her low back and buttock pain. She reports intermittent low back pain that is tolerable. She denies any radiating leg pain. She has seen neurology who is working her up regarding her balance and gait. She is scheduled for EMG in January. She continues to take Gabapentin with benefit. She is currently participating in physical therapy. She denies any other health changes. Her pain today is 1/10.    Interval History 9/11/2019:  The patient returns to clinic today for follow up and image review. She continues to report low back and buttock pain that is sharp and aching. She reports intermittent radiating pain into the posterior aspect of her right leg to her ankle. She denies any left leg pain. She denies any numbness or tingling to her feet. Her worst pain at this time is her bilateral buttocks and tailbone. This pain is worse with prolonged sitting and moving from seated to standing. She continues to report shuffling gait. She has had multiple falls recently. She does take Gabapentin with benefit. She denies any other health changes. Her pain today is 8/10.    Interval History 8/27/2019:  The patient returns to clinic today for follow up. She is s/p right L3,4,5 RFA on 7/25/2019. She is s/p left L3,4,5 RFA on 8/6/2019. She reports limited relief at this time. She continues to report low back pain that is sharp and aching. She denies any radicular leg pain. She denies any numbness or tingling to her feet. She does report episodes of "shuffling" gait where she feels like she will fall. She has had two significant falls recently. She continues to take Gabapentin with benefit. She denies any other health changes. Her pain today is 2/10.    Interval History 7/23/2019:  The patient returns to clinic " today for follow up. She was previously scheduled for RFA but had to cancel due to a fall. She fell while getting out of her bed and hit her face on her nightstand. She did have xrays which were negative for facial fracture. She did have a facial laceration which was sutured. She has rescheduled her RFA for this week. She continues to report low back pain that is constant, sharp, and aching. She denies any radiating leg pain. She does report intermittent numbness and pain to her buttocks with prolonged sitting. She continues to take Gabapentin. She denies any other health changes. Her pain today is 7/10.      Interval History 5/28/2019:  The patient returns to clinic today for follow up. She is s/p bilateral L2,3,4,5 MBB on 5/21/2019. She reports 80% relief of her low back pain after the block. She did sleep very well the night after the block. Her pain has returned to baseline. She continues to report low back pain that is constant, sharp, and aching in nature. She denies any radiating leg pain. She does report increased tailbone pain. Her pain is worse with prolonged standing and walking. She continues to take Gabapentin with benefit. She denies any other health changes. Her pain today is 2/10.    Interval History 4/26/2019:  The patient returns to clinic today for follow up. She reports a fall yesterday at home where she tripped over her shoe string. She landed on her buttock. She reports that she hit the back of her head on the carpet. She denies any loss of consciousness. She did not go to the ER. She does report increased low back pain today that is sore in nature which she attributes to the fall. She continues to report low back pain that is sharp and aching in nature. She does report intermittent aching buttock pain. She denies any radiating leg pain. Her pain is worse with prolonged standing and walking. She continues to participate in physical therapy. She continues to take Gabapentin with benefit. She did  have lumbar MBB in 2017 with 90% relief for a few days. She denies any other health changes. She does have stress urinary incontinence. She does follow up with Urogyn. Her pain today is 7/10.    Interval History 3/11/2019:  The patient returns to clinic today for follow up. She continues to report low back pain that is aching in nature. She reports right sided buttock pain that is sore in nature. She denies any radiating leg pain. Her pain is worse with standing, sitting, and activity. She does endorse morning stiffness. She is currently participating in physical therapy with benefit. She continues to take Gabapentin with benefit and does need a refill today. She denies any other health changes. She denies any bowel or bladder incontinence. Her pain today is 5/10.    Interval History 1/9/2019:  The patient returns to clinic today for follow up. She is s/p bilateral SI joint injections on 12/27/2018. She reports 100% for the first week. She now reports approximately 50% relief of her pain. She reports intermittent low back and bilateral buttock pain. She describes this pain as sore and aching in nature. This pain is worse with prolonged sitting. She denies any radiating leg pain. She often feels as though as she is leaning over while walking. She often feels as though her balance is off. She continues to take Gabapentin with benefit. She denies any other health changes. His pain today is 2/10.     Interval History 12/11/2018:  The patient returns to clinic today for follow up. She reports increased low back and bilateral buttock pain, left greater than right. She describes this pain as sore in nature. This pain is worse with prolonged sitting. She denies any radiating leg pain. She reports that she often feels like her back is locking up on her. She continues to take Gabapentin with benefit. She denies any other health changes. Her pain today is 5/10.    Interval History 9/25/2018:  The patient returns to clinic  today for follow up. She is s/p right L5 and S1 TF LEONEL. She reports 90% relief of her low back and leg pain. She reports intermittent low back pain that is aching in nature. She denies any radiating leg pain today. Her back pain is worse with bending. She also reports that her legs feel heavy with prolonged walking. She denies any other health changes. Her pain today is 4/10.    Interval History 7/27/2018:  The patient returns to clinic today for follow up and image review. She continues to report low back pain that radiates down the posterior aspect of her right leg to her foot. She reports intermittent left leg pain in the same distribution. Her pain is worse with prolonged walking and activity. She often feels like her legs are heavy. She often feels like she will fall. She denies any other health changes. She denies any bowel or bladder incontinence. Her pain today is 2/10.    Interval History 7/20/2018:  The patient returns to clinic today for follow up. She has not been seen in over a year due to her son being diagnosed with cancer. She reports that he is in remission now. She returns today due to three fall recently, last a week ago. She continues to report low back pain that radiates intermittently down the posterior portion of both legs to her feet. She reports that her legs often feel heavy and weak which is why she falls. She denies any bowel or bladder incontinence. Her pain today is 7/10.    Interval History 3/6/2017:  The patient returns to clinic today for follow up. She is s/p bilateral S1 TF LEONEL on 2/8/2017. She reports 80% relief of her radiating leg pain. She continues to have low back pain. She describes the pain as dull and aching. The pain is worse in the morning. She reports that the pain increases with prolonged sitting and standing. She denies any other health changes. She denies any bowel or bladder incontinence. Her pain today is 6/10.     Interval History 1/23/2017:  The patient returns  "to clinic today for follow up of low back pain and bilateral leg pain. She has recently completed a medrol dose pack with some benefit. She still reports radiating pain into both legs. The pain is worse with prolonged sitting and lifting. The pain gets better with walking and rest. The pain radiates down the back of both legs to ankles. She denies any bowel or bladder incontinence or signs of saddle paresthesia. She continues to work with lifting restrictions. She currently takes Gabapentin and Naproxen with benefit. Her pain today is 6/10.     Initial encounter:    Mila Foster presents to the clinic for the evaluation of bilateral leg pain with intermittent lower back pain. 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 She reports recent falling to knees, but she changed her shoes, and the falling has ceased. She reports that she had a "pain flare" 2 weeks ago requiring a walker for 2 days.     Brief history:  70yo F with hx of HTN and DM complicated by neuropathy BL but R>L with tingling, tightness, shooting pain only in feet. Patient reports glucose was 120 this morning and usually runs around 130.    Pain Description:    The pain is located in the posterior leg area and radiates to her ankles and to her knees at its worst.     At BEST  1/10     At WORST  8/10 on the WORST day.      On average pain is rated as 7/10    Today the pain is rated as 5/10    The pain is described as aching, burning, dull, sharp and tight band      Symptoms interfere with work.     Exacerbating factors: Sitting, picking up children, lifting, bending over    Mitigating factors: walking, rest    Patient denies night fever/night sweats, bowel incontinence, significant weight loss, significant motor weakness and loss of sensations. She does report chronic urinary incontinence unrelated to back/leg pain.    Patient denies any suicidal or homicidal " ideations    Pain Medications:  Current:  Gabapentin    Tried in Past:  NSAIDs -yes Naproxen   TCA -Never  SNRI -Never  Anti-convulsants -gabapentin  Muscle Relaxants -Never  Opioids-Never    Physical Therapy/Home Exercise: yes       report: N/A    Pain Procedures:   2/8/2017- Bilateral S1 TF LEONEL  3/22/2017- Bilateral L2,3,4,5 MBB  9/11/2018- Right L5 and S1 TF LEONEL   12/27/2018- Bilateral SI joint injections  5/21/2019- Bilateral L2,3,4,5 MBB  7/25/2019- Right L3,4,5 RFA  8/6/2019- Left L3,4,5 RFA  11/7/2019- Bilateral SI joint injection     Chiropractor -never  Acupuncture - never  TENS unit -never  Spinal decompression -never  Joint replacement -never    Imaging:     EMG 1/2020: Diabetic polyneuropathy    MRI Lumbar Spine 9/7/2019:  COMPARISON:  07/25/2018    FINDINGS:  The distal cord/conus demonstrates normal size and signal.  No evidence of osteomyelitis, marrow replacement process, or acute fracture.  No paraspinal masses.    L1-2 is unremarkable.    At L2-3, there is mild facet arthropathy and disc bulging.  No spinal canal stenosis or significant neural foraminal narrowing.    At L3-4, there is mild disc bulging and bilateral facet arthropathy.  No spinal canal stenosis or significant neural foraminal narrowing.    At L4-5, there is bilateral facet arthropathy with slight L4-5 anterolisthesis.  No spinal canal stenosis..  There is mild right-sided neural foraminal narrowing.    At L5-S1, there is mild bilateral facet arthropathy and disc bulging.  Small posterior annular tear with right paracentral/lateral recess disc protrusion, possibly impinging upon the right L5 or S1 nerve roots.  No spinal canal stenosis or significant neural foraminal narrowing.      Impression       Small right paracentral/ lateral recess disc protrusion at L5-S1 and mild neural foraminal narrowing at L4-5, as above.  No spinal canal stenosis.  Findings are similar to the 07/25/2018 exam.     Xray Lumbar Spine  7/25/2018:  COMPARISON:  Prior lumbar spine MRI dated 12/16/16    FINDINGS:  There is diffuse osteopenia.  There is mild grade 1 anterolisthesis of L3 on L4 (4 mm) and of L4 on L5 (6 mm).  There is no evidence of translational instability.  There is moderate facet arthropathy at the lower lumbar spine with probable L5-S1 neural foraminal narrowing.  Endplate degenerative changes are present with subchondral sclerosis and marginal osteophyte formation.    There is atherosclerotic calcification of the aorta.      Impression       Osteopenia and degenerative change of the lumbar spine with grade 1 anterolisthesis of L3-4 and L4-5, new from the 2016 MRI.  No evidence of translational instability.             Past Medical History:   Diagnosis Date    Diabetes     Hypertension      Past Surgical History:   Procedure Laterality Date    CHOLECYSTECTOMY      COLONOSCOPY N/A 11/1/2018    Procedure: COLONOSCOPY Suprep;  Surgeon: Azucena Hood MD;  [facility] ENDO;  Service: Endoscopy;  Laterality: N/A;    HYSTERECTOMY      INJECTION OF ANESTHETIC AGENT AROUND NERVE Bilateral 5/21/2019    Procedure: BLOCK, NERVE, BILATERAL L2,3,4,5;  Surgeon: Bonnie Duncan MD;  Location: [facility] PAIN [facility];  Service: Pain Management;  Laterality: Bilateral;  BLOCK, NERVE, BILATERAL L2,3,4,5    INJECTION OF JOINT Bilateral 12/27/2018    Procedure: Injection, Joint BILATERAL SACROILIAC JOINT INJECTION;  Surgeon: Bonnie Duncan MD;  Location: [facility] PAIN T;  Service: Pain Management;  Laterality: Bilateral;    INJECTION OF JOINT Bilateral 11/7/2019    Procedure: INJECTION, JOINT, SI;  Surgeon: Bonnie Duncan MD;  [facility];  Service: Pain Management;  Laterality: Bilateral;  B/L SI Joint Injections    RADIOFREQUENCY ABLATION Right 7/25/2019    Procedure: RADIOFREQUENCY ABLATION;  Surgeon: Bonnie Duncan MD;  [facility];  Service: Pain Management;  Laterality: Right;  Right RFA L2,3,4,5;  THERMAL  1 of 2    RADIOFREQUENCY ABLATION Left 8/6/2019    Procedure: RADIOFREQUENCY ABLATION;  Surgeon: Bonnie Duncan MD;  Location: [facility];  Service: Pain Management;  Laterality: Left;  Left RFA L2,3,4,5 LUMBAR  1 of 2     Social History     Socioeconomic History    Marital status: Single     Spouse name: Not on file    Number of children: Not on file    Years of education: Not on file    Highest education level: Not on file   Occupational History    Not on file   Social Needs    Financial resource strain: Not on file    Food insecurity:     Worry: Not on file     Inability: Not on file    Transportation needs:     Medical: Not on file     Non-medical: Not on file   Tobacco Use    Smoking status: Never Smoker    Smokeless tobacco: Never Used   Substance and Sexual Activity    Alcohol use: No     Frequency: Never    Drug use: No    Sexual activity: Not Currently   Lifestyle    Physical activity:     Days per week: Not on file     Minutes per session: Not on file    Stress: Not on file   Relationships    Social connections:     Talks on phone: Not on file     Gets together: Not on file     Attends Muslim service: Not on file     Active member of club or organization: Not on file     Attends meetings of clubs or organizations: Not on file     Relationship status: Not on file   Other Topics Concern    Not on file   Social History Narrative    Not on file     Family History   Problem Relation Age of Onset    Vaginal cancer Neg Hx     Endometrial cancer Neg Hx     Cervical cancer Neg Hx        Review of patient's allergies indicates:  No Known Allergies    Current Outpatient Medications   Medication Sig    aspirin (ECOTRIN) 81 MG EC tablet 81 mg.    FLUoxetine 20 MG capsule TK ONE C PO  QD    gabapentin (NEURONTIN) 300 MG capsule Take 2 tabs in the morning, 1 tab in the afternoon, and 2 tabs in the evening    hyoscyamine (ANASPAZ,LEVSIN) 0.125 mg Tab Take 1 tablet (125 mcg  total) by mouth every 4 (four) hours as needed.    metformin (GLUCOPHAGE-XR) 500 MG 24 hr tablet 500 mg.    metoprolol tartrate (LOPRESSOR) 25 MG tablet     mirabegron (MYRBETRIQ) 25 mg Tb24 ER tablet Take 1 tablet (25 mg total) by mouth once daily.    naproxen (NAPROSYN) 500 MG tablet     omeprazole (PRILOSEC) 20 MG capsule 20 mg.    pravastatin (PRAVACHOL) 40 MG tablet 40 mg.     No current facility-administered medications for this visit.      Facility-Administered Medications Ordered in Other Visits   Medication    0.9%  NaCl infusion       REVIEW OF SYSTEMS:    GENERAL:  No weight loss, malaise or fevers.  HEENT:   No recent changes in vision or hearing  NECK:  Negative for lumps, no difficulty with swallowing.  RESPIRATORY:  Negative for cough, wheezing or shortness of breath, patient denies any recent URI.  CARDIOVASCULAR:  Negative for chest pain, leg swelling or palpitations. HTN.  GI:  Negative for abdominal discomfort, blood in stools or black stools or change in bowel habits.  MUSCULOSKELETAL:  See HPI.  SKIN:  Negative for lesions, rash, and itching.  PSYCH:  No mood disorder or recent psychosocial stressors.  Patient's sleep is occasionally disturbed secondary to pain.  HEMATOLOGY/LYMPHOLOGY:  Negative for prolonged bleeding, bruising easily or swollen nodes.  Patient is not currently taking any anti-coagulants  ENDO: Positive for DM. Negative for thyroid dysfunction  NEURO:   No history of headaches, syncope, paralysis, seizures or tremors.  All other reviewed and negative other than HPI.    OBJECTIVE:    There were no vitals taken for this visit.    PHYSICAL EXAMINATION:    FINDINGS BELOW ARE FROM PREVIOUS ENCOUNTER, AS THIS ENCOUNTER VIA TELEHEALTH VISIT    GENERAL: Well appearing, in no acute distress, alert and oriented x3.  PSYCH:  Mood and affect appropriate.  SKIN: Skin color, texture, turgor normal, no rashes or lesions.  HEAD/FACE:  Normocephalic, atraumatic. Cranial nerves grossly  intact.   CV: RRR with palpation of the radial artery.  PULM: No evidence of respiratory difficulty, symmetric chest rise.  BACK: Straight leg raising in the siting position is negative for radicular pain bilaterally. There is mild pain with palpation over lumbar spine. Limited ROM with mild pain on extension. Positive facet loading bilaterally.  LOWER EXTREMITIES: Peripheral joint ROM is full and pain free without obvious instability or laxity in lower extremities. No deformities, edema, or skin discoloration. Good capillary refill.  MUSCULOSKELETAL: Hip and knee provocative maneuvers are negative.  There is mild pain with palpation over the sacroiliac joints bilaterally.  FABERs test is negative bilaterally.  FADIRs test is negative. 5/5 strength in right ankle with plantar and dorsiflexion. 4/5 strength in left ankle with plantar and dorsiflexion. 5/5 strength with right knee flexion and extension. 5/5 strength with left knee flexion and extension. 5/5 strength in right EHL, 4/5 strength in left EHL.  No atrophy or tone abnormalities are noted.  NEURO: Bilateral lower extremity coordination and muscle stretch reflexes are physiologic and symmetric.   No loss of sensation is noted.  GAIT: Antalgic, shuffling gait- ambulates with walker.     No results found for: LABA1C, HGBA1C  No results found for: WBC, HGB, HCT, MCV, PLT    BMP  No results found for: NA, K, CL, CO2, BUN, CREATININE, CALCIUM, ANIONGAP, ESTGFRAFRICA, EGFRNONAA    ASSESSMENT: 75 y.o. year old female with back/leg pain, consistent with the following:    Encounter Diagnoses   Name Primary?    Lumbar disc herniation with radiculopathy Yes    Lumbar spondylosis     Sacroiliitis        PLAN:     - A1C ordered today    - Referral to Internal Medicine for diabetic hyperglycemia management in setting of polyneuropathy affecting gait    - Will inquire into ordering home walker assistance device for patient    - Continue Gabapentin 600/300/600 mg  qday    - She is s/p bilateral SI joint injections with relief.     - If short term relief, consider sacral RFA    - Consider repeat LEONEL.     - She is s/p lumbar RFA with some benefit. We will continue to monitor progress.    - Continue physical therapy.     - Continue follow up with Neurology.     - Follow up in 1 Month    The above plan and management options were discussed at length with patient. Patient is in agreement with the above and verbalized understanding.     Jason Coronado MD  04/20/2020    I have personally reviewed the phone encounter with resident/fellow/NP and personally spoke with patient for over 11 min after addressing all questions and concerns   The phone call was initiated by patient who consented and verbalized understanding to the type of encounter not related to any office visit or other encounter in the past 7 days    Bonnie Duncan MD 04/20/2020

## 2020-04-21 ENCOUNTER — TELEPHONE (OUTPATIENT)
Dept: PAIN MEDICINE | Facility: CLINIC | Age: 75
End: 2020-04-21

## 2020-05-05 ENCOUNTER — LAB VISIT (OUTPATIENT)
Dept: LAB | Facility: HOSPITAL | Age: 75
End: 2020-05-05
Attending: INTERNAL MEDICINE
Payer: MEDICARE

## 2020-05-05 DIAGNOSIS — D47.2 MGUS (MONOCLONAL GAMMOPATHY OF UNKNOWN SIGNIFICANCE): ICD-10-CM

## 2020-05-05 LAB
ALBUMIN SERPL BCP-MCNC: 3.9 G/DL (ref 3.5–5.2)
ALP SERPL-CCNC: 74 U/L (ref 55–135)
ALT SERPL W/O P-5'-P-CCNC: 14 U/L (ref 10–44)
ANION GAP SERPL CALC-SCNC: 10 MMOL/L (ref 8–16)
AST SERPL-CCNC: 16 U/L (ref 10–40)
BASOPHILS # BLD AUTO: 0.04 K/UL (ref 0–0.2)
BASOPHILS NFR BLD: 0.5 % (ref 0–1.9)
BILIRUB SERPL-MCNC: 0.8 MG/DL (ref 0.1–1)
BUN SERPL-MCNC: 15 MG/DL (ref 8–23)
CALCIUM SERPL-MCNC: 10.2 MG/DL (ref 8.7–10.5)
CHLORIDE SERPL-SCNC: 103 MMOL/L (ref 95–110)
CO2 SERPL-SCNC: 26 MMOL/L (ref 23–29)
CREAT SERPL-MCNC: 1 MG/DL (ref 0.5–1.4)
DIFFERENTIAL METHOD: ABNORMAL
EOSINOPHIL # BLD AUTO: 0.1 K/UL (ref 0–0.5)
EOSINOPHIL NFR BLD: 1.7 % (ref 0–8)
ERYTHROCYTE [DISTWIDTH] IN BLOOD BY AUTOMATED COUNT: 11.9 % (ref 11.5–14.5)
EST. GFR  (AFRICAN AMERICAN): >60 ML/MIN/1.73 M^2
EST. GFR  (NON AFRICAN AMERICAN): 55 ML/MIN/1.73 M^2
GLUCOSE SERPL-MCNC: 208 MG/DL (ref 70–110)
HCT VFR BLD AUTO: 45.9 % (ref 37–48.5)
HGB BLD-MCNC: 15.2 G/DL (ref 12–16)
IMM GRANULOCYTES # BLD AUTO: 0.05 K/UL (ref 0–0.04)
IMM GRANULOCYTES NFR BLD AUTO: 0.6 % (ref 0–0.5)
LYMPHOCYTES # BLD AUTO: 2.3 K/UL (ref 1–4.8)
LYMPHOCYTES NFR BLD: 27.9 % (ref 18–48)
MCH RBC QN AUTO: 29.9 PG (ref 27–31)
MCHC RBC AUTO-ENTMCNC: 33.1 G/DL (ref 32–36)
MCV RBC AUTO: 90 FL (ref 82–98)
MONOCYTES # BLD AUTO: 0.6 K/UL (ref 0.3–1)
MONOCYTES NFR BLD: 7.4 % (ref 4–15)
NEUTROPHILS # BLD AUTO: 5.2 K/UL (ref 1.8–7.7)
NEUTROPHILS NFR BLD: 61.9 % (ref 38–73)
NRBC BLD-RTO: 0 /100 WBC
PLATELET # BLD AUTO: 289 K/UL (ref 150–350)
PMV BLD AUTO: 10.6 FL (ref 9.2–12.9)
POTASSIUM SERPL-SCNC: 4.1 MMOL/L (ref 3.5–5.1)
PROT SERPL-MCNC: 7.6 G/DL (ref 6–8.4)
RBC # BLD AUTO: 5.08 M/UL (ref 4–5.4)
SODIUM SERPL-SCNC: 139 MMOL/L (ref 136–145)
WBC # BLD AUTO: 8.39 K/UL (ref 3.9–12.7)

## 2020-05-05 PROCEDURE — 84165 PROTEIN E-PHORESIS SERUM: CPT

## 2020-05-05 PROCEDURE — 84165 PATHOLOGIST INTERPRETATION SPE: ICD-10-PCS | Mod: 26,,, | Performed by: PATHOLOGY

## 2020-05-05 PROCEDURE — 83520 IMMUNOASSAY QUANT NOS NONAB: CPT | Mod: 59

## 2020-05-05 PROCEDURE — 86334 IMMUNOFIX E-PHORESIS SERUM: CPT

## 2020-05-05 PROCEDURE — 85025 COMPLETE CBC W/AUTO DIFF WBC: CPT

## 2020-05-05 PROCEDURE — 80053 COMPREHEN METABOLIC PANEL: CPT

## 2020-05-05 PROCEDURE — 84165 PROTEIN E-PHORESIS SERUM: CPT | Mod: 26,,, | Performed by: PATHOLOGY

## 2020-05-05 PROCEDURE — 86334 PATHOLOGIST INTERPRETATION IFE: ICD-10-PCS | Mod: 26,,, | Performed by: PATHOLOGY

## 2020-05-05 PROCEDURE — 36415 COLL VENOUS BLD VENIPUNCTURE: CPT

## 2020-05-05 PROCEDURE — 86334 IMMUNOFIX E-PHORESIS SERUM: CPT | Mod: 26,,, | Performed by: PATHOLOGY

## 2020-05-06 ENCOUNTER — HOSPITAL ENCOUNTER (EMERGENCY)
Facility: HOSPITAL | Age: 75
Discharge: HOME | End: 2020-05-06
Attending: EMERGENCY MEDICINE
Payer: MEDICARE

## 2020-05-06 VITALS
TEMPERATURE: 97 F | DIASTOLIC BLOOD PRESSURE: 89 MMHG | RESPIRATION RATE: 18 BRPM | SYSTOLIC BLOOD PRESSURE: 167 MMHG | HEART RATE: 62 BPM | OXYGEN SATURATION: 100 %

## 2020-05-06 DIAGNOSIS — E11.42 DIABETIC POLYNEUROPATHY ASSOCIATED WITH TYPE 2 DIABETES MELLITUS: ICD-10-CM
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
## 2020-05-06 LAB
ALBUMIN SERPL ELPH-MCNC: 4.12 G/DL (ref 3.35–5.55)
ALPHA1 GLOB SERPL ELPH-MCNC: 0.29 G/DL (ref 0.17–0.41)
ALPHA2 GLOB SERPL ELPH-MCNC: 0.78 G/DL (ref 0.43–0.99)
B-GLOBULIN SERPL ELPH-MCNC: 0.89 G/DL (ref 0.5–1.1)
GAMMA GLOB SERPL ELPH-MCNC: 0.93 G/DL (ref 0.67–1.58)
KAPPA LC SER QL IA: 1.53 MG/DL (ref 0.33–1.94)
KAPPA LC/LAMBDA SER IA: 0.55 (ref 0.26–1.65)
LAMBDA LC SER QL IA: 2.77 MG/DL (ref 0.57–2.63)
PROT SERPL-MCNC: 7 G/DL (ref 6–8.4)

## 2020-05-06 PROCEDURE — 99284 PR EMERGENCY DEPT VISIT,LEVEL IV: ICD-10-PCS | Mod: ,,, | Performed by: EMERGENCY MEDICINE

## 2020-05-06 PROCEDURE — 99284 EMERGENCY DEPT VISIT MOD MDM: CPT | Mod: ,,, | Performed by: EMERGENCY MEDICINE

## 2020-05-06 PROCEDURE — 99283 EMERGENCY DEPT VISIT LOW MDM: CPT

## 2020-05-07 ENCOUNTER — OFFICE VISIT (OUTPATIENT)
Dept: HEMATOLOGY/ONCOLOGY | Facility: CLINIC | Age: 75
End: 2020-05-07
Payer: MEDICARE

## 2020-05-07 ENCOUNTER — TELEPHONE (OUTPATIENT)
Dept: HEMATOLOGY/ONCOLOGY | Facility: CLINIC | Age: 75
End: 2020-05-07

## 2020-05-07 DIAGNOSIS — D47.2 MGUS (MONOCLONAL GAMMOPATHY OF UNKNOWN SIGNIFICANCE): Primary | ICD-10-CM

## 2020-05-07 LAB
INTERPRETATION SERPL IFE-IMP: NORMAL
PATHOLOGIST INTERPRETATION IFE: NORMAL
PATHOLOGIST INTERPRETATION SPE: NORMAL

## 2020-05-07 PROCEDURE — 3288F PR FALLS RISK ASSESSMENT DOCUMENTED: ICD-10-PCS | Mod: CPTII,95,, | Performed by: INTERNAL MEDICINE

## 2020-05-07 PROCEDURE — 1100F PR PT FALLS ASSESS DOC 2+ FALLS/FALL W/INJURY/YR: ICD-10-PCS | Mod: CPTII,95,, | Performed by: INTERNAL MEDICINE

## 2020-05-07 PROCEDURE — 1100F PTFALLS ASSESS-DOCD GE2>/YR: CPT | Mod: CPTII,95,, | Performed by: INTERNAL MEDICINE

## 2020-05-07 PROCEDURE — 99213 OFFICE O/P EST LOW 20 MIN: CPT | Mod: 95,,, | Performed by: INTERNAL MEDICINE

## 2020-05-07 PROCEDURE — 1159F PR MEDICATION LIST DOCUMENTED IN MEDICAL RECORD: ICD-10-PCS | Mod: 95,,, | Performed by: INTERNAL MEDICINE

## 2020-05-07 PROCEDURE — 3288F FALL RISK ASSESSMENT DOCD: CPT | Mod: CPTII,95,, | Performed by: INTERNAL MEDICINE

## 2020-05-07 PROCEDURE — 99213 PR OFFICE/OUTPT VISIT, EST, LEVL III, 20-29 MIN: ICD-10-PCS | Mod: 95,,, | Performed by: INTERNAL MEDICINE

## 2020-05-07 PROCEDURE — 1159F MED LIST DOCD IN RCRD: CPT | Mod: 95,,, | Performed by: INTERNAL MEDICINE

## 2020-05-07 NOTE — ED NOTES
Discharge instructions, diagnosis, medications, and follow up discussed with patient. Patient verbalized understanding. All questions and concerns answered. No needs expressed at the time. Pt is awake, alert and oriented with no acute distress noted. Respirations even and unlabored. Wheeled out of ED by RN with mask in place.

## 2020-05-07 NOTE — ED PROVIDER NOTES
Encounter Date: 2020       History     Chief Complaint   Patient presents with    Extremity Weakness     Pt was walking around grocery store today when she began to feel lower bilateral extremity weakness so she sat down. Pt AAOx4.  Pts POCT B.      Ms. Foster is a 75-year-old female with diabetes mellitus complicated by polyneuropathy and back pain who presents to [facility] ED via EMS after falling in the grocery store this evening.  She states that she has been having issues with her balance for the past 6 months.  Per medical record review she has undergone extensive workup for this issue and as an EMG which shows severe diabetic polyneuropathy of her right leg.  The patient normally uses a walker at home with breaks however she was without her walker in the supermarket and using a shopping cart to assist her ambulation.  While ambulating she got the sensation that someone was pushing a her from behind and she could not stop.  To avoid falling or aggression, she statin to the ground.  She did not hit her head or lose consciousness.         Review of patient's allergies indicates:  No Known Allergies  Past Medical History:   Diagnosis Date    Diabetes     Hypertension      Past Surgical History:   Procedure Laterality Date    CHOLECYSTECTOMY      COLONOSCOPY N/A 2018    Procedure: COLONOSCOPY Suprep;  Surgeon: Azucena Hood MD;  Location: [facility];  Service: Endoscopy;  Laterality: N/A;    HYSTERECTOMY      INJECTION OF ANESTHETIC AGENT AROUND NERVE Bilateral 2019    Procedure: BLOCK, NERVE, BILATERAL L2,3,4,5;  Surgeon: Bonnie Duncan MD;  Location: [facility];  Service: Pain Management;  Laterality: Bilateral;  BLOCK, NERVE, BILATERAL L2,3,4,5    INJECTION OF JOINT Bilateral 2018    Procedure: Injection, Joint BILATERAL SACROILIAC JOINT INJECTION;  Surgeon: Bonnie Duncan MD;  Location: [facility];  Service: Pain Management;  Laterality: Bilateral;    INJECTION OF  JOINT Bilateral 11/7/2019    Procedure: INJECTION, JOINT, SI;  Surgeon: Bonnie Duncan MD;  Location: [facility];  Service: Pain Management;  Laterality: Bilateral;  B/L SI Joint Injections    RADIOFREQUENCY ABLATION Right 7/25/2019    Procedure: RADIOFREQUENCY ABLATION;  Surgeon: Bonnie Duncan MD;  Location: [facility];  Service: Pain Management;  Laterality: Right;  Right RFA L2,3,4,5; THERMAL  1 of 2    RADIOFREQUENCY ABLATION Left 8/6/2019    Procedure: RADIOFREQUENCY ABLATION;  Surgeon: Bonnie Duncan MD;  Location: [facility];  Service: Pain Management;  Laterality: Left;  Left RFA L2,3,4,5 LUMBAR  1 of 2     Family History   Problem Relation Age of Onset    Vaginal cancer Neg Hx     Endometrial cancer Neg Hx     Cervical cancer Neg Hx      Social History     Tobacco Use    Smoking status: Never Smoker    Smokeless tobacco: Never Used   Substance Use Topics    Alcohol use: No     Frequency: Never    Drug use: No     Review of Systems   Constitutional: Negative for fever.   HENT: Negative for congestion.    Eyes: Negative for visual disturbance.   Respiratory: Negative for shortness of breath.    Cardiovascular: Negative for chest pain.   Gastrointestinal: Negative for abdominal pain.   Genitourinary: Negative for dysuria.   Musculoskeletal: Positive for back pain.   Skin: Negative for wound.   Neurological: Negative for weakness.        Difficulty balancing   Hematological: Does not bruise/bleed easily.   Psychiatric/Behavioral: Negative for confusion.       Physical Exam     Initial Vitals [05/06/20 2151]   BP Pulse Resp Temp SpO2   (!) 152/88 80 18 97.1 °F (36.2 °C) 98 %      MAP       --         Physical Exam    Constitutional: She appears well-developed and well-nourished. She is not diaphoretic. No distress.   HENT:   Head: Normocephalic and atraumatic.   Eyes: EOM are normal. Pupils are equal, round, and reactive to light.   Neck: Normal range of motion. Neck supple.    Cardiovascular: Normal rate, regular rhythm and intact distal pulses.   Pulmonary/Chest: No respiratory distress.   Abdominal: Soft. She exhibits no distension.   Musculoskeletal: Normal range of motion. She exhibits no edema.   Neurological: She is alert and oriented to person, place, and time. She has normal strength. No sensory deficit. GCS score is 15. GCS eye subscore is 4. GCS verbal subscore is 5. GCS motor subscore is 6.   Skin: Skin is warm and dry. No rash noted. No erythema.   Psychiatric: She has a normal mood and affect.         ED Course   Procedures  Labs Reviewed - No data to display       Imaging Results    None          Medical Decision Making:   Initial Assessment:   75-year-old female who fell in the grocery store earlier this evening.  She has a walker at home however was not using it occurs disorder.  Differential Diagnosis:   Diabetic polyneuropathy  ED Management:  Patient was evaluated in the ED.  Strength 5/5 throughout, no sensory defect  speech intact  Oriented x3  No evidence of stroke    Patient was subsequently discharged with instructions to see her primary care physician as soon as she can make an appointment.              Attending Attestation:   Physician Attestation Statement for Resident:  As the supervising MD   Physician Attestation Statement: I have personally seen and examined this patient.   I agree with the above history. -:   As the supervising MD I agree with the above PE.   -: Normal gait, no weakness, no facial droop, normal speech, no AMS   As the supervising MD I agree with the above treatment, course, plan, and disposition.                                  Clinical Impression:       ICD-10-CM ICD-9-CM   1. Fall, initial encounter W19.XXXA E888.9   2. Diabetic polyneuropathy associated with type 2 diabetes mellitus E11.42 250.60     357.2         Disposition:   Disposition: Discharged  Condition: Stable     ED Disposition Condition    Discharge Stable        ED  Prescriptions     None        Follow-up Information     Follow up With Specialties Details Why Contact Info    Dinesh Connors MD Family Medicine Schedule an appointment as soon as possible for a visit   [address] [facility] [address]  [phone]                                       Colt Roberto MD  Resident  05/06/20 0947       Jayant Vargas MD  05/06/20 1687

## 2020-05-07 NOTE — ED TRIAGE NOTES
Pt was at the grocery store when she felt weak. Pt proceeded to lower herself to the ground and hit her lip on the wheel of the grocery cart. Pt AAO X4. Swelling noted to upper lip. Denies LOC.       Mila Foster, a 75 y.o. female presents to the ED w/ complaint of extremity weakness    Triage note:  Chief Complaint   Patient presents with    Extremity Weakness     Pt was walking around grocery store today when she began to feel lower bilateral extremity weakness so she sat down. Pt AAOx4.  Pts POCT B.      Review of patient's allergies indicates:  No Known Allergies  Past Medical History:   Diagnosis Date    Diabetes     Hypertension        Adult Physical Assessment  LOC: Mila Foster, 75 y.o. female verified via two identifiers.  The patient is awake, alert, oriented and speaking appropriately at this time.  APPEARANCE: Patient resting comfortably and appears to be in no acute distress at this time. Patient is clean and well groomed, patient's clothing is properly fastened.  SKIN:The skin is warm and dry, color consistent with ethnicity, patient has normal skin turgor and moist mucus membranes, skin intact, no breakdown or brusing noted.  MUSCULOSKELETAL: Patient moving all extremities well, no obvious swelling or deformities noted.  RESPIRATORY: Airway is open and patent, respirations are spontaneous, patient has a normal effort and rate, no accessory muscle use noted.  CARDIAC: Patient has a normal rate and rhythm, no periphreal edema noted in any extremity, capillary refill < 3 seconds in all extremities  ABDOMEN: Soft and non tender to palpation, no abdominal distention noted. Bowel sounds present in all four quadrants.  NEUROLOGIC: Eyes open spontaneously, behavior appropriate to situation, follows commands, facial expression symmetrical, bilateral hand grasp equal and even, purposeful motor response noted, normal sensation in all extremities when touched with a finger.

## 2020-05-07 NOTE — PROGRESS NOTES
PATIENT: Mila Foster  MRN: [id number]  DATE: 5/7/2020    The patient location is: home  The chief complaint leading to consultation is:  MGUS  Visit type: audio only  Total time spent with patient:  10 min  Each patient to whom he or she provides medical services by telemedicine is:  (1) informed of the relationship between the physician and patient and the respective role of any other health care provider with respect to management of the patient; and (2) notified that he or she may decline to receive medical services by telemedicine and may withdraw from such care at any time.    Diagnosis:   1. MGUS (monoclonal gammopathy of unknown significance)      Chief Complaint:  MGUS    Subjective:     History of Present Illness: 74-year-old female referred for abnormal SPEP.    She saw Neurology for abnormal gait.  SPEP was done for further evaluation on 10/29/2019.  It showed 0.19 grams/deciliter IgG lambda specific monoclonal protein.    Past Medical History:   Past Medical History:   Diagnosis Date    Diabetes     Hypertension        Past Surgical History:   Past Surgical History:   Procedure Laterality Date    CHOLECYSTECTOMY      COLONOSCOPY N/A 11/1/2018    Procedure: COLONOSCOPY Suprep;  Surgeon: Azucena Hood MD;  Location: [facility];  Service: Endoscopy;  Laterality: N/A;    HYSTERECTOMY      INJECTION OF ANESTHETIC AGENT AROUND NERVE Bilateral 5/21/2019    Procedure: BLOCK, NERVE, BILATERAL L2,3,4,5;  Surgeon: Bonnie Duncan MD;  Location: [facility];  Service: Pain Management;  Laterality: Bilateral;  BLOCK, NERVE, BILATERAL L2,3,4,5    INJECTION OF JOINT Bilateral 12/27/2018    Procedure: Injection, Joint BILATERAL SACROILIAC JOINT INJECTION;  Surgeon: Bonnie Duncan MD;  Location: [facility];  Service: Pain Management;  Laterality: Bilateral;    INJECTION OF JOINT Bilateral 11/7/2019    Procedure: INJECTION, JOINT, SI;  Surgeon: Bonnie Duncan MD;  Location: [facility];   Service: Pain Management;  Laterality: Bilateral;  B/L SI Joint Injections    RADIOFREQUENCY ABLATION Right 7/25/2019    Procedure: RADIOFREQUENCY ABLATION;  Surgeon: Bonnie Duncan MD;  Location: [facility];  Service: Pain Management;  Laterality: Right;  Right RFA L2,3,4,5; THERMAL  1 of 2    RADIOFREQUENCY ABLATION Left 8/6/2019    Procedure: RADIOFREQUENCY ABLATION;  Surgeon: Bonnie Duncan MD;  Location: [facility];  Service: Pain Management;  Laterality: Left;  Left RFA L2,3,4,5 LUMBAR  1 of 2       Family History:   Family History   Problem Relation Age of Onset    Vaginal cancer Neg Hx     Endometrial cancer Neg Hx     Cervical cancer Neg Hx        Social History:  reports that she has never smoked. She has never used smokeless tobacco. She reports that she does not drink alcohol or use drugs.    Allergies:  Review of patient's allergies indicates:  No Known Allergies    Medications:  Current Outpatient Medications   Medication Sig Dispense Refill    aspirin (ECOTRIN) 81 MG EC tablet 81 mg.  2    FLUoxetine 20 MG capsule TK ONE C PO  QD  1    gabapentin (NEURONTIN) 300 MG capsule Take 2 tabs in the morning, 1 tab in the afternoon, and 2 tabs in the evening 150 capsule 5    hyoscyamine (ANASPAZ,LEVSIN) 0.125 mg Tab Take 1 tablet (125 mcg total) by mouth every 4 (four) hours as needed. 30 tablet 6    metformin (GLUCOPHAGE-XR) 500 MG 24 hr tablet 500 mg.  1    metoprolol tartrate (LOPRESSOR) 25 MG tablet   1    mirabegron (MYRBETRIQ) 25 mg Tb24 ER tablet Take 1 tablet (25 mg total) by mouth once daily. 30 tablet 1    naproxen (NAPROSYN) 500 MG tablet   1    omeprazole (PRILOSEC) 20 MG capsule 20 mg.  1    pravastatin (PRAVACHOL) 40 MG tablet 40 mg.  1     No current facility-administered medications for this visit.      Facility-Administered Medications Ordered in Other Visits   Medication Dose Route Frequency Provider Last Rate Last Dose    0.9%  NaCl infusion  500 mL Intravenous  Continuous Kapil Baum MD           Review of Systems  CONSTITUTIONAL: Weight stable. Appetite good. No fevers.  RESPIRATORY: No cough or congestion.  GASTROINTESTINAL: No abdominal pain or nausea. No change in bowel habits.    Objective:     No exam done as this is a virtual visit    Assessment:       1. MGUS (monoclonal gammopathy of unknown significance)      Plan:   She has MGUS - 0.19 gram/deciliter IgG lambda specific paraprotein band    Paraprotein band is stable    Continue to monitor    Repeat labs and follow-up in 6 months

## 2020-05-07 NOTE — DISCHARGE INSTRUCTIONS
Please use walker at all times when at home or out in public.  Avoid issues with poor .    Schedule appointment with her primary care physician as soon as possible.

## 2020-05-07 NOTE — TELEPHONE ENCOUNTER
Scheduled upcoming appts for november----- Message from Mey Flood sent at 5/7/2020  2:50 PM CDT -----  Contact: Patient   Requesting Call back:     Who Called : Mila    Reason of call:  Patient called to notify the doctor that she was not aware that it was a video appt and she does not have any form of having a smart phone or tablet. She would like to speak with someone instead or see the doctor if possible to go over lab results.    Best contact number:  [phone]   Additional information:  No

## 2020-09-18 DIAGNOSIS — N39.41 URGE INCONTINENCE: ICD-10-CM

## 2020-09-18 DIAGNOSIS — R39.15 URINARY URGENCY: ICD-10-CM

## 2020-09-18 NOTE — TELEPHONE ENCOUNTER
LVM: Regarding prescription refill request. Asked patient to call office for follow up exam visit and to discuss mybetriq refill. Office number [phone]

## 2020-11-06 RX ORDER — HYOSCYAMINE SULFATE 0.125 MG
125 TABLET ORAL EVERY 4 HOURS PRN
Qty: 30 TABLET | Refills: 6 | Status: CANCELLED | OUTPATIENT
Start: 2020-11-06 | End: 2020-12-06

## 2020-11-30 ENCOUNTER — TELEPHONE (OUTPATIENT)
Dept: PAIN MEDICINE | Facility: CLINIC | Age: 75
End: 2020-11-30

## 2020-11-30 NOTE — TELEPHONE ENCOUNTER
"This message is for patient in regards to his/her appointment 12/01/20 at 04:00p       [facility] Policy: For the safety of all patients and staff members.     Patient Visitor policy: Due to social distancing and limited seating staff are requesting patient to arrive to their schedule appointments alone. If patient do not need assistance with their visit, we're asking all visitors to remain outside the waiting area.    Upon arriving to facility; patient will have temperature taking and are required to wear a face mask, if patient do not have a face mask one will be provided. Upon arriving patient we ask patients to contact clinic at this number [phone] to notify staff that they have arrived or they may do so by utilizing the Mobile checked in Zo(if patient have patient portal; clinical staff will send a message through there letting them know it's okay to proceed to their visit). Staff will give the patient the "okay" to come up or wait inside their vehicle until clinic is ready for patient to be seen by LESTER Tobias. If you have any questions or concerns please contact [phone]    Pt verbalized understanding and has confirmed appt    "

## 2020-12-15 NOTE — PATIENT INSTRUCTIONS
1.  Urinary incontinence, urge   --Bladder diary for 3-7 days, write the number of times you go to the rest room and associated symptoms of urgency or leakage.   --Empty bladder every 3 hours.  Empty well: wait a minute, lean forward on toilet.    --Avoid dietary irritants (see sheet).  Keep diary x 3-5 days to determine your irritants.  --KEGELS: do 10 in AM and 10 in PM, holding each x 10 seconds.  When you feel urge to go, STOP, KEGEL, and when urge has passed, then go to bathroom.  Start pelvic therapy .    --URGE:  Myrbetriq 25 mg daily.  SE profile reviewed.   Takes 2-4 weeks to see if will have effect.       2. Fecal incontinence  --start Fiber  --Start PT   --Lomotil as needed   --Start antisosmotic .125 hyoscyamine (call peoples to see which they cover)  --We discussed benefits of SNS     3.  R/o IBS- diarrhea   --Recommend GI evaluation     4.  Morbid Obesity: discussed healthy eating habits, patient trying to loose weight on her own if unable then nutritional consult. Obesity has been shown to be an independent risk factor for urinary incontinence. Weight loss has been shown to decrease incidence of urinary incontinence significantly.     5. Prolapse:  Stage 2 posterior vaginal wall prolapse   --Daily pelvic floor exercises as assigned, consider Pelvic floor PT in future  --Non surgical options discussed with patient    --Surgical options discussed such as posterior colporrhaphy.  Risks/ benefits/ alternatives/ succuss and failure rates were reviewed.      [address] [address]  Dept: [phone]  ______________________________________________________________________________    Patient: Nixon More   : 1937   MRN: [id number]   December 15, 2020    INVASIVE PROCEDURE SAFETY CHECKLIST    Date: 12/15/20   Procedure:L subacromial CSI with depomedrol  Patient Name: Nixon More  MRN: [id number]  YOB: 1937    Action: Complete sections as appropriate. Any discrepancy results in a HARD COPY until resolved.     PRE PROCEDURE:  Patient ID verified with 2 identifiers (name and  or MRN): Yes  Procedure and site verified with patient/designee (when able): Yes  Accurate consent documentation in medical record: Yes  H&P (or appropriate assessment) documented in medical record: Yes  H&P must be up to 20 days prior to procedure and updates within 24 hours of procedure as applicable: NA  Relevant diagnostic and radiology test results appropriately labeled and displayed as applicable: Yes  Procedure site(s) marked with provider initials: NA    TIMEOUT:  Time-Out performed immediately prior to starting procedure, including verbal and active participation of all team members addressing the following:Yes  * Correct patient identify  * Confirmed that the correct side and site are marked  * An accurate procedure consent form  * Agreement on the procedure to be done  * Correct patient position  * Relevant images and results are properly labeled and appropriately displayed  * The need to administer antibiotics or fluids for irrigation purposes during the procedure as applicable   * Safety precautions based on patient history or medication use    DURING PROCEDURE: Verification of correct person, site, and procedures any time the responsibility for care of the patient is transferred to another member of the care team.       Prior to injection, verified patient identity using patient's name and date  of birth.  Due to injection administration, patient instructed to remain in clinic for 15 minutes  afterwards, and to report any adverse reaction to me immediately.    Bursa injection was performed.      Drug Amount Wasted:  Yes: 1 mg/ml   Vial/Syringe: Single dose vial  Expiration Date:  4/24 Lidocaine  8/21 - Depo      Ethan Bytonia, [facility]  December 15, 2020

## 2021-03-26 ENCOUNTER — OFFICE VISIT (OUTPATIENT)
Dept: URGENT CARE | Facility: CLINIC | Age: 76
End: 2021-03-26
Payer: MEDICARE

## 2021-03-26 VITALS
HEART RATE: 78 BPM | HEIGHT: 68 IN | SYSTOLIC BLOOD PRESSURE: 128 MMHG | TEMPERATURE: 98 F | OXYGEN SATURATION: 97 % | BODY MASS INDEX: 34.86 KG/M2 | WEIGHT: 230 LBS | DIASTOLIC BLOOD PRESSURE: 75 MMHG

## 2021-03-26 DIAGNOSIS — N30.01 ACUTE CYSTITIS WITH HEMATURIA: Primary | ICD-10-CM

## 2021-03-26 LAB
BILIRUB UR QL STRIP: NEGATIVE
GLUCOSE UR QL STRIP: POSITIVE
KETONES UR QL STRIP: NEGATIVE
LEUKOCYTE ESTERASE UR QL STRIP: POSITIVE
PH, POC UA: 5.5 (ref 5–8)
POC BLOOD, URINE: POSITIVE
POC NITRATES, URINE: POSITIVE
PROT UR QL STRIP: POSITIVE
SP GR UR STRIP: 1 (ref 1–1.03)
UROBILINOGEN UR STRIP-ACNC: NORMAL (ref 0.1–1.1)

## 2021-03-26 PROCEDURE — 99214 OFFICE O/P EST MOD 30 MIN: CPT | Mod: 25,S$GLB,, | Performed by: STUDENT IN AN ORGANIZED HEALTH CARE EDUCATION/TRAINING PROGRAM

## 2021-03-26 PROCEDURE — 87086 URINE CULTURE/COLONY COUNT: CPT | Performed by: STUDENT IN AN ORGANIZED HEALTH CARE EDUCATION/TRAINING PROGRAM

## 2021-03-26 PROCEDURE — 81003 URINALYSIS AUTO W/O SCOPE: CPT | Mod: QW,S$GLB,, | Performed by: STUDENT IN AN ORGANIZED HEALTH CARE EDUCATION/TRAINING PROGRAM

## 2021-03-26 PROCEDURE — 87077 CULTURE AEROBIC IDENTIFY: CPT | Performed by: STUDENT IN AN ORGANIZED HEALTH CARE EDUCATION/TRAINING PROGRAM

## 2021-03-26 PROCEDURE — 87186 SC STD MICRODIL/AGAR DIL: CPT | Performed by: STUDENT IN AN ORGANIZED HEALTH CARE EDUCATION/TRAINING PROGRAM

## 2021-03-26 PROCEDURE — 99214 PR OFFICE/OUTPT VISIT, EST, LEVL IV, 30-39 MIN: ICD-10-PCS | Mod: 25,S$GLB,, | Performed by: STUDENT IN AN ORGANIZED HEALTH CARE EDUCATION/TRAINING PROGRAM

## 2021-03-26 PROCEDURE — 81003 POCT URINALYSIS, DIPSTICK, AUTOMATED, W/O SCOPE: ICD-10-PCS | Mod: QW,S$GLB,, | Performed by: STUDENT IN AN ORGANIZED HEALTH CARE EDUCATION/TRAINING PROGRAM

## 2021-03-26 PROCEDURE — 87088 URINE BACTERIA CULTURE: CPT | Performed by: STUDENT IN AN ORGANIZED HEALTH CARE EDUCATION/TRAINING PROGRAM

## 2021-03-26 RX ORDER — NITROFURANTOIN 25; 75 MG/1; MG/1
100 CAPSULE ORAL 2 TIMES DAILY
Qty: 14 CAPSULE | Refills: 0 | Status: SHIPPED | OUTPATIENT
Start: 2021-03-26 | End: 2021-04-02

## 2021-03-29 ENCOUNTER — TELEPHONE (OUTPATIENT)
Dept: URGENT CARE | Facility: CLINIC | Age: 76
End: 2021-03-29

## 2021-03-29 LAB — BACTERIA UR CULT: ABNORMAL

## 2021-04-19 ENCOUNTER — TELEPHONE (OUTPATIENT)
Dept: PAIN MEDICINE | Facility: CLINIC | Age: 76
End: 2021-04-19

## 2021-04-20 ENCOUNTER — OFFICE VISIT (OUTPATIENT)
Dept: PAIN MEDICINE | Facility: CLINIC | Age: 76
End: 2021-04-20
Payer: MEDICARE

## 2021-04-20 VITALS
HEART RATE: 71 BPM | WEIGHT: 216.06 LBS | SYSTOLIC BLOOD PRESSURE: 126 MMHG | TEMPERATURE: 98 F | BODY MASS INDEX: 33.91 KG/M2 | HEIGHT: 67 IN | RESPIRATION RATE: 18 BRPM | DIASTOLIC BLOOD PRESSURE: 67 MMHG

## 2021-04-20 DIAGNOSIS — M51.36 ANNULAR TEAR OF LUMBAR DISC: ICD-10-CM

## 2021-04-20 DIAGNOSIS — G89.4 CHRONIC PAIN SYNDROME: ICD-10-CM

## 2021-04-20 DIAGNOSIS — M48.061 NEUROFORAMINAL STENOSIS OF LUMBAR SPINE: ICD-10-CM

## 2021-04-20 DIAGNOSIS — M46.1 SACROILIITIS: Primary | ICD-10-CM

## 2021-04-20 DIAGNOSIS — M47.816 LUMBAR SPONDYLOSIS: ICD-10-CM

## 2021-04-20 DIAGNOSIS — G62.9 POLYNEUROPATHY: ICD-10-CM

## 2021-04-20 DIAGNOSIS — M51.36 DDD (DEGENERATIVE DISC DISEASE), LUMBAR: ICD-10-CM

## 2021-04-20 PROCEDURE — 1125F AMNT PAIN NOTED PAIN PRSNT: CPT | Mod: S$GLB,,, | Performed by: NURSE PRACTITIONER

## 2021-04-20 PROCEDURE — 1159F MED LIST DOCD IN RCRD: CPT | Mod: S$GLB,,, | Performed by: NURSE PRACTITIONER

## 2021-04-20 PROCEDURE — 1125F PR PAIN SEVERITY QUANTIFIED, PAIN PRESENT: ICD-10-PCS | Mod: S$GLB,,, | Performed by: NURSE PRACTITIONER

## 2021-04-20 PROCEDURE — 99213 PR OFFICE/OUTPT VISIT, EST, LEVL III, 20-29 MIN: ICD-10-PCS | Mod: S$GLB,,, | Performed by: NURSE PRACTITIONER

## 2021-04-20 PROCEDURE — 99999 PR PBB SHADOW E&M-EST. PATIENT-LVL IV: ICD-10-PCS | Mod: PBBFAC,,, | Performed by: NURSE PRACTITIONER

## 2021-04-20 PROCEDURE — 1159F PR MEDICATION LIST DOCUMENTED IN MEDICAL RECORD: ICD-10-PCS | Mod: S$GLB,,, | Performed by: NURSE PRACTITIONER

## 2021-04-20 PROCEDURE — 99999 PR PBB SHADOW E&M-EST. PATIENT-LVL IV: CPT | Mod: PBBFAC,,, | Performed by: NURSE PRACTITIONER

## 2021-04-20 PROCEDURE — 99213 OFFICE O/P EST LOW 20 MIN: CPT | Mod: S$GLB,,, | Performed by: NURSE PRACTITIONER

## 2021-04-20 NOTE — H&P (VIEW-ONLY)
Chronic Pain - Established Patient    Referring Physician: No ref. provider found    Chief Complaint:   Chief Complaint   Patient presents with    Leg Pain     right        SUBJECTIVE: Disclaimer: This note has been generated using voice-recognition software. There may be typographical errors that have been missed during proof-reading    Interval History 4/20/2021:  The patient returns to clinic today for follow up of low back pain. She has not been seen in over a year. She reports increased low back and buttock pain. She denies any radicular leg pain. Her pain is worse with prolonged sitting, standing, and activity. She continues to report balance issues. She has not followed up with Neurology since last year. She denies any other health changes. Her pain today is 3/10.    Interval Hx 4/20/20:  Follow up for LBP. She continues to have lower back and buttock pain. The pain does not radiate below the buttocks. She continues to take gabapentin 600/300/600mg per day. She does home PT and is waiting to start formal PT again after the outbreak ends. She saw Neurology after the last visit and they ordered an EMG that was c/w diabetic polyneuropathy. She feels her medications are controlling her pain well. Her primary complaint is gait instability.     Interval History 1/20/2020:  The patient returns to clinic today for follow up. She continues to report intermittent low back and buttock pain. She also may take naproxen sometimes for pain but she is unsure.  She denies any radiating leg pain. She is currently participating in physical therapy which has been helpful. She continues to report intermittent falls. She has seen neurology who is obtaining a nerve conduction study. She continues to follow up with Neurology. She continues to take Gabapentin with benefit. She denies any other health changes. Her pain today is 1/10.    Interval History 11/20/2019:  The patient returns to clinic today for follow up. She is s/p  "bilateral SI joint injections on 11/7/2019. She reports 100% relief of her low back and buttock pain. She reports intermittent low back pain that is tolerable. She denies any radiating leg pain. She has seen neurology who is working her up regarding her balance and gait. She is scheduled for EMG in January. She continues to take Gabapentin with benefit. She is currently participating in physical therapy. She denies any other health changes. Her pain today is 1/10.    Interval History 9/11/2019:  The patient returns to clinic today for follow up and image review. She continues to report low back and buttock pain that is sharp and aching. She reports intermittent radiating pain into the posterior aspect of her right leg to her ankle. She denies any left leg pain. She denies any numbness or tingling to her feet. Her worst pain at this time is her bilateral buttocks and tailbone. This pain is worse with prolonged sitting and moving from seated to standing. She continues to report shuffling gait. She has had multiple falls recently. She does take Gabapentin with benefit. She denies any other health changes. Her pain today is 8/10.    Interval History 8/27/2019:  The patient returns to clinic today for follow up. She is s/p right L3,4,5 RFA on 7/25/2019. She is s/p left L3,4,5 RFA on 8/6/2019. She reports limited relief at this time. She continues to report low back pain that is sharp and aching. She denies any radicular leg pain. She denies any numbness or tingling to her feet. She does report episodes of "shuffling" gait where she feels like she will fall. She has had two significant falls recently. She continues to take Gabapentin with benefit. She denies any other health changes. Her pain today is 2/10.    Interval History 7/23/2019:  The patient returns to clinic today for follow up. She was previously scheduled for RFA but had to cancel due to a fall. She fell while getting out of her bed and hit her face on her " nightstand. She did have xrays which were negative for facial fracture. She did have a facial laceration which was sutured. She has rescheduled her RFA for this week. She continues to report low back pain that is constant, sharp, and aching. She denies any radiating leg pain. She does report intermittent numbness and pain to her buttocks with prolonged sitting. She continues to take Gabapentin. She denies any other health changes. Her pain today is 7/10.      Interval History 5/28/2019:  The patient returns to clinic today for follow up. She is s/p bilateral L2,3,4,5 MBB on 5/21/2019. She reports 80% relief of her low back pain after the block. She did sleep very well the night after the block. Her pain has returned to baseline. She continues to report low back pain that is constant, sharp, and aching in nature. She denies any radiating leg pain. She does report increased tailbone pain. Her pain is worse with prolonged standing and walking. She continues to take Gabapentin with benefit. She denies any other health changes. Her pain today is 2/10.    Interval History 4/26/2019:  The patient returns to clinic today for follow up. She reports a fall yesterday at home where she tripped over her shoe string. She landed on her buttock. She reports that she hit the back of her head on the carpet. She denies any loss of consciousness. She did not go to the ER. She does report increased low back pain today that is sore in nature which she attributes to the fall. She continues to report low back pain that is sharp and aching in nature. She does report intermittent aching buttock pain. She denies any radiating leg pain. Her pain is worse with prolonged standing and walking. She continues to participate in physical therapy. She continues to take Gabapentin with benefit. She did have lumbar MBB in 2017 with 90% relief for a few days. She denies any other health changes. She does have stress urinary incontinence. She does follow  up with Urogyn. Her pain today is 7/10.    Interval History 3/11/2019:  The patient returns to clinic today for follow up. She continues to report low back pain that is aching in nature. She reports right sided buttock pain that is sore in nature. She denies any radiating leg pain. Her pain is worse with standing, sitting, and activity. She does endorse morning stiffness. She is currently participating in physical therapy with benefit. She continues to take Gabapentin with benefit and does need a refill today. She denies any other health changes. She denies any bowel or bladder incontinence. Her pain today is 5/10.    Interval History 1/9/2019:  The patient returns to clinic today for follow up. She is s/p bilateral SI joint injections on 12/27/2018. She reports 100% for the first week. She now reports approximately 50% relief of her pain. She reports intermittent low back and bilateral buttock pain. She describes this pain as sore and aching in nature. This pain is worse with prolonged sitting. She denies any radiating leg pain. She often feels as though as she is leaning over while walking. She often feels as though her balance is off. She continues to take Gabapentin with benefit. She denies any other health changes. His pain today is 2/10.     Interval History 12/11/2018:  The patient returns to clinic today for follow up. She reports increased low back and bilateral buttock pain, left greater than right. She describes this pain as sore in nature. This pain is worse with prolonged sitting. She denies any radiating leg pain. She reports that she often feels like her back is locking up on her. She continues to take Gabapentin with benefit. She denies any other health changes. Her pain today is 5/10.    Interval History 9/25/2018:  The patient returns to clinic today for follow up. She is s/p right L5 and S1 TF LEONEL. She reports 90% relief of her low back and leg pain. She reports intermittent low back pain that is  aching in nature. She denies any radiating leg pain today. Her back pain is worse with bending. She also reports that her legs feel heavy with prolonged walking. She denies any other health changes. Her pain today is 4/10.    Interval History 7/27/2018:  The patient returns to clinic today for follow up and image review. She continues to report low back pain that radiates down the posterior aspect of her right leg to her foot. She reports intermittent left leg pain in the same distribution. Her pain is worse with prolonged walking and activity. She often feels like her legs are heavy. She often feels like she will fall. She denies any other health changes. She denies any bowel or bladder incontinence. Her pain today is 2/10.    Interval History 7/20/2018:  The patient returns to clinic today for follow up. She has not been seen in over a year due to her son being diagnosed with cancer. She reports that he is in remission now. She returns today due to three fall recently, last a week ago. She continues to report low back pain that radiates intermittently down the posterior portion of both legs to her feet. She reports that her legs often feel heavy and weak which is why she falls. She denies any bowel or bladder incontinence. Her pain today is 7/10.    Interval History 3/6/2017:  The patient returns to clinic today for follow up. She is s/p bilateral S1 TF LEONEL on 2/8/2017. She reports 80% relief of her radiating leg pain. She continues to have low back pain. She describes the pain as dull and aching. The pain is worse in the morning. She reports that the pain increases with prolonged sitting and standing. She denies any other health changes. She denies any bowel or bladder incontinence. Her pain today is 6/10.     Interval History 1/23/2017:  The patient returns to clinic today for follow up of low back pain and bilateral leg pain. She has recently completed a medrol dose pack with some benefit. She still reports  "radiating pain into both legs. The pain is worse with prolonged sitting and lifting. The pain gets better with walking and rest. The pain radiates down the back of both legs to ankles. She denies any bowel or bladder incontinence or signs of saddle paresthesia. She continues to work with lifting restrictions. She currently takes Gabapentin and Naproxen with benefit. Her pain today is 6/10.     Initial encounter:    Mila Foster presents to the clinic for the evaluation of bilateral leg pain with intermittent lower back pain. 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 She reports recent falling to knees, but she changed her shoes, and the falling has ceased. She reports that she had a "pain flare" 2 weeks ago requiring a walker for 2 days.     Brief history:  72yo F with hx of HTN and DM complicated by neuropathy BL but R>L with tingling, tightness, shooting pain only in feet. Patient reports glucose was 120 this morning and usually runs around 130.    Pain Description:    The pain is located in the posterior leg area and radiates to her ankles and to her knees at its worst.     At BEST  1/10     At WORST  8/10 on the WORST day.      On average pain is rated as 7/10    Today the pain is rated as 5/10    The pain is described as aching, burning, dull, sharp and tight band      Symptoms interfere with work.     Exacerbating factors: Sitting, picking up children, lifting, bending over    Mitigating factors: walking, rest    Patient denies night fever/night sweats, bowel incontinence, significant weight loss, significant motor weakness and loss of sensations. She does report chronic urinary incontinence unrelated to back/leg pain.    Patient denies any suicidal or homicidal ideations    Pain Medications:  Current:  Gabapentin    Tried in Past:  NSAIDs -yes Naproxen   TCA -Never  SNRI -Never  Anti-convulsants " -gabapentin  Muscle Relaxants -Never  Opioids-Never    Physical Therapy/Home Exercise: yes       report: N/A    Pain Procedures:   2/8/2017- Bilateral S1 TF LEONEL  3/22/2017- Bilateral L2,3,4,5 MBB  9/11/2018- Right L5 and S1 TF LEONEL   12/27/2018- Bilateral SI joint injections  5/21/2019- Bilateral L2,3,4,5 MBB  7/25/2019- Right L3,4,5 RFA  8/6/2019- Left L3,4,5 RFA  11/7/2019- Bilateral SI joint injection     Chiropractor -never  Acupuncture - never  TENS unit -never  Spinal decompression -never  Joint replacement -never    Imaging:     EMG 1/2020: Diabetic polyneuropathy    MRI Lumbar Spine 9/7/2019:  COMPARISON:  07/25/2018    FINDINGS:  The distal cord/conus demonstrates normal size and signal.  No evidence of osteomyelitis, marrow replacement process, or acute fracture.  No paraspinal masses.    L1-2 is unremarkable.    At L2-3, there is mild facet arthropathy and disc bulging.  No spinal canal stenosis or significant neural foraminal narrowing.    At L3-4, there is mild disc bulging and bilateral facet arthropathy.  No spinal canal stenosis or significant neural foraminal narrowing.    At L4-5, there is bilateral facet arthropathy with slight L4-5 anterolisthesis.  No spinal canal stenosis..  There is mild right-sided neural foraminal narrowing.    At L5-S1, there is mild bilateral facet arthropathy and disc bulging.  Small posterior annular tear with right paracentral/lateral recess disc protrusion, possibly impinging upon the right L5 or S1 nerve roots.  No spinal canal stenosis or significant neural foraminal narrowing.      Impression       Small right paracentral/ lateral recess disc protrusion at L5-S1 and mild neural foraminal narrowing at L4-5, as above.  No spinal canal stenosis.  Findings are similar to the 07/25/2018 exam.     Xray Lumbar Spine 7/25/2018:  COMPARISON:  Prior lumbar spine MRI dated 12/16/16    FINDINGS:  There is diffuse osteopenia.  There is mild grade 1 anterolisthesis of L3 on L4  (4 mm) and of L4 on L5 (6 mm).  There is no evidence of translational instability.  There is moderate facet arthropathy at the lower lumbar spine with probable L5-S1 neural foraminal narrowing.  Endplate degenerative changes are present with subchondral sclerosis and marginal osteophyte formation.    There is atherosclerotic calcification of the aorta.      Impression       Osteopenia and degenerative change of the lumbar spine with grade 1 anterolisthesis of L3-4 and L4-5, new from the 2016 MRI.  No evidence of translational instability.             Past Medical History:   Diagnosis Date    Diabetes     Hypertension      Past Surgical History:   Procedure Laterality Date    CHOLECYSTECTOMY      COLONOSCOPY N/A 11/1/2018    Procedure: COLONOSCOPY Suprep;  Surgeon: Azucena Hood MD;  Location: [facility] ENDO;  Service: Endoscopy;  Laterality: N/A;    HYSTERECTOMY      INJECTION OF ANESTHETIC AGENT AROUND NERVE Bilateral 5/21/2019    Procedure: BLOCK, NERVE, BILATERAL L2,3,4,5;  Surgeon: Bonnie Duncan MD;  Location: [facility];  Service: Pain Management;  Laterality: Bilateral;  BLOCK, NERVE, BILATERAL L2,3,4,5    INJECTION OF JOINT Bilateral 12/27/2018    Procedure: Injection, Joint BILATERAL SACROILIAC JOINT INJECTION;  Surgeon: Bonnie Duncan MD;  Location: [facility] PAIN MGT;  Service: Pain Management;  Laterality: Bilateral;    INJECTION OF JOINT Bilateral 11/7/2019    Procedure: INJECTION, JOINT, SI;  Surgeon: Bonnie Duncan MD;  Location: [facility] PAIN MGT;  Service: Pain Management;  Laterality: Bilateral;  B/L SI Joint Injections    RADIOFREQUENCY ABLATION Right 7/25/2019    Procedure: RADIOFREQUENCY ABLATION;  Surgeon: Bonnie Duncan MD;  [facility];  Service: Pain Management;  Laterality: Right;  Right RFA L2,3,4,5; THERMAL  1 of 2    RADIOFREQUENCY ABLATION Left 8/6/2019    Procedure: RADIOFREQUENCY ABLATION;  Surgeon: Bonnie Duncan MD;  Location: [facility];   Service: Pain Management;  Laterality: Left;  Left RFA L2,3,4,5 LUMBAR  1 of 2     Social History     Socioeconomic History    Marital status: Single     Spouse name: Not on file    Number of children: Not on file    Years of education: Not on file    Highest education level: Not on file   Occupational History    Not on file   Tobacco Use    Smoking status: Never Smoker    Smokeless tobacco: Never Used   Substance and Sexual Activity    Alcohol use: No    Drug use: No    Sexual activity: Not Currently   Other Topics Concern    Not on file   Social History Narrative    Not on file     Social Determinants of Health     Financial Resource Strain:     Difficulty of Paying Living Expenses:    Food Insecurity:     Worried About Running Out of Food in the Last Year:     Ran Out of Food in the Last Year:    Transportation Needs:     Lack of Transportation (Medical):     Lack of Transportation (Non-Medical):    Physical Activity:     Days of Exercise per Week:     Minutes of Exercise per Session:    Stress:     Feeling of Stress :    Social Connections:     Frequency of Communication with Friends and Family:     Frequency of Social Gatherings with Friends and Family:     Attends Judaism Services:     Active Member of Clubs or Organizations:     Attends Club or Organization Meetings:     Marital Status:      Family History   Problem Relation Age of Onset    Vaginal cancer Neg Hx     Endometrial cancer Neg Hx     Cervical cancer Neg Hx        Review of patient's allergies indicates:  No Known Allergies    Current Outpatient Medications   Medication Sig    aspirin (ECOTRIN) 81 MG EC tablet 81 mg.    FLUoxetine 20 MG capsule TK ONE C PO  QD    gabapentin (NEURONTIN) 300 MG capsule Take 2 tabs in the morning, 1 tab in the afternoon, and 2 tabs in the evening    metformin (GLUCOPHAGE-XR) 500 MG 24 hr tablet 500 mg.    metoprolol tartrate (LOPRESSOR) 25 MG tablet     mirabegron (MYRBETRIQ) 25 mg  "Tb24 ER tablet Take 1 tablet (25 mg total) by mouth once daily.    naproxen (NAPROSYN) 500 MG tablet     omeprazole (PRILOSEC) 20 MG capsule 20 mg.    pravastatin (PRAVACHOL) 40 MG tablet 40 mg.    hyoscyamine (ANASPAZ,LEVSIN) 0.125 mg Tab Take 1 tablet (125 mcg total) by mouth every 4 (four) hours as needed.     No current facility-administered medications for this visit.     Facility-Administered Medications Ordered in Other Visits   Medication    0.9%  NaCl infusion       REVIEW OF SYSTEMS:    GENERAL:  No weight loss, malaise or fevers.  HEENT:   No recent changes in vision or hearing  NECK:  Negative for lumps, no difficulty with swallowing.  RESPIRATORY:  Negative for cough, wheezing or shortness of breath, patient denies any recent URI.  CARDIOVASCULAR:  Negative for chest pain, leg swelling or palpitations. HTN.  GI:  Negative for abdominal discomfort, blood in stools or black stools or change in bowel habits.  MUSCULOSKELETAL:  See HPI.  SKIN:  Negative for lesions, rash, and itching.  PSYCH:  No mood disorder or recent psychosocial stressors.  Patient's sleep is occasionally disturbed secondary to pain.  HEMATOLOGY/LYMPHOLOGY:  Negative for prolonged bleeding, bruising easily or swollen nodes.  Patient is not currently taking any anti-coagulants  ENDO: Positive for DM. Negative for thyroid dysfunction  NEURO:   No history of headaches, syncope, paralysis, seizures or tremors.  All other reviewed and negative other than HPI.    OBJECTIVE:    /67 (BP Location: Left arm, Patient Position: Sitting)   Pulse 71   Temp 98.4 °F (36.9 °C) (Oral)   Resp 18   Ht 5' 7" (1.702 m)   Wt 98 kg (216 lb 0.8 oz)   BMI 33.84 kg/m²     PHYSICAL EXAMINATION:    GENERAL: Well appearing, in no acute distress, alert and oriented x3.  PSYCH:  Mood and affect appropriate.  SKIN: Skin color, texture, turgor normal, no rashes or lesions.  HEAD/FACE:  Normocephalic, atraumatic. Cranial nerves grossly intact.   CV: RRR " with palpation of the radial artery.  PULM: No evidence of respiratory difficulty, symmetric chest rise.  BACK: Straight leg raising in the siting position is negative for radicular pain bilaterally. There is mild pain with palpation over lumbar facet joints bilaterally. Limited ROM with mild pain on extension. Positive facet loading bilaterally.  LOWER EXTREMITIES:  No deformities, edema, or skin discoloration. Good capillary refill.  MUSCULOSKELETAL:  There is pain with palpation over the sacroiliac joints bilaterally.  FABERs test is positive bilaterally.  FADIRs test is negative. 5/5 strength in right ankle with plantar and dorsiflexion. 4/5 strength in left ankle with plantar and dorsiflexion. 5/5 strength with right knee flexion and extension. 5/5 strength with left knee flexion and extension. 5/5 strength in right EHL, 4/5 strength in left EHL.  No atrophy or tone abnormalities are noted.  NEURO: Bilateral lower extremity coordination and muscle stretch reflexes are physiologic and symmetric.   No loss of sensation is noted.  GAIT: Antalgic, shuffling gait- ambulates with walker.         BMP  Lab Results   Component Value Date     05/05/2020    K 4.1 05/05/2020     05/05/2020    CO2 26 05/05/2020    BUN 15 05/05/2020    CREATININE 1.0 05/05/2020    CALCIUM 10.2 05/05/2020    ANIONGAP 10 05/05/2020    ESTGFRAFRICA >60 05/05/2020    EGFRNONAA 55 (A) 05/05/2020       ASSESSMENT: 76 y.o. year old female with back/leg pain, consistent with the following:    Encounter Diagnoses   Name Primary?    Sacroiliitis Yes    Lumbar spondylosis     Annular tear of lumbar disc     Neuroforaminal stenosis of lumbar spine     DDD (degenerative disc disease), lumbar     Polyneuropathy     Chronic pain syndrome        PLAN:     - Previous imaging was reviewed and discussed with the patient today.    - Schedule for bilateral SI joint injections.     - Continue physical therapy.     - Continue follow up with  Neurology.     - RTC 2 weeks after above procedure.     - Dr. Duncan was consulted on the patient and agrees with this plan.    The above plan and management options were discussed at length with patient. Patient is in agreement with the above and verbalized understanding.     Coby Russo NP  04/20/2021

## 2021-04-21 ENCOUNTER — TELEPHONE (OUTPATIENT)
Dept: PAIN MEDICINE | Facility: CLINIC | Age: 76
End: 2021-04-21

## 2021-04-22 ENCOUNTER — TELEPHONE (OUTPATIENT)
Dept: PAIN MEDICINE | Facility: CLINIC | Age: 76
End: 2021-04-22

## 2021-05-18 ENCOUNTER — HOSPITAL ENCOUNTER (OUTPATIENT)
Facility: OTHER | Age: 76
Discharge: HOME | End: 2021-05-18
Attending: ANESTHESIOLOGY | Admitting: ANESTHESIOLOGY
Payer: MEDICARE

## 2021-05-18 VITALS
BODY MASS INDEX: 37.67 KG/M2 | RESPIRATION RATE: 16 BRPM | HEIGHT: 67 IN | WEIGHT: 240 LBS | HEART RATE: 67 BPM | DIASTOLIC BLOOD PRESSURE: 104 MMHG | OXYGEN SATURATION: 96 % | TEMPERATURE: 98 F | SYSTOLIC BLOOD PRESSURE: 157 MMHG

## 2021-05-18 DIAGNOSIS — M46.1 SACROILIITIS: Primary | ICD-10-CM

## 2021-05-18 DIAGNOSIS — G89.29 CHRONIC PAIN: ICD-10-CM

## 2021-05-18 LAB — POCT GLUCOSE: 150 MG/DL (ref 70–110)

## 2021-05-18 PROCEDURE — 27096 PR INJECTION,SACROILIAC JOINT: ICD-10-PCS | Mod: 50,,, | Performed by: ANESTHESIOLOGY

## 2021-05-18 PROCEDURE — 27096 INJECT SACROILIAC JOINT: CPT | Mod: 50 | Performed by: ANESTHESIOLOGY

## 2021-05-18 PROCEDURE — 25500020 PHARM REV CODE 255: Performed by: ANESTHESIOLOGY

## 2021-05-18 PROCEDURE — 25000003 PHARM REV CODE 250: Performed by: ANESTHESIOLOGY

## 2021-05-18 PROCEDURE — 82947 ASSAY GLUCOSE BLOOD QUANT: CPT | Performed by: ANESTHESIOLOGY

## 2021-05-18 PROCEDURE — 63600175 PHARM REV CODE 636 W HCPCS: Performed by: ANESTHESIOLOGY

## 2021-05-18 PROCEDURE — 27096 INJECT SACROILIAC JOINT: CPT | Mod: 50,,, | Performed by: ANESTHESIOLOGY

## 2021-05-18 RX ORDER — BUPIVACAINE HYDROCHLORIDE 2.5 MG/ML
INJECTION, SOLUTION EPIDURAL; INFILTRATION; INTRACAUDAL
Status: DISCONTINUED | OUTPATIENT
Start: 2021-05-18 | End: 2021-05-18 | Stop reason: HOSPADM

## 2021-05-18 RX ORDER — METHYLPREDNISOLONE ACETATE 40 MG/ML
INJECTION, SUSPENSION INTRA-ARTICULAR; INTRALESIONAL; INTRAMUSCULAR; SOFT TISSUE
Status: DISCONTINUED | OUTPATIENT
Start: 2021-05-18 | End: 2021-05-18 | Stop reason: HOSPADM

## 2021-05-18 RX ORDER — SODIUM CHLORIDE 9 MG/ML
INJECTION, SOLUTION INTRAVENOUS CONTINUOUS
Status: ACTIVE | OUTPATIENT
Start: 2021-05-18

## 2021-05-18 NOTE — DISCHARGE SUMMARY
Discharge Note  Short Stay      SUMMARY     Admit Date: 5/18/2021    Attending Physician: Jenn Nugent      Discharge Physician: Jenn Nugent      Discharge Date: 5/18/2021 3:44 PM    Procedure(s) (LRB):  INJECTION, JOINT, SI (Bilateral)    Final Diagnosis: Bilateral sacroiliitis [M46.1]    Disposition: Home or self care    Patient Instructions:   Current Discharge Medication List      CONTINUE these medications which have NOT CHANGED    Details   aspirin (ECOTRIN) 81 MG EC tablet 81 mg.  Refills: 2      FLUoxetine 20 MG capsule TK ONE C PO  QD  Refills: 1      gabapentin (NEURONTIN) 300 MG capsule Take 2 tabs in the morning, 1 tab in the afternoon, and 2 tabs in the evening  Qty: 150 capsule, Refills: 5    Associated Diagnoses: Annular tear of lumbar disc; Neuroforaminal stenosis of lumbar spine; Facet arthritis of lumbar region; Lumbar spondylosis; Sacroiliac joint pain; DDD (degenerative disc disease), lumbar      hyoscyamine (ANASPAZ,LEVSIN) 0.125 mg Tab Take 1 tablet (125 mcg total) by mouth every 4 (four) hours as needed.  Qty: 30 tablet, Refills: 6      metformin (GLUCOPHAGE-XR) 500 MG 24 hr tablet 500 mg.  Refills: 1      metoprolol tartrate (LOPRESSOR) 25 MG tablet Refills: 1      mirabegron (MYRBETRIQ) 25 mg Tb24 ER tablet Take 1 tablet (25 mg total) by mouth once daily.  Qty: 30 tablet, Refills: 0    Associated Diagnoses: Urge incontinence; Urinary urgency      naproxen (NAPROSYN) 500 MG tablet Refills: 1      omeprazole (PRILOSEC) 20 MG capsule 20 mg.  Refills: 1      pravastatin (PRAVACHOL) 40 MG tablet 40 mg.  Refills: 1                 Discharge Diagnosis: Bilateral sacroiliitis [M46.1]  Condition on Discharge: Stable with no complications to procedure   Diet on Discharge: Same as before.  Activity: as per instruction sheet.  Discharge to: Home with a responsible adult.  Follow up: 2-4 weeks       Please call my office or pager at [phone] if experienced any weakness or loss of sensation, fever >  101.5, pain uncontrolled with oral medications, persistent nausea/vomiting/or diarrhea, redness or drainage from the incisions, or any other worrisome concerns. If physician on call was not reached or could not communicate with our office for any reason please go to the nearest emergency department

## 2021-05-18 NOTE — OP NOTE
Sacroiliac Joint Injection under Fluoroscopy    Date of procedure 05/18/2021    Time-out taken to identify patient and procedure side prior to starting the procedure.                                                                 PROCEDURE:  bilateralsacroiliac joint injection under fluoroscopy.    REASON FOR PROCEDURE: Bilateral sacroiliitis [M46.1]    PHYSICIAN: Bonnie Duncan MD    Assistants:   Jenn Nugent, PGY-5, Pain Fellow  I was present and supervising all critical portions of the procedure       MEDICATIONS INJECTED:  Depo-Medrol 40mg and 4 mL Bupivacaine 0.25%    SEDATION MEDICATIONS: None    ESTIMATED BLOOD LOSS:  None.    COMPLICATIONS:  None.    TECHNIQUE:   Laying in the prone position, the patient was prepped and draped in the usual sterile fashion using ChloraPrep and fenestrated drape.  The area was determined under fluoroscopy.   The 3.5 inch 22-gauge spinal needle was introduce into the bilateral sacroiliac joint.  Negative pressure applied to confirm no intravascular placement.  Omnipaque was injected to confirm placement and to confirm that there was no vascular runoff.  The medication was then injected slowly.  3 mL was injected intraarticular, and then the remaining 2 mL was injected rigoberto-articular.The patient tolerated the procedure well.    The patient was monitored for approximately 30 minutes after the procedure.  Patient was given post procedure and discharge instructions to follow at home.  We will see the patient back in two weeks or the patient may call to inform of status. The patient was discharged in a stable condition.

## 2021-05-18 NOTE — INTERVAL H&P NOTE
The patient has been examined and the H&P has been reviewed:    I concur with the findings and no changes have occurred since H&P was written.    Anesthesia/Surgery risks, benefits and alternative options discussed and understood by patient/family.          Active Hospital Problems    Diagnosis  POA    Chronic pain [G89.29]  Yes      Resolved Hospital Problems   No resolved problems to display.

## 2021-05-18 NOTE — DISCHARGE INSTRUCTIONS

## 2021-05-24 ENCOUNTER — TELEPHONE (OUTPATIENT)
Dept: PAIN MEDICINE | Facility: CLINIC | Age: 76
End: 2021-05-24

## 2021-05-25 ENCOUNTER — OFFICE VISIT (OUTPATIENT)
Dept: PAIN MEDICINE | Facility: CLINIC | Age: 76
End: 2021-05-25
Payer: MEDICARE

## 2021-05-25 VITALS
DIASTOLIC BLOOD PRESSURE: 77 MMHG | HEART RATE: 71 BPM | SYSTOLIC BLOOD PRESSURE: 139 MMHG | TEMPERATURE: 99 F | RESPIRATION RATE: 20 BRPM | WEIGHT: 240 LBS | BODY MASS INDEX: 37.67 KG/M2 | HEIGHT: 67 IN

## 2021-05-25 DIAGNOSIS — G89.4 CHRONIC PAIN SYNDROME: ICD-10-CM

## 2021-05-25 DIAGNOSIS — M46.1 SACROILIITIS: Primary | ICD-10-CM

## 2021-05-25 DIAGNOSIS — G62.9 POLYNEUROPATHY: ICD-10-CM

## 2021-05-25 DIAGNOSIS — M47.816 LUMBAR SPONDYLOSIS: ICD-10-CM

## 2021-05-25 DIAGNOSIS — M51.36 DDD (DEGENERATIVE DISC DISEASE), LUMBAR: ICD-10-CM

## 2021-05-25 DIAGNOSIS — M48.061 NEUROFORAMINAL STENOSIS OF LUMBAR SPINE: ICD-10-CM

## 2021-05-25 DIAGNOSIS — M51.36 ANNULAR TEAR OF LUMBAR DISC: ICD-10-CM

## 2021-05-25 PROCEDURE — 99213 PR OFFICE/OUTPT VISIT, EST, LEVL III, 20-29 MIN: ICD-10-PCS | Mod: S$GLB,,, | Performed by: NURSE PRACTITIONER

## 2021-05-25 PROCEDURE — 1159F PR MEDICATION LIST DOCUMENTED IN MEDICAL RECORD: ICD-10-PCS | Mod: S$GLB,,, | Performed by: NURSE PRACTITIONER

## 2021-05-25 PROCEDURE — 1125F AMNT PAIN NOTED PAIN PRSNT: CPT | Mod: S$GLB,,, | Performed by: NURSE PRACTITIONER

## 2021-05-25 PROCEDURE — 99999 PR PBB SHADOW E&M-EST. PATIENT-LVL IV: CPT | Mod: PBBFAC,,, | Performed by: NURSE PRACTITIONER

## 2021-05-25 PROCEDURE — 1101F PR PT FALLS ASSESS DOC 0-1 FALLS W/OUT INJ PAST YR: ICD-10-PCS | Mod: CPTII,S$GLB,, | Performed by: NURSE PRACTITIONER

## 2021-05-25 PROCEDURE — 3288F PR FALLS RISK ASSESSMENT DOCUMENTED: ICD-10-PCS | Mod: CPTII,S$GLB,, | Performed by: NURSE PRACTITIONER

## 2021-05-25 PROCEDURE — 99999 PR PBB SHADOW E&M-EST. PATIENT-LVL IV: ICD-10-PCS | Mod: PBBFAC,,, | Performed by: NURSE PRACTITIONER

## 2021-05-25 PROCEDURE — 1101F PT FALLS ASSESS-DOCD LE1/YR: CPT | Mod: CPTII,S$GLB,, | Performed by: NURSE PRACTITIONER

## 2021-05-25 PROCEDURE — 1159F MED LIST DOCD IN RCRD: CPT | Mod: S$GLB,,, | Performed by: NURSE PRACTITIONER

## 2021-05-25 PROCEDURE — 99213 OFFICE O/P EST LOW 20 MIN: CPT | Mod: S$GLB,,, | Performed by: NURSE PRACTITIONER

## 2021-05-25 PROCEDURE — 3288F FALL RISK ASSESSMENT DOCD: CPT | Mod: CPTII,S$GLB,, | Performed by: NURSE PRACTITIONER

## 2021-05-25 PROCEDURE — 1125F PR PAIN SEVERITY QUANTIFIED, PAIN PRESENT: ICD-10-PCS | Mod: S$GLB,,, | Performed by: NURSE PRACTITIONER

## 2021-06-29 ENCOUNTER — TELEPHONE (OUTPATIENT)
Dept: PAIN MEDICINE | Facility: CLINIC | Age: 76
End: 2021-06-29

## 2021-07-07 ENCOUNTER — TELEPHONE (OUTPATIENT)
Dept: PAIN MEDICINE | Facility: CLINIC | Age: 76
End: 2021-07-07

## 2021-07-08 ENCOUNTER — OFFICE VISIT (OUTPATIENT)
Dept: PAIN MEDICINE | Facility: CLINIC | Age: 76
End: 2021-07-08
Payer: MEDICARE

## 2021-07-08 VITALS
BODY MASS INDEX: 33.98 KG/M2 | SYSTOLIC BLOOD PRESSURE: 165 MMHG | WEIGHT: 216.5 LBS | HEIGHT: 67 IN | DIASTOLIC BLOOD PRESSURE: 83 MMHG | HEART RATE: 65 BPM | RESPIRATION RATE: 18 BRPM

## 2021-07-08 DIAGNOSIS — M47.816 LUMBAR SPONDYLOSIS: ICD-10-CM

## 2021-07-08 DIAGNOSIS — M51.36 DDD (DEGENERATIVE DISC DISEASE), LUMBAR: ICD-10-CM

## 2021-07-08 DIAGNOSIS — G62.9 POLYNEUROPATHY: ICD-10-CM

## 2021-07-08 DIAGNOSIS — M51.36 ANNULAR TEAR OF LUMBAR DISC: ICD-10-CM

## 2021-07-08 DIAGNOSIS — G89.4 CHRONIC PAIN SYNDROME: ICD-10-CM

## 2021-07-08 DIAGNOSIS — M53.3 SACROILIAC JOINT PAIN: ICD-10-CM

## 2021-07-08 DIAGNOSIS — M48.061 NEUROFORAMINAL STENOSIS OF LUMBAR SPINE: ICD-10-CM

## 2021-07-08 DIAGNOSIS — M54.16 LUMBAR RADICULOPATHY: Primary | ICD-10-CM

## 2021-07-08 DIAGNOSIS — M51.16 LUMBAR DISC HERNIATION WITH RADICULOPATHY: ICD-10-CM

## 2021-07-08 PROCEDURE — 3288F PR FALLS RISK ASSESSMENT DOCUMENTED: ICD-10-PCS | Mod: CPTII,S$GLB,, | Performed by: NURSE PRACTITIONER

## 2021-07-08 PROCEDURE — 1159F PR MEDICATION LIST DOCUMENTED IN MEDICAL RECORD: ICD-10-PCS | Mod: S$GLB,,, | Performed by: NURSE PRACTITIONER

## 2021-07-08 PROCEDURE — 1125F PR PAIN SEVERITY QUANTIFIED, PAIN PRESENT: ICD-10-PCS | Mod: S$GLB,,, | Performed by: NURSE PRACTITIONER

## 2021-07-08 PROCEDURE — 99213 PR OFFICE/OUTPT VISIT, EST, LEVL III, 20-29 MIN: ICD-10-PCS | Mod: S$GLB,,, | Performed by: NURSE PRACTITIONER

## 2021-07-08 PROCEDURE — 1125F AMNT PAIN NOTED PAIN PRSNT: CPT | Mod: S$GLB,,, | Performed by: NURSE PRACTITIONER

## 2021-07-08 PROCEDURE — 99999 PR PBB SHADOW E&M-EST. PATIENT-LVL IV: ICD-10-PCS | Mod: PBBFAC,,, | Performed by: NURSE PRACTITIONER

## 2021-07-08 PROCEDURE — 1159F MED LIST DOCD IN RCRD: CPT | Mod: S$GLB,,, | Performed by: NURSE PRACTITIONER

## 2021-07-08 PROCEDURE — 99213 OFFICE O/P EST LOW 20 MIN: CPT | Mod: S$GLB,,, | Performed by: NURSE PRACTITIONER

## 2021-07-08 PROCEDURE — 1101F PR PT FALLS ASSESS DOC 0-1 FALLS W/OUT INJ PAST YR: ICD-10-PCS | Mod: CPTII,S$GLB,, | Performed by: NURSE PRACTITIONER

## 2021-07-08 PROCEDURE — 1101F PT FALLS ASSESS-DOCD LE1/YR: CPT | Mod: CPTII,S$GLB,, | Performed by: NURSE PRACTITIONER

## 2021-07-08 PROCEDURE — 3288F FALL RISK ASSESSMENT DOCD: CPT | Mod: CPTII,S$GLB,, | Performed by: NURSE PRACTITIONER

## 2021-07-08 PROCEDURE — 99999 PR PBB SHADOW E&M-EST. PATIENT-LVL IV: CPT | Mod: PBBFAC,,, | Performed by: NURSE PRACTITIONER

## 2021-07-08 RX ORDER — HYDROCHLOROTHIAZIDE 12.5 MG/1
12.5 TABLET ORAL EVERY MORNING
Status: ON HOLD | COMMUNITY
Start: 2021-04-22 | End: 2024-02-26 | Stop reason: HOSPADM

## 2021-07-08 RX ORDER — GLIMEPIRIDE 2 MG/1
2 TABLET ORAL EVERY MORNING
Status: ON HOLD | COMMUNITY
Start: 2021-04-22 | End: 2024-02-26 | Stop reason: HOSPADM

## 2021-07-22 ENCOUNTER — TELEPHONE (OUTPATIENT)
Dept: NEUROLOGY | Facility: CLINIC | Age: 76
End: 2021-07-22

## 2021-08-12 ENCOUNTER — TELEPHONE (OUTPATIENT)
Dept: PAIN MEDICINE | Facility: CLINIC | Age: 76
End: 2021-08-12

## 2021-08-17 ENCOUNTER — HOSPITAL ENCOUNTER (OUTPATIENT)
Facility: OTHER | Age: 76
Discharge: HOME | End: 2021-08-17
Attending: ANESTHESIOLOGY | Admitting: ANESTHESIOLOGY
Payer: MEDICARE

## 2021-08-17 VITALS
RESPIRATION RATE: 14 BRPM | TEMPERATURE: 99 F | DIASTOLIC BLOOD PRESSURE: 63 MMHG | WEIGHT: 216 LBS | OXYGEN SATURATION: 99 % | HEART RATE: 65 BPM | BODY MASS INDEX: 33.9 KG/M2 | SYSTOLIC BLOOD PRESSURE: 133 MMHG | HEIGHT: 67 IN

## 2021-08-17 DIAGNOSIS — G89.29 CHRONIC LOW BACK PAIN, UNSPECIFIED BACK PAIN LATERALITY, UNSPECIFIED WHETHER SCIATICA PRESENT: ICD-10-CM

## 2021-08-17 DIAGNOSIS — M51.16 LUMBAR DISC HERNIATION WITH RADICULOPATHY: ICD-10-CM

## 2021-08-17 DIAGNOSIS — M47.816 LUMBAR SPONDYLOSIS: Primary | ICD-10-CM

## 2021-08-17 DIAGNOSIS — M54.50 CHRONIC LOW BACK PAIN, UNSPECIFIED BACK PAIN LATERALITY, UNSPECIFIED WHETHER SCIATICA PRESENT: ICD-10-CM

## 2021-08-17 DIAGNOSIS — M54.17 LUMBOSACRAL RADICULOPATHY: ICD-10-CM

## 2021-08-17 LAB — POCT GLUCOSE: 178 MG/DL (ref 70–110)

## 2021-08-17 PROCEDURE — 25000003 PHARM REV CODE 250: Performed by: ANESTHESIOLOGY

## 2021-08-17 PROCEDURE — 64483 NJX AA&/STRD TFRM EPI L/S 1: CPT | Mod: 50 | Performed by: ANESTHESIOLOGY

## 2021-08-17 PROCEDURE — 64483 PR EPIDURAL INJ, ANES/STEROID, TRANSFORAMINAL, LUMB/SACR, SNGL LEVL: ICD-10-PCS | Mod: 50,,, | Performed by: ANESTHESIOLOGY

## 2021-08-17 PROCEDURE — 64483 NJX AA&/STRD TFRM EPI L/S 1: CPT | Mod: 50,,, | Performed by: ANESTHESIOLOGY

## 2021-08-17 PROCEDURE — 63600175 PHARM REV CODE 636 W HCPCS: Performed by: ANESTHESIOLOGY

## 2021-08-17 PROCEDURE — 25500020 PHARM REV CODE 255: Performed by: ANESTHESIOLOGY

## 2021-08-17 RX ORDER — ALPRAZOLAM 0.5 MG/1
0.5 TABLET ORAL ONCE
Status: COMPLETED | OUTPATIENT
Start: 2021-08-17 | End: 2021-08-17

## 2021-08-17 RX ORDER — SODIUM CHLORIDE 9 MG/ML
INJECTION, SOLUTION INTRAVENOUS CONTINUOUS
Status: DISCONTINUED | OUTPATIENT
Start: 2021-08-17 | End: 2021-08-17 | Stop reason: HOSPADM

## 2021-08-17 RX ORDER — SODIUM CHLORIDE 9 MG/ML
INJECTION, SOLUTION INTRAVENOUS CONTINUOUS
Status: DISCONTINUED | OUTPATIENT
Start: 2021-08-17 | End: 2021-08-17

## 2021-08-17 RX ORDER — LIDOCAINE HYDROCHLORIDE 10 MG/ML
INJECTION INFILTRATION; PERINEURAL
Status: DISCONTINUED | OUTPATIENT
Start: 2021-08-17 | End: 2021-08-17 | Stop reason: HOSPADM

## 2021-08-17 RX ORDER — DEXAMETHASONE SODIUM PHOSPHATE 10 MG/ML
INJECTION INTRAMUSCULAR; INTRAVENOUS
Status: DISCONTINUED | OUTPATIENT
Start: 2021-08-17 | End: 2021-08-17 | Stop reason: HOSPADM

## 2021-08-17 RX ORDER — BUPIVACAINE HYDROCHLORIDE 2.5 MG/ML
INJECTION, SOLUTION EPIDURAL; INFILTRATION; INTRACAUDAL
Status: DISCONTINUED | OUTPATIENT
Start: 2021-08-17 | End: 2021-08-17 | Stop reason: HOSPADM

## 2021-08-17 RX ADMIN — ALPRAZOLAM 0.5 MG: 0.5 TABLET ORAL at 02:08

## 2021-12-13 ENCOUNTER — TELEPHONE (OUTPATIENT)
Dept: PAIN MEDICINE | Facility: CLINIC | Age: 76
End: 2021-12-13
Payer: MEDICARE

## 2021-12-14 ENCOUNTER — TELEPHONE (OUTPATIENT)
Dept: PAIN MEDICINE | Facility: CLINIC | Age: 76
End: 2021-12-14
Payer: MEDICARE

## 2021-12-14 ENCOUNTER — OFFICE VISIT (OUTPATIENT)
Dept: URGENT CARE | Facility: CLINIC | Age: 76
End: 2021-12-14
Payer: MEDICARE

## 2021-12-14 VITALS
DIASTOLIC BLOOD PRESSURE: 82 MMHG | HEART RATE: 82 BPM | SYSTOLIC BLOOD PRESSURE: 142 MMHG | BODY MASS INDEX: 33.9 KG/M2 | RESPIRATION RATE: 16 BRPM | WEIGHT: 216 LBS | HEIGHT: 67 IN | TEMPERATURE: 99 F | OXYGEN SATURATION: 98 %

## 2021-12-14 DIAGNOSIS — N39.0 COMPLICATED UTI (URINARY TRACT INFECTION): Primary | ICD-10-CM

## 2021-12-14 LAB
BILIRUB UR QL STRIP: NEGATIVE
GLUCOSE UR QL STRIP: NEGATIVE
KETONES UR QL STRIP: NEGATIVE
LEUKOCYTE ESTERASE UR QL STRIP: POSITIVE
PH, POC UA: 7 (ref 5–8)
POC BLOOD, URINE: POSITIVE
POC NITRATES, URINE: NEGATIVE
PROT UR QL STRIP: POSITIVE
SP GR UR STRIP: 1.01 (ref 1–1.03)
UROBILINOGEN UR STRIP-ACNC: NORMAL (ref 0.1–1.1)

## 2021-12-14 PROCEDURE — 99213 PR OFFICE/OUTPT VISIT, EST, LEVL III, 20-29 MIN: ICD-10-PCS | Mod: S$GLB,,, | Performed by: FAMILY MEDICINE

## 2021-12-14 PROCEDURE — 81003 POCT URINALYSIS, DIPSTICK, AUTOMATED, W/O SCOPE: ICD-10-PCS | Mod: QW,S$GLB,, | Performed by: FAMILY MEDICINE

## 2021-12-14 PROCEDURE — 87086 URINE CULTURE/COLONY COUNT: CPT | Performed by: FAMILY MEDICINE

## 2021-12-14 PROCEDURE — 99213 OFFICE O/P EST LOW 20 MIN: CPT | Mod: S$GLB,,, | Performed by: FAMILY MEDICINE

## 2021-12-14 PROCEDURE — 81003 URINALYSIS AUTO W/O SCOPE: CPT | Mod: QW,S$GLB,, | Performed by: FAMILY MEDICINE

## 2021-12-14 RX ORDER — CIPROFLOXACIN 500 MG/1
500 TABLET ORAL 2 TIMES DAILY
Qty: 14 TABLET | Refills: 0 | Status: ON HOLD | OUTPATIENT
Start: 2021-12-14 | End: 2024-02-26 | Stop reason: CLARIF

## 2021-12-15 ENCOUNTER — OFFICE VISIT (OUTPATIENT)
Dept: PAIN MEDICINE | Facility: CLINIC | Age: 76
End: 2021-12-15
Payer: MEDICARE

## 2021-12-15 VITALS
SYSTOLIC BLOOD PRESSURE: 120 MMHG | TEMPERATURE: 100 F | DIASTOLIC BLOOD PRESSURE: 67 MMHG | WEIGHT: 216 LBS | RESPIRATION RATE: 18 BRPM | HEIGHT: 67 IN | HEART RATE: 84 BPM | BODY MASS INDEX: 33.9 KG/M2

## 2021-12-15 DIAGNOSIS — M51.16 LUMBAR DISC HERNIATION WITH RADICULOPATHY: ICD-10-CM

## 2021-12-15 DIAGNOSIS — M54.16 LUMBAR RADICULOPATHY: ICD-10-CM

## 2021-12-15 DIAGNOSIS — M47.816 LUMBAR SPONDYLOSIS: ICD-10-CM

## 2021-12-15 DIAGNOSIS — M51.36 DDD (DEGENERATIVE DISC DISEASE), LUMBAR: ICD-10-CM

## 2021-12-15 DIAGNOSIS — M54.17 LUMBOSACRAL RADICULOPATHY: Primary | ICD-10-CM

## 2021-12-15 DIAGNOSIS — M48.061 NEUROFORAMINAL STENOSIS OF LUMBAR SPINE: ICD-10-CM

## 2021-12-15 PROCEDURE — 99213 OFFICE O/P EST LOW 20 MIN: CPT | Mod: S$GLB,,, | Performed by: NURSE PRACTITIONER

## 2021-12-15 PROCEDURE — 99999 PR PBB SHADOW E&M-EST. PATIENT-LVL IV: ICD-10-PCS | Mod: PBBFAC,,, | Performed by: NURSE PRACTITIONER

## 2021-12-15 PROCEDURE — 99213 PR OFFICE/OUTPT VISIT, EST, LEVL III, 20-29 MIN: ICD-10-PCS | Mod: S$GLB,,, | Performed by: NURSE PRACTITIONER

## 2021-12-15 PROCEDURE — 99999 PR PBB SHADOW E&M-EST. PATIENT-LVL IV: CPT | Mod: PBBFAC,,, | Performed by: NURSE PRACTITIONER

## 2021-12-16 LAB — BACTERIA UR CULT: NORMAL

## 2021-12-20 ENCOUNTER — TELEPHONE (OUTPATIENT)
Dept: URGENT CARE | Facility: CLINIC | Age: 76
End: 2021-12-20
Payer: MEDICARE

## 2021-12-21 ENCOUNTER — TELEPHONE (OUTPATIENT)
Dept: PAIN MEDICINE | Facility: OTHER | Age: 76
End: 2021-12-21
Payer: MEDICARE

## 2021-12-21 DIAGNOSIS — M54.16 LUMBAR RADICULOPATHY: Primary | ICD-10-CM

## 2022-01-03 ENCOUNTER — TELEPHONE (OUTPATIENT)
Dept: PAIN MEDICINE | Facility: CLINIC | Age: 77
End: 2022-01-03
Payer: MEDICARE

## 2022-01-03 NOTE — TELEPHONE ENCOUNTER
This message is for patient in regards to his/her appointment 01/04/22 at 10:20a       [facility] Policy: For the safety of all patients and staff members.     Patient Visitor policy: Due to social distancing and limited seating staff are requesting patient to arrive to their schedule appointments on time. During this visit we're asking all patients to only have one visitor over the age of 18yrs old to accompany to be seen by Coby Castaneda NP. If patient do not required assistance with their visit, we're asking all visitors to remain outside the waiting area.    Upon arriving to your schedule appointment; patients are required to wear a face mask, if patient do not have a face mask one will be provided entering the facility. We ask patients to contact clinical staff at this number [phone] to notify staff that they have arrived or they may do so by utilizing the Mobile checked in Zo(if patient have patient portal; clinical staff will send a message through there letting them know it's okay to proceed to their visit). If you have any questions or concerns please contact [phone]    Patient verbalized undertsanding

## 2022-01-06 ENCOUNTER — OFFICE VISIT (OUTPATIENT)
Dept: PAIN MEDICINE | Facility: CLINIC | Age: 77
End: 2022-01-06
Payer: MEDICARE

## 2022-01-06 VITALS
DIASTOLIC BLOOD PRESSURE: 65 MMHG | HEIGHT: 67 IN | TEMPERATURE: 98 F | WEIGHT: 216 LBS | RESPIRATION RATE: 18 BRPM | SYSTOLIC BLOOD PRESSURE: 130 MMHG | HEART RATE: 83 BPM | BODY MASS INDEX: 33.9 KG/M2

## 2022-01-06 DIAGNOSIS — M47.816 LUMBAR SPONDYLOSIS: ICD-10-CM

## 2022-01-06 DIAGNOSIS — M51.16 LUMBAR DISC HERNIATION WITH RADICULOPATHY: ICD-10-CM

## 2022-01-06 DIAGNOSIS — M48.061 NEUROFORAMINAL STENOSIS OF LUMBAR SPINE: ICD-10-CM

## 2022-01-06 DIAGNOSIS — M54.16 LUMBAR RADICULOPATHY, CHRONIC: ICD-10-CM

## 2022-01-06 DIAGNOSIS — M51.36 DDD (DEGENERATIVE DISC DISEASE), LUMBAR: ICD-10-CM

## 2022-01-06 DIAGNOSIS — M54.17 LUMBOSACRAL RADICULOPATHY: Primary | ICD-10-CM

## 2022-01-06 PROCEDURE — 1159F PR MEDICATION LIST DOCUMENTED IN MEDICAL RECORD: ICD-10-PCS | Mod: CPTII,S$GLB,, | Performed by: NURSE PRACTITIONER

## 2022-01-06 PROCEDURE — 3075F PR MOST RECENT SYSTOLIC BLOOD PRESS GE 130-139MM HG: ICD-10-PCS | Mod: CPTII,S$GLB,, | Performed by: NURSE PRACTITIONER

## 2022-01-06 PROCEDURE — 1159F MED LIST DOCD IN RCRD: CPT | Mod: CPTII,S$GLB,, | Performed by: NURSE PRACTITIONER

## 2022-01-06 PROCEDURE — 3288F PR FALLS RISK ASSESSMENT DOCUMENTED: ICD-10-PCS | Mod: CPTII,S$GLB,, | Performed by: NURSE PRACTITIONER

## 2022-01-06 PROCEDURE — 99999 PR PBB SHADOW E&M-EST. PATIENT-LVL V: ICD-10-PCS | Mod: PBBFAC,,, | Performed by: NURSE PRACTITIONER

## 2022-01-06 PROCEDURE — 1160F RVW MEDS BY RX/DR IN RCRD: CPT | Mod: CPTII,S$GLB,, | Performed by: NURSE PRACTITIONER

## 2022-01-06 PROCEDURE — 1125F PR PAIN SEVERITY QUANTIFIED, PAIN PRESENT: ICD-10-PCS | Mod: CPTII,S$GLB,, | Performed by: NURSE PRACTITIONER

## 2022-01-06 PROCEDURE — 1100F PR PT FALLS ASSESS DOC 2+ FALLS/FALL W/INJURY/YR: ICD-10-PCS | Mod: CPTII,S$GLB,, | Performed by: NURSE PRACTITIONER

## 2022-01-06 PROCEDURE — 99999 PR PBB SHADOW E&M-EST. PATIENT-LVL V: CPT | Mod: PBBFAC,,, | Performed by: NURSE PRACTITIONER

## 2022-01-06 PROCEDURE — 3288F FALL RISK ASSESSMENT DOCD: CPT | Mod: CPTII,S$GLB,, | Performed by: NURSE PRACTITIONER

## 2022-01-06 PROCEDURE — 99213 PR OFFICE/OUTPT VISIT, EST, LEVL III, 20-29 MIN: ICD-10-PCS | Mod: S$GLB,,, | Performed by: NURSE PRACTITIONER

## 2022-01-06 PROCEDURE — 3075F SYST BP GE 130 - 139MM HG: CPT | Mod: CPTII,S$GLB,, | Performed by: NURSE PRACTITIONER

## 2022-01-06 PROCEDURE — 1125F AMNT PAIN NOTED PAIN PRSNT: CPT | Mod: CPTII,S$GLB,, | Performed by: NURSE PRACTITIONER

## 2022-01-06 PROCEDURE — 1100F PTFALLS ASSESS-DOCD GE2>/YR: CPT | Mod: CPTII,S$GLB,, | Performed by: NURSE PRACTITIONER

## 2022-01-06 PROCEDURE — 3078F DIAST BP <80 MM HG: CPT | Mod: CPTII,S$GLB,, | Performed by: NURSE PRACTITIONER

## 2022-01-06 PROCEDURE — 1160F PR REVIEW ALL MEDS BY PRESCRIBER/CLIN PHARMACIST DOCUMENTED: ICD-10-PCS | Mod: CPTII,S$GLB,, | Performed by: NURSE PRACTITIONER

## 2022-01-06 PROCEDURE — 99213 OFFICE O/P EST LOW 20 MIN: CPT | Mod: S$GLB,,, | Performed by: NURSE PRACTITIONER

## 2022-01-06 PROCEDURE — 3078F PR MOST RECENT DIASTOLIC BLOOD PRESSURE < 80 MM HG: ICD-10-PCS | Mod: CPTII,S$GLB,, | Performed by: NURSE PRACTITIONER

## 2022-01-06 NOTE — PROGRESS NOTES
Chronic Pain - Established Patient    Referring Physician: No ref. provider found    Chief Complaint:   Chief Complaint   Patient presents with    Low-back Pain        SUBJECTIVE: Disclaimer: This note has been generated using voice-recognition software. There may be typographical errors that have been missed during proof-reading    Interval History 1/6/2022:  The patient returns to clinic today for follow up of low back pain. She reports 2 falls in the last week. She reports that her right leg gave out on her. She reports catching pain into her right posterior knee. She continues to report low back pain that radiates into the posterior aspect of both legs to her knees. Her pain is worse with standing and walking. She has difficulty getting out of the bed, chair, and car. She is scheduled for LEONEL next week. She denies any other health changes. Her pain today is 8/10.    Interval History 12/15/2021:  The patient returns to clinic today for follow up of low back pain. She reports that previous LEONEL in August provided 75% relief. She reports increased low back pain over the last few weeks. She reports low back pain that radiates into the posterior aspect of both legs to her knees, right greater than left. She describes this pain as stabbing and shocking in nature. Her pain is worse with walking. She also reports difficulty with getting out of the car. She does report being diagnosed with UTI yesterday. She is currently on antibiotics. She denies any other health changes. Her pain today is 7/10.    Interval History 7/8/2021:  The patient returns to clinic today for follow up of low back pain. She now reports 100% relief of her pain from previous SI joint injections for a few weeks. She continues to report some benefit. She reports low back pain that radiates into her buttocks and posterior thighs. She reports intermittent radiating pain into her right leg to her knee. Her pain is worse with prolonged sitting and  walking. She continues to participate in a home exercise routine. She denies any other health changes. Her pain today is 3/10.       Interval History 5/25/2021:  The patient returns to clinic today for follow up of low back pain. She is s/p bilateral SI joint injections on 5/18/2021. She reports 50% relief of her pain at this time. She continues to report low back and buttock pain, left side greater than right. She denies any radicular leg pain. She reports increased pain with prolonged sitting, standing, and activity. She continues to participate in a home exercise routine. She denies any other health changes. Her pain today is 2/10.    Interval History 4/20/2021:  The patient returns to clinic today for follow up of low back pain. She has not been seen in over a year. She reports increased low back and buttock pain. She denies any radicular leg pain. Her pain is worse with prolonged sitting, standing, and activity. She continues to report balance issues. She has not followed up with Neurology since last year. She denies any other health changes. Her pain today is 3/10.    Interval Hx 4/20/20:  Follow up for LBP. She continues to have lower back and buttock pain. The pain does not radiate below the buttocks. She continues to take gabapentin 600/300/600mg per day. She does home PT and is waiting to start formal PT again after the outbreak ends. She saw Neurology after the last visit and they ordered an EMG that was c/w diabetic polyneuropathy. She feels her medications are controlling her pain well. Her primary complaint is gait instability.     Interval History 1/20/2020:  The patient returns to clinic today for follow up. She continues to report intermittent low back and buttock pain. She also may take naproxen sometimes for pain but she is unsure.  She denies any radiating leg pain. She is currently participating in physical therapy which has been helpful. She continues to report intermittent falls. She has seen  "neurology who is obtaining a nerve conduction study. She continues to follow up with Neurology. She continues to take Gabapentin with benefit. She denies any other health changes. Her pain today is 1/10.    Interval History 11/20/2019:  The patient returns to clinic today for follow up. She is s/p bilateral SI joint injections on 11/7/2019. She reports 100% relief of her low back and buttock pain. She reports intermittent low back pain that is tolerable. She denies any radiating leg pain. She has seen neurology who is working her up regarding her balance and gait. She is scheduled for EMG in January. She continues to take Gabapentin with benefit. She is currently participating in physical therapy. She denies any other health changes. Her pain today is 1/10.    Interval History 9/11/2019:  The patient returns to clinic today for follow up and image review. She continues to report low back and buttock pain that is sharp and aching. She reports intermittent radiating pain into the posterior aspect of her right leg to her ankle. She denies any left leg pain. She denies any numbness or tingling to her feet. Her worst pain at this time is her bilateral buttocks and tailbone. This pain is worse with prolonged sitting and moving from seated to standing. She continues to report shuffling gait. She has had multiple falls recently. She does take Gabapentin with benefit. She denies any other health changes. Her pain today is 8/10.    Interval History 8/27/2019:  The patient returns to clinic today for follow up. She is s/p right L3,4,5 RFA on 7/25/2019. She is s/p left L3,4,5 RFA on 8/6/2019. She reports limited relief at this time. She continues to report low back pain that is sharp and aching. She denies any radicular leg pain. She denies any numbness or tingling to her feet. She does report episodes of "shuffling" gait where she feels like she will fall. She has had two significant falls recently. She continues to take " Gabapentin with benefit. She denies any other health changes. Her pain today is 2/10.    Interval History 7/23/2019:  The patient returns to clinic today for follow up. She was previously scheduled for RFA but had to cancel due to a fall. She fell while getting out of her bed and hit her face on her nightstand. She did have xrays which were negative for facial fracture. She did have a facial laceration which was sutured. She has rescheduled her RFA for this week. She continues to report low back pain that is constant, sharp, and aching. She denies any radiating leg pain. She does report intermittent numbness and pain to her buttocks with prolonged sitting. She continues to take Gabapentin. She denies any other health changes. Her pain today is 7/10.      Interval History 5/28/2019:  The patient returns to clinic today for follow up. She is s/p bilateral L2,3,4,5 MBB on 5/21/2019. She reports 80% relief of her low back pain after the block. She did sleep very well the night after the block. Her pain has returned to baseline. She continues to report low back pain that is constant, sharp, and aching in nature. She denies any radiating leg pain. She does report increased tailbone pain. Her pain is worse with prolonged standing and walking. She continues to take Gabapentin with benefit. She denies any other health changes. Her pain today is 2/10.    Interval History 4/26/2019:  The patient returns to clinic today for follow up. She reports a fall yesterday at home where she tripped over her shoe string. She landed on her buttock. She reports that she hit the back of her head on the carpet. She denies any loss of consciousness. She did not go to the ER. She does report increased low back pain today that is sore in nature which she attributes to the fall. She continues to report low back pain that is sharp and aching in nature. She does report intermittent aching buttock pain. She denies any radiating leg pain. Her pain is  worse with prolonged standing and walking. She continues to participate in physical therapy. She continues to take Gabapentin with benefit. She did have lumbar MBB in 2017 with 90% relief for a few days. She denies any other health changes. She does have stress urinary incontinence. She does follow up with Urogyn. Her pain today is 7/10.    Interval History 3/11/2019:  The patient returns to clinic today for follow up. She continues to report low back pain that is aching in nature. She reports right sided buttock pain that is sore in nature. She denies any radiating leg pain. Her pain is worse with standing, sitting, and activity. She does endorse morning stiffness. She is currently participating in physical therapy with benefit. She continues to take Gabapentin with benefit and does need a refill today. She denies any other health changes. She denies any bowel or bladder incontinence. Her pain today is 5/10.    Interval History 1/9/2019:  The patient returns to clinic today for follow up. She is s/p bilateral SI joint injections on 12/27/2018. She reports 100% for the first week. She now reports approximately 50% relief of her pain. She reports intermittent low back and bilateral buttock pain. She describes this pain as sore and aching in nature. This pain is worse with prolonged sitting. She denies any radiating leg pain. She often feels as though as she is leaning over while walking. She often feels as though her balance is off. She continues to take Gabapentin with benefit. She denies any other health changes. His pain today is 2/10.     Interval History 12/11/2018:  The patient returns to clinic today for follow up. She reports increased low back and bilateral buttock pain, left greater than right. She describes this pain as sore in nature. This pain is worse with prolonged sitting. She denies any radiating leg pain. She reports that she often feels like her back is locking up on her. She continues to take  Gabapentin with benefit. She denies any other health changes. Her pain today is 5/10.    Interval History 9/25/2018:  The patient returns to clinic today for follow up. She is s/p right L5 and S1 TF LEONEL. She reports 90% relief of her low back and leg pain. She reports intermittent low back pain that is aching in nature. She denies any radiating leg pain today. Her back pain is worse with bending. She also reports that her legs feel heavy with prolonged walking. She denies any other health changes. Her pain today is 4/10.    Interval History 7/27/2018:  The patient returns to clinic today for follow up and image review. She continues to report low back pain that radiates down the posterior aspect of her right leg to her foot. She reports intermittent left leg pain in the same distribution. Her pain is worse with prolonged walking and activity. She often feels like her legs are heavy. She often feels like she will fall. She denies any other health changes. She denies any bowel or bladder incontinence. Her pain today is 2/10.    Interval History 7/20/2018:  The patient returns to clinic today for follow up. She has not been seen in over a year due to her son being diagnosed with cancer. She reports that he is in remission now. She returns today due to three fall recently, last a week ago. She continues to report low back pain that radiates intermittently down the posterior portion of both legs to her feet. She reports that her legs often feel heavy and weak which is why she falls. She denies any bowel or bladder incontinence. Her pain today is 7/10.    Interval History 3/6/2017:  The patient returns to clinic today for follow up. She is s/p bilateral S1 TF LEONEL on 2/8/2017. She reports 80% relief of her radiating leg pain. She continues to have low back pain. She describes the pain as dull and aching. The pain is worse in the morning. She reports that the pain increases with prolonged sitting and standing. She denies  "any other health changes. She denies any bowel or bladder incontinence. Her pain today is 6/10.     Interval History 1/23/2017:  The patient returns to clinic today for follow up of low back pain and bilateral leg pain. She has recently completed a medrol dose pack with some benefit. She still reports radiating pain into both legs. The pain is worse with prolonged sitting and lifting. The pain gets better with walking and rest. The pain radiates down the back of both legs to ankles. She denies any bowel or bladder incontinence or signs of saddle paresthesia. She continues to work with lifting restrictions. She currently takes Gabapentin and Naproxen with benefit. Her pain today is 6/10.     Initial encounter:    Mila Foster presents to the clinic for the evaluation of bilateral leg pain with intermittent lower back pain. 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 She reports recent falling to knees, but she changed her shoes, and the falling has ceased. She reports that she had a "pain flare" 2 weeks ago requiring a walker for 2 days.     Brief history:  70yo F with hx of HTN and DM complicated by neuropathy BL but R>L with tingling, tightness, shooting pain only in feet. Patient reports glucose was 120 this morning and usually runs around 130.    Pain Description:    The pain is located in the posterior leg area and radiates to her ankles and to her knees at its worst.     At BEST  1/10     At WORST  8/10 on the WORST day.      On average pain is rated as 7/10    Today the pain is rated as 5/10    The pain is described as aching, burning, dull, sharp and tight band      Symptoms interfere with work.     Exacerbating factors: Sitting, picking up children, lifting, bending over    Mitigating factors: walking, rest    Patient denies night fever/night sweats, bowel incontinence, significant weight loss, significant motor " weakness and loss of sensations. She does report chronic urinary incontinence unrelated to back/leg pain.    Patient denies any suicidal or homicidal ideations    Pain Medications:  Current:  Gabapentin    Tried in Past:  NSAIDs -yes Naproxen   TCA -Never  SNRI -Never  Anti-convulsants -gabapentin  Muscle Relaxants -Never  Opioids-Never    Physical Therapy/Home Exercise: yes       report: N/A    Pain Procedures:   2/8/2017- Bilateral S1 TF LEONEL  3/22/2017- Bilateral L2,3,4,5 MBB  9/11/2018- Right L5 and S1 TF LEONEL   12/27/2018- Bilateral SI joint injections  5/21/2019- Bilateral L2,3,4,5 MBB  7/25/2019- Right L3,4,5 RFA  8/6/2019- Left L3,4,5 RFA  11/7/2019- Bilateral SI joint injection   5/18/2021- Bilateral SI joint injections  8/17/2021- Bilateral S1 TF LEONEL    Chiropractor -never  Acupuncture - never  TENS unit -never  Spinal decompression -never  Joint replacement -never    Imaging:     EMG 1/2020: Diabetic polyneuropathy    MRI Lumbar Spine 9/7/2019:  COMPARISON:  07/25/2018    FINDINGS:  The distal cord/conus demonstrates normal size and signal.  No evidence of osteomyelitis, marrow replacement process, or acute fracture.  No paraspinal masses.    L1-2 is unremarkable.    At L2-3, there is mild facet arthropathy and disc bulging.  No spinal canal stenosis or significant neural foraminal narrowing.    At L3-4, there is mild disc bulging and bilateral facet arthropathy.  No spinal canal stenosis or significant neural foraminal narrowing.    At L4-5, there is bilateral facet arthropathy with slight L4-5 anterolisthesis.  No spinal canal stenosis..  There is mild right-sided neural foraminal narrowing.    At L5-S1, there is mild bilateral facet arthropathy and disc bulging.  Small posterior annular tear with right paracentral/lateral recess disc protrusion, possibly impinging upon the right L5 or S1 nerve roots.  No spinal canal stenosis or significant neural foraminal narrowing.      Impression       Small right  paracentral/ lateral recess disc protrusion at L5-S1 and mild neural foraminal narrowing at L4-5, as above.  No spinal canal stenosis.  Findings are similar to the 07/25/2018 exam.     Xray Lumbar Spine 7/25/2018:  COMPARISON:  Prior lumbar spine MRI dated 12/16/16    FINDINGS:  There is diffuse osteopenia.  There is mild grade 1 anterolisthesis of L3 on L4 (4 mm) and of L4 on L5 (6 mm).  There is no evidence of translational instability.  There is moderate facet arthropathy at the lower lumbar spine with probable L5-S1 neural foraminal narrowing.  Endplate degenerative changes are present with subchondral sclerosis and marginal osteophyte formation.    There is atherosclerotic calcification of the aorta.      Impression       Osteopenia and degenerative change of the lumbar spine with grade 1 anterolisthesis of L3-4 and L4-5, new from the 2016 MRI.  No evidence of translational instability.             Past Medical History:   Diagnosis Date    Diabetes     Hypertension      Past Surgical History:   Procedure Laterality Date    CHOLECYSTECTOMY      COLONOSCOPY N/A 11/1/2018    Procedure: COLONOSCOPY Suprep;  Surgeon: Azucena Hood MD;  Location: [facility];  Service: Endoscopy;  Laterality: N/A;    HYSTERECTOMY      INJECTION OF ANESTHETIC AGENT AROUND NERVE Bilateral 5/21/2019    Procedure: BLOCK, NERVE, BILATERAL L2,3,4,5;  Surgeon: Bonnie Duncan MD;  Location: [facility];  Service: Pain Management;  Laterality: Bilateral;  BLOCK, NERVE, BILATERAL L2,3,4,5    INJECTION OF JOINT Bilateral 12/27/2018    Procedure: Injection, Joint BILATERAL SACROILIAC JOINT INJECTION;  Surgeon: Bonnie Duncan MD;  Location: [facility]IN [facility]GT;  Service: Pain Management;  Laterality: Bilateral;    INJECTION OF JOINT Bilateral 11/7/2019    Procedure: INJECTION, JOINT, SI;  Surgeon: Bonnie Duncan MD;  Location: [facility];  Service: Pain Management;  Laterality: Bilateral;  B/L SI Joint Injections    INJECTION  OF JOINT Bilateral 5/18/2021    Procedure: INJECTION, JOINT, SI;  Surgeon: Bonnie Duncan MD;  Location: [facility];  Service: Pain Management;  Laterality: Bilateral;  Consent needed    RADIOFREQUENCY ABLATION Right 7/25/2019    Procedure: RADIOFREQUENCY ABLATION;  Surgeon: Bonnie Duncan MD;  Location: [facility];  Service: Pain Management;  Laterality: Right;  Right RFA L2,3,4,5; THERMAL  1 of 2    RADIOFREQUENCY ABLATION Left 8/6/2019    Procedure: RADIOFREQUENCY ABLATION;  Surgeon: Bonnie Duncan MD;  Location: [facility];  Service: Pain Management;  Laterality: Left;  Left RFA L2,3,4,5 LUMBAR  1 of 2    TRANSFORAMINAL EPIDURAL INJECTION OF STEROID Bilateral 8/17/2021    Procedure: INJECTION, STEROID, EPIDURAL, TRANSFORAMINAL APPROACH S1;  Surgeon: Bonnie Duncan MD;  Location: [facility];  Service: Pain Management;  Laterality: Bilateral;     Social History     Socioeconomic History    Marital status: Single   Tobacco Use    Smoking status: Never Smoker    Smokeless tobacco: Never Used   Substance and Sexual Activity    Alcohol use: No    Drug use: No    Sexual activity: Not Currently     Family History   Problem Relation Age of Onset    Vaginal cancer Neg Hx     Endometrial cancer Neg Hx     Cervical cancer Neg Hx        Review of patient's allergies indicates:  No Known Allergies    Current Outpatient Medications   Medication Sig    aspirin (ECOTRIN) 81 MG EC tablet 81 mg.    ciprofloxacin HCl (CIPRO) 500 MG tablet Take 1 tablet (500 mg total) by mouth 2 (two) times daily.    FLUoxetine 20 MG capsule TK ONE C PO  QD    gabapentin (NEURONTIN) 300 MG capsule Take 2 tabs in the morning, 1 tab in the afternoon, and 2 tabs in the evening    glimepiride (AMARYL) 2 MG tablet Take 2 mg by mouth every morning.    hydroCHLOROthiazide (HYDRODIURIL) 12.5 MG Tab Take 12.5 mg by mouth every morning.    metformin (GLUCOPHAGE-XR) 500 MG 24 hr tablet 500 mg.    metoprolol  "tartrate (LOPRESSOR) 25 MG tablet     naproxen (NAPROSYN) 500 MG tablet     omeprazole (PRILOSEC) 20 MG capsule 20 mg.    pravastatin (PRAVACHOL) 40 MG tablet 40 mg.    hyoscyamine (ANASPAZ,LEVSIN) 0.125 mg Tab Take 1 tablet (125 mcg total) by mouth every 4 (four) hours as needed.    mirabegron (MYRBETRIQ) 25 mg Tb24 ER tablet Take 1 tablet (25 mg total) by mouth once daily.     No current facility-administered medications for this visit.     Facility-Administered Medications Ordered in Other Visits   Medication    0.9%  NaCl infusion    0.9%  NaCl infusion       REVIEW OF SYSTEMS:    GENERAL:  No weight loss, malaise or fevers.  HEENT:   No recent changes in vision or hearing  NECK:  Negative for lumps, no difficulty with swallowing.  RESPIRATORY:  Negative for cough, wheezing or shortness of breath, patient denies any recent URI.  CARDIOVASCULAR:  Negative for chest pain, leg swelling or palpitations. HTN.  GI:  Negative for abdominal discomfort, blood in stools or black stools or change in bowel habits.  MUSCULOSKELETAL:  See HPI.  SKIN:  Negative for lesions, rash, and itching.  PSYCH:  No mood disorder or recent psychosocial stressors.  Patient's sleep is occasionally disturbed secondary to pain.  HEMATOLOGY/LYMPHOLOGY:  Negative for prolonged bleeding, bruising easily or swollen nodes.  Patient is not currently taking any anti-coagulants  ENDO: Positive for DM. Negative for thyroid dysfunction  NEURO:   No history of headaches, syncope, paralysis, seizures or tremors.  All other reviewed and negative other than HPI.    OBJECTIVE:    /65 (BP Location: Right arm, Patient Position: Sitting, BP Method: Large (Automatic))   Pulse 83   Temp 97.5 °F (36.4 °C)   Resp 18   Ht 5' 7" (1.702 m)   Wt 98 kg (216 lb)   BMI 33.83 kg/m²     PHYSICAL EXAMINATION:    GENERAL: Well appearing, in no acute distress, alert and oriented x3.  PSYCH:  Mood and affect appropriate.  SKIN: Skin color, texture, turgor " normal, no rashes or lesions.  HEAD/FACE:  Normocephalic, atraumatic. Cranial nerves grossly intact.   CV: RRR with palpation of the radial artery.  PULM: No evidence of respiratory difficulty, symmetric chest rise.  BACK: Straight leg raising in the siting position is positive for radicular pain on the right. There is pain with palpation over lumbar facet joints bilaterally, R>L. Limited ROM with pain on flexion and extension. Positive facet loading bilaterally, R>L.  LOWER EXTREMITIES:  No deformities, edema, or skin discoloration. Good capillary refill.  MUSCULOSKELETAL:  There is mild pain with palpation over the sacroiliac joints bilaterally.  FABERs test is positive bilaterally.  FADIRs test is negative. 5/5 strength in right ankle with plantar and dorsiflexion. 4/5 strength in left ankle with plantar and dorsiflexion. 5/5 strength with right knee flexion and extension. 5/5 strength with left knee flexion and extension. 5/5 strength in right EHL, 4/5 strength in left EHL.  No atrophy or tone abnormalities are noted.  NEURO: Bilateral lower extremity coordination and muscle stretch reflexes are physiologic and symmetric.   No loss of sensation is noted.  GAIT: Antalgic, shuffling gait- ambulates with walker.         BMP  Lab Results   Component Value Date     05/05/2020    K 4.1 05/05/2020     05/05/2020    CO2 26 05/05/2020    BUN 15 05/05/2020    CREATININE 1.0 05/05/2020    CALCIUM 10.2 05/05/2020    ANIONGAP 10 05/05/2020    ESTGFRAFRICA >60 05/05/2020    EGFRNONAA 55 (A) 05/05/2020       ASSESSMENT: 76 y.o. year old female with back/leg pain, consistent with the following:    Encounter Diagnoses   Name Primary?    Lumbosacral radiculopathy Yes    Lumbar radiculopathy, chronic     Lumbar disc herniation with radiculopathy     Neuroforaminal stenosis of lumbar spine     Lumbar spondylosis     DDD (degenerative disc disease), lumbar        PLAN:     - Previous imaging was reviewed and  discussed with the patient today.    - She is scheduled for bilateral S1 TF LEONEL next week.     - Obtain updated lumbar xray and MRI given frequent falls.     - She is s/p bilateral SI joint injections with benefit. If short term relief, consider SI fusion.     - Consult physical therapy.     - RTC 2 weeks after above procedure with Dr. Quijano to establish care.     The above plan and management options were discussed at length with patient. Patient is in agreement with the above and verbalized understanding.     Coby Russo NP  01/06/2022

## 2022-02-16 ENCOUNTER — TELEPHONE (OUTPATIENT)
Dept: PAIN MEDICINE | Facility: CLINIC | Age: 77
End: 2022-02-16

## 2022-02-16 ENCOUNTER — HOSPITAL ENCOUNTER (OUTPATIENT)
Dept: RADIOLOGY | Facility: OTHER | Age: 77
Discharge: HOME | End: 2022-02-16
Attending: NURSE PRACTITIONER
Payer: MEDICARE

## 2022-02-16 ENCOUNTER — OFFICE VISIT (OUTPATIENT)
Dept: PAIN MEDICINE | Facility: CLINIC | Age: 77
End: 2022-02-16
Attending: ANESTHESIOLOGY
Payer: MEDICARE

## 2022-02-16 VITALS
DIASTOLIC BLOOD PRESSURE: 68 MMHG | HEIGHT: 67 IN | BODY MASS INDEX: 33.49 KG/M2 | RESPIRATION RATE: 18 BRPM | HEART RATE: 78 BPM | SYSTOLIC BLOOD PRESSURE: 113 MMHG | WEIGHT: 213.38 LBS

## 2022-02-16 DIAGNOSIS — G89.29 CHRONIC PAIN OF RIGHT KNEE: ICD-10-CM

## 2022-02-16 DIAGNOSIS — M51.16 LUMBAR DISC HERNIATION WITH RADICULOPATHY: ICD-10-CM

## 2022-02-16 DIAGNOSIS — R29.6 MULTIPLE FALLS: ICD-10-CM

## 2022-02-16 DIAGNOSIS — M54.16 LUMBAR RADICULOPATHY, CHRONIC: ICD-10-CM

## 2022-02-16 DIAGNOSIS — M48.061 NEUROFORAMINAL STENOSIS OF LUMBAR SPINE: ICD-10-CM

## 2022-02-16 DIAGNOSIS — M51.36 DDD (DEGENERATIVE DISC DISEASE), LUMBAR: ICD-10-CM

## 2022-02-16 DIAGNOSIS — M53.3 SACROILIAC JOINT PAIN: ICD-10-CM

## 2022-02-16 DIAGNOSIS — M47.816 LUMBAR SPONDYLOSIS: ICD-10-CM

## 2022-02-16 DIAGNOSIS — M25.561 CHRONIC PAIN OF RIGHT KNEE: ICD-10-CM

## 2022-02-16 DIAGNOSIS — M54.17 LUMBOSACRAL RADICULOPATHY: Primary | ICD-10-CM

## 2022-02-16 PROCEDURE — 72114 XR LUMBAR SPINE 5 VIEW WITH FLEX AND EXT: ICD-10-PCS | Mod: 26,,, | Performed by: RADIOLOGY

## 2022-02-16 PROCEDURE — 1160F RVW MEDS BY RX/DR IN RCRD: CPT | Mod: CPTII,S$GLB,, | Performed by: NURSE PRACTITIONER

## 2022-02-16 PROCEDURE — 72114 X-RAY EXAM L-S SPINE BENDING: CPT | Mod: 26,,, | Performed by: RADIOLOGY

## 2022-02-16 PROCEDURE — 3078F DIAST BP <80 MM HG: CPT | Mod: CPTII,S$GLB,, | Performed by: NURSE PRACTITIONER

## 2022-02-16 PROCEDURE — 72114 X-RAY EXAM L-S SPINE BENDING: CPT | Mod: TC,FY

## 2022-02-16 PROCEDURE — 1160F PR REVIEW ALL MEDS BY PRESCRIBER/CLIN PHARMACIST DOCUMENTED: ICD-10-PCS | Mod: CPTII,S$GLB,, | Performed by: NURSE PRACTITIONER

## 2022-02-16 PROCEDURE — 1125F PR PAIN SEVERITY QUANTIFIED, PAIN PRESENT: ICD-10-PCS | Mod: CPTII,S$GLB,, | Performed by: NURSE PRACTITIONER

## 2022-02-16 PROCEDURE — 1100F PR PT FALLS ASSESS DOC 2+ FALLS/FALL W/INJURY/YR: ICD-10-PCS | Mod: CPTII,S$GLB,, | Performed by: NURSE PRACTITIONER

## 2022-02-16 PROCEDURE — 3074F PR MOST RECENT SYSTOLIC BLOOD PRESSURE < 130 MM HG: ICD-10-PCS | Mod: CPTII,S$GLB,, | Performed by: NURSE PRACTITIONER

## 2022-02-16 PROCEDURE — 99999 PR PBB SHADOW E&M-EST. PATIENT-LVL IV: CPT | Mod: PBBFAC,,, | Performed by: NURSE PRACTITIONER

## 2022-02-16 PROCEDURE — 3078F PR MOST RECENT DIASTOLIC BLOOD PRESSURE < 80 MM HG: ICD-10-PCS | Mod: CPTII,S$GLB,, | Performed by: NURSE PRACTITIONER

## 2022-02-16 PROCEDURE — 1159F MED LIST DOCD IN RCRD: CPT | Mod: CPTII,S$GLB,, | Performed by: NURSE PRACTITIONER

## 2022-02-16 PROCEDURE — 1159F PR MEDICATION LIST DOCUMENTED IN MEDICAL RECORD: ICD-10-PCS | Mod: CPTII,S$GLB,, | Performed by: NURSE PRACTITIONER

## 2022-02-16 PROCEDURE — 3288F FALL RISK ASSESSMENT DOCD: CPT | Mod: CPTII,S$GLB,, | Performed by: NURSE PRACTITIONER

## 2022-02-16 PROCEDURE — 73560 X-RAY EXAM OF KNEE 1 OR 2: CPT | Mod: TC,50,FY

## 2022-02-16 PROCEDURE — 1100F PTFALLS ASSESS-DOCD GE2>/YR: CPT | Mod: CPTII,S$GLB,, | Performed by: NURSE PRACTITIONER

## 2022-02-16 PROCEDURE — 73560 X-RAY EXAM OF KNEE 1 OR 2: CPT | Mod: 26,50,, | Performed by: RADIOLOGY

## 2022-02-16 PROCEDURE — 3288F PR FALLS RISK ASSESSMENT DOCUMENTED: ICD-10-PCS | Mod: CPTII,S$GLB,, | Performed by: NURSE PRACTITIONER

## 2022-02-16 PROCEDURE — 99213 PR OFFICE/OUTPT VISIT, EST, LEVL III, 20-29 MIN: ICD-10-PCS | Mod: S$GLB,,, | Performed by: NURSE PRACTITIONER

## 2022-02-16 PROCEDURE — 99999 PR PBB SHADOW E&M-EST. PATIENT-LVL IV: ICD-10-PCS | Mod: PBBFAC,,, | Performed by: NURSE PRACTITIONER

## 2022-02-16 PROCEDURE — 99213 OFFICE O/P EST LOW 20 MIN: CPT | Mod: S$GLB,,, | Performed by: NURSE PRACTITIONER

## 2022-02-16 PROCEDURE — 3074F SYST BP LT 130 MM HG: CPT | Mod: CPTII,S$GLB,, | Performed by: NURSE PRACTITIONER

## 2022-02-16 PROCEDURE — 1125F AMNT PAIN NOTED PAIN PRSNT: CPT | Mod: CPTII,S$GLB,, | Performed by: NURSE PRACTITIONER

## 2022-02-16 PROCEDURE — 73560 XR KNEE 1 OR 2 VIEW BILATERAL: ICD-10-PCS | Mod: 26,50,, | Performed by: RADIOLOGY

## 2022-02-16 RX ORDER — GABAPENTIN 300 MG/1
CAPSULE ORAL
Qty: 150 CAPSULE | Refills: 5 | Status: SHIPPED | OUTPATIENT
Start: 2022-02-16

## 2022-02-16 NOTE — PROGRESS NOTES
Chronic Pain - Established Patient    Referring Physician: No ref. provider found    Chief Complaint:   Chief Complaint   Patient presents with    Low-back Pain    Leg Pain        SUBJECTIVE: Disclaimer: This note has been generated using voice-recognition software. There may be typographical errors that have been missed during proof-reading    Interval History 2/16/2022:  The patient returns to clinic today for follow up of low back pain. She was scheduled for LEONEL but this was canceled due to UTI. She also no showed for MRI that was ordered at previous visit. She continues to report low back pain that radiates into the posterolateral aspect of both legs to her ankles, right greater than left. Her right leg gives out on her. She reports multiple falls since last visit, including 3 in the last week. Her son reports that he had difficulty getting her up after a recent fall. Her pain is worse with standing and walking. She also notes difficulty getting out of the chair and bed without support. She feels as though she will fall forward the longer she walking. She denies any other health changes. Her pain today is 4/10.    Interval History 1/6/2022:  The patient returns to clinic today for follow up of low back pain. She reports 2 falls in the last week. She reports that her right leg gave out on her. She reports catching pain into her right posterior knee. She continues to report low back pain that radiates into the posterior aspect of both legs to her knees. Her pain is worse with standing and walking. She has difficulty getting out of the bed, chair, and car. She is scheduled for LEONEL next week. She denies any other health changes. Her pain today is 8/10.    Interval History 12/15/2021:  The patient returns to clinic today for follow up of low back pain. She reports that previous LEONEL in August provided 75% relief. She reports increased low back pain over the last few weeks. She reports low back pain that radiates  into the posterior aspect of both legs to her knees, right greater than left. She describes this pain as stabbing and shocking in nature. Her pain is worse with walking. She also reports difficulty with getting out of the car. She does report being diagnosed with UTI yesterday. She is currently on antibiotics. She denies any other health changes. Her pain today is 7/10.    Interval History 7/8/2021:  The patient returns to clinic today for follow up of low back pain. She now reports 100% relief of her pain from previous SI joint injections for a few weeks. She continues to report some benefit. She reports low back pain that radiates into her buttocks and posterior thighs. She reports intermittent radiating pain into her right leg to her knee. Her pain is worse with prolonged sitting and walking. She continues to participate in a home exercise routine. She denies any other health changes. Her pain today is 3/10.       Interval History 5/25/2021:  The patient returns to clinic today for follow up of low back pain. She is s/p bilateral SI joint injections on 5/18/2021. She reports 50% relief of her pain at this time. She continues to report low back and buttock pain, left side greater than right. She denies any radicular leg pain. She reports increased pain with prolonged sitting, standing, and activity. She continues to participate in a home exercise routine. She denies any other health changes. Her pain today is 2/10.    Interval History 4/20/2021:  The patient returns to clinic today for follow up of low back pain. She has not been seen in over a year. She reports increased low back and buttock pain. She denies any radicular leg pain. Her pain is worse with prolonged sitting, standing, and activity. She continues to report balance issues. She has not followed up with Neurology since last year. She denies any other health changes. Her pain today is 3/10.    Interval Hx 4/20/20:  Follow up for LBP. She continues to  have lower back and buttock pain. The pain does not radiate below the buttocks. She continues to take gabapentin 600/300/600mg per day. She does home PT and is waiting to start formal PT again after the outbreak ends. She saw Neurology after the last visit and they ordered an EMG that was c/w diabetic polyneuropathy. She feels her medications are controlling her pain well. Her primary complaint is gait instability.     Interval History 1/20/2020:  The patient returns to clinic today for follow up. She continues to report intermittent low back and buttock pain. She also may take naproxen sometimes for pain but she is unsure.  She denies any radiating leg pain. She is currently participating in physical therapy which has been helpful. She continues to report intermittent falls. She has seen neurology who is obtaining a nerve conduction study. She continues to follow up with Neurology. She continues to take Gabapentin with benefit. She denies any other health changes. Her pain today is 1/10.    Interval History 11/20/2019:  The patient returns to clinic today for follow up. She is s/p bilateral SI joint injections on 11/7/2019. She reports 100% relief of her low back and buttock pain. She reports intermittent low back pain that is tolerable. She denies any radiating leg pain. She has seen neurology who is working her up regarding her balance and gait. She is scheduled for EMG in January. She continues to take Gabapentin with benefit. She is currently participating in physical therapy. She denies any other health changes. Her pain today is 1/10.    Interval History 9/11/2019:  The patient returns to clinic today for follow up and image review. She continues to report low back and buttock pain that is sharp and aching. She reports intermittent radiating pain into the posterior aspect of her right leg to her ankle. She denies any left leg pain. She denies any numbness or tingling to her feet. Her worst pain at this time  "is her bilateral buttocks and tailbone. This pain is worse with prolonged sitting and moving from seated to standing. She continues to report shuffling gait. She has had multiple falls recently. She does take Gabapentin with benefit. She denies any other health changes. Her pain today is 8/10.    Interval History 8/27/2019:  The patient returns to clinic today for follow up. She is s/p right L3,4,5 RFA on 7/25/2019. She is s/p left L3,4,5 RFA on 8/6/2019. She reports limited relief at this time. She continues to report low back pain that is sharp and aching. She denies any radicular leg pain. She denies any numbness or tingling to her feet. She does report episodes of "shuffling" gait where she feels like she will fall. She has had two significant falls recently. She continues to take Gabapentin with benefit. She denies any other health changes. Her pain today is 2/10.    Interval History 7/23/2019:  The patient returns to clinic today for follow up. She was previously scheduled for RFA but had to cancel due to a fall. She fell while getting out of her bed and hit her face on her nightstand. She did have xrays which were negative for facial fracture. She did have a facial laceration which was sutured. She has rescheduled her RFA for this week. She continues to report low back pain that is constant, sharp, and aching. She denies any radiating leg pain. She does report intermittent numbness and pain to her buttocks with prolonged sitting. She continues to take Gabapentin. She denies any other health changes. Her pain today is 7/10.      Interval History 5/28/2019:  The patient returns to clinic today for follow up. She is s/p bilateral L2,3,4,5 MBB on 5/21/2019. She reports 80% relief of her low back pain after the block. She did sleep very well the night after the block. Her pain has returned to baseline. She continues to report low back pain that is constant, sharp, and aching in nature. She denies any radiating leg " pain. She does report increased tailbone pain. Her pain is worse with prolonged standing and walking. She continues to take Gabapentin with benefit. She denies any other health changes. Her pain today is 2/10.    Interval History 4/26/2019:  The patient returns to clinic today for follow up. She reports a fall yesterday at home where she tripped over her shoe string. She landed on her buttock. She reports that she hit the back of her head on the carpet. She denies any loss of consciousness. She did not go to the ER. She does report increased low back pain today that is sore in nature which she attributes to the fall. She continues to report low back pain that is sharp and aching in nature. She does report intermittent aching buttock pain. She denies any radiating leg pain. Her pain is worse with prolonged standing and walking. She continues to participate in physical therapy. She continues to take Gabapentin with benefit. She did have lumbar MBB in 2017 with 90% relief for a few days. She denies any other health changes. She does have stress urinary incontinence. She does follow up with Urogyn. Her pain today is 7/10.    Interval History 3/11/2019:  The patient returns to clinic today for follow up. She continues to report low back pain that is aching in nature. She reports right sided buttock pain that is sore in nature. She denies any radiating leg pain. Her pain is worse with standing, sitting, and activity. She does endorse morning stiffness. She is currently participating in physical therapy with benefit. She continues to take Gabapentin with benefit and does need a refill today. She denies any other health changes. She denies any bowel or bladder incontinence. Her pain today is 5/10.    Interval History 1/9/2019:  The patient returns to clinic today for follow up. She is s/p bilateral SI joint injections on 12/27/2018. She reports 100% for the first week. She now reports approximately 50% relief of her pain.  She reports intermittent low back and bilateral buttock pain. She describes this pain as sore and aching in nature. This pain is worse with prolonged sitting. She denies any radiating leg pain. She often feels as though as she is leaning over while walking. She often feels as though her balance is off. She continues to take Gabapentin with benefit. She denies any other health changes. His pain today is 2/10.     Interval History 12/11/2018:  The patient returns to clinic today for follow up. She reports increased low back and bilateral buttock pain, left greater than right. She describes this pain as sore in nature. This pain is worse with prolonged sitting. She denies any radiating leg pain. She reports that she often feels like her back is locking up on her. She continues to take Gabapentin with benefit. She denies any other health changes. Her pain today is 5/10.    Interval History 9/25/2018:  The patient returns to clinic today for follow up. She is s/p right L5 and S1 TF LEONEL. She reports 90% relief of her low back and leg pain. She reports intermittent low back pain that is aching in nature. She denies any radiating leg pain today. Her back pain is worse with bending. She also reports that her legs feel heavy with prolonged walking. She denies any other health changes. Her pain today is 4/10.    Interval History 7/27/2018:  The patient returns to clinic today for follow up and image review. She continues to report low back pain that radiates down the posterior aspect of her right leg to her foot. She reports intermittent left leg pain in the same distribution. Her pain is worse with prolonged walking and activity. She often feels like her legs are heavy. She often feels like she will fall. She denies any other health changes. She denies any bowel or bladder incontinence. Her pain today is 2/10.    Interval History 7/20/2018:  The patient returns to clinic today for follow up. She has not been seen in over a  "year due to her son being diagnosed with cancer. She reports that he is in remission now. She returns today due to three fall recently, last a week ago. She continues to report low back pain that radiates intermittently down the posterior portion of both legs to her feet. She reports that her legs often feel heavy and weak which is why she falls. She denies any bowel or bladder incontinence. Her pain today is 7/10.    Interval History 3/6/2017:  The patient returns to clinic today for follow up. She is s/p bilateral S1 TF LEONEL on 2/8/2017. She reports 80% relief of her radiating leg pain. She continues to have low back pain. She describes the pain as dull and aching. The pain is worse in the morning. She reports that the pain increases with prolonged sitting and standing. She denies any other health changes. She denies any bowel or bladder incontinence. Her pain today is 6/10.     Interval History 1/23/2017:  The patient returns to clinic today for follow up of low back pain and bilateral leg pain. She has recently completed a medrol dose pack with some benefit. She still reports radiating pain into both legs. The pain is worse with prolonged sitting and lifting. The pain gets better with walking and rest. The pain radiates down the back of both legs to ankles. She denies any bowel or bladder incontinence or signs of saddle paresthesia. She continues to work with lifting restrictions. She currently takes Gabapentin and Naproxen with benefit. Her pain today is 6/10.     Initial encounter:    Mila Foster presents to the clinic for the evaluation of bilateral leg pain with intermittent lower back pain. 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 She reports recent falling to knees, but she changed her shoes, and the falling has ceased. She reports that she had a "pain flare" 2 weeks ago requiring a walker for 2 days. "     Brief history:  72yo F with hx of HTN and DM complicated by neuropathy BL but R>L with tingling, tightness, shooting pain only in feet. Patient reports glucose was 120 this morning and usually runs around 130.    Pain Description:    The pain is located in the posterior leg area and radiates to her ankles and to her knees at its worst.     At BEST  1/10     At WORST  8/10 on the WORST day.      On average pain is rated as 7/10    Today the pain is rated as 5/10    The pain is described as aching, burning, dull, sharp and tight band      Symptoms interfere with work.     Exacerbating factors: Sitting, picking up children, lifting, bending over    Mitigating factors: walking, rest    Patient denies night fever/night sweats, bowel incontinence, significant weight loss, significant motor weakness and loss of sensations. She does report chronic urinary incontinence unrelated to back/leg pain.    Patient denies any suicidal or homicidal ideations    Pain Medications:  Current:  Gabapentin    Tried in Past:  NSAIDs -yes Naproxen   TCA -Never  SNRI -Never  Anti-convulsants -gabapentin  Muscle Relaxants -Never  Opioids-Never    Physical Therapy/Home Exercise: yes       report: N/A    Pain Procedures:   2/8/2017- Bilateral S1 TF LEONEL  3/22/2017- Bilateral L2,3,4,5 MBB  9/11/2018- Right L5 and S1 TF LEONEL   12/27/2018- Bilateral SI joint injections  5/21/2019- Bilateral L2,3,4,5 MBB  7/25/2019- Right L3,4,5 RFA  8/6/2019- Left L3,4,5 RFA  11/7/2019- Bilateral SI joint injection   5/18/2021- Bilateral SI joint injections  8/17/2021- Bilateral S1 TF LEONEL    Chiropractor -never  Acupuncture - never  TENS unit -never  Spinal decompression -never  Joint replacement -never    Imaging:     EMG 1/2020: Diabetic polyneuropathy    MRI Lumbar Spine 9/7/2019:  COMPARISON:  07/25/2018    FINDINGS:  The distal cord/conus demonstrates normal size and signal.  No evidence of osteomyelitis, marrow replacement process, or acute fracture.  No  paraspinal masses.    L1-2 is unremarkable.    At L2-3, there is mild facet arthropathy and disc bulging.  No spinal canal stenosis or significant neural foraminal narrowing.    At L3-4, there is mild disc bulging and bilateral facet arthropathy.  No spinal canal stenosis or significant neural foraminal narrowing.    At L4-5, there is bilateral facet arthropathy with slight L4-5 anterolisthesis.  No spinal canal stenosis..  There is mild right-sided neural foraminal narrowing.    At L5-S1, there is mild bilateral facet arthropathy and disc bulging.  Small posterior annular tear with right paracentral/lateral recess disc protrusion, possibly impinging upon the right L5 or S1 nerve roots.  No spinal canal stenosis or significant neural foraminal narrowing.      Impression       Small right paracentral/ lateral recess disc protrusion at L5-S1 and mild neural foraminal narrowing at L4-5, as above.  No spinal canal stenosis.  Findings are similar to the 07/25/2018 exam.     Xray Lumbar Spine 7/25/2018:  COMPARISON:  Prior lumbar spine MRI dated 12/16/16    FINDINGS:  There is diffuse osteopenia.  There is mild grade 1 anterolisthesis of L3 on L4 (4 mm) and of L4 on L5 (6 mm).  There is no evidence of translational instability.  There is moderate facet arthropathy at the lower lumbar spine with probable L5-S1 neural foraminal narrowing.  Endplate degenerative changes are present with subchondral sclerosis and marginal osteophyte formation.    There is atherosclerotic calcification of the aorta.      Impression       Osteopenia and degenerative change of the lumbar spine with grade 1 anterolisthesis of L3-4 and L4-5, new from the 2016 MRI.  No evidence of translational instability.             Past Medical History:   Diagnosis Date    Diabetes     Hypertension      Past Surgical History:   Procedure Laterality Date    CHOLECYSTECTOMY      COLONOSCOPY N/A 11/1/2018    Procedure: COLONOSCOPY Suprep;  Surgeon: Azucean SMALLWOOD  MD Sana;  Location: [facility] ENDO;  Service: Endoscopy;  Laterality: N/A;    HYSTERECTOMY      INJECTION OF ANESTHETIC AGENT AROUND NERVE Bilateral 5/21/2019    Procedure: BLOCK, NERVE, BILATERAL L2,3,4,5;  Surgeon: Bonnie Duncan MD;  Location: [facility] PAIN MGT;  Service: Pain Management;  Laterality: Bilateral;  BLOCK, NERVE, BILATERAL L2,3,4,5    INJECTION OF JOINT Bilateral 12/27/2018    Procedure: Injection, Joint BILATERAL SACROILIAC JOINT INJECTION;  Surgeon: Bonnie Duncan MD;  Location: [facility] PAIN MGT;  Service: Pain Management;  Laterality: Bilateral;    INJECTION OF JOINT Bilateral 11/7/2019    Procedure: INJECTION, JOINT, SI;  Surgeon: Bonnie Duncan MD;  Location: [facility] PAIN MGT;  Service: Pain Management;  Laterality: Bilateral;  B/L SI Joint Injections    INJECTION OF JOINT Bilateral 5/18/2021    Procedure: INJECTION, JOINT, SI;  Surgeon: Bonnie Duncan MD;  Location: [facility] PAIN MGT;  Service: Pain Management;  Laterality: Bilateral;  Consent needed    RADIOFREQUENCY ABLATION Right 7/25/2019    Procedure: RADIOFREQUENCY ABLATION;  Surgeon: Bonnie Duncan MD;  Location: [facility] PAIN MGT;  Service: Pain Management;  Laterality: Right;  Right RFA L2,3,4,5; THERMAL  1 of 2    RADIOFREQUENCY ABLATION Left 8/6/2019    Procedure: RADIOFREQUENCY ABLATION;  Surgeon: Bonnie Duncan MD;  Location: [facility] PAIN MGT;  Service: Pain Management;  Laterality: Left;  Left RFA L2,3,4,5 LUMBAR  1 of 2    TRANSFORAMINAL EPIDURAL INJECTION OF STEROID Bilateral 8/17/2021    Procedure: INJECTION, STEROID, EPIDURAL, TRANSFORAMINAL APPROACH S1;  Surgeon: Bonnie Duncan MD;  Location: [facility] PAIN MGT;  Service: Pain Management;  Laterality: Bilateral;     Social History     Socioeconomic History    Marital status: Single   Tobacco Use    Smoking status: Never Smoker    Smokeless tobacco: Never Used   Substance and Sexual Activity    Alcohol use: No    Drug use: No    Sexual activity: Not Currently      Family History   Problem Relation Age of Onset    Vaginal cancer Neg Hx     Endometrial cancer Neg Hx     Cervical cancer Neg Hx        Review of patient's allergies indicates:  No Known Allergies    Current Outpatient Medications   Medication Sig    aspirin (ECOTRIN) 81 MG EC tablet 81 mg.    ciprofloxacin HCl (CIPRO) 500 MG tablet Take 1 tablet (500 mg total) by mouth 2 (two) times daily.    FLUoxetine 20 MG capsule TK ONE C PO  QD    glimepiride (AMARYL) 2 MG tablet Take 2 mg by mouth every morning.    hydroCHLOROthiazide (HYDRODIURIL) 12.5 MG Tab Take 12.5 mg by mouth every morning.    metformin (GLUCOPHAGE-XR) 500 MG 24 hr tablet 500 mg.    metoprolol tartrate (LOPRESSOR) 25 MG tablet     naproxen (NAPROSYN) 500 MG tablet     omeprazole (PRILOSEC) 20 MG capsule 20 mg.    pravastatin (PRAVACHOL) 40 MG tablet 40 mg.    gabapentin (NEURONTIN) 300 MG capsule Take 2 tabs in the morning, 1 tab in the afternoon, and 2 tabs in the evening    mirabegron (MYRBETRIQ) 25 mg Tb24 ER tablet Take 1 tablet (25 mg total) by mouth once daily.     No current facility-administered medications for this visit.     Facility-Administered Medications Ordered in Other Visits   Medication    0.9%  NaCl infusion    0.9%  NaCl infusion       REVIEW OF SYSTEMS:    GENERAL:  No weight loss, malaise or fevers.  HEENT:   No recent changes in vision or hearing  NECK:  Negative for lumps, no difficulty with swallowing.  RESPIRATORY:  Negative for cough, wheezing or shortness of breath, patient denies any recent URI.  CARDIOVASCULAR:  Negative for chest pain, leg swelling or palpitations. HTN.  GI:  Negative for abdominal discomfort, blood in stools or black stools or change in bowel habits.  MUSCULOSKELETAL:  See HPI.  SKIN:  Negative for lesions, rash, and itching.  PSYCH:  No mood disorder or recent psychosocial stressors.  Patient's sleep is occasionally disturbed secondary to pain.  HEMATOLOGY/LYMPHOLOGY:  Negative for  "prolonged bleeding, bruising easily or swollen nodes.  Patient is not currently taking any anti-coagulants  ENDO: Positive for DM. Negative for thyroid dysfunction  NEURO:   No history of headaches, syncope, paralysis, seizures or tremors.  All other reviewed and negative other than HPI.    OBJECTIVE:    /68 (BP Location: Right arm, Patient Position: Sitting, BP Method: Large (Automatic))   Pulse 78   Resp 18   Ht 5' 7" (1.702 m)   Wt 96.8 kg (213 lb 6.5 oz)   BMI 33.42 kg/m²     PHYSICAL EXAMINATION:    GENERAL: Well appearing, in no acute distress, alert and oriented x3.  PSYCH:  Mood and affect appropriate.  SKIN: Skin color, texture, turgor normal, no rashes or lesions.  HEAD/FACE:  Normocephalic, atraumatic. Cranial nerves grossly intact.   CV: RRR with palpation of the radial artery.  PULM: No evidence of respiratory difficulty, symmetric chest rise.  BACK: Straight leg raising in the siting position is positive for radicular pain on the right. There is mild pain with palpation over lumbar facet joints bilaterally, R>L. Limited ROM with pain on flexion and extension. Positive facet loading bilaterally.  LOWER EXTREMITIES:  No deformities, edema, or skin discoloration. Good capillary refill.  MUSCULOSKELETAL: There is pain with palpation over the right knee. There is pain with palpation over the sacroiliac joints bilaterally.  FABERs test is positive bilaterally.  FADIRs test is negative. 5/5 strength in right ankle with plantar and dorsiflexion. 4/5 strength in left ankle with plantar and dorsiflexion. 5/5 strength with right knee flexion and extension. 5/5 strength with left knee flexion and extension. 5/5 strength in right EHL, 4/5 strength in left EHL.  No atrophy or tone abnormalities are noted.  NEURO: Bilateral lower extremity coordination and muscle stretch reflexes are physiologic and symmetric.   No loss of sensation is noted.  GAIT: Antalgic, shuffling gait- ambulates with walker. "     ASSESSMENT: 76 y.o. year old female with back/leg pain, consistent with the following:    Encounter Diagnoses   Name Primary?    Lumbosacral radiculopathy Yes    Lumbar radiculopathy, chronic     Lumbar disc herniation with radiculopathy     Neuroforaminal stenosis of lumbar spine     Lumbar spondylosis     DDD (degenerative disc disease), lumbar     Sacroiliac joint pain     Multiple falls     Chronic pain of right knee        PLAN:     - Previous imaging reviewed today.    - Schedule for bilateral S1 TF LEONEL.      - Obtain updated lumbar xray and MRI given frequent falls. Obtain xray of knees.     - She is s/p bilateral SI joint injections with benefit. If short term relief, consider SI fusion.     - Consult physical therapy.     - I will have her follow up with Neurology given frequent falls.     - RTC 2 weeks after above procedure with Dr. Quijano to establish care.     The above plan and management options were discussed at length with patient. Patient is in agreement with the above and verbalized understanding.     Coby Russo NP  02/16/2022

## 2022-02-16 NOTE — TELEPHONE ENCOUNTER
----- Message from Reinier Velazquez sent at 2/16/2022 12:59 PM CST -----  Name of Who is Calling:ABILIO SIDDIQUI          What is the request in detail: The patient states she is downstairs parking she will be up shortly. Please advise          Can the clinic reply by [facility]:No         What Number to Call Back if not in [facility]: [phone]

## 2022-02-18 ENCOUNTER — TELEPHONE (OUTPATIENT)
Dept: PAIN MEDICINE | Facility: CLINIC | Age: 77
End: 2022-02-18
Payer: MEDICARE

## 2022-02-18 NOTE — TELEPHONE ENCOUNTER
----- Message from Aman Maldonado sent at 2/18/2022  4:32 PM CST -----  Type:  Patient Returning Call    Who Called:     Who Left Message for Patient:     Does the patient know what this is regarding?: missed call / results     Best Call Back Number:  [phone]    Additional Information:

## 2022-02-24 ENCOUNTER — TELEPHONE (OUTPATIENT)
Dept: PAIN MEDICINE | Facility: CLINIC | Age: 77
End: 2022-02-24
Payer: MEDICARE

## 2022-02-25 ENCOUNTER — HOSPITAL ENCOUNTER (OUTPATIENT)
Dept: RADIOLOGY | Facility: OTHER | Age: 77
Discharge: HOME | End: 2022-02-25
Attending: NURSE PRACTITIONER
Payer: MEDICARE

## 2022-02-25 ENCOUNTER — TELEPHONE (OUTPATIENT)
Dept: PAIN MEDICINE | Facility: CLINIC | Age: 77
End: 2022-02-25
Payer: MEDICARE

## 2022-02-25 DIAGNOSIS — M54.16 LUMBAR RADICULOPATHY, CHRONIC: ICD-10-CM

## 2022-02-25 PROCEDURE — 72148 MRI LUMBAR SPINE W/O DYE: CPT | Mod: 26,,, | Performed by: RADIOLOGY

## 2022-02-25 PROCEDURE — 72148 MRI LUMBAR SPINE WITHOUT CONTRAST: ICD-10-PCS | Mod: 26,,, | Performed by: RADIOLOGY

## 2022-02-25 PROCEDURE — 72148 MRI LUMBAR SPINE W/O DYE: CPT | Mod: TC

## 2022-03-04 ENCOUNTER — TELEPHONE (OUTPATIENT)
Dept: PAIN MEDICINE | Facility: CLINIC | Age: 77
End: 2022-03-04
Payer: MEDICARE

## 2022-03-04 NOTE — TELEPHONE ENCOUNTER
Staff returned call to patient in regards to her message.        Staff informed patient she would have to contact Billing department [phone]      Pt verbalized understanding.

## 2022-03-04 NOTE — TELEPHONE ENCOUNTER
----- Message from Lesly Ivory sent at 3/4/2022  4:09 PM CST -----  Regarding: insurance concerns  Name of Who is Calling:  Mila           What is the request in detail: Patient is requesting a call back to find out if her insurance paid the whole amount for her surgery.           Can the clinic reply by [facility]: No           What Number to Call Back if not in [facility]: [phone]

## 2022-03-07 ENCOUNTER — TELEPHONE (OUTPATIENT)
Dept: PAIN MEDICINE | Facility: CLINIC | Age: 77
End: 2022-03-07
Payer: MEDICARE

## 2022-03-07 NOTE — TELEPHONE ENCOUNTER
----- Message from Reinier Velazquez sent at 3/7/2022 10:39 AM CST -----  Name of Who is Calling:ABILIO SIDDIQUI          What is the request in detail: The patient is calling to r/s her procedure. Please advise          Can the clinic reply by [facility]: No         What Number to Call Back if not in [facility]: [phone]

## 2022-05-24 ENCOUNTER — TELEPHONE (OUTPATIENT)
Dept: PAIN MEDICINE | Facility: CLINIC | Age: 77
End: 2022-05-24
Payer: MEDICARE

## 2022-05-24 NOTE — TELEPHONE ENCOUNTER
Staff spoke with patient in regards to appt on 5/25/22 at 9 am with  pt has been rescheduled as requested to 6/29/22 @ 9 am as in person visit with .

## 2022-06-28 ENCOUNTER — TELEPHONE (OUTPATIENT)
Dept: PAIN MEDICINE | Facility: CLINIC | Age: 77
End: 2022-06-28
Payer: MEDICARE

## 2022-06-28 NOTE — TELEPHONE ENCOUNTER
This message is for patient in regards to his/her appointment 06/29/22 at 09:00a   With Dr. Shwetha Quijano MD.      [facility] Policy: For the safety of all patients and staff members.     Patient Visitor policy: During this visit we're informing all patients that face mask are now optional, upon visit you have the options of requesting clinical staff to wear a mask for your protection and thiers.      If you have any questions or concerns please contact [phone]        Staff left a voicemail reminding pt of their appt

## 2022-06-30 ENCOUNTER — TELEPHONE (OUTPATIENT)
Dept: PAIN MEDICINE | Facility: CLINIC | Age: 77
End: 2022-06-30
Payer: MEDICARE

## 2022-11-09 NOTE — TELEPHONE ENCOUNTER
Staff contacted pt regarding missed appointment on 06/29/22 for 9:00am with Mendez Quijano MD. On 9th floor [address].    Pt verbalize that she is sick and would give the office a call to reschedule when she is feeling better.   04-Nov-2022 01:35

## 2024-02-24 ENCOUNTER — HOSPITAL ENCOUNTER (INPATIENT)
Facility: HOSPITAL | Age: 79
LOS: 2 days | Discharge: HOME | DRG: 322 | End: 2024-02-26
Attending: EMERGENCY MEDICINE | Admitting: INTERNAL MEDICINE
Payer: MEDICARE

## 2024-02-24 DIAGNOSIS — E78.5 HYPERLIPIDEMIA LDL GOAL <70: ICD-10-CM

## 2024-02-24 DIAGNOSIS — I21.4 NSTEMI (NON-ST ELEVATED MYOCARDIAL INFARCTION): ICD-10-CM

## 2024-02-24 DIAGNOSIS — I25.118 CORONARY ARTERY DISEASE OF NATIVE ARTERY OF NATIVE HEART WITH STABLE ANGINA PECTORIS: Primary | ICD-10-CM

## 2024-02-24 DIAGNOSIS — R07.9 CHEST PAIN: ICD-10-CM

## 2024-02-24 DIAGNOSIS — I25.10 CAD (CORONARY ARTERY DISEASE): ICD-10-CM

## 2024-02-24 LAB
ALBUMIN SERPL BCP-MCNC: 4 G/DL (ref 3.5–5.2)
ALP SERPL-CCNC: 66 U/L (ref 55–135)
ALT SERPL W/O P-5'-P-CCNC: 20 U/L (ref 10–44)
ANION GAP SERPL CALC-SCNC: 11 MMOL/L (ref 8–16)
APTT PPP: 25.8 SEC (ref 21–32)
APTT PPP: 25.8 SEC (ref 21–32)
APTT PPP: 47.7 SEC (ref 21–32)
AST SERPL-CCNC: 48 U/L (ref 10–40)
BACTERIA #/AREA URNS AUTO: ABNORMAL /HPF
BASOPHILS # BLD AUTO: 0.03 K/UL (ref 0–0.2)
BASOPHILS # BLD AUTO: 0.04 K/UL (ref 0–0.2)
BASOPHILS NFR BLD: 0.2 % (ref 0–1.9)
BASOPHILS NFR BLD: 0.3 % (ref 0–1.9)
BILIRUB SERPL-MCNC: 0.8 MG/DL (ref 0.1–1)
BILIRUB UR QL STRIP: NEGATIVE
BNP SERPL-MCNC: 436 PG/ML (ref 0–99)
BUN SERPL-MCNC: 15 MG/DL (ref 8–23)
CALCIUM SERPL-MCNC: 9.9 MG/DL (ref 8.7–10.5)
CHLORIDE SERPL-SCNC: 98 MMOL/L (ref 95–110)
CHOLEST SERPL-MCNC: 238 MG/DL (ref 120–199)
CHOLEST/HDLC SERPL: 3 {RATIO} (ref 2–5)
CLARITY UR REFRACT.AUTO: ABNORMAL
CO2 SERPL-SCNC: 24 MMOL/L (ref 23–29)
COLOR UR AUTO: YELLOW
CREAT SERPL-MCNC: 1 MG/DL (ref 0.5–1.4)
DIFFERENTIAL METHOD BLD: ABNORMAL
DIFFERENTIAL METHOD BLD: ABNORMAL
EOSINOPHIL # BLD AUTO: 0 K/UL (ref 0–0.5)
EOSINOPHIL # BLD AUTO: 0 K/UL (ref 0–0.5)
EOSINOPHIL NFR BLD: 0.1 % (ref 0–8)
EOSINOPHIL NFR BLD: 0.1 % (ref 0–8)
ERYTHROCYTE [DISTWIDTH] IN BLOOD BY AUTOMATED COUNT: 11.9 % (ref 11.5–14.5)
ERYTHROCYTE [DISTWIDTH] IN BLOOD BY AUTOMATED COUNT: 11.9 % (ref 11.5–14.5)
EST. GFR  (NO RACE VARIABLE): 57.7 ML/MIN/1.73 M^2
ESTIMATED AVG GLUCOSE: 143 MG/DL (ref 68–131)
GLUCOSE SERPL-MCNC: 203 MG/DL (ref 70–110)
GLUCOSE UR QL STRIP: ABNORMAL
HBA1C MFR BLD: 6.6 % (ref 4–5.6)
HCT VFR BLD AUTO: 41.4 % (ref 37–48.5)
HCT VFR BLD AUTO: 41.7 % (ref 37–48.5)
HCV AB SERPL QL IA: NORMAL
HDLC SERPL-MCNC: 79 MG/DL (ref 40–75)
HDLC SERPL: 33.2 % (ref 20–50)
HGB BLD-MCNC: 14 G/DL (ref 12–16)
HGB BLD-MCNC: 14.1 G/DL (ref 12–16)
HGB UR QL STRIP: ABNORMAL
HIV 1+2 AB+HIV1 P24 AG SERPL QL IA: NORMAL
HYALINE CASTS UR QL AUTO: 0 /LPF
IMM GRANULOCYTES # BLD AUTO: 0.04 K/UL (ref 0–0.04)
IMM GRANULOCYTES # BLD AUTO: 0.04 K/UL (ref 0–0.04)
IMM GRANULOCYTES NFR BLD AUTO: 0.3 % (ref 0–0.5)
IMM GRANULOCYTES NFR BLD AUTO: 0.3 % (ref 0–0.5)
INR PPP: 1 (ref 0.8–1.2)
KETONES UR QL STRIP: ABNORMAL
LDLC SERPL CALC-MCNC: 140.6 MG/DL (ref 63–159)
LEUKOCYTE ESTERASE UR QL STRIP: ABNORMAL
LIPASE SERPL-CCNC: 9 U/L (ref 4–60)
LYMPHOCYTES # BLD AUTO: 2 K/UL (ref 1–4.8)
LYMPHOCYTES # BLD AUTO: 2.4 K/UL (ref 1–4.8)
LYMPHOCYTES NFR BLD: 15.6 % (ref 18–48)
LYMPHOCYTES NFR BLD: 19 % (ref 18–48)
MCH RBC QN AUTO: 30.8 PG (ref 27–31)
MCH RBC QN AUTO: 31 PG (ref 27–31)
MCHC RBC AUTO-ENTMCNC: 33.8 G/DL (ref 32–36)
MCHC RBC AUTO-ENTMCNC: 33.8 G/DL (ref 32–36)
MCV RBC AUTO: 91 FL (ref 82–98)
MCV RBC AUTO: 92 FL (ref 82–98)
MICROSCOPIC COMMENT: ABNORMAL
MONOCYTES # BLD AUTO: 0.7 K/UL (ref 0.3–1)
MONOCYTES # BLD AUTO: 0.9 K/UL (ref 0.3–1)
MONOCYTES NFR BLD: 5.9 % (ref 4–15)
MONOCYTES NFR BLD: 6.6 % (ref 4–15)
NEUTROPHILS # BLD AUTO: 9.3 K/UL (ref 1.8–7.7)
NEUTROPHILS # BLD AUTO: 9.9 K/UL (ref 1.8–7.7)
NEUTROPHILS NFR BLD: 74.4 % (ref 38–73)
NEUTROPHILS NFR BLD: 77.2 % (ref 38–73)
NITRITE UR QL STRIP: NEGATIVE
NONHDLC SERPL-MCNC: 159 MG/DL
NRBC BLD-RTO: 0 /100 WBC
NRBC BLD-RTO: 0 /100 WBC
OHS QRS DURATION: 76 MS
OHS QRS DURATION: 78 MS
OHS QRS DURATION: 78 MS
OHS QRS DURATION: 80 MS
OHS QTC CALCULATION: 388 MS
OHS QTC CALCULATION: 411 MS
OHS QTC CALCULATION: 419 MS
OHS QTC CALCULATION: 443 MS
PH UR STRIP: 7 [PH] (ref 5–8)
PLATELET # BLD AUTO: 242 K/UL (ref 150–450)
PLATELET # BLD AUTO: 260 K/UL (ref 150–450)
PMV BLD AUTO: 10.3 FL (ref 9.2–12.9)
PMV BLD AUTO: 9.8 FL (ref 9.2–12.9)
POCT GLUCOSE: 178 MG/DL (ref 70–110)
POTASSIUM SERPL-SCNC: 3.8 MMOL/L (ref 3.5–5.1)
PROT SERPL-MCNC: 7.5 G/DL (ref 6–8.4)
PROT UR QL STRIP: ABNORMAL
PROTHROMBIN TIME: 10.9 SEC (ref 9–12.5)
RBC # BLD AUTO: 4.52 M/UL (ref 4–5.4)
RBC # BLD AUTO: 4.58 M/UL (ref 4–5.4)
RBC #/AREA URNS AUTO: 2 /HPF (ref 0–4)
SODIUM SERPL-SCNC: 133 MMOL/L (ref 136–145)
SP GR UR STRIP: 1.01 (ref 1–1.03)
SQUAMOUS #/AREA URNS AUTO: 1 /HPF
TRIGL SERPL-MCNC: 92 MG/DL (ref 30–150)
TROPONIN I SERPL DL<=0.01 NG/ML-MCNC: 5.36 NG/ML (ref 0–0.03)
TROPONIN I SERPL DL<=0.01 NG/ML-MCNC: 9.61 NG/ML (ref 0–0.03)
URN SPEC COLLECT METH UR: ABNORMAL
WBC # BLD AUTO: 12.47 K/UL (ref 3.9–12.7)
WBC # BLD AUTO: 12.79 K/UL (ref 3.9–12.7)
WBC #/AREA URNS AUTO: 8 /HPF (ref 0–5)

## 2024-02-24 PROCEDURE — 92978 ENDOLUMINL IVUS OCT C 1ST: CPT | Mod: LD

## 2024-02-24 PROCEDURE — 94761 N-INVAS EAR/PLS OXIMETRY MLT: CPT

## 2024-02-24 PROCEDURE — C1894 INTRO/SHEATH, NON-LASER: HCPCS | Performed by: INTERNAL MEDICINE

## 2024-02-24 PROCEDURE — 25000003 PHARM REV CODE 250: Performed by: STUDENT IN AN ORGANIZED HEALTH CARE EDUCATION/TRAINING PROGRAM

## 2024-02-24 PROCEDURE — 84484 ASSAY OF TROPONIN QUANT: CPT | Performed by: EMERGENCY MEDICINE

## 2024-02-24 PROCEDURE — 86803 HEPATITIS C AB TEST: CPT | Performed by: PHYSICIAN ASSISTANT

## 2024-02-24 PROCEDURE — C1887 CATHETER, GUIDING: HCPCS | Performed by: INTERNAL MEDICINE

## 2024-02-24 PROCEDURE — 27201423 OPTIME MED/SURG SUP & DEVICES STERILE SUPPLY: Performed by: INTERNAL MEDICINE

## 2024-02-24 PROCEDURE — 80053 COMPREHEN METABOLIC PANEL: CPT | Performed by: EMERGENCY MEDICINE

## 2024-02-24 PROCEDURE — 99223 1ST HOSP IP/OBS HIGH 75: CPT | Mod: AI,GC,, | Performed by: INTERNAL MEDICINE

## 2024-02-24 PROCEDURE — 85610 PROTHROMBIN TIME: CPT | Performed by: EMERGENCY MEDICINE

## 2024-02-24 PROCEDURE — 25500020 PHARM REV CODE 255: Performed by: INTERNAL MEDICINE

## 2024-02-24 PROCEDURE — 25000242 PHARM REV CODE 250 ALT 637 W/ HCPCS: Performed by: EMERGENCY MEDICINE

## 2024-02-24 PROCEDURE — 93005 ELECTROCARDIOGRAM TRACING: CPT

## 2024-02-24 PROCEDURE — 93458 L HRT ARTERY/VENTRICLE ANGIO: CPT | Mod: 59 | Performed by: INTERNAL MEDICINE

## 2024-02-24 PROCEDURE — 63600175 PHARM REV CODE 636 W HCPCS: Performed by: EMERGENCY MEDICINE

## 2024-02-24 PROCEDURE — 92978 ENDOLUMINL IVUS OCT C 1ST: CPT | Mod: 26,LD,, | Performed by: INTERNAL MEDICINE

## 2024-02-24 PROCEDURE — 87389 HIV-1 AG W/HIV-1&-2 AB AG IA: CPT | Performed by: PHYSICIAN ASSISTANT

## 2024-02-24 PROCEDURE — 99900035 HC TECH TIME PER 15 MIN (STAT)

## 2024-02-24 PROCEDURE — 84484 ASSAY OF TROPONIN QUANT: CPT | Mod: 91

## 2024-02-24 PROCEDURE — 25000003 PHARM REV CODE 250: Performed by: INTERNAL MEDICINE

## 2024-02-24 PROCEDURE — 92928 PRQ TCAT PLMT NTRAC ST 1 LES: CPT | Mod: LD,,, | Performed by: INTERNAL MEDICINE

## 2024-02-24 PROCEDURE — 83690 ASSAY OF LIPASE: CPT | Performed by: EMERGENCY MEDICINE

## 2024-02-24 PROCEDURE — C1769 GUIDE WIRE: HCPCS | Performed by: INTERNAL MEDICINE

## 2024-02-24 PROCEDURE — 63600175 PHARM REV CODE 636 W HCPCS: Performed by: INTERNAL MEDICINE

## 2024-02-24 PROCEDURE — 27000221 HC OXYGEN, UP TO 24 HOURS

## 2024-02-24 PROCEDURE — 25000003 PHARM REV CODE 250

## 2024-02-24 PROCEDURE — 85025 COMPLETE CBC W/AUTO DIFF WBC: CPT | Mod: 91 | Performed by: EMERGENCY MEDICINE

## 2024-02-24 PROCEDURE — 83880 ASSAY OF NATRIURETIC PEPTIDE: CPT | Performed by: EMERGENCY MEDICINE

## 2024-02-24 PROCEDURE — 93010 ELECTROCARDIOGRAM REPORT: CPT | Mod: 76,,, | Performed by: INTERNAL MEDICINE

## 2024-02-24 PROCEDURE — 99152 MOD SED SAME PHYS/QHP 5/>YRS: CPT | Performed by: INTERNAL MEDICINE

## 2024-02-24 PROCEDURE — 027034Z DILATION OF CORONARY ARTERY, ONE ARTERY WITH DRUG-ELUTING INTRALUMINAL DEVICE, PERCUTANEOUS APPROACH: ICD-10-PCS | Performed by: INTERNAL MEDICINE

## 2024-02-24 PROCEDURE — B2151ZZ FLUOROSCOPY OF LEFT HEART USING LOW OSMOLAR CONTRAST: ICD-10-PCS | Performed by: INTERNAL MEDICINE

## 2024-02-24 PROCEDURE — B240ZZ3 ULTRASONOGRAPHY OF SINGLE CORONARY ARTERY, INTRAVASCULAR: ICD-10-PCS | Performed by: INTERNAL MEDICINE

## 2024-02-24 PROCEDURE — B2111ZZ FLUOROSCOPY OF MULTIPLE CORONARY ARTERIES USING LOW OSMOLAR CONTRAST: ICD-10-PCS | Performed by: INTERNAL MEDICINE

## 2024-02-24 PROCEDURE — 93010 ELECTROCARDIOGRAM REPORT: CPT | Mod: ,,, | Performed by: INTERNAL MEDICINE

## 2024-02-24 PROCEDURE — 93458 L HRT ARTERY/VENTRICLE ANGIO: CPT | Mod: 26,59,51, | Performed by: INTERNAL MEDICINE

## 2024-02-24 PROCEDURE — 4A023N7 MEASUREMENT OF CARDIAC SAMPLING AND PRESSURE, LEFT HEART, PERCUTANEOUS APPROACH: ICD-10-PCS | Performed by: INTERNAL MEDICINE

## 2024-02-24 PROCEDURE — 25000003 PHARM REV CODE 250: Performed by: EMERGENCY MEDICINE

## 2024-02-24 PROCEDURE — 81001 URINALYSIS AUTO W/SCOPE: CPT | Performed by: EMERGENCY MEDICINE

## 2024-02-24 PROCEDURE — 96374 THER/PROPH/DIAG INJ IV PUSH: CPT

## 2024-02-24 PROCEDURE — 20000000 HC ICU ROOM

## 2024-02-24 PROCEDURE — C1753 CATH, INTRAVAS ULTRASOUND: HCPCS | Performed by: INTERNAL MEDICINE

## 2024-02-24 PROCEDURE — 85730 THROMBOPLASTIN TIME PARTIAL: CPT | Performed by: EMERGENCY MEDICINE

## 2024-02-24 PROCEDURE — 99152 MOD SED SAME PHYS/QHP 5/>YRS: CPT | Mod: ,,, | Performed by: INTERNAL MEDICINE

## 2024-02-24 PROCEDURE — 85730 THROMBOPLASTIN TIME PARTIAL: CPT | Mod: 91 | Performed by: INTERNAL MEDICINE

## 2024-02-24 PROCEDURE — 83036 HEMOGLOBIN GLYCOSYLATED A1C: CPT | Performed by: EMERGENCY MEDICINE

## 2024-02-24 PROCEDURE — C1725 CATH, TRANSLUMIN NON-LASER: HCPCS | Performed by: INTERNAL MEDICINE

## 2024-02-24 PROCEDURE — 99153 MOD SED SAME PHYS/QHP EA: CPT | Performed by: INTERNAL MEDICINE

## 2024-02-24 PROCEDURE — C1874 STENT, COATED/COV W/DEL SYS: HCPCS | Performed by: INTERNAL MEDICINE

## 2024-02-24 PROCEDURE — 80061 LIPID PANEL: CPT | Performed by: PHYSICIAN ASSISTANT

## 2024-02-24 PROCEDURE — 99291 CRITICAL CARE FIRST HOUR: CPT

## 2024-02-24 PROCEDURE — 63600175 PHARM REV CODE 636 W HCPCS: Mod: JZ,JG | Performed by: STUDENT IN AN ORGANIZED HEALTH CARE EDUCATION/TRAINING PROGRAM

## 2024-02-24 PROCEDURE — C9600 PERC DRUG-EL COR STENT SING: HCPCS | Mod: LD | Performed by: INTERNAL MEDICINE

## 2024-02-24 DEVICE — EVEROLIMUS-ELUTING PLATINUM CHROMIUM CORONARY STENT SYSTEM
Type: IMPLANTABLE DEVICE | Site: HEART | Status: FUNCTIONAL
Brand: SYNERGY™ XD

## 2024-02-24 RX ORDER — HEPARIN SODIUM 1000 [USP'U]/ML
INJECTION, SOLUTION INTRAVENOUS; SUBCUTANEOUS
Status: DISCONTINUED | OUTPATIENT
Start: 2024-02-24 | End: 2024-02-24 | Stop reason: HOSPADM

## 2024-02-24 RX ORDER — BUTALBITAL, ACETAMINOPHEN AND CAFFEINE 50; 325; 40 MG/1; MG/1; MG/1
1 TABLET ORAL EVERY 4 HOURS PRN
Status: DISCONTINUED | OUTPATIENT
Start: 2024-02-24 | End: 2024-02-25

## 2024-02-24 RX ORDER — SODIUM CHLORIDE 0.9 % (FLUSH) 0.9 %
10 SYRINGE (ML) INJECTION
Status: DISCONTINUED | OUTPATIENT
Start: 2024-02-24 | End: 2024-02-25

## 2024-02-24 RX ORDER — DIPHENHYDRAMINE HCL 50 MG
50 CAPSULE ORAL ONCE
Status: CANCELLED | OUTPATIENT
Start: 2024-02-24 | End: 2024-02-24

## 2024-02-24 RX ORDER — ISOSORBIDE MONONITRATE 30 MG/1
30 TABLET, EXTENDED RELEASE ORAL DAILY
Status: DISCONTINUED | OUTPATIENT
Start: 2024-02-24 | End: 2024-02-25

## 2024-02-24 RX ORDER — HEPARIN SODIUM,PORCINE/D5W 25000/250
0-40 INTRAVENOUS SOLUTION INTRAVENOUS CONTINUOUS
Status: DISCONTINUED | OUTPATIENT
Start: 2024-02-24 | End: 2024-02-24

## 2024-02-24 RX ORDER — GLUCAGON 1 MG
1 KIT INJECTION
Status: DISCONTINUED | OUTPATIENT
Start: 2024-02-24 | End: 2024-02-25

## 2024-02-24 RX ORDER — ACETAMINOPHEN 325 MG/1
650 TABLET ORAL EVERY 4 HOURS PRN
Status: DISCONTINUED | OUTPATIENT
Start: 2024-02-24 | End: 2024-02-24

## 2024-02-24 RX ORDER — HEPARIN SOD,PORCINE/0.9 % NACL 1000/500ML
INTRAVENOUS SOLUTION INTRAVENOUS
Status: DISCONTINUED | OUTPATIENT
Start: 2024-02-24 | End: 2024-02-24 | Stop reason: HOSPADM

## 2024-02-24 RX ORDER — IBUPROFEN 200 MG
16 TABLET ORAL
Status: DISCONTINUED | OUTPATIENT
Start: 2024-02-24 | End: 2024-02-26 | Stop reason: HOSPADM

## 2024-02-24 RX ORDER — FENTANYL CITRATE 50 UG/ML
INJECTION, SOLUTION INTRAMUSCULAR; INTRAVENOUS
Status: DISCONTINUED | OUTPATIENT
Start: 2024-02-24 | End: 2024-02-24 | Stop reason: HOSPADM

## 2024-02-24 RX ORDER — ISOSORBIDE MONONITRATE 30 MG/1
30 TABLET, EXTENDED RELEASE ORAL DAILY
Status: DISCONTINUED | OUTPATIENT
Start: 2024-02-25 | End: 2024-02-24

## 2024-02-24 RX ORDER — MICONAZOLE NITRATE 2 %
POWDER (GRAM) TOPICAL 2 TIMES DAILY
Status: DISCONTINUED | OUTPATIENT
Start: 2024-02-24 | End: 2024-02-26 | Stop reason: HOSPADM

## 2024-02-24 RX ORDER — ASPIRIN 325 MG
325 TABLET ORAL
Status: COMPLETED | OUTPATIENT
Start: 2024-02-24 | End: 2024-02-24

## 2024-02-24 RX ORDER — ATORVASTATIN CALCIUM 40 MG/1
80 TABLET, FILM COATED ORAL DAILY
Status: DISCONTINUED | OUTPATIENT
Start: 2024-02-24 | End: 2024-02-26 | Stop reason: HOSPADM

## 2024-02-24 RX ORDER — SODIUM CHLORIDE 0.9 % (FLUSH) 0.9 %
10 SYRINGE (ML) INJECTION
Status: DISCONTINUED | OUTPATIENT
Start: 2024-02-24 | End: 2024-02-26 | Stop reason: HOSPADM

## 2024-02-24 RX ORDER — INSULIN ASPART 100 [IU]/ML
0-5 INJECTION, SOLUTION INTRAVENOUS; SUBCUTANEOUS
Status: DISCONTINUED | OUTPATIENT
Start: 2024-02-24 | End: 2024-02-26 | Stop reason: HOSPADM

## 2024-02-24 RX ORDER — SODIUM CHLORIDE 9 MG/ML
INJECTION, SOLUTION INTRAVENOUS CONTINUOUS
Status: CANCELLED | OUTPATIENT
Start: 2024-02-24 | End: 2024-02-24

## 2024-02-24 RX ORDER — NITROGLYCERIN 20 MG/100ML
0-400 INJECTION INTRAVENOUS CONTINUOUS
Status: DISCONTINUED | OUTPATIENT
Start: 2024-02-24 | End: 2024-02-24

## 2024-02-24 RX ORDER — ONDANSETRON 4 MG/1
4 TABLET, ORALLY DISINTEGRATING ORAL EVERY 6 HOURS PRN
Status: DISCONTINUED | OUTPATIENT
Start: 2024-02-24 | End: 2024-02-26 | Stop reason: HOSPADM

## 2024-02-24 RX ORDER — NITROGLYCERIN 0.4 MG/1
0.4 TABLET SUBLINGUAL EVERY 5 MIN PRN
Status: DISCONTINUED | OUTPATIENT
Start: 2024-02-24 | End: 2024-02-25

## 2024-02-24 RX ORDER — MIDAZOLAM HYDROCHLORIDE 2 MG/2ML
INJECTION, SOLUTION INTRAMUSCULAR; INTRAVENOUS
Status: DISCONTINUED | OUTPATIENT
Start: 2024-02-24 | End: 2024-02-24 | Stop reason: HOSPADM

## 2024-02-24 RX ORDER — IBUPROFEN 200 MG
24 TABLET ORAL
Status: DISCONTINUED | OUTPATIENT
Start: 2024-02-24 | End: 2024-02-26 | Stop reason: HOSPADM

## 2024-02-24 RX ORDER — METOPROLOL TARTRATE 25 MG/1
25 TABLET, FILM COATED ORAL 3 TIMES DAILY
Status: DISCONTINUED | OUTPATIENT
Start: 2024-02-24 | End: 2024-02-25

## 2024-02-24 RX ORDER — LIDOCAINE HYDROCHLORIDE 20 MG/ML
INJECTION, SOLUTION EPIDURAL; INFILTRATION; INTRACAUDAL; PERINEURAL
Status: DISCONTINUED | OUTPATIENT
Start: 2024-02-24 | End: 2024-02-24 | Stop reason: HOSPADM

## 2024-02-24 RX ORDER — ACETAMINOPHEN 325 MG/1
650 TABLET ORAL EVERY 4 HOURS PRN
Status: DISCONTINUED | OUTPATIENT
Start: 2024-02-24 | End: 2024-02-26 | Stop reason: HOSPADM

## 2024-02-24 RX ORDER — HYDROCHLOROTHIAZIDE 12.5 MG/1
12.5 TABLET ORAL EVERY MORNING
Status: DISCONTINUED | OUTPATIENT
Start: 2024-02-24 | End: 2024-02-26

## 2024-02-24 RX ORDER — VALSARTAN 40 MG/1
40 TABLET ORAL DAILY
Status: DISCONTINUED | OUTPATIENT
Start: 2024-02-24 | End: 2024-02-25

## 2024-02-24 RX ORDER — ONDANSETRON HYDROCHLORIDE 2 MG/ML
4 INJECTION, SOLUTION INTRAVENOUS
Status: COMPLETED | OUTPATIENT
Start: 2024-02-24 | End: 2024-02-24

## 2024-02-24 RX ORDER — NITROGLYCERIN 400 UG/1
1 SPRAY ORAL EVERY 5 MIN PRN
Status: DISCONTINUED | OUTPATIENT
Start: 2024-02-24 | End: 2024-02-25

## 2024-02-24 RX ORDER — ALUMINUM HYDROXIDE, MAGNESIUM HYDROXIDE, AND SIMETHICONE 1200; 120; 1200 MG/30ML; MG/30ML; MG/30ML
5 SUSPENSION ORAL
Status: COMPLETED | OUTPATIENT
Start: 2024-02-24 | End: 2024-02-24

## 2024-02-24 RX ORDER — NAPROXEN SODIUM 220 MG/1
81 TABLET, FILM COATED ORAL DAILY
Status: DISCONTINUED | OUTPATIENT
Start: 2024-02-24 | End: 2024-02-26 | Stop reason: HOSPADM

## 2024-02-24 RX ADMIN — TICAGRELOR 90 MG: 90 TABLET ORAL at 04:02

## 2024-02-24 RX ADMIN — TICAGRELOR 90 MG: 90 TABLET ORAL at 07:02

## 2024-02-24 RX ADMIN — ISOSORBIDE MONONITRATE 30 MG: 30 TABLET, EXTENDED RELEASE ORAL at 03:02

## 2024-02-24 RX ADMIN — TICAGRELOR 90 MG: 90 TABLET ORAL at 09:02

## 2024-02-24 RX ADMIN — ATORVASTATIN CALCIUM 80 MG: 40 TABLET, FILM COATED ORAL at 09:02

## 2024-02-24 RX ADMIN — HEPARIN SODIUM 12 UNITS/KG/HR: 10000 INJECTION, SOLUTION INTRAVENOUS at 05:02

## 2024-02-24 RX ADMIN — NITROGLYCERIN 0.4 MG: 0.4 TABLET, ORALLY DISINTEGRATING SUBLINGUAL at 04:02

## 2024-02-24 RX ADMIN — MICONAZOLE NITRATE 2 % TOPICAL POWDER: at 11:02

## 2024-02-24 RX ADMIN — ASPIRIN 325 MG ORAL TABLET 325 MG: 325 PILL ORAL at 04:02

## 2024-02-24 RX ADMIN — INSULIN ASPART 1 UNITS: 100 INJECTION, SOLUTION INTRAVENOUS; SUBCUTANEOUS at 09:02

## 2024-02-24 RX ADMIN — NITROGLYCERIN 5 MCG/MIN: 20 INJECTION INTRAVENOUS at 10:02

## 2024-02-24 RX ADMIN — ONDANSETRON 4 MG: 2 INJECTION INTRAMUSCULAR; INTRAVENOUS at 04:02

## 2024-02-24 RX ADMIN — HYDROCHLOROTHIAZIDE 12.5 MG: 12.5 TABLET ORAL at 07:02

## 2024-02-24 RX ADMIN — ACETAMINOPHEN 650 MG: 325 TABLET ORAL at 10:02

## 2024-02-24 RX ADMIN — ASPIRIN 81 MG CHEWABLE TABLET 81 MG: 81 TABLET CHEWABLE at 09:02

## 2024-02-24 RX ADMIN — ALUMINUM HYDROXIDE, MAGNESIUM HYDROXIDE, AND SIMETHICONE 5 ML: 200; 200; 20 SUSPENSION ORAL at 03:02

## 2024-02-24 RX ADMIN — VALSARTAN 40 MG: 40 TABLET, FILM COATED ORAL at 09:02

## 2024-02-24 RX ADMIN — METOPROLOL TARTRATE 25 MG: 25 TABLET, FILM COATED ORAL at 07:02

## 2024-02-24 RX ADMIN — METOPROLOL TARTRATE 25 MG: 25 TABLET, FILM COATED ORAL at 09:02

## 2024-02-24 RX ADMIN — MICONAZOLE NITRATE 2 % TOPICAL POWDER: at 09:02

## 2024-02-24 NOTE — HOSPITAL COURSE
Ms. Foster was admitted to [facility] [place] with concern for STEMI. She was noted to have ST elevations in V2, V3 and rising troponin so ACS protocol was started and patient was taken to the Cath lab for a PCI. Stent placed to LAD. Cath report:  100% mLAD s/p IVUS guided PCI w DESx1 (3.5x24mm).  Large, patent LM on IVUS and mild to moderate pLAD lesion with a MLA of 5 mm^2 on IVUS.   100% mid to distal LCX after take-off of large OM1 system.  Nonobs RCA ds.  LVEDP 20.     Repeat echo showed EF 55% with WMA in the apex. Patient felt well at time of discharge and was discharged on the below medications, with cardiology follow up.

## 2024-02-24 NOTE — ED PROVIDER NOTES
Encounter Date: 2/24/2024       History     Chief Complaint   Patient presents with    Chest Pain     Chest pain over the last ~6 hours. No shortness of breath.    Dental Pain     Reports left upper tooth pain, thinks it is abscessed. No abx. This has been ongoing over 1 month. No trismus or drooling     78 year old female with history of DM, patient presenting for chest pain that started 20 hours ago, patient states chest pain has gotten worse through out the day, substernal non radiating associated with multiple episodes of vomiting. Patient denies orthopnea, pain worsening with exertion. Patient denies shortness of breath. Patient states that she also has dental pain. denies subjective fevers but endorses chills. Patient is nauseous at this time. Patient denies diaphoresis. Dental Exam: tongue able to protrude beyond vermilion boarder, tongue elevation normal, dental exam reveals multiple areas of poor dentition but without pain on specific teeth that can be elicited with palpation. Tongue midline, no subglossal swelling noted. no cervical lymphadenopathy.           Review of patient's allergies indicates:  No Known Allergies  Past Medical History:   Diagnosis Date    Diabetes     Hypertension      Past Surgical History:   Procedure Laterality Date    CHOLECYSTECTOMY      COLONOSCOPY N/A 11/1/2018    Procedure: COLONOSCOPY Suprep;  Surgeon: Azucena Hood MD;  Location: [facility] ENDO;  Service: Endoscopy;  Laterality: N/A;    HYSTERECTOMY      INJECTION OF ANESTHETIC AGENT AROUND NERVE Bilateral 5/21/2019    Procedure: BLOCK, NERVE, BILATERAL L2,3,4,5;  Surgeon: Bonnie Duncan MD;  Location: [facility];  Service: Pain Management;  Laterality: Bilateral;  BLOCK, NERVE, BILATERAL L2,3,4,5    INJECTION OF JOINT Bilateral 12/27/2018    Procedure: Injection, Joint BILATERAL SACROILIAC JOINT INJECTION;  Surgeon: Bonnie Duncan MD;  Location: [facility];  Service: Pain Management;  Laterality: Bilateral;     INJECTION OF JOINT Bilateral 11/7/2019    Procedure: INJECTION, JOINT, SI;  Surgeon: Bonnie Duncan MD;  Location: [facility] PAIN MGT;  Service: Pain Management;  Laterality: Bilateral;  B/L SI Joint Injections    INJECTION OF JOINT Bilateral 5/18/2021    Procedure: INJECTION, JOINT, SI;  Surgeon: Bonnie Duncan MD;  Location: [facility] PAIN MGT;  Service: Pain Management;  Laterality: Bilateral;  Consent needed    RADIOFREQUENCY ABLATION Right 7/25/2019    Procedure: RADIOFREQUENCY ABLATION;  Surgeon: Bonnie Duncan MD;  Location: [facility] PAIN T;  Service: Pain Management;  Laterality: Right;  Right RFA L2,3,4,5; THERMAL  1 of 2    RADIOFREQUENCY ABLATION Left 8/6/2019    Procedure: RADIOFREQUENCY ABLATION;  Surgeon: Bonnie Duncan MD;  Location: [facility] PAIN T;  Service: Pain Management;  Laterality: Left;  Left RFA L2,3,4,5 LUMBAR  1 of 2    TRANSFORAMINAL EPIDURAL INJECTION OF STEROID Bilateral 8/17/2021    Procedure: INJECTION, STEROID, EPIDURAL, TRANSFORAMINAL APPROACH S1;  Surgeon: Bonnie Duncan MD;  Location: [facility];  Service: Pain Management;  Laterality: Bilateral;     Family History   Problem Relation Age of Onset    Vaginal cancer Neg Hx     Endometrial cancer Neg Hx     Cervical cancer Neg Hx      Social History     Tobacco Use    Smoking status: Never    Smokeless tobacco: Never   Substance Use Topics    Alcohol use: No    Drug use: No     Review of Systems    Physical Exam     Initial Vitals [02/24/24 0245]   BP Pulse Resp Temp SpO2   (!) 163/73 70 20 98.2 °F (36.8 °C) 96 %      MAP       --         Physical Exam    Nursing note and vitals reviewed.  Constitutional: She appears well-developed. No distress.   HENT:   Head: Normocephalic and atraumatic.   Mouth/Throat: Oropharynx is clear and moist.   Eyes: Conjunctivae and EOM are normal.   Neck: No JVD present.   Normal range of motion.  Cardiovascular:  Normal rate, regular rhythm, normal heart sounds and intact distal pulses.            Pulmonary/Chest: Breath sounds normal. No respiratory distress.   Abdominal: Abdomen is soft. She exhibits no distension. There is no abdominal tenderness.   Musculoskeletal:         General: No tenderness or edema. Normal range of motion.      Cervical back: Normal range of motion.     Neurological: She is alert and oriented to person, place, and time. She has normal strength.   Skin: Skin is warm and dry. Capillary refill takes less than 2 seconds.         ED Course   Procedures  Labs Reviewed   CBC W/ AUTO DIFFERENTIAL - Abnormal; Notable for the following components:       Result Value    Gran # (ANC) 9.3 (*)     Gran % 74.4 (*)     All other components within normal limits    Narrative:     Release to patient->Immediate   COMPREHENSIVE METABOLIC PANEL - Abnormal; Notable for the following components:    Sodium 133 (*)     Glucose 203 (*)     AST 48 (*)     eGFR 57.7 (*)     All other components within normal limits    Narrative:     Release to patient->Immediate   TROPONIN I - Abnormal; Notable for the following components:    Troponin I 5.356 (*)     All other components within normal limits    Narrative:     Release to patient->Immediate   B-TYPE NATRIURETIC PEPTIDE - Abnormal; Notable for the following components:     (*)     All other components within normal limits    Narrative:     Release to patient->Immediate   URINALYSIS, REFLEX TO URINE CULTURE - Abnormal; Notable for the following components:    Appearance, UA Hazy (*)     Protein, UA 1+ (*)     Glucose, UA 1+ (*)     Ketones, UA Trace (*)     Occult Blood UA 1+ (*)     Leukocytes, UA 3+ (*)     All other components within normal limits    Narrative:     Specimen Source->Urine   CBC W/ AUTO DIFFERENTIAL - Abnormal; Notable for the following components:    WBC 12.79 (*)     Gran # (ANC) 9.9 (*)     Gran % 77.2 (*)     Lymph % 15.6 (*)     All other components within normal limits    Narrative:     Draw baseline aPTT prior to starting the  heparin bolus or  infusion  PTT daily if no changes in infusion rate  (if patient is on warfarin prior to heparin therapy)   LIPID PANEL - Abnormal; Notable for the following components:    Cholesterol 238 (*)     HDL 79 (*)     All other components within normal limits    Narrative:     add on lipid per  /order#[id number] @ 02/24/2024  07:53         Release to patient->Immediate  Add on Lipase per Dr. Jones @ 03:19 am to order # [id number]   HEMOGLOBIN A1C - Abnormal; Notable for the following components:    Hemoglobin A1C 6.6 (*)     Estimated Avg Glucose 143 (*)     All other components within normal limits    Narrative:     Draw baseline aPTT prior to starting the heparin bolus or  infusion  PTT daily if no changes in infusion rate  (if patient is on warfarin prior to heparin therapy)      ADD-ON [facility] #[id number] Per. Gary TORRES MD  02/24/2024  08:06    URINALYSIS MICROSCOPIC - Abnormal; Notable for the following components:    WBC, UA 8 (*)     All other components within normal limits    Narrative:     Specimen Source->Urine   HIV 1 / 2 ANTIBODY    Narrative:     Release to patient->Immediate  Add on Lipase per Dr. Jones @ 03:19 am to order # [id number]   HEPATITIS C ANTIBODY    Narrative:     Release to patient->Immediate  Add on Lipase per Dr. Jones @ 03:19 am to order # [id number]   LIPASE   LIPASE    Narrative:     Release to patient->Immediate  Add on Lipase per Dr. Jones @ 03:19 am to order # [id number]   APTT    Narrative:     Draw baseline aPTT prior to starting the heparin bolus or  infusion  PTT daily if no changes in infusion rate  (if patient is on warfarin prior to heparin therapy)   APTT    Narrative:     Draw baseline aPTT prior to starting the heparin bolus or  infusion  PTT daily if no changes in infusion rate  (if patient is on warfarin prior to heparin therapy)   PROTIME-INR    Narrative:     Draw baseline aPTT prior to starting the heparin bolus or  infusion  PTT daily if no  changes in infusion rate  (if patient is on warfarin prior to heparin therapy)   LIPID PANEL   HEMOGLOBIN A1C   POCT GLUCOSE MONITORING CONTINUOUS     EKG Readings: (Independently Interpreted)   Initial Reading: No STEMI. Previous EKG: Compared with most recent EKG Rhythm: Normal Sinus Rhythm. Heart Rate: 63. Ectopy: No Ectopy. Conduction: Normal. ST Segments: Normal ST Segments. T Waves: Normal. Axis: Normal. Clinical Impression: Normal Sinus Rhythm       Imaging Results              X-Ray Chest AP Portable (Final result)  Result time 02/24/24 04:05:47      Final result by Jaspal Alvarenga MD (02/24/24 04:05:47)                   Impression:      No acute findings in the chest.      Electronically signed by: Jaspal Alvarenga MD  Date:    02/24/2024  Time:    04:05               Narrative:    EXAMINATION:  XR CHEST AP PORTABLE    CLINICAL HISTORY:  Chest Pain;    TECHNIQUE:  Single frontal view of the chest was performed.    COMPARISON:  None    FINDINGS:  No consolidation, pleural effusion or pneumothorax.    Cardiomediastinal silhouette is unremarkable.                                    X-Rays:   Independently Interpreted Readings:   Chest X-Ray: Normal heart size.  No infiltrates.  No acute abnormalities.     Medications   nitroGLYCERIN SL tablet 0.4 mg (0.4 mg Sublingual Given 2/24/24 6623)   heparin 25,000 units in dextrose 5% 250 mL (100 units/mL) infusion LOW INTENSITY nomogram - [facility] (12 Units/kg/hr × 74.8 kg (Adjusted) Intravenous New Bag 2/24/24 3264)   heparin 25,000 units in dextrose 5% (100 units/ml) IV bolus from bag LOW INTENSITY nomogram - [facility] (has no administration in time range)   heparin 25,000 units in dextrose 5% (100 units/ml) IV bolus from bag LOW INTENSITY nomogram - [facility] (has no administration in time range)   iohexoL (OMNIPAQUE 350) injection 100 mL (has no administration in time range)   hydroCHLOROthiazide tablet 12.5 mg (12.5 mg Oral Given 2/24/24 2513)   metoprolol tartrate (LOPRESSOR)  tablet 25 mg (25 mg Oral Given 2/24/24 0713)   atorvastatin tablet 80 mg (has no administration in time range)   sodium chloride 0.9% flush 10 mL (has no administration in time range)   acetaminophen tablet 650 mg (has no administration in time range)   aspirin chewable tablet 81 mg (has no administration in time range)   ticagrelor tablet 90 mg (0 mg Oral Hold 2/24/24 0900)   nitroGLYCERIN 0.4 MG/DOSE TL SPRY 400 mcg/spray spray 1 spray (has no administration in time range)   valsartan tablet 40 mg (has no administration in time range)   glucose chewable tablet 16 g (has no administration in time range)   glucose chewable tablet 24 g (has no administration in time range)   glucagon (human recombinant) injection 1 mg (has no administration in time range)   insulin aspart U-100 pen 0-5 Units (has no administration in time range)   dextrose 10% bolus 125 mL 125 mL (has no administration in time range)   dextrose 10% bolus 250 mL 250 mL (has no administration in time range)   aluminum-magnesium hydroxide-simethicone 200-200-20 mg/5 mL suspension 5 mL (5 mLs Oral Given 2/24/24 0349)   aspirin tablet 325 mg (325 mg Oral Given 2/24/24 0426)   heparin 25,000 units in dextrose 5% (100 units/ml) IV bolus from bag LOW INTENSITY nomogram - [facility] (4,000 Units Intravenous Bolus from Bag 2/24/24 0529)   ticagrelor tablet 90 mg (90 mg Oral Given 2/24/24 0429)   ondansetron injection 4 mg (4 mg Intravenous Given 2/24/24 9255)     Medical Decision Making  78 year old female with history of DM, patient presenting for chest pain that started 20 hours ago, patient states chest pain has gotten worse through out the day, substernal non radiating associated with multiple episodes of vomiting.  Patient is alert and oriented, no acute distress, hemodynamically stable.  Differential including but not limited to ACS, CHF, pericarditis, myocarditis, pneumonia, gastritis, pancreatitis, doubt pneumothorax    Amount and/or Complexity of Data  Reviewed  Labs: ordered. Decision-making details documented in ED Course.  Radiology: ordered and independent interpretation performed. Decision-making details documented in ED Course.  ECG/medicine tests: ordered and independent interpretation performed. Decision-making details documented in ED Course.    Risk  OTC drugs.  Prescription drug management.  Decision regarding hospitalization.               ED Course as of 02/24/24 0841   Sat Feb 24, 2024   0838 CBC auto differential(!)  Leukocytosis, no acute anemia [NC]   0839 Urinalysis Microscopic(!)  Unlikely UTI [NC]   0839 Comprehensive metabolic panel(!)  No electrolyte derangement, normal renal function [NC]   0839 Troponin I(!): 5.356  Significantly elevated, concerning for ACS.  Will start heparin, and dual antiplatelet, nitro for pain.  Cardiology consulted [NC]   0840 Patient was evaluated by cardiology at bedside, concerning for wall motion abnormalities on echo, however, her pain improved, no urgent cauterization at this point.  Patient admitted to cardiac floor for further management. [NC]      ED Course User Index  [NC] Kenji Clark MD                           Clinical Impression:  Final diagnoses:  [R07.9] Chest pain  [I21.4] NSTEMI (non-ST elevated myocardial infarction)          ED Disposition Condition    Admit                 Kenji Clark MD  Resident  02/24/24 0841

## 2024-02-24 NOTE — CONSULTS
[facility] [place]  Cardiology Consult       Mila Foster   YOB: 1945   Medical Record Number: [id number]   Attending Physician:    Date of Admission: 02/24/2024       Hospital Day:  0  Current Principal Problem:  <principal problem not specified>      History     Cc: chest pain     HPI  Mrs Mila Foster is a 78 year old female with PMH of HTN, DM2 who presents to [facility] with chest pain. She states pain began Thursday evening after a meal. Described as a substernal burning which she initially attributed to reflux, however the pain continued to wax and wane over the next 24 hours. She described nausea, no vomiting during the episodes and occasional radiation to bilateral arms. She cannot relate the pain to exertion but notes she is not very active. Denies associated dyspnea, diaphoresis, palpitations, syncope.      She has no known cardiac history. In ED she was hypertensive and hemodynamically stable. Labs notable for troponin 5.3 and . EKG demonstrates sinus rhythm with very slight ST elevations in V2 and V3 not meeting STEMI criteria. At the time of my assessment she had received nitroglycerin x3 which did resolve her pain. Bedside echo performed by myself with apical hypokinesis.     Medications - Outpatient  Prior to Admission medications    Medication Sig Start Date End Date Taking? Authorizing Provider   aspirin (ECOTRIN) 81 MG EC tablet 81 mg. 9/8/16   Provider, Historical   ciprofloxacin HCl (CIPRO) 500 MG tablet Take 1 tablet (500 mg total) by mouth 2 (two) times daily. 12/14/21   Jam Espinal NP   FLUoxetine 20 MG capsule TK ONE C PO  QD 7/11/19   Provider, Historical   gabapentin (NEURONTIN) 300 MG capsule Take 2 tabs in the morning, 1 tab in the afternoon, and 2 tabs in the evening 2/16/22   Coby Russo NP   glimepiride (AMARYL) 2 MG tablet Take 2 mg by mouth every morning. 4/22/21   Provider, Historical   hydroCHLOROthiazide (HYDRODIURIL) 12.5 MG Tab Take  12.5 mg by mouth every morning. 4/22/21   Provider, Historical   metformin (GLUCOPHAGE-XR) 500 MG 24 hr tablet 500 mg. 7/25/16   Provider, Historical   metoprolol tartrate (LOPRESSOR) 25 MG tablet  9/26/18   Provider, Historical   mirabegron (MYRBETRIQ) 25 mg Tb24 ER tablet Take 1 tablet (25 mg total) by mouth once daily. 9/18/20 9/18/21  Mayda Yancey NP   naproxen (NAPROSYN) 500 MG tablet  12/6/16   Provider, Historical   omeprazole (PRILOSEC) 20 MG capsule 20 mg. 10/24/16   Provider, Historical   pravastatin (PRAVACHOL) 40 MG tablet 40 mg. 8/21/16   Provider, Historical         Medications - Current  Scheduled Meds:   heparin (PORCINE)  53.5 Units/kg (Adjusted) Intravenous Once     Continuous Infusions:   heparin (porcine) in D5W       PRN Meds:.heparin (PORCINE), heparin (PORCINE), nitroGLYCERIN      Allergies  Review of patient's allergies indicates:  No Known Allergies      Past Medical History  Past Medical History:   Diagnosis Date    Diabetes     Hypertension          Past Surgical History  Past Surgical History:   Procedure Laterality Date    CHOLECYSTECTOMY      COLONOSCOPY N/A 11/1/2018    Procedure: COLONOSCOPY Suprep;  Surgeon: Azucena Hood MD;  Location: [facility];  Service: Endoscopy;  Laterality: N/A;    HYSTERECTOMY      INJECTION OF ANESTHETIC AGENT AROUND NERVE Bilateral 5/21/2019    Procedure: BLOCK, NERVE, BILATERAL L2,3,4,5;  Surgeon: Bonnie [facility] MD;  Location: [facility]IN MGT;  Service: Pain Management;  Laterality: Bilateral;  BLOCK, NERVE, BILATERAL L2,3,4,5    INJECTION OF JOINT Bilateral 12/27/2018    Procedure: Injection, Joint BILATERAL SACROILIAC JOINT INJECTION;  Surgeon: Bonnie [facility] MD;  Location: [facility]IN [facility]GT;  Service: Pain Management;  Laterality: Bilateral;    INJECTION OF JOINT Bilateral 11/7/2019    Procedure: INJECTION, JOINT, SI;  Surgeon: Bonnie [facility] MD;  Location: [facility]IN [facility]GT;  Service: Pain Management;  Laterality: Bilateral;  B/L SI Joint  Injections    INJECTION OF JOINT Bilateral 5/18/2021    Procedure: INJECTION, JOINT, SI;  Surgeon: Bonnie [facility] MD;  Location: [facility] PAIN MGT;  Service: Pain Management;  Laterality: Bilateral;  Consent needed    RADIOFREQUENCY ABLATION Right 7/25/2019    Procedure: RADIOFREQUENCY ABLATION;  Surgeon: Bonnie [facility] MD;  Location: [facility] PAIN MGT;  Service: Pain Management;  Laterality: Right;  Right RFA L2,3,4,5; THERMAL  1 of 2    RADIOFREQUENCY ABLATION Left 8/6/2019    Procedure: RADIOFREQUENCY ABLATION;  Surgeon: Bonnie [facility] MD;  Location: [facility] PAIN MGT;  Service: Pain Management;  Laterality: Left;  Left RFA L2,3,4,5 LUMBAR  1 of 2    TRANSFORAMINAL EPIDURAL INJECTION OF STEROID Bilateral 8/17/2021    Procedure: INJECTION, STEROID, EPIDURAL, TRANSFORAMINAL APPROACH S1;  Surgeon: Bonnie [facility] MD;  Location: [facility] PAIN MGT;  Service: Pain Management;  Laterality: Bilateral;         Social History  Social History     Socioeconomic History    Marital status: Single   Tobacco Use    Smoking status: Never    Smokeless tobacco: Never   Substance and Sexual Activity    Alcohol use: No    Drug use: No    Sexual activity: Not Currently         ROS  10 point ROS performed and negative except as stated in HPI     Physical Examination         Vital Signs  Vitals  Temp: 98.2 °F (36.8 °C)  Temp Source: Oral  Pulse: 61  Resp: 20  SpO2: 97 %  BP: (!) 174/84  MAP (mmHg): 120          24 Hour VS Range    Temp:  [98.2 °F (36.8 °C)]   Pulse:  [60-70]   Resp:  [15-20]   BP: (162-174)/(73-84)   SpO2:  [96 %-97 %]   No intake or output data in the 24 hours ending 02/24/24 0526        Physical Exam:   Constitutional: no acute distress  HEENT: NCAT, EOMI, no scleral icterus  Cardiovascular: Regular rate and rhythm  Pulmonary: Normal respiratory effort   Abdomen: nontender, non-distended   Neuro: alert and oriented, no focal deficits  Extremities: warm, no edema   MSK: no deformities  Integument: intact, no rashes  "      Data       Recent Labs   Lab 02/24/24  0306 02/24/24  0434   WBC 12.47 12.79*   HGB 14.1 14.0   HCT 41.7 41.4    242        Recent Labs   Lab 02/24/24  0434   INR 1.0        Recent Labs   Lab 02/24/24  0306   *   K 3.8   CL 98   CO2 24   BUN 15   CREATININE 1.0   ANIONGAP 11   CALCIUM 9.9        Recent Labs   Lab 02/24/24  0306   PROT 7.5   ALBUMIN 4.0   BILITOT 0.8   ALKPHOS 66   AST 48*   ALT 20        Recent Labs   Lab 02/24/24  0306   TROPONINI 5.356*        BNP (pg/mL)   Date Value   02/24/2024 436 (H)       No results for input(s): "LABBLOO" in the last 168 hours.         Assessment & Plan   78 year old female with PMH of HTN, MGUS, DM2 who [place] [facility] [place].     NSTEMI:   Does not meet criteria for STEMI based on EKG, however slight ST elevations and biphasic T waves throughout precordium are concerning for ischemic change. Trop elevated and bedside echo with regional WMAs. Started on ACS therapy by ED. Takotsubo also a consideration but treating as ACS until ruled out.   -Admit to CCU   -Heparin gtt  - --> 81 mg daily   -Ticagrelor 180 --> 90 mg BID  -Lopressor 25 TID  -HI statin   -Check echocardiogram, lipids, A1c    -IC consulted. Discussed with fellow, given improvement in pain no emergent indication for LHC but will likely need during stay    HTN:   Continue home HCTZ. Start valsartan.     DM2:   No recent A1c. Check now. Frequent accuchecks, SSI, hypoglycemic precautions.       Saturnino Ruffin MD  [facility]   Cardiovascular Disease PGY-VI     "

## 2024-02-24 NOTE — BRIEF OP NOTE
"    Post Cath Note  Referring Physician: Dinesh Vega MD  Procedure: Angiogram, Coronary, with Left Heart Cath (Right), IVUS, Coronary, INSERTION, STENT, CORONARY ARTERY (N/A)      : Susie Tanner MD     Referring Physician: Self,Aaareferral     All Operators: Surgeon(s):  Susie Tanner MD Maitas, Oscar, MD     Preoperative Diagnosis: Chest pain [R07.9]  NSTEMI (non-ST elevated myocardial infarction) [I21.4]     Postop Diagnosis: Chest pain [R07.9]  NSTEMI (non-ST elevated myocardial infarction) [I21.4]    Treatments/Procedures: Procedure(s) (LRB):  Angiogram, Coronary, with Left Heart Cath (Right)  IVUS, Coronary  INSERTION, STENT, CORONARY ARTERY (N/A)    Estimated Blood loss: <50 cc         Access: Right radial    See full report for further details    Intervention:   Successful mid LAD DEANNA. IVUS guided.     Closure device: Radial band    Post Cath Exam:   BP (!) 165/80   Pulse (!) 55   Temp 98.8 °F (37.1 °C)   Resp (!) 24   Ht 5' 7" (1.702 m)   Wt 94.5 kg (208 lb 5.4 oz)   SpO2 97%   Breastfeeding No   BMI 32.63 kg/m²   No unusual pain, hematoma, thrill or bruit at vascular access site.  Distal pulse present without signs of ischemia.    Recommendations:   - Routine post-cath care  - IVF at 150 cc/hr for 4 hrs  -  Brilinta and ASA, Cardiac rehab referral, Continue medical management, Risk factor reduction, Follow-up with outpatient cardiologist    Amrik Moncada    "

## 2024-02-24 NOTE — ED NOTES
Assumed care of patient. Mila Foster, a 78 y.o. female presents to the ED w/ complaint of chest pain radiating to her back and under her bilateral ribs    Triage note:  Chief Complaint   Patient presents with    Chest Pain     Chest pain over the last ~6 hours. No shortness of breath.    Dental Pain     Reports left upper tooth pain, thinks it is abscessed. No abx. This has been ongoing over 1 month. No trismus or drooling     Review of patient's allergies indicates:  No Known Allergies  Past Medical History:   Diagnosis Date    Diabetes     Hypertension

## 2024-02-24 NOTE — ED NOTES
Patient stated to MD Augustin that chest pain then started to radiate up in between shoulder blades, Augustin HOLDER perform bedside ultrasound.

## 2024-02-24 NOTE — EKG INTERPRETATIONS - EMERGENCY DEPT.
Independently interpreted by MD:  Rate 65, NSR, no stemi, no ectopy, no hypertrophy, poor qrs progression in V3-4, mild artifact

## 2024-02-24 NOTE — H&P
[facility] [place]  Cardiology H&P      Mila Foster   YOB: 1945   Medical Record Number: [id number]   Attending Physician:    Date of Admission: 02/24/2024       Hospital Day:  0  Current Principal Problem:  <principal problem not specified>      History     Cc: chest pain     HPI  Mrs Mila Foster is a 78 year old female with PMH of HTN, DM2 who presents to [facility] with chest pain. She states pain began Thursday evening after a meal. Described as a substernal burning which she initially attributed to reflux, however the pain continued to wax and wane over the next 24 hours. She described nausea, no vomiting during the episodes and occasional radiation to bilateral arms. She cannot relate the pain to exertion but notes she is not very active. Denies associated dyspnea, diaphoresis, palpitations, syncope.       She has no known cardiac history. In ED she was hypertensive and hemodynamically stable. Labs notable for troponin 5.3 and . EKG demonstrates sinus rhythm with very slight ST elevations in V2 and V3 not meeting STEMI criteria. At the time of my assessment she had received nitroglycerin x3 which did resolve her pain. Bedside echo performed by myself with apical hypokinesis.       Medications - Outpatient  Prior to Admission medications    Medication Sig Start Date End Date Taking? Authorizing Provider   aspirin (ECOTRIN) 81 MG EC tablet 81 mg. 9/8/16   Provider, Historical   ciprofloxacin HCl (CIPRO) 500 MG tablet Take 1 tablet (500 mg total) by mouth 2 (two) times daily. 12/14/21   Jam Espinal NP   FLUoxetine 20 MG capsule TK ONE C PO  QD 7/11/19   Provider, Historical   gabapentin (NEURONTIN) 300 MG capsule Take 2 tabs in the morning, 1 tab in the afternoon, and 2 tabs in the evening 2/16/22   Coby Russo NP   glimepiride (AMARYL) 2 MG tablet Take 2 mg by mouth every morning. 4/22/21   Provider, Historical   hydroCHLOROthiazide (HYDRODIURIL) 12.5 MG Tab Take 12.5  mg by mouth every morning. 4/22/21   Provider, Historical   metformin (GLUCOPHAGE-XR) 500 MG 24 hr tablet 500 mg. 7/25/16   Provider, Historical   metoprolol tartrate (LOPRESSOR) 25 MG tablet  9/26/18   Provider, Historical   mirabegron (MYRBETRIQ) 25 mg Tb24 ER tablet Take 1 tablet (25 mg total) by mouth once daily. 9/18/20 9/18/21  Mayda Yancey, NP   naproxen (NAPROSYN) 500 MG tablet  12/6/16   Provider, Historical   omeprazole (PRILOSEC) 20 MG capsule 20 mg. 10/24/16   Provider, Historical   pravastatin (PRAVACHOL) 40 MG tablet 40 mg. 8/21/16   Provider, Historical         Medications - Current  Scheduled Meds:   aspirin  81 mg Oral Daily    atorvastatin  80 mg Oral Daily    hydroCHLOROthiazide  12.5 mg Oral QAM    metoprolol tartrate  25 mg Oral TID    ticagrelor  90 mg Oral BID    valsartan  40 mg Oral Daily     Continuous Infusions:   heparin (porcine) in D5W 12 Units/kg/hr (02/24/24 0528)     PRN Meds:.acetaminophen, dextrose 10%, dextrose 10%, glucagon (human recombinant), glucose, glucose, heparin (PORCINE), heparin (PORCINE), insulin aspart U-100, iohexoL, nitroGLYCERIN 0.4 MG/DOSE TL SPRY, nitroGLYCERIN, sodium chloride 0.9%      Allergies  Review of patient's allergies indicates:  No Known Allergies      Past Medical History  Past Medical History:   Diagnosis Date    Diabetes     Hypertension          Past Surgical History  Past Surgical History:   Procedure Laterality Date    CHOLECYSTECTOMY      COLONOSCOPY N/A 11/1/2018    Procedure: COLONOSCOPY Suprep;  Surgeon: Azucena Hood MD;  Location: [facility] ENDO;  Service: Endoscopy;  Laterality: N/A;    HYSTERECTOMY      INJECTION OF ANESTHETIC AGENT AROUND NERVE Bilateral 5/21/2019    Procedure: BLOCK, NERVE, BILATERAL L2,3,4,5;  Surgeon: Bonnie [facility] MD;  Location: [facility] PAIN MGT;  Service: Pain Management;  Laterality: Bilateral;  BLOCK, NERVE, BILATERAL L2,3,4,5    INJECTION OF JOINT Bilateral 12/27/2018    Procedure: Injection, Joint  BILATERAL SACROILIAC JOINT INJECTION;  Surgeon: Bonnie [facility] MD;  Location: [facility] PAIN MGT;  Service: Pain Management;  Laterality: Bilateral;    INJECTION OF JOINT Bilateral 11/7/2019    Procedure: INJECTION, JOINT, SI;  Surgeon: Bonnie [facility] MD;  Location: [facility] PAIN MGT;  Service: Pain Management;  Laterality: Bilateral;  B/L SI Joint Injections    INJECTION OF JOINT Bilateral 5/18/2021    Procedure: INJECTION, JOINT, SI;  Surgeon: Bonnie [facility] MD;  Location: [facility] PAIN MGT;  Service: Pain Management;  Laterality: Bilateral;  Consent needed    RADIOFREQUENCY ABLATION Right 7/25/2019    Procedure: RADIOFREQUENCY ABLATION;  Surgeon: Bonnie [facility] MD;  Location: [facility] PAIN MGT;  Service: Pain Management;  Laterality: Right;  Right RFA L2,3,4,5; THERMAL  1 of 2    RADIOFREQUENCY ABLATION Left 8/6/2019    Procedure: RADIOFREQUENCY ABLATION;  Surgeon: Bonnie [facility] MD;  Location: [facility] PAIN MGT;  Service: Pain Management;  Laterality: Left;  Left RFA L2,3,4,5 LUMBAR  1 of 2    TRANSFORAMINAL EPIDURAL INJECTION OF STEROID Bilateral 8/17/2021    Procedure: INJECTION, STEROID, EPIDURAL, TRANSFORAMINAL APPROACH S1;  Surgeon: Bonnie [facility] MD;  Location: [facility] PAIN T;  Service: Pain Management;  Laterality: Bilateral;         Social History  Social History     Socioeconomic History    Marital status: Single   Tobacco Use    Smoking status: Never    Smokeless tobacco: Never   Substance and Sexual Activity    Alcohol use: No    Drug use: No    Sexual activity: Not Currently         ROS  10 point ROS performed and negative except as stated in HPI     Physical Examination         Vital Signs  Vitals  Temp: 98.3 °F (36.8 °C)  Temp Source: Oral  Pulse: (!) 59  Resp: 17  SpO2: 97 %  BP: (!) 159/73  MAP (mmHg): 105          24 Hour VS Range    Temp:  [98.2 °F (36.8 °C)-98.3 °F (36.8 °C)]   Pulse:  [59-70]   Resp:  [15-20]   BP: (159-181)/(73-88)   SpO2:  [95 %-97 %]   No intake or output data in the 24  "hours ending 02/24/24 0711        Physical Exam:   Constitutional: no acute distress  HEENT: NCAT, EOMI, no scleral icterus  Cardiovascular: Regular rate and rhythm  Pulmonary: Normal respiratory effort   Abdomen: nontender, non-distended   Neuro: alert and oriented, no focal deficits  Extremities: warm, no edema   MSK: no deformities  Integument: intact, no rashes       Data       Recent Labs   Lab 02/24/24  0306 02/24/24  0434   WBC 12.47 12.79*   HGB 14.1 14.0   HCT 41.7 41.4    242        Recent Labs   Lab 02/24/24  0434   INR 1.0        Recent Labs   Lab 02/24/24  0306   *   K 3.8   CL 98   CO2 24   BUN 15   CREATININE 1.0   ANIONGAP 11   CALCIUM 9.9        Recent Labs   Lab 02/24/24  0306   PROT 7.5   ALBUMIN 4.0   BILITOT 0.8   ALKPHOS 66   AST 48*   ALT 20        Recent Labs   Lab 02/24/24  0306   TROPONINI 5.356*        BNP (pg/mL)   Date Value   02/24/2024 436 (H)       No results for input(s): "LABBLOO" in the last 168 hours.         Assessment & Plan   78 year old female with PMH of HTN, MGUS, DM2 who presents to [facility] with chest pain.      NSTEMI:   Does not meet criteria for STEMI based on EKG, however slight ST elevations and biphasic T waves throughout precordium are concerning for ischemic change. Trop elevated and bedside echo with regional WMAs. Started on ACS therapy by ED. Takotsubo also a consideration but treating as ACS until ruled out.   -Admit to CCU   -Heparin gtt  - --> 81 mg daily   -Ticagrelor 180 --> 90 mg BID  -Lopressor 25 TID  -HI statin   -Check echocardiogram, lipids, A1c    -IC consulted. Discussed with fellow, given improvement in pain no emergent indication for LHC but will likely need during stay     HTN:   Continue home HCTZ. Start valsartan.      DM2:   No recent A1c. Check now. Frequent accuchecks, SSI, hypoglycemic precautions.         Saturnino Ruffin MD  [facility]   Cardiovascular Disease PGY-VI     "

## 2024-02-24 NOTE — Clinical Note
The catheter was inserted into the distal   left anterior descending. IVUS was performed and catheter was removed.

## 2024-02-24 NOTE — Clinical Note
The left groin and right brachial was prepped. The site was prepped with ChloraPrep. The site was clipped. The patient was draped.

## 2024-02-24 NOTE — ED TRIAGE NOTES
Mila Foster, a 78 y.o. female presents to the ED w/ complaint of mid sternal chest pain since yesterday that has been radiating towards both sides of her ribs. Patient states having dental pain to left upper tooth, believes she may have an abscess. Patient denies SOB. Patient endorses nausea and episodes of vomiting    Triage note:  Chief Complaint   Patient presents with    Chest Pain     Chest pain over the last ~6 hours. No shortness of breath.    Dental Pain     Reports left upper tooth pain, thinks it is abscessed. No abx. This has been ongoing over 1 month. No trismus or drooling     Review of patient's allergies indicates:  No Known Allergies  Past Medical History:   Diagnosis Date    Diabetes     Hypertension

## 2024-02-25 PROBLEM — I25.118 CORONARY ARTERY DISEASE OF NATIVE ARTERY OF NATIVE HEART WITH STABLE ANGINA PECTORIS: Status: ACTIVE | Noted: 2024-02-25

## 2024-02-25 PROBLEM — E78.5 HYPERLIPIDEMIA LDL GOAL <70: Status: ACTIVE | Noted: 2024-02-25

## 2024-02-25 PROBLEM — E11.9 TYPE 2 DIABETES MELLITUS: Status: ACTIVE | Noted: 2024-02-25

## 2024-02-25 PROBLEM — I21.4 NSTEMI (NON-ST ELEVATED MYOCARDIAL INFARCTION): Status: ACTIVE | Noted: 2024-02-25

## 2024-02-25 LAB
ALBUMIN SERPL BCP-MCNC: 3.4 G/DL (ref 3.5–5.2)
ALP SERPL-CCNC: 56 U/L (ref 55–135)
ALT SERPL W/O P-5'-P-CCNC: 23 U/L (ref 10–44)
ANION GAP SERPL CALC-SCNC: 9 MMOL/L (ref 8–16)
AST SERPL-CCNC: 56 U/L (ref 10–40)
BASOPHILS # BLD AUTO: 0.04 K/UL (ref 0–0.2)
BASOPHILS NFR BLD: 0.4 % (ref 0–1.9)
BILIRUB SERPL-MCNC: 1.1 MG/DL (ref 0.1–1)
BUN SERPL-MCNC: 14 MG/DL (ref 8–23)
CALCIUM SERPL-MCNC: 9.1 MG/DL (ref 8.7–10.5)
CHLORIDE SERPL-SCNC: 102 MMOL/L (ref 95–110)
CO2 SERPL-SCNC: 28 MMOL/L (ref 23–29)
CREAT SERPL-MCNC: 0.9 MG/DL (ref 0.5–1.4)
DIFFERENTIAL METHOD BLD: ABNORMAL
EOSINOPHIL # BLD AUTO: 0.1 K/UL (ref 0–0.5)
EOSINOPHIL NFR BLD: 0.8 % (ref 0–8)
ERYTHROCYTE [DISTWIDTH] IN BLOOD BY AUTOMATED COUNT: 12.1 % (ref 11.5–14.5)
EST. GFR  (NO RACE VARIABLE): >60 ML/MIN/1.73 M^2
GLUCOSE SERPL-MCNC: 156 MG/DL (ref 70–110)
HCT VFR BLD AUTO: 39.7 % (ref 37–48.5)
HGB BLD-MCNC: 13 G/DL (ref 12–16)
IMM GRANULOCYTES # BLD AUTO: 0.06 K/UL (ref 0–0.04)
IMM GRANULOCYTES NFR BLD AUTO: 0.6 % (ref 0–0.5)
LYMPHOCYTES # BLD AUTO: 2.9 K/UL (ref 1–4.8)
LYMPHOCYTES NFR BLD: 26.8 % (ref 18–48)
MAGNESIUM SERPL-MCNC: 1.5 MG/DL (ref 1.6–2.6)
MCH RBC QN AUTO: 30.7 PG (ref 27–31)
MCHC RBC AUTO-ENTMCNC: 32.7 G/DL (ref 32–36)
MCV RBC AUTO: 94 FL (ref 82–98)
MONOCYTES # BLD AUTO: 1.1 K/UL (ref 0.3–1)
MONOCYTES NFR BLD: 9.9 % (ref 4–15)
NEUTROPHILS # BLD AUTO: 6.7 K/UL (ref 1.8–7.7)
NEUTROPHILS NFR BLD: 61.5 % (ref 38–73)
NRBC BLD-RTO: 0 /100 WBC
OHS QRS DURATION: 78 MS
OHS QTC CALCULATION: 494 MS
PHOSPHATE SERPL-MCNC: 2.8 MG/DL (ref 2.7–4.5)
PLATELET # BLD AUTO: 242 K/UL (ref 150–450)
PMV BLD AUTO: 10.3 FL (ref 9.2–12.9)
POCT GLUCOSE: 176 MG/DL (ref 70–110)
POCT GLUCOSE: 227 MG/DL (ref 70–110)
POTASSIUM SERPL-SCNC: 3.4 MMOL/L (ref 3.5–5.1)
PROT SERPL-MCNC: 6.4 G/DL (ref 6–8.4)
RBC # BLD AUTO: 4.23 M/UL (ref 4–5.4)
SODIUM SERPL-SCNC: 139 MMOL/L (ref 136–145)
WBC # BLD AUTO: 10.86 K/UL (ref 3.9–12.7)

## 2024-02-25 PROCEDURE — 25000003 PHARM REV CODE 250: Performed by: STUDENT IN AN ORGANIZED HEALTH CARE EDUCATION/TRAINING PROGRAM

## 2024-02-25 PROCEDURE — 99900035 HC TECH TIME PER 15 MIN (STAT)

## 2024-02-25 PROCEDURE — 63600175 PHARM REV CODE 636 W HCPCS: Performed by: STUDENT IN AN ORGANIZED HEALTH CARE EDUCATION/TRAINING PROGRAM

## 2024-02-25 PROCEDURE — 25000003 PHARM REV CODE 250: Performed by: INTERNAL MEDICINE

## 2024-02-25 PROCEDURE — 84100 ASSAY OF PHOSPHORUS: CPT | Performed by: STUDENT IN AN ORGANIZED HEALTH CARE EDUCATION/TRAINING PROGRAM

## 2024-02-25 PROCEDURE — 99232 SBSQ HOSP IP/OBS MODERATE 35: CPT | Mod: GC,,, | Performed by: INTERNAL MEDICINE

## 2024-02-25 PROCEDURE — 80053 COMPREHEN METABOLIC PANEL: CPT | Performed by: STUDENT IN AN ORGANIZED HEALTH CARE EDUCATION/TRAINING PROGRAM

## 2024-02-25 PROCEDURE — 20600001 HC STEP DOWN PRIVATE ROOM

## 2024-02-25 PROCEDURE — 85025 COMPLETE CBC W/AUTO DIFF WBC: CPT | Performed by: INTERNAL MEDICINE

## 2024-02-25 PROCEDURE — 94761 N-INVAS EAR/PLS OXIMETRY MLT: CPT

## 2024-02-25 PROCEDURE — 83735 ASSAY OF MAGNESIUM: CPT | Performed by: STUDENT IN AN ORGANIZED HEALTH CARE EDUCATION/TRAINING PROGRAM

## 2024-02-25 RX ORDER — LANOLIN ALCOHOL/MO/W.PET/CERES
800 CREAM (GRAM) TOPICAL
Status: DISCONTINUED | OUTPATIENT
Start: 2024-02-25 | End: 2024-02-26

## 2024-02-25 RX ORDER — METOPROLOL SUCCINATE 50 MG/1
50 TABLET, EXTENDED RELEASE ORAL DAILY
Status: DISCONTINUED | OUTPATIENT
Start: 2024-02-25 | End: 2024-02-26 | Stop reason: HOSPADM

## 2024-02-25 RX ORDER — EZETIMIBE 10 MG/1
10 TABLET ORAL NIGHTLY
Status: DISCONTINUED | OUTPATIENT
Start: 2024-02-25 | End: 2024-02-26

## 2024-02-25 RX ORDER — VALSARTAN 40 MG/1
40 TABLET ORAL DAILY
Status: DISCONTINUED | OUTPATIENT
Start: 2024-02-26 | End: 2024-02-26 | Stop reason: HOSPADM

## 2024-02-25 RX ORDER — SODIUM CHLORIDE 9 MG/ML
INJECTION, SOLUTION INTRAVENOUS
Status: DISCONTINUED | OUTPATIENT
Start: 2024-02-25 | End: 2024-02-25

## 2024-02-25 RX ORDER — DAPAGLIFLOZIN 10 MG/1
10 TABLET, FILM COATED ORAL DAILY
Status: DISCONTINUED | OUTPATIENT
Start: 2024-02-26 | End: 2024-02-26 | Stop reason: HOSPADM

## 2024-02-25 RX ORDER — SODIUM,POTASSIUM PHOSPHATES 280-250MG
2 POWDER IN PACKET (EA) ORAL
Status: DISCONTINUED | OUTPATIENT
Start: 2024-02-25 | End: 2024-02-26

## 2024-02-25 RX ORDER — VALSARTAN 40 MG/1
80 TABLET ORAL DAILY
Status: DISCONTINUED | OUTPATIENT
Start: 2024-02-26 | End: 2024-02-25

## 2024-02-25 RX ORDER — MAGNESIUM SULFATE 1 G/100ML
1 INJECTION INTRAVENOUS ONCE
Status: COMPLETED | OUTPATIENT
Start: 2024-02-25 | End: 2024-02-25

## 2024-02-25 RX ORDER — MAGNESIUM SULFATE HEPTAHYDRATE 40 MG/ML
2 INJECTION, SOLUTION INTRAVENOUS ONCE
Status: COMPLETED | OUTPATIENT
Start: 2024-02-25 | End: 2024-02-25

## 2024-02-25 RX ADMIN — MAGNESIUM SULFATE HEPTAHYDRATE 1 G: 500 INJECTION, SOLUTION INTRAMUSCULAR; INTRAVENOUS at 06:02

## 2024-02-25 RX ADMIN — TICAGRELOR 90 MG: 90 TABLET ORAL at 08:02

## 2024-02-25 RX ADMIN — METOPROLOL TARTRATE 25 MG: 25 TABLET, FILM COATED ORAL at 08:02

## 2024-02-25 RX ADMIN — ASPIRIN 81 MG CHEWABLE TABLET 81 MG: 81 TABLET CHEWABLE at 08:02

## 2024-02-25 RX ADMIN — VALSARTAN 40 MG: 40 TABLET, FILM COATED ORAL at 08:02

## 2024-02-25 RX ADMIN — HYDROCHLOROTHIAZIDE 12.5 MG: 12.5 TABLET ORAL at 06:02

## 2024-02-25 RX ADMIN — ATORVASTATIN CALCIUM 80 MG: 40 TABLET, FILM COATED ORAL at 08:02

## 2024-02-25 RX ADMIN — ISOSORBIDE MONONITRATE 30 MG: 30 TABLET, EXTENDED RELEASE ORAL at 08:02

## 2024-02-25 RX ADMIN — METOPROLOL SUCCINATE 50 MG: 50 TABLET, EXTENDED RELEASE ORAL at 03:02

## 2024-02-25 RX ADMIN — MAGNESIUM SULFATE HEPTAHYDRATE 2 G: 40 INJECTION, SOLUTION INTRAVENOUS at 08:02

## 2024-02-25 RX ADMIN — POTASSIUM BICARBONATE 40 MEQ: 391 TABLET, EFFERVESCENT ORAL at 05:02

## 2024-02-25 RX ADMIN — SODIUM CHLORIDE: 9 INJECTION, SOLUTION INTRAVENOUS at 06:02

## 2024-02-25 RX ADMIN — MICONAZOLE NITRATE 2 % TOPICAL POWDER: at 08:02

## 2024-02-25 RX ADMIN — EZETIMIBE 10 MG: 10 TABLET ORAL at 08:02

## 2024-02-25 NOTE — NURSING TRANSFER
Nursing Transfer Note      2/25/2024   2:19 PM    Nurse giving handoff:Krunal  Nurse receiving handoff:Alina    Reason patient is being transferred: stepdown    Transfer to 321    Transfer via wheelchair    Transfer with cardiac monitoring    Transported by krunal    Transfer Vital Signs:  Blood Pressure:90/70  Heart Rate:60  O2:100  Temperature:98  Respirations:20    Telemetry: Rate 66 and Rhythm SR  Order for Tele Monitor? Yes    Additional Lines: na    4eyes on Skin: yes    Medicines sent: wit pt    Any special needs or follow-up needed:     Patient belongings transferred with patient: Yes    Chart send with patient: Yes    Notified: son    Patient reassessed at: 2/28/24 @1400  Upon arrival to floor: cardiac monitor applied, patient oriented to room, call bell in reach, and bed in lowest position

## 2024-02-25 NOTE — SUBJECTIVE & OBJECTIVE
Objective:     Vital Signs (Most Recent):  Temp: 98.3 °F (36.8 °C) (02/25/24 0302)  Pulse: (!) 59 (02/25/24 0656)  Resp: 14 (02/25/24 0656)  BP: (!) 112/58 (02/25/24 0602)  SpO2: 96 % (02/25/24 0656) Vital Signs (24h Range):  Temp:  [97.7 °F (36.5 °C)-99 °F (37.2 °C)] 98.3 °F (36.8 °C)  Pulse:  [48-64] 59  Resp:  [12-32] 14  SpO2:  [93 %-99 %] 96 %  BP: ()/() 112/58     Weight: 94.5 kg (208 lb 5.4 oz)  Body mass index is 32.63 kg/m².    Intake/Output Summary (Last 24 hours) at 2/25/2024 0750  Last data filed at 2/25/2024 0102  Gross per 24 hour   Intake 1581.32 ml   Output 2050 ml   Net -468.68 ml     Physical Exam:   Constitutional: no acute distress  HEENT: NCAT, EOMI, no scleral icterus  Cardiovascular: Regular rate and rhythm  Pulmonary: Normal respiratory effort   Abdomen: nontender, non-distended   Neuro: alert and oriented, no focal deficits  Extremities: warm, no edema   MSK: no deformities  Integument: intact, no rashes     Significant Labs: CMP   Recent Labs   Lab 02/24/24  0306 02/25/24  0325   * 139   K 3.8 3.4*   CL 98 102   CO2 24 28   * 156*   BUN 15 14   CREATININE 1.0 0.9   CALCIUM 9.9 9.1   PROT 7.5 6.4   ALBUMIN 4.0 3.4*   BILITOT 0.8 1.1*   ALKPHOS 66 56   AST 48* 56*   ALT 20 23   ANIONGAP 11 9   , CBC   Recent Labs   Lab 02/24/24  0306 02/24/24  0434 02/25/24  0036   WBC 12.47 12.79* 10.86   HGB 14.1 14.0 13.0   HCT 41.7 41.4 39.7    242 242   , Lipid Panel   Recent Labs   Lab 02/24/24  0306   CHOL 238*   HDL 79*   LDLCALC 140.6   TRIG 92   CHOLHDL 33.2   , and Troponin   Recent Labs   Lab 02/24/24  0306 02/24/24  1019   TROPONINI 5.356* 9.607*     Significant Imaging: Reviewed

## 2024-02-25 NOTE — ASSESSMENT & PLAN NOTE
Lab Results   Component Value Date    HGBA1C 6.6 (H) 02/24/2024     Newly diagnosed, not previously on oral therapy. SSI inpatient.  Start metformin vs GLP-1 or SGLT2i at discharge pending echo

## 2024-02-25 NOTE — PROGRESS NOTES
[facility]  Cardiology  Progress Note    Patient Name: Mila Foster  MRN: [id number]  Admission Date: 2/24/2024  Hospital Length of Stay: 1 days  Code Status: Full Code   Attending Physician: Dinesh Vega MD   Primary Care Physician: Dinesh Connors MD  Expected Discharge Date:   Principal Problem:NSTEMI (non-ST elevated myocardial infarction)    Subjective:     Hospital Course:   Ms. Foster [place] [facility] [place] and NSTEMI with concern for borderline ST elevation. She was noted to have ST elevations in V2, V3 and rising troponin so ACS protocol was started and patient was taken urgently to the cath lab 2/24/24. Doing well overnight without chest pain or nausea (her anginal equivalent).     Objective:     Vital Signs (Most Recent):  Temp: 98.3 °F (36.8 °C) (02/25/24 0302)  Pulse: (!) 59 (02/25/24 0656)  Resp: 14 (02/25/24 0656)  BP: (!) 112/58 (02/25/24 0602)  SpO2: 96 % (02/25/24 0656) Vital Signs (24h Range):  Temp:  [97.7 °F (36.5 °C)-99 °F (37.2 °C)] 98.3 °F (36.8 °C)  Pulse:  [48-64] 59  Resp:  [12-32] 14  SpO2:  [93 %-99 %] 96 %  BP: ()/() 112/58     Weight: 94.5 kg (208 lb 5.4 oz)  Body mass index is 32.63 kg/m².    Intake/Output Summary (Last 24 hours) at 2/25/2024 0750  Last data filed at 2/25/2024 0102  Gross per 24 hour   Intake 1581.32 ml   Output 2050 ml   Net -468.68 ml     Physical Exam:   Constitutional: no acute distress  HEENT: NCAT, EOMI, no scleral icterus  Cardiovascular: Regular rate and rhythm  Pulmonary: Normal respiratory effort   Abdomen: nontender, non-distended   Neuro: alert and oriented, no focal deficits  Extremities: warm, no edema   MSK: no deformities  Integument: intact, no rashes     Significant Labs: CMP   Recent Labs   Lab 02/24/24  0306 02/25/24  0325   * 139   K 3.8 3.4*   CL 98 102   CO2 24 28   * 156*   BUN 15 14   CREATININE 1.0 0.9   CALCIUM 9.9 9.1   PROT 7.5 6.4   ALBUMIN 4.0 3.4*   BILITOT 0.8 1.1*   ALKPHOS  66 56   AST 48* 56*   ALT 20 23   ANIONGAP 11 9   , CBC   Recent Labs   Lab 02/24/24  0306 02/24/24  0434 02/25/24  0036   WBC 12.47 12.79* 10.86   HGB 14.1 14.0 13.0   HCT 41.7 41.4 39.7    242 242   , Lipid Panel   Recent Labs   Lab 02/24/24  0306   CHOL 238*   HDL 79*   LDLCALC 140.6   TRIG 92   CHOLHDL 33.2   , and Troponin   Recent Labs   Lab 02/24/24  0306 02/24/24  1019   TROPONINI 5.356* 9.607*     Significant Imaging: Reviewed  Assessment and Plan:     Brief HPI: 78 year old female with PMH of HTN, DM2 who [place] [facility] [place]. Described as a substernal burning which she initially attributed to reflux, however the pain continued to wax and wane over the next 24 hours. She described nausea, no vomiting during the episodes and occasional radiation to bilateral arms. She cannot relate the pain to exertion but notes she is not very active.    She has no known cardiac history. In the ED, she was hypertensive and hemodynamically stable. Labs notable for troponin 5.3>9.6 and . EKG demonstrates sinus rhythm with very slight ST elevations in V2 and V3 not meeting STEMI criteria. Nitroglycerin x3 which did resolve her pain. Bedside echo with apical hypokinesis.     * NSTEMI (non-ST elevated myocardial infarction)  78 year old female with PMH of HTN, MGUS, DM2 who presents to [facility] with chest pain.   Did not meet criteria for STEMI based on EKG, however slight ST elevations and biphasic T waves throughout precordium were concerning for ischemic change.   Trop elevated and bedside echo with regional WMAs. Started on ACS therapy by ED. Takotsubo was also a consideration if ACS had been ruled out.   -Admitted to CCU   -Heparin gtt  - --> 81 mg daily   -Ticagrelor 180 --> 90 mg BID  -Lopressor 25 TID  -Imdur 30mg daily  -HI statin   -Pending echocardiogram  -LDL elevated (see hyperlipidemia for management)  -Newly diagnosed diabetes (see DM2 for management)    -IC consulted. 2/24 successful  mid LAD DEANNA. IVUS guided.     Type 2 diabetes mellitus  Lab Results   Component Value Date    HGBA1C 6.6 (H) 02/24/2024     Newly diagnosed, not previously on oral therapy. SSI inpatient.  Start metformin vs GLP-1 or SGLT2i at discharge pending echo     Coronary artery disease of native artery of native heart with stable angina pectoris  Newly diagnosed.  S/p mid LAD PCI 2/24/24    Hyperlipidemia LDL goal <70  .   Start atorvastatin 80, zetia 10mg  Likely will need PCSK9i  Repeat lipid panel outpatient 6-8 weeks        Alvarado Huertas MD  Cardiology  [facility] - [facility]

## 2024-02-25 NOTE — ASSESSMENT & PLAN NOTE
78 year old female with PMH of HTN, MGUS, DM2 who presents to [facility] with chest pain.   Did not meet criteria for STEMI based on EKG, however slight ST elevations and biphasic T waves throughout precordium were concerning for ischemic change.   Trop elevated and bedside echo with regional WMAs. Started on ACS therapy by ED. Takotsubo was also a consideration if ACS had been ruled out.   -Admitted to CCU   -Heparin gtt  - --> 81 mg daily   -Ticagrelor 180 --> 90 mg BID  -Lopressor 25 TID  -Imdur 30mg daily  -HI statin   -Pending echocardiogram  -LDL elevated (see hyperlipidemia for management)  -Newly diagnosed diabetes (see DM2 for management)    -IC consulted. 2/24 successful mid LAD DEANNA. IVUS guided.

## 2024-02-25 NOTE — ASSESSMENT & PLAN NOTE
.   Start atorvastatin 80, zetia 10mg  Likely will need PCSK9i  Repeat lipid panel outpatient 6-8 weeks

## 2024-02-25 NOTE — PLAN OF CARE
CICU Care Plan    POC reviewed with Mila Foster at 1900. Pt verbalized understanding. Questions and concerns addressed. No signs of bleeding overnight. No acute events today. Pt progressing toward goals. Security measures in place, plan of care to continue. See below and flowsheets for full assessment and VS info.             Is this a stroke patient? no    Neuro:  Hansel Coma Scale  Best Eye Response: 4-->(E4) spontaneous  Best Motor Response: 6-->(M6) obeys commands  Best Verbal Response: 5-->(V5) oriented  Everett Coma Scale Score: 15  Assessment Qualifiers: patient not sedated/intubated, no eye obstruction present  Pupil PERRLA: yes     24 hr Temp:  [97.7 °F (36.5 °C)-99 °F (37.2 °C)]     CV:   Rhythm: sinus bradycardia  BP goals:   SBP < 180  MAP > 65    Resp:      Oxygen Concentration (%): 28    Plan: N/A    GI/:     Diet/Nutrition Received: low saturated fat/low cholesterol, 2 gram sodium  Last Bowel Movement: 02/23/24  Voiding Characteristics: external catheter    Intake/Output Summary (Last 24 hours) at 2/25/2024 0633  Last data filed at 2/25/2024 0102  Gross per 24 hour   Intake 1581.32 ml   Output 2050 ml   Net -468.68 ml        Nutritional Supplement Intake: Quantity N/A, Type:  N/A    Labs/Accuchecks:  Recent Labs   Lab 02/25/24  0036   WBC 10.86   RBC 4.23   HGB 13.0   HCT 39.7         Recent Labs   Lab 02/25/24  0325      K 3.4*   CO2 28      BUN 14   CREATININE 0.9   ALKPHOS 56   ALT 23   AST 56*   BILITOT 1.1*      Recent Labs   Lab 02/24/24  0434 02/24/24  1141   INR 1.0  --    APTT 25.8  25.8 47.7*      Recent Labs   Lab 02/24/24  1019   TROPONINI 9.607*       Electrolytes: Electrolytes replaced  Accuchecks: ACHS    Gtts:      LDA/Wounds:  Lines/Drains/Airways       Drain  Duration             Female External Urinary Catheter w/ Suction 02/24/24 0653 <1 day              Peripheral Intravenous Line  Duration                  Peripheral IV - Single Lumen 02/24/24 0308 20  G Left Antecubital 1 day         Peripheral IV - Single Lumen 02/24/24 0525 22 G Distal;Left;Posterior Forearm 1 day         Peripheral IV - Single Lumen 02/24/24 1030 18 G Anterior;Right Upper Arm <1 day                  Wounds: No  Wound care consulted: No      Problem: Adult Inpatient Plan of Care  Goal: Patient-Specific Goal (Individualized)  Outcome: Ongoing, Progressing  Goal: Readiness for Transition of Care  Outcome: Ongoing, Progressing     Problem: Fall Injury Risk  Goal: Absence of Fall and Fall-Related Injury  Outcome: Ongoing, Progressing

## 2024-02-26 VITALS
HEIGHT: 67 IN | TEMPERATURE: 97 F | WEIGHT: 208 LBS | SYSTOLIC BLOOD PRESSURE: 109 MMHG | HEART RATE: 64 BPM | BODY MASS INDEX: 32.65 KG/M2 | OXYGEN SATURATION: 95 % | DIASTOLIC BLOOD PRESSURE: 59 MMHG | RESPIRATION RATE: 18 BRPM

## 2024-02-26 LAB
ALBUMIN SERPL BCP-MCNC: 3.1 G/DL (ref 3.5–5.2)
ALP SERPL-CCNC: 59 U/L (ref 55–135)
ALT SERPL W/O P-5'-P-CCNC: 20 U/L (ref 10–44)
ANION GAP SERPL CALC-SCNC: 10 MMOL/L (ref 8–16)
ASCENDING AORTA: 3.34 CM
AST SERPL-CCNC: 32 U/L (ref 10–40)
AV INDEX (PROSTH): 0.79
AV MEAN GRADIENT: 4 MMHG
AV PEAK GRADIENT: 8 MMHG
AV VALVE AREA BY VELOCITY RATIO: 2.13 CM²
AV VALVE AREA: 2.49 CM²
AV VELOCITY RATIO: 0.67
BASOPHILS # BLD AUTO: 0.05 K/UL (ref 0–0.2)
BASOPHILS NFR BLD: 0.5 % (ref 0–1.9)
BILIRUB SERPL-MCNC: 0.9 MG/DL (ref 0.1–1)
BSA FOR ECHO PROCEDURE: 2.11 M2
BUN SERPL-MCNC: 19 MG/DL (ref 8–23)
CALCIUM SERPL-MCNC: 9.1 MG/DL (ref 8.7–10.5)
CHLORIDE SERPL-SCNC: 103 MMOL/L (ref 95–110)
CO2 SERPL-SCNC: 26 MMOL/L (ref 23–29)
CREAT SERPL-MCNC: 0.8 MG/DL (ref 0.5–1.4)
CV ECHO LV RWT: 0.4 CM
DIFFERENTIAL METHOD BLD: ABNORMAL
DOP CALC AO PEAK VEL: 1.4 M/S
DOP CALC AO VTI: 30.02 CM
DOP CALC LVOT AREA: 3.2 CM2
DOP CALC LVOT DIAMETER: 2.01 CM
DOP CALC LVOT PEAK VEL: 0.94 M/S
DOP CALC LVOT STROKE VOLUME: 74.88 CM3
DOP CALCLVOT PEAK VEL VTI: 23.61 CM
E WAVE DECELERATION TIME: 271.23 MSEC
E/A RATIO: 0.74
E/E' RATIO: 9.23 M/S
ECHO LV POSTERIOR WALL: 0.8 CM (ref 0.6–1.1)
EJECTION FRACTION: 58 %
EOSINOPHIL # BLD AUTO: 0.1 K/UL (ref 0–0.5)
EOSINOPHIL NFR BLD: 1.2 % (ref 0–8)
ERYTHROCYTE [DISTWIDTH] IN BLOOD BY AUTOMATED COUNT: 12.2 % (ref 11.5–14.5)
EST. GFR  (NO RACE VARIABLE): >60 ML/MIN/1.73 M^2
FRACTIONAL SHORTENING: 31 % (ref 28–44)
GLUCOSE SERPL-MCNC: 157 MG/DL (ref 70–110)
HCT VFR BLD AUTO: 36.3 % (ref 37–48.5)
HGB BLD-MCNC: 12.1 G/DL (ref 12–16)
IMM GRANULOCYTES # BLD AUTO: 0.08 K/UL (ref 0–0.04)
IMM GRANULOCYTES NFR BLD AUTO: 0.8 % (ref 0–0.5)
INTERVENTRICULAR SEPTUM: 1 CM (ref 0.6–1.1)
IVC DIAMETER: 1.9 CM
LA MAJOR: 4.49 CM
LA MINOR: 4.91 CM
LA WIDTH: 3.18 CM
LEFT ATRIUM SIZE: 3.18 CM
LEFT ATRIUM VOLUME INDEX MOD: 15.6 ML/M2
LEFT ATRIUM VOLUME INDEX: 19.6 ML/M2
LEFT ATRIUM VOLUME MOD: 32.14 CM3
LEFT ATRIUM VOLUME: 40.32 CM3
LEFT INTERNAL DIMENSION IN SYSTOLE: 2.78 CM (ref 2.1–4)
LEFT VENTRICLE DIASTOLIC VOLUME INDEX: 35.03 ML/M2
LEFT VENTRICLE DIASTOLIC VOLUME: 72.16 ML
LEFT VENTRICLE MASS INDEX: 54 G/M2
LEFT VENTRICLE SYSTOLIC VOLUME INDEX: 14.1 ML/M2
LEFT VENTRICLE SYSTOLIC VOLUME: 28.97 ML
LEFT VENTRICULAR INTERNAL DIMENSION IN DIASTOLE: 4.05 CM (ref 3.5–6)
LEFT VENTRICULAR MASS: 111.9 G
LV LATERAL E/E' RATIO: 8.57 M/S
LV SEPTAL E/E' RATIO: 10 M/S
LYMPHOCYTES # BLD AUTO: 3 K/UL (ref 1–4.8)
LYMPHOCYTES NFR BLD: 28.6 % (ref 18–48)
MAGNESIUM SERPL-MCNC: 1.6 MG/DL (ref 1.6–2.6)
MCH RBC QN AUTO: 30.8 PG (ref 27–31)
MCHC RBC AUTO-ENTMCNC: 33.3 G/DL (ref 32–36)
MCV RBC AUTO: 92 FL (ref 82–98)
MONOCYTES # BLD AUTO: 1.3 K/UL (ref 0.3–1)
MONOCYTES NFR BLD: 12.2 % (ref 4–15)
MV PEAK A VEL: 0.81 M/S
MV PEAK E VEL: 0.6 M/S
MV STENOSIS PRESSURE HALF TIME: 78.66 MS
MV VALVE AREA P 1/2 METHOD: 2.8 CM2
NEUTROPHILS # BLD AUTO: 5.9 K/UL (ref 1.8–7.7)
NEUTROPHILS NFR BLD: 56.7 % (ref 38–73)
NRBC BLD-RTO: 0 /100 WBC
PHOSPHATE SERPL-MCNC: 2.6 MG/DL (ref 2.7–4.5)
PISA TR MAX VEL: 2.35 M/S
PLATELET # BLD AUTO: 207 K/UL (ref 150–450)
PMV BLD AUTO: 11.2 FL (ref 9.2–12.9)
POC ACTIVATED CLOTTING TIME K: 250 SEC (ref 74–137)
POCT GLUCOSE: 142 MG/DL (ref 70–110)
POCT GLUCOSE: 152 MG/DL (ref 70–110)
POCT GLUCOSE: 228 MG/DL (ref 70–110)
POTASSIUM SERPL-SCNC: 3.7 MMOL/L (ref 3.5–5.1)
PROT SERPL-MCNC: 5.9 G/DL (ref 6–8.4)
PV PEAK GRADIENT: 25 MMHG
PV PEAK VELOCITY: 2.48 M/S
RA MAJOR: 4.22 CM
RA PRESSURE ESTIMATED: 3 MMHG
RA WIDTH: 3.12 CM
RBC # BLD AUTO: 3.93 M/UL (ref 4–5.4)
RIGHT VENTRICULAR END-DIASTOLIC DIMENSION: 3.53 CM
RV TB RVSP: 5 MMHG
SAMPLE: ABNORMAL
SINUS: 2.91 CM
SODIUM SERPL-SCNC: 139 MMOL/L (ref 136–145)
STJ: 2.67 CM
TDI LATERAL: 0.07 M/S
TDI SEPTAL: 0.06 M/S
TDI: 0.07 M/S
TR MAX PG: 22 MMHG
TRICUSPID ANNULAR PLANE SYSTOLIC EXCURSION: 2.05 CM
TV REST PULMONARY ARTERY PRESSURE: 25 MMHG
WBC # BLD AUTO: 10.46 K/UL (ref 3.9–12.7)
Z-SCORE OF LEFT VENTRICULAR DIMENSION IN END DIASTOLE: -4.29
Z-SCORE OF LEFT VENTRICULAR DIMENSION IN END SYSTOLE: -2.49

## 2024-02-26 PROCEDURE — 36415 COLL VENOUS BLD VENIPUNCTURE: CPT | Performed by: STUDENT IN AN ORGANIZED HEALTH CARE EDUCATION/TRAINING PROGRAM

## 2024-02-26 PROCEDURE — 1111F DSCHRG MED/CURRENT MED MERGE: CPT | Mod: CPTII,GC,, | Performed by: INTERNAL MEDICINE

## 2024-02-26 PROCEDURE — 80053 COMPREHEN METABOLIC PANEL: CPT | Performed by: STUDENT IN AN ORGANIZED HEALTH CARE EDUCATION/TRAINING PROGRAM

## 2024-02-26 PROCEDURE — 84100 ASSAY OF PHOSPHORUS: CPT | Performed by: STUDENT IN AN ORGANIZED HEALTH CARE EDUCATION/TRAINING PROGRAM

## 2024-02-26 PROCEDURE — 99239 HOSP IP/OBS DSCHRG MGMT >30: CPT | Mod: GC,,, | Performed by: INTERNAL MEDICINE

## 2024-02-26 PROCEDURE — 25000003 PHARM REV CODE 250

## 2024-02-26 PROCEDURE — 83735 ASSAY OF MAGNESIUM: CPT | Performed by: STUDENT IN AN ORGANIZED HEALTH CARE EDUCATION/TRAINING PROGRAM

## 2024-02-26 PROCEDURE — 85025 COMPLETE CBC W/AUTO DIFF WBC: CPT | Performed by: STUDENT IN AN ORGANIZED HEALTH CARE EDUCATION/TRAINING PROGRAM

## 2024-02-26 PROCEDURE — 25000003 PHARM REV CODE 250: Performed by: STUDENT IN AN ORGANIZED HEALTH CARE EDUCATION/TRAINING PROGRAM

## 2024-02-26 PROCEDURE — 25000003 PHARM REV CODE 250: Performed by: INTERNAL MEDICINE

## 2024-02-26 RX ORDER — METOPROLOL SUCCINATE 50 MG/1
50 TABLET, EXTENDED RELEASE ORAL DAILY
Qty: 90 TABLET | Refills: 3 | Status: SHIPPED | OUTPATIENT
Start: 2024-02-26 | End: 2025-02-25

## 2024-02-26 RX ORDER — LANOLIN ALCOHOL/MO/W.PET/CERES
400 CREAM (GRAM) TOPICAL ONCE
Status: COMPLETED | OUTPATIENT
Start: 2024-02-26 | End: 2024-02-26

## 2024-02-26 RX ORDER — METFORMIN HYDROCHLORIDE 500 MG/1
500 TABLET, EXTENDED RELEASE ORAL 2 TIMES DAILY WITH MEALS
Qty: 120 TABLET | Refills: 5 | Status: SHIPPED | OUTPATIENT
Start: 2024-02-26

## 2024-02-26 RX ORDER — NITROGLYCERIN 0.4 MG/1
0.4 TABLET SUBLINGUAL EVERY 5 MIN PRN
Qty: 25 TABLET | Refills: 0 | Status: SHIPPED | OUTPATIENT
Start: 2024-02-26 | End: 2024-02-26 | Stop reason: HOSPADM

## 2024-02-26 RX ORDER — NITROGLYCERIN 0.4 MG/1
0.4 TABLET SUBLINGUAL EVERY 5 MIN PRN
Qty: 25 TABLET | Refills: 0 | Status: SHIPPED | OUTPATIENT
Start: 2024-02-26 | End: 2025-02-25

## 2024-02-26 RX ORDER — ENOXAPARIN SODIUM 100 MG/ML
40 INJECTION SUBCUTANEOUS EVERY 24 HOURS
Status: DISCONTINUED | OUTPATIENT
Start: 2024-02-26 | End: 2024-02-26 | Stop reason: HOSPADM

## 2024-02-26 RX ORDER — ATORVASTATIN CALCIUM 80 MG/1
80 TABLET, FILM COATED ORAL DAILY
Qty: 90 TABLET | Refills: 3 | Status: SHIPPED | OUTPATIENT
Start: 2024-02-26 | End: 2024-02-26 | Stop reason: HOSPADM

## 2024-02-26 RX ORDER — POTASSIUM CHLORIDE 20 MEQ/1
40 TABLET, EXTENDED RELEASE ORAL ONCE
Status: COMPLETED | OUTPATIENT
Start: 2024-02-26 | End: 2024-02-26

## 2024-02-26 RX ORDER — DAPAGLIFLOZIN 10 MG/1
10 TABLET, FILM COATED ORAL DAILY
Qty: 90 TABLET | Refills: 3 | Status: SHIPPED | OUTPATIENT
Start: 2024-02-26

## 2024-02-26 RX ORDER — EZETIMIBE 10 MG/1
10 TABLET ORAL NIGHTLY
Qty: 90 TABLET | Refills: 3 | Status: SHIPPED | OUTPATIENT
Start: 2024-02-26 | End: 2024-02-26 | Stop reason: HOSPADM

## 2024-02-26 RX ORDER — VALSARTAN 40 MG/1
40 TABLET ORAL DAILY
Qty: 90 TABLET | Refills: 3 | Status: SHIPPED | OUTPATIENT
Start: 2024-02-26 | End: 2025-02-25

## 2024-02-26 RX ORDER — NAPROXEN SODIUM 220 MG/1
81 TABLET, FILM COATED ORAL DAILY
Qty: 90 TABLET | Refills: 3 | Status: SHIPPED | OUTPATIENT
Start: 2024-02-26 | End: 2025-02-25

## 2024-02-26 RX ORDER — ISOSORBIDE MONONITRATE 30 MG/1
30 TABLET, EXTENDED RELEASE ORAL DAILY
Status: DISCONTINUED | OUTPATIENT
Start: 2024-02-26 | End: 2024-02-26 | Stop reason: HOSPADM

## 2024-02-26 RX ORDER — ROSUVASTATIN CALCIUM 40 MG/1
40 TABLET, COATED ORAL NIGHTLY
Qty: 90 TABLET | Refills: 3 | Status: SHIPPED | OUTPATIENT
Start: 2024-02-26 | End: 2025-02-25

## 2024-02-26 RX ORDER — ISOSORBIDE MONONITRATE 30 MG/1
30 TABLET, EXTENDED RELEASE ORAL DAILY
Qty: 30 TABLET | Refills: 11 | Status: SHIPPED | OUTPATIENT
Start: 2024-02-26 | End: 2025-02-25

## 2024-02-26 RX ADMIN — METOPROLOL SUCCINATE 50 MG: 50 TABLET, EXTENDED RELEASE ORAL at 10:02

## 2024-02-26 RX ADMIN — MICONAZOLE NITRATE 2 % TOPICAL POWDER: at 10:02

## 2024-02-26 RX ADMIN — ASPIRIN 81 MG CHEWABLE TABLET 81 MG: 81 TABLET CHEWABLE at 10:02

## 2024-02-26 RX ADMIN — DAPAGLIFLOZIN 10 MG: 10 TABLET, FILM COATED ORAL at 10:02

## 2024-02-26 RX ADMIN — POTASSIUM CHLORIDE 40 MEQ: 1500 TABLET, EXTENDED RELEASE ORAL at 10:02

## 2024-02-26 RX ADMIN — ATORVASTATIN CALCIUM 80 MG: 40 TABLET, FILM COATED ORAL at 10:02

## 2024-02-26 RX ADMIN — Medication 400 MG: at 12:02

## 2024-02-26 RX ADMIN — HYDROCHLOROTHIAZIDE 12.5 MG: 12.5 TABLET ORAL at 06:02

## 2024-02-26 RX ADMIN — ISOSORBIDE MONONITRATE 30 MG: 30 TABLET, EXTENDED RELEASE ORAL at 12:02

## 2024-02-26 RX ADMIN — VALSARTAN 40 MG: 40 TABLET, FILM COATED ORAL at 10:02

## 2024-02-26 RX ADMIN — TICAGRELOR 90 MG: 90 TABLET ORAL at 10:02

## 2024-02-26 NOTE — PLAN OF CARE
Problem: Adult Inpatient Plan of Care  Goal: Plan of Care Review  Outcome: Ongoing, Progressing  Goal: Optimal Comfort and Wellbeing  Outcome: Ongoing, Progressing     Problem: Fall Injury Risk  Goal: Absence of Fall and Fall-Related Injury  Outcome: Ongoing, Progressing     Problem: Diabetes Comorbidity  Goal: Blood Glucose Level Within Targeted Range  Outcome: Ongoing, Progressing

## 2024-02-26 NOTE — NURSING
1450: PIV and telemetry removed. Patient discharge instructions reviewed with son and patient, verbalized understanding. Discharge medications with patient at bedside. Awaiting transport    1525: patient discharged

## 2024-02-26 NOTE — PLAN OF CARE
SW went to bedside and met with pt, niece, and grandson to attempt initial assessment but pt was getting up with assistance to use the bathroom so SW stated she will attempt again later.      Benita Villalba LMSW  [facility]  [id number]

## 2024-02-26 NOTE — DISCHARGE SUMMARY
[facility] - [facility] Stepdown  Cardiology  Discharge Summary      Patient Name: Mila Foster  MRN: [id number]  Admission Date: 2/24/2024  Hospital Length of Stay: 2 days  Discharge Date and Time:  02/26/2024 10:46 AM  Attending Physician: Dinesh Vega MD    Discharging Provider: Ciera Alfredo MD  Primary Care Physician: Dinesh Connors MD    HPI:   No notes on file    Procedure(s) (LRB):  Angiogram, Coronary, with Left Heart Cath (Right)  IVUS, Coronary  INSERTION, STENT, CORONARY ARTERY (N/A)  Ultrasound-coronary (N/A)     Indwelling Lines/Drains at time of discharge:  Lines/Drains/Airways       Drain  Duration             Female External Urinary Catheter w/ Suction 02/24/24 0653 2 days                    Hospital Course:  Ms. Foster [place] [facility] [place] concern for [place]I. She was noted to have ST elevations in V2, V3 and rising troponin so ACS protocol was started and patient was taken to the Cath lab for a PCI. Stent placed to LAD. Cath report:  100% mLAD s/p IVUS guided PCI w DESx1 (3.5x24mm).  Large, patent LM on IVUS and mild to moderate pLAD lesion with a MLA of 5 mm^2 on IVUS.   100% mid to distal LCX after take-off of large OM1 system.  Nonobs RCA ds.  LVEDP 20.     Repeat echo showed EF 55% with WMA in the apex. A1c 6.6, LDL 140s on pravastatin. Patient felt well at time of discharge and was discharged on the below medications, with cardiology follow up. Explained the importance of compliance with antiplatelets, and instructed on return precautions re chest pain.        Goals of Care Treatment Preferences:  Code Status: Full Code      Significant Diagnostic Studies: Angiography:   100% mLAD s/p IVUS guided PCI w DESx1 (3.5x24mm).  Large, patent LM on IVUS and mild to moderate pLAD lesion with a MLA of 5 mm^2 on IVUS.   100% mid to distal LCX after take-off of large OM1 system.  Nonobs RCA ds.  LVEDP 20.     Pending Diagnostic Studies:       None            Final Active Diagnoses:     Diagnosis Date Noted POA    PRINCIPAL PROBLEM:  NSTEMI (non-ST elevated myocardial infarction) [I21.4] 02/25/2024 Yes    Hyperlipidemia LDL goal <70 [E78.5] 02/25/2024 Yes    Coronary artery disease of native artery of native heart with stable angina pectoris [I25.118] 02/25/2024 Yes    Type 2 diabetes mellitus [E11.9] 02/25/2024 Yes      Problems Resolved During this Admission:       Discharged Condition: stable    Disposition: Home or Self Care    Follow Up:   Follow-up Information       [facility] - Cardiology - 3rd Fl. Schedule an appointment as soon as possible for a visit in 1 month(s).    Specialty: Cardiology  Contact information:  [address]  [facility] [address] [phone]  Additional information:  Cardiology Services Clinics - 3rd floor                         Patient Instructions:      Ambulatory referral/consult to Cardiology   Standing Status: Future   Referral Priority: Routine Referral Type: Consultation   Referral Reason: Specialty Services Required   Requested Specialty: Cardiology   Number of Visits Requested: 1     Medications:  Reconciled Home Medications:      Medication List        START taking these medications      aspirin 81 MG Chew  Chew and swallow 1 tablet (81 mg total) by mouth once daily.     dapagliflozin propanediol 10 mg tablet  Commonly known as: Farxiga  Take 1 tablet (10 mg total) by mouth once daily.     isosorbide mononitrate 30 MG 24 hr tablet  Commonly known as: IMDUR  Take 1 tablet (30 mg total) by mouth once daily.     metoprolol succinate 50 MG 24 hr tablet  Commonly known as: TOPROL-XL  Take 1 tablet (50 mg total) by mouth once daily.     nitroGLYCERIN 0.4 MG SL tablet  Commonly known as: NITROSTAT  Place 1 tablet (0.4 mg total) under the tongue every 5 (five) minutes as needed for Chest pain.     rosuvastatin 40 MG Tab  Commonly known as: CRESTOR  Take 1 tablet (40 mg total) by mouth every evening.     ticagrelor 90 mg tablet  Commonly known as:  BRILINTA  Take 1 tablet (90 mg total) by mouth 2 (two) times daily.     valsartan 40 MG tablet  Commonly known as: DIOVAN  Take 1 tablet (40 mg total) by mouth once daily.            CHANGE how you take these medications      metFORMIN 500 MG ER 24hr tablet  Commonly known as: GLUCOPHAGE-XR  Take 1 tablet (500 mg total) by mouth 2 (two) times daily with meals.  What changed:   how to take this  when to take this            CONTINUE taking these medications      FLUoxetine 20 MG capsule  TK ONE C PO  QD     gabapentin 300 MG capsule  Commonly known as: NEURONTIN  Take 2 tabs in the morning, 1 tab in the afternoon, and 2 tabs in the evening     mirabegron 25 mg Tb24 ER tablet  Commonly known as: MYRBETRIQ  Take 1 tablet (25 mg total) by mouth once daily.     omeprazole 20 MG capsule  Commonly known as: PRILOSEC  20 mg.            STOP taking these medications      glimepiride 2 MG tablet  Commonly known as: AMARYL     hydroCHLOROthiazide 12.5 MG Tab  Commonly known as: HYDRODIURIL     metoprolol tartrate 25 MG tablet  Commonly known as: LOPRESSOR     naproxen 500 MG tablet  Commonly known as: NAPROSYN     pravastatin 40 MG tablet  Commonly known as: PRAVACHOL              Time spent on the discharge of patient: 35 minutes    Ciera Alfredo MD  Cardiology  [facility] - Cardiology Stepdown

## 2024-02-26 NOTE — PLAN OF CARE
[facility] - Cardiology Stepdown  Discharge Final Note    Primary Care Provider: Dinesh Connors MD    Expected Discharge Date: 2/26/2024    Final Discharge Note (most recent)       Final Note - 02/26/24 1531          Final Note    Assessment Type Final Discharge Note     Anticipated Discharge Disposition Home or Self Care     Hospital Resources/Appts/Education Provided Appointments scheduled and added to AVS                 Benita Villalba LMSW  [facility]  [id number]      Important Message from Medicare  Important Message from Medicare regarding Discharge Appeal Rights: Given to patient/caregiver, Explained to patient/caregiver, Signed/date by patient/caregiver     Date IMM was signed: 02/26/24  Time IMM was signed: 1110      Future Appointments   Date Time Provider Department Center   2/28/2024  9:00 AM Liliane Garza MD [facility]  [place] [facility]   3/19/2024 10:20 AM Susie Tanner MD [facility] [place]       Contact Info       [facility] - Cardiology - 3rd Fl   Specialty: Cardiology    [address]  [facility] [address]   Phone: [phone]       Next Steps: Schedule an appointment as soon as possible for a visit in 1 month(s)    Dinesh Connors MD   Specialty: Family Medicine   Relationship: PCP - General    [address] [facility] [address]   Phone: [phone]       Next Steps: Go on 3/4/2024    Instructions: Follow up 3/4 at 1130a

## 2024-02-26 NOTE — PLAN OF CARE
[facility] - Cardiology Stepdown  Discharge Assessment    Primary Care Provider: Dinesh Connors MD     Discharge Assessment (most recent)       BRIEF DISCHARGE ASSESSMENT - 02/26/24 1528          Discharge Planning    Assessment Type Discharge Planning Brief Assessment     Resource/Environmental Concerns none     Support Systems Family members     Equipment Currently Used at Home rollator     Current Living Arrangements home     Patient/Family Anticipates Transition to home     Patient/Family Anticipated Services at Transition none     DME Needed Upon Discharge  none     Discharge Plan A Home with family     Discharge Plan B Home Health        Physical Activity    On average, how many days per week do you engage in moderate to strenuous exercise (like a brisk walk)? 0 days     On average, how many minutes do you engage in exercise at this level? 0 min        Financial Resource Strain    How hard is it for you to pay for the very basics like food, housing, medical care, and heating? Not hard at all        Housing Stability    In the last 12 months, was there a time when you were not able to pay the mortgage or rent on time? No     In the last 12 months, was there a time when you did not have a steady place to sleep or slept in a shelter (including now)? No        Transportation Needs    In the past 12 months, has lack of transportation kept you from medical appointments or from getting medications? No     In the past 12 months, has lack of transportation kept you from meetings, work, or from getting things needed for daily living? No        Food Insecurity    Within the past 12 months, you worried that your food would run out before you got the money to buy more. Never true     Within the past 12 months, the food you bought just didn't last and you didn't have money to get more. Never true        Stress    Do you feel stress - tense, restless, nervous, or anxious, or unable to sleep at night because your mind is  troubled all the time - these days? Only a little        Social Connections    In a typical week, how many times do you talk on the phone with family, friends, or neighbors? More than three times a week     How often do you attend Scientologist or Adventist services? More than 4 times per year     Do you belong to any clubs or organizations such as Scientologist groups, unions, fraternal or athletic groups, or school groups? No     How often do you attend meetings of the clubs or organizations you belong to? Never     Are you , , , , never , or living with a partner?         Alcohol Use    Q1: How often do you have a drink containing alcohol? Never     Q2: How many drinks containing alcohol do you have on a typical day when you are drinking? Patient does not drink     Q3: How often do you have six or more drinks on one occasion? Patient declined                 SW met with pt and son Traci at bedside who reported that they are just wanting on transport to come for discharge.  Pt uses a rollator and her grandson Royal stays with her to help.  Pt will have transportation and assistance from family at discharge.  Discharge Plan A and Plan B have been determined by review of patient's clinical status, future medical and therapeutic needs, and coverage/benefits for post-acute care in coordination with multidisciplinary team members.  NATHANIEL name and ext on whiteboard.  Will continue to follow.      Benita Villalba LMSW  [facility]  [id number]

## 2024-04-10 NOTE — INTERVAL H&P NOTE
Problem: Discharge Planning  Goal: Discharge to home or other facility with appropriate resources  4/10/2024 1529 by Naz Diaz RN  Outcome: Progressing  Flowsheets (Taken 4/10/2024 0814 by Suhail Cancino RN)  Discharge to home or other facility with appropriate resources: Identify barriers to discharge with patient and caregiver  4/10/2024 0504 by Nannette Pugh RN  Outcome: Progressing  Flowsheets  Taken 4/9/2024 2230 by Nannette Pugh RN  Discharge to home or other facility with appropriate resources:   Identify barriers to discharge with patient and caregiver   Arrange for needed discharge resources and transportation as appropriate   Identify discharge learning needs (meds, wound care, etc)   Refer to discharge planning if patient needs post-hospital services based on physician order or complex needs related to functional status, cognitive ability or social support system  Taken 4/9/2024 2000 by Jaylene Hurley RN  Discharge to home or other facility with appropriate resources:   Identify barriers to discharge with patient and caregiver   Arrange for needed discharge resources and transportation as appropriate   Refer to discharge planning if patient needs post-hospital services based on physician order or complex needs related to functional status, cognitive ability or social support system     Problem: Pain  Goal: Verbalizes/displays adequate comfort level or baseline comfort level  4/10/2024 1529 by Naz Diaz RN  Outcome: Progressing  4/10/2024 0504 by Nannette Pugh RN  Outcome: Progressing  Flowsheets (Taken 4/9/2024 2000 by Jaylene Hurley RN)  Verbalizes/displays adequate comfort level or baseline comfort level:   Encourage patient to monitor pain and request assistance   Assess pain using appropriate pain scale   Administer analgesics based on type and severity of pain and evaluate response     Problem: Skin/Tissue Integrity  Goal: Absence of new skin breakdown  Description: 1.  Monitor  The patient has been examined and the H&P has been reviewed:    I concur with the findings and no changes have occurred since H&P was written.    Anesthesia/Surgery risks, benefits and alternative options discussed and understood by patient/family.          There are no hospital problems to display for this patient.

## 2024-05-27 PROBLEM — I21.4 NSTEMI (NON-ST ELEVATED MYOCARDIAL INFARCTION): Status: RESOLVED | Noted: 2024-02-25 | Resolved: 2024-05-27

## 2024-10-12 ENCOUNTER — HOSPITAL ENCOUNTER (EMERGENCY)
Facility: HOSPITAL | Age: 79
Discharge: HOME | End: 2024-10-12
Attending: EMERGENCY MEDICINE
Payer: MEDICARE

## 2024-10-12 VITALS
TEMPERATURE: 99 F | DIASTOLIC BLOOD PRESSURE: 83 MMHG | SYSTOLIC BLOOD PRESSURE: 132 MMHG | HEIGHT: 67 IN | OXYGEN SATURATION: 96 % | WEIGHT: 208 LBS | BODY MASS INDEX: 32.65 KG/M2 | HEART RATE: 74 BPM | RESPIRATION RATE: 16 BRPM

## 2024-10-12 DIAGNOSIS — N30.01 ACUTE CYSTITIS WITH HEMATURIA: Primary | ICD-10-CM

## 2024-10-12 DIAGNOSIS — M79.642 LEFT HAND PAIN: ICD-10-CM

## 2024-10-12 DIAGNOSIS — S69.90XA HAND INJURY: ICD-10-CM

## 2024-10-12 LAB
BACTERIA #/AREA URNS AUTO: ABNORMAL /HPF
BILIRUB UR QL STRIP: NEGATIVE
CLARITY UR REFRACT.AUTO: ABNORMAL
COLOR UR AUTO: YELLOW
GLUCOSE UR QL STRIP: ABNORMAL
HGB UR QL STRIP: ABNORMAL
HYALINE CASTS UR QL AUTO: 0 /LPF
KETONES UR QL STRIP: ABNORMAL
LEUKOCYTE ESTERASE UR QL STRIP: ABNORMAL
MICROSCOPIC COMMENT: ABNORMAL
NITRITE UR QL STRIP: NEGATIVE
PH UR STRIP: 6 [PH] (ref 5–8)
PROT UR QL STRIP: ABNORMAL
RBC #/AREA URNS AUTO: 17 /HPF (ref 0–4)
SP GR UR STRIP: 1.03 (ref 1–1.03)
SQUAMOUS #/AREA URNS AUTO: 1 /HPF
URN SPEC COLLECT METH UR: ABNORMAL
WBC #/AREA URNS AUTO: >100 /HPF (ref 0–5)
WBC CLUMPS UR QL AUTO: ABNORMAL
YEAST UR QL AUTO: ABNORMAL

## 2024-10-12 PROCEDURE — 87088 URINE BACTERIA CULTURE: CPT

## 2024-10-12 PROCEDURE — 99284 EMERGENCY DEPT VISIT MOD MDM: CPT | Mod: 25

## 2024-10-12 PROCEDURE — 29125 APPL SHORT ARM SPLINT STATIC: CPT | Mod: LT

## 2024-10-12 PROCEDURE — 87086 URINE CULTURE/COLONY COUNT: CPT

## 2024-10-12 PROCEDURE — 96372 THER/PROPH/DIAG INJ SC/IM: CPT

## 2024-10-12 PROCEDURE — 63600175 PHARM REV CODE 636 W HCPCS

## 2024-10-12 PROCEDURE — 87186 SC STD MICRODIL/AGAR DIL: CPT

## 2024-10-12 PROCEDURE — 81001 URINALYSIS AUTO W/SCOPE: CPT

## 2024-10-12 PROCEDURE — 25000003 PHARM REV CODE 250

## 2024-10-12 RX ORDER — CEPHALEXIN 500 MG/1
500 CAPSULE ORAL EVERY 12 HOURS
Qty: 14 CAPSULE | Refills: 0 | Status: SHIPPED | OUTPATIENT
Start: 2024-10-12 | End: 2024-10-19

## 2024-10-12 RX ORDER — CEPHALEXIN 500 MG/1
500 CAPSULE ORAL
Status: COMPLETED | OUTPATIENT
Start: 2024-10-12 | End: 2024-10-12

## 2024-10-12 RX ORDER — KETOROLAC TROMETHAMINE 30 MG/ML
15 INJECTION, SOLUTION INTRAMUSCULAR; INTRAVENOUS
Status: COMPLETED | OUTPATIENT
Start: 2024-10-12 | End: 2024-10-12

## 2024-10-12 RX ORDER — CEPHALEXIN 500 MG/1
500 CAPSULE ORAL EVERY 12 HOURS
Qty: 14 CAPSULE | Refills: 0 | Status: SHIPPED | OUTPATIENT
Start: 2024-10-12 | End: 2024-10-12

## 2024-10-12 RX ORDER — ACETAMINOPHEN 500 MG
1000 TABLET ORAL
Status: COMPLETED | OUTPATIENT
Start: 2024-10-12 | End: 2024-10-12

## 2024-10-12 RX ADMIN — CEPHALEXIN 500 MG: 500 CAPSULE ORAL at 03:10

## 2024-10-12 RX ADMIN — KETOROLAC TROMETHAMINE 15 MG: 30 INJECTION, SOLUTION INTRAMUSCULAR; INTRAVENOUS at 04:10

## 2024-10-12 RX ADMIN — ACETAMINOPHEN 1000 MG: 500 TABLET ORAL at 02:10

## 2024-10-12 NOTE — ED NOTES
LOC: The patient is awake, alert, and oriented to self, place, time, and situation. Pt is calm and cooperative. Affect is appropriate.  Speech is appropriate and clear.     APPEARANCE: Patient resting uncomfortably, reporting frequent, painful urinations, pain to left wrist, in no acute distress.  Patient is clean and well groomed.    SKIN: The skin is warm and dry; color consistent with ethnicity.  Patient has normal skin turgor and moist mucus membranes.  Skin intact; no breakdown or bruising noted.     MUSCULOSKELETAL: Patient moving upper and lower extremities without difficulty; denies pain in the extremities or back.  Denies weakness.     RESPIRATORY: Airway is open and patent. Respirations spontaneous, even, easy, and non-labored.  Patient has a normal effort and rate.  No accessory muscle use noted. Denies cough.     CARDIAC:  No peripheral edema noted. No complaints of chest pain.     ABDOMEN: Soft and non tender to palpation.  No distention noted. Pt denies abdominal pain; denies nausea, vomiting, diarrhea, or constipation.    NEUROLOGIC: Eyes open spontaneously.  Behavior appropriate to situation.  Follows commands; facial expression symmetrical.  Purposeful motor response noted; normal sensation in all extremities. Pt denies headache; denies lightheadedness or dizziness; denies visual disturbances; denies loss of balance; denies unilateral weakness.

## 2024-10-12 NOTE — ED PROVIDER NOTES
Encounter Date: 10/12/2024       History     Chief Complaint   Patient presents with    Hand Pain     L thumb hand pain and swelling,     Female  Problem     Urine burning     79-year-old female with history of hypertension and type 2 diabetes presents to the ED regarding dysuria and left hand pain.  She states for the past few days she has been experiencing pain with urination.  She took azo for a few days with relief but when she stopped the pain returned and more intense.  She denies fever, body aches, chills, abdominal pain, nausea, vomiting, flank pain, or hematuria.  Last night patient states she stuck her hand in the  and was trying to obtain stuck ice.  She thinks she may have hit her hand on the .  Woke up this morning with pain the base of her left thumb.  Cannot make a fist.  Mild paresthesias with no other complaints.    The history is provided by the patient and medical records.     Review of patient's allergies indicates:  No Known Allergies  Past Medical History:   Diagnosis Date    Diabetes     Hypertension      Past Surgical History:   Procedure Laterality Date    ANGIOGRAM, CORONARY, WITH LEFT HEART CATHETERIZATION Right 2/24/2024    Procedure: Angiogram, Coronary, with Left Heart Cath;  Surgeon: Susie Tanner MD;  Location: [facility] CATH LAB;  Service: Cardiology;  Laterality: Right;    CHOLECYSTECTOMY      COLONOSCOPY N/A 11/1/2018    Procedure: COLONOSCOPY Suprep;  Surgeon: Azucena Hood MD;  Location: [facility] ENDO;  Service: Endoscopy;  Laterality: N/A;    CORONARY STENT PLACEMENT N/A 2/24/2024    Procedure: INSERTION, STENT, CORONARY ARTERY;  Surgeon: Susie Tanner MD;  Location: [facility] CATH LAB;  Service: Cardiology;  Laterality: N/A;    HYSTERECTOMY      INJECTION OF ANESTHETIC AGENT AROUND NERVE Bilateral 5/21/2019    Procedure: BLOCK, NERVE, BILATERAL L2,3,4,5;  Surgeon: Bonnie Duncan MD;  Location: [facility] PAIN MGT;  Service: Pain Management;  Laterality: Bilateral;   BLOCK, NERVE, BILATERAL L2,3,4,5    INJECTION OF JOINT Bilateral 12/27/2018    Procedure: Injection, Joint BILATERAL SACROILIAC JOINT INJECTION;  Surgeon: Bonnie Duncan MD;  Location: [facility] PAIN MGT;  Service: Pain Management;  Laterality: Bilateral;    INJECTION OF JOINT Bilateral 11/7/2019    Procedure: INJECTION, JOINT, SI;  Surgeon: Bonnie Duncan MD;  Location: [facility] PAIN MGT;  Service: Pain Management;  Laterality: Bilateral;  B/L SI Joint Injections    INJECTION OF JOINT Bilateral 5/18/2021    Procedure: INJECTION, JOINT, SI;  Surgeon: Bonnie Duncan MD;  Location: [facility] PAIN MGT;  Service: Pain Management;  Laterality: Bilateral;  Consent needed    INTRAVASCULAR ULTRASOUND, CORONARY N/A 2/24/2024    Procedure: Ultrasound-coronary;  Surgeon: Susie Tanner MD;  Location: [facility] CATH LAB;  Service: Cardiology;  Laterality: N/A;    IVUS, CORONARY  2/24/2024    Procedure: IVUS, Coronary;  Surgeon: Susie Tanner MD;  Location: [facility] CATH LAB;  Service: Cardiology;;    RADIOFREQUENCY ABLATION Right 7/25/2019    Procedure: RADIOFREQUENCY ABLATION;  Surgeon: Bonnie Duncan MD;  Location: [facility] PAIN MGT;  Service: Pain Management;  Laterality: Right;  Right RFA L2,3,4,5; THERMAL  1 of 2    RADIOFREQUENCY ABLATION Left 8/6/2019    Procedure: RADIOFREQUENCY ABLATION;  Surgeon: Bonnie Duncan MD;  Location: [facility] PAIN MGT;  Service: Pain Management;  Laterality: Left;  Left RFA L2,3,4,5 LUMBAR  1 of 2    STENT, DRUG ELUTING, SINGLE VESSEL, CORONARY  2/24/2024    Procedure: Stent, Drug Eluting, Single Vessel, Coronary;  Surgeon: Susie Tanner MD;  Location: [facility] CATH LAB;  Service: Cardiology;;    TRANSFORAMINAL EPIDURAL INJECTION OF STEROID Bilateral 8/17/2021    Procedure: INJECTION, STEROID, EPIDURAL, TRANSFORAMINAL APPROACH S1;  Surgeon: Bonnie Duncan MD;  Location: [facility] PAIN MGT;  Service: Pain Management;  Laterality: Bilateral;     Family History   Problem Relation Name Age of Onset    Vaginal  cancer Neg Hx      Endometrial cancer Neg Hx      Cervical cancer Neg Hx       Social History     Tobacco Use    Smoking status: Never    Smokeless tobacco: Never   Substance Use Topics    Alcohol use: No    Drug use: No     Review of Systems  See HPI  Physical Exam     Initial Vitals [10/12/24 1238]   BP Pulse Resp Temp SpO2   (!) 172/75 84 18 99.7 °F (37.6 °C) 97 %      MAP       --         Physical Exam    Vitals reviewed.  Constitutional: She appears well-developed and well-nourished. She is not diaphoretic. No distress.   HENT:   Head: Normocephalic and atraumatic.   Neck: Neck supple.   Cardiovascular:  Normal rate and intact distal pulses.           Pulmonary/Chest: No respiratory distress.   Musculoskeletal:      Left wrist: Swelling, tenderness and snuff box tenderness present. Decreased range of motion. Normal pulse.      Left hand: Tenderness present. Decreased range of motion. Normal pulse.      Cervical back: Neck supple.      Comments: Tenderness along left base of thumb and thenar prominence. Skin mildly erythematous with no warmth, induration, or fluctuance. Decreased thumb flexion and opposition     Neurological: She is alert and oriented to person, place, and time. She has normal strength. No sensory deficit.   Psychiatric: She has a normal mood and affect.         ED Course   Procedures  Labs Reviewed   CULTURE, URINE - Abnormal       Result Value    Urine Culture, Routine   (*)     Value: GRAM NEGATIVE OMAYRA  >100,000 cfu/ml  Identification and susceptibility pending      Narrative:     Specimen Source->Urine   URINALYSIS, REFLEX TO URINE CULTURE - Abnormal    Specimen UA Urine, Clean Catch      Color, UA Yellow      Appearance, UA Cloudy (*)     pH, UA 6.0      Specific Gravity, UA 1.030      Protein, UA 1+ (*)     Glucose, UA 4+ (*)     Ketones, UA 1+ (*)     Bilirubin (UA) Negative      Occult Blood UA 2+ (*)     Nitrite, UA Negative      Leukocytes, UA 3+ (*)     Narrative:     Specimen  Source->Urine   URINALYSIS MICROSCOPIC - Abnormal    RBC, UA 17 (*)     WBC, UA >100 (*)     WBC Clumps, UA Many (*)     Bacteria Many (*)     Yeast, UA None      Squam Epithel, UA 1      Hyaline Casts, UA 0      Microscopic Comment SEE COMMENT      Narrative:     Specimen Source->Urine          Imaging Results              X-Ray Hand 3 View Left (Final result)  Result time 10/12/24 15:58:08      Final result by Rome Rosales MD (10/12/24 15:58:08)                   Impression:      No acute abnormality.      Electronically signed by: Rome Rosales MD  Date:    10/12/2024  Time:    15:58               Narrative:    EXAMINATION:  XR HAND COMPLETE 3 VIEW LEFT    CLINICAL HISTORY:  left hand injury;.    TECHNIQUE:  PA, lateral, and oblique views of the left hand were performed.    COMPARISON:  None    FINDINGS:  Diffuse osteopenia.    No displaced fracture or subluxation.  No cortical destruction or bone lysis.    Moderate DJD of the thumb carpometacarpal articulation.    Unremarkable soft tissues.  No soft tissue gas.  No radiopaque foreign body.                                       X-Ray Wrist Complete Left (Final result)  Result time 10/12/24 15:59:37      Final result by Rome Rosales MD (10/12/24 15:59:37)                   Impression:      No acute abnormality.      Electronically signed by: Rome Rosales MD  Date:    10/12/2024  Time:    15:59               Narrative:    EXAMINATION:  XR WRIST COMPLETE 3 VIEWS LEFT    CLINICAL HISTORY:  Unspecified injury of unspecified wrist, hand and finger(s), initial encounter    TECHNIQUE:  PA, lateral, and oblique views of the left wrist were performed.    COMPARISON:  None    FINDINGS:  Osteopenia.    No displaced fracture or subluxation.  No bone erosion.    Unremarkable soft tissues.                                       Medications   acetaminophen tablet 1,000 mg (1,000 mg Oral Given 10/12/24 9105)   cephALEXin capsule 500 mg (500 mg Oral Given 10/12/24  1529)   ketorolac injection 15 mg (15 mg Intramuscular Given 10/12/24 1618)     Medical Decision Making  Amount and/or Complexity of Data Reviewed  Labs: ordered. Decision-making details documented in ED Course.  Radiology: ordered. Decision-making details documented in ED Course.    Risk  OTC drugs.  Prescription drug management.         APC / Resident Notes:   Emergent evaluation of 79 year-old-female regarding dysuria and left hand pain/injury. No systemic symptoms. Vitals WNL. Clinically well appearing, in no acute distress. See physical exam findings above. No CVA tenderness, doubt pyelonephritis. Left hand neurovascularly intact. Will obtain x-ray to assess for fracture. Will put in thumba spica brace given snuffbox tenderness.    My differential diagnoses include but are not limited to: UTI, pyelonephritis, fracture, sprain, strain  See ED course.  I have reviewed the patient's records and discussed with my supervising physician.       Attending Attestation:     Physician Attestation Statement for NP/PA:   I personally made/approved the management plan and take responsibility for the patient management.    Other NP/PA Attestation Additions:    History of Present Illness: 79-year-old woman presents to the ED for evaluation of mild dysuria for several days as well as left hand pain related to recent injury.               ED Course as of 10/12/24 2141   Sat Oct 12, 2024   1604 Per my independent interpretation, No acute abnormality seen on imaging [KB]   1604 Patient educated on findings.  Applied thumb spica brace.  Advised to closely follow up with hand clinic and strict ED return precautions discussed with all questions answered.  Patient verbalized understanding and agreed to plan.  Vitals are stable and safe for discharge.  [KB]      ED Course User Index  [KB] Haley Anderson PA-C                           Clinical Impression:  Final diagnoses:  [S69.90XA] Hand injury  [N30.01] Acute cystitis with  hematuria (Primary)  [M79.642] Left hand pain          ED Disposition Condition    Discharge Stable          ED Prescriptions       Medication Sig Dispense Start Date End Date Auth. Provider    cephALEXin (KEFLEX) 500 MG capsule  (Status: Discontinued) Take 1 capsule (500 mg total) by mouth every 12 (twelve) hours. for 7 days 14 capsule 10/12/2024 10/12/2024 Haley Anderson PA-C    cephALEXin (KEFLEX) 500 MG capsule Take 1 capsule (500 mg total) by mouth every 12 (twelve) hours for 7 days 14 capsule 10/12/2024 10/19/2024 Haley Anderson PA-C          Follow-up Information       Follow up With Specialties Details Why Contact Info Additional Information    [facility] Schedule an appointment as soon as possible for a visit   [address], [address]  [place] [address] [phone] Turn at Entrance [address] on [address] in [facility] and take elevators to Floor 2. Follow signs to [facility]. Take [place] Elevators to Floor 9 for [address].    [facility] Schedule an appointment as soon as possible for a visit   [address] [address]  [phone]       [facility] - Emergency Dept Emergency Medicine Go to  If symptoms worsen [address] [id number] [phone]              Haley Anderson PA-C  10/12/24 2207       Kan Santoro MD  10/14/24 9085

## 2024-10-14 LAB — BACTERIA UR CULT: ABNORMAL

## 2024-10-15 ENCOUNTER — OFFICE VISIT (OUTPATIENT)
Dept: ORTHOPEDICS | Facility: CLINIC | Age: 79
End: 2024-10-15
Payer: MEDICARE

## 2024-10-15 DIAGNOSIS — S69.92XD INJURY OF LEFT HAND, SUBSEQUENT ENCOUNTER: ICD-10-CM

## 2024-10-15 DIAGNOSIS — M18.32 POST-TRAUMATIC OSTEOARTHRITIS OF FIRST CARPOMETACARPAL JOINT OF LEFT HAND: Primary | ICD-10-CM

## 2024-10-15 PROCEDURE — 99204 OFFICE O/P NEW MOD 45 MIN: CPT | Mod: S$GLB,,, | Performed by: SPECIALIST/TECHNOLOGIST

## 2024-10-15 PROCEDURE — 1125F AMNT PAIN NOTED PAIN PRSNT: CPT | Mod: CPTII,S$GLB,, | Performed by: SPECIALIST/TECHNOLOGIST

## 2024-10-15 PROCEDURE — 3288F FALL RISK ASSESSMENT DOCD: CPT | Mod: CPTII,S$GLB,, | Performed by: SPECIALIST/TECHNOLOGIST

## 2024-10-15 PROCEDURE — 1101F PT FALLS ASSESS-DOCD LE1/YR: CPT | Mod: CPTII,S$GLB,, | Performed by: SPECIALIST/TECHNOLOGIST

## 2024-10-15 PROCEDURE — 1159F MED LIST DOCD IN RCRD: CPT | Mod: CPTII,S$GLB,, | Performed by: SPECIALIST/TECHNOLOGIST

## 2024-10-15 PROCEDURE — 99999 PR PBB SHADOW E&M-EST. PATIENT-LVL III: CPT | Mod: PBBFAC,,, | Performed by: SPECIALIST/TECHNOLOGIST

## 2024-10-15 NOTE — PROGRESS NOTES
Subjective:       Patient ID: Mila Foster is a 79 y.o. female.    Chief Complaint: Pain, Swelling, and Numbness of the Left Hand    HPI  10/15/24  79-year-old right-hand dominant female presents to clinic today with left hand pain.  She states most of her pain is localized at the basilar thumb.  She states that 3 days ago she was reaching into the freezer to grab some ice that was stuck.  She states that she would not feel a pop her any discomfort at the time.  She states she woke up the next morning with significant swelling and pain localized at the basilar thumb.  She denied any numbness or tingling.  She states that she has never had this episode before.  She denies any previous surgeries or trauma to the wrist or hand.      Past Medical History:   Diagnosis Date    Diabetes     Hypertension      Past Surgical History:   Procedure Laterality Date    ANGIOGRAM, CORONARY, WITH LEFT HEART CATHETERIZATION Right 2/24/2024    Procedure: Angiogram, Coronary, with Left Heart Cath;  Surgeon: Susie Tanner MD;  Location: [facility] CATH LAB;  Service: Cardiology;  Laterality: Right;    CHOLECYSTECTOMY      COLONOSCOPY N/A 11/1/2018    Procedure: COLONOSCOPY Suprep;  Surgeon: Azucena Hood MD;  Location: [facility] ENDO;  Service: Endoscopy;  Laterality: N/A;    CORONARY STENT PLACEMENT N/A 2/24/2024    Procedure: INSERTION, STENT, CORONARY ARTERY;  Surgeon: Susie Tanner MD;  Location: [facility] CATH LAB;  Service: Cardiology;  Laterality: N/A;    HYSTERECTOMY      INJECTION OF ANESTHETIC AGENT AROUND NERVE Bilateral 5/21/2019    Procedure: BLOCK, NERVE, BILATERAL L2,3,4,5;  Surgeon: Bonnie Duncan MD;  Location: [facility] PAIN T;  Service: Pain Management;  Laterality: Bilateral;  BLOCK, NERVE, BILATERAL L2,3,4,5    INJECTION OF JOINT Bilateral 12/27/2018    Procedure: Injection, Joint BILATERAL SACROILIAC JOINT INJECTION;  Surgeon: Bonnie Duncan MD;  Location: [facility] PAIN T;  Service: Pain Management;  Laterality:  Bilateral;    INJECTION OF JOINT Bilateral 11/7/2019    Procedure: INJECTION, JOINT, SI;  Surgeon: Bonnie Duncan MD;  Location: [facility] PAIN MGT;  Service: Pain Management;  Laterality: Bilateral;  B/L SI Joint Injections    INJECTION OF JOINT Bilateral 5/18/2021    Procedure: INJECTION, JOINT, SI;  Surgeon: Bonnie Duncan MD;  Location: [facility] PAIN MGT;  Service: Pain Management;  Laterality: Bilateral;  Consent needed    INTRAVASCULAR ULTRASOUND, CORONARY N/A 2/24/2024    Procedure: Ultrasound-coronary;  Surgeon: Susie Tanner MD;  Location: [facility] CATH LAB;  Service: Cardiology;  Laterality: N/A;    IVUS, CORONARY  2/24/2024    Procedure: IVUS, Coronary;  Surgeon: Susie Tanner MD;  Location: [facility] CATH LAB;  Service: Cardiology;;    RADIOFREQUENCY ABLATION Right 7/25/2019    Procedure: RADIOFREQUENCY ABLATION;  Surgeon: Bonnie Duncan MD;  Location: [facility] PAIN MGT;  Service: Pain Management;  Laterality: Right;  Right RFA L2,3,4,5; THERMAL  1 of 2    RADIOFREQUENCY ABLATION Left 8/6/2019    Procedure: RADIOFREQUENCY ABLATION;  Surgeon: Bonnie Duncan MD;  Location: [facility] PAIN MGT;  Service: Pain Management;  Laterality: Left;  Left RFA L2,3,4,5 LUMBAR  1 of 2    STENT, DRUG ELUTING, SINGLE VESSEL, CORONARY  2/24/2024    Procedure: Stent, Drug Eluting, Single Vessel, Coronary;  Surgeon: Susie Tanner MD;  Location: [facility] CATH LAB;  Service: Cardiology;;    TRANSFORAMINAL EPIDURAL INJECTION OF STEROID Bilateral 8/17/2021    Procedure: INJECTION, STEROID, EPIDURAL, TRANSFORAMINAL APPROACH S1;  Surgeon: Bonnie Duncan MD;  Location: [facility] PAIN MGT;  Service: Pain Management;  Laterality: Bilateral;     Family History   Problem Relation Name Age of Onset    Vaginal cancer Neg Hx      Endometrial cancer Neg Hx      Cervical cancer Neg Hx       Social History     Socioeconomic History    Marital status: Single   Tobacco Use    Smoking status: Never    Smokeless tobacco: Never   Substance and Sexual Activity     Alcohol use: No    Drug use: No    Sexual activity: Not Currently     Social Drivers of Health     Financial Resource Strain: Low Risk  (2/26/2024)    Overall Financial Resource Strain (CARDIA)     Difficulty of Paying Living Expenses: Not hard at all   Food Insecurity: No Food Insecurity (2/26/2024)    Hunger Vital Sign     Worried About Running Out of Food in the Last Year: Never true     Ran Out of Food in the Last Year: Never true   Transportation Needs: No Transportation Needs (2/26/2024)    PRAPARE - Transportation     Lack of Transportation (Medical): No     Lack of Transportation (Non-Medical): No   Physical Activity: Inactive (2/26/2024)    Exercise Vital Sign     Days of Exercise per Week: 0 days     Minutes of Exercise per Session: 0 min   Stress: No Stress Concern Present (2/26/2024)    [employer] - Occupational Stress Questionnaire     Feeling of Stress : Only a little   Housing Stability: Unknown (2/26/2024)    Housing Stability Vital Sign     Unable to Pay for Housing in the Last Year: No     Unstable Housing in the Last Year: No       Current Outpatient Medications   Medication Sig Dispense Refill    aspirin 81 MG Chew Chew and swallow 1 tablet (81 mg total) by mouth once daily. 90 tablet 3    cephALEXin (KEFLEX) 500 MG capsule Take 1 capsule (500 mg total) by mouth every 12 (twelve) hours for 7 days 14 capsule 0    dapagliflozin propanediol (FARXIGA) 10 mg tablet Take 1 tablet (10 mg total) by mouth once daily. 90 tablet 3    FLUoxetine 20 MG capsule TK ONE C PO  QD  1    isosorbide mononitrate (IMDUR) 30 MG 24 hr tablet Take 1 tablet (30 mg total) by mouth once daily. 30 tablet 11    metFORMIN (GLUCOPHAGE-XR) 500 MG ER 24hr tablet Take 1 tablet (500 mg total) by mouth 2 (two) times daily with meals. 120 tablet 5    metoprolol succinate (TOPROL-XL) 50 MG 24 hr tablet Take 1 tablet (50 mg total) by mouth once daily. 90 tablet 3    nitroGLYCERIN (NITROSTAT) 0.4 MG SL  tablet Place 1 tablet (0.4 mg total) under the tongue every 5 (five) minutes as needed for Chest pain. 25 tablet 0    omeprazole (PRILOSEC) 20 MG capsule Take 20 mg by mouth once daily.  1    rosuvastatin (CRESTOR) 40 MG Tab Take 1 tablet (40 mg total) by mouth every evening. 90 tablet 3    ticagrelor (BRILINTA) 90 mg tablet Take 1 tablet (90 mg total) by mouth 2 (two) times daily. 60 tablet 11    valsartan (DIOVAN) 40 MG tablet Take 1 tablet (40 mg total) by mouth once daily. 90 tablet 3     No current facility-administered medications for this visit.     Review of patient's allergies indicates:  No Known Allergies    Review of Systems        Objective:      There were no vitals filed for this visit.  Physical Exam  Cardiovascular:      Pulses:           Radial pulses are Normal on the right side and Normal on the left side.       Hand/Wrist Musculoskeletal Exam    Inspection    Right      Erythema: none      Ecchymosis: none      Edema: none      Deformity: none    Left      Erythema: none      Ecchymosis: none      Edema: none      Deformity: none    Palpation    Left      Thumb tenderness to palpation: carpometacarpal joint      Dorsal hand - 1st metacarpal tenderness to palpation: CMC    Range of Motion        Range of motion additional comments: Able to make a composite fist.    Neurovascular    Right       Radial pulse: normal      Capillary refill: brisk      Ulnar nerve sensory distribution: normal      Median nerve sensory distribution: normal      Superficial radial nerve sensory distribution: normal    Left       Radial pulse: normal      Capillary refill: brisk      Ulnar nerve sensory distribution: normal      Median nerve sensory distribution: normal      Superficial radial nerve sensory distribution: normal    Neurovascular additional comments:       Special Tests    Left      CMC grind test: positive      Diagnostics Review: X-Ray: Reviewed  Personal interpretation of the x-ray reveals no signs of  fractures dislocations.  She does have narrowing at the CMC joint.       Assessment:       1. Post-traumatic osteoarthritis of first carpometacarpal joint of left hand    2. Hand injury        Plan:       We discussed with the patient at length all the different treatment options available including anti-inflammatories, acetaminophen, rest, ice, physical therapy to include strengthening, range of motion exercise, splinting, occasional cortisone injections for temporary relief, or possible surgical interventions.     Discussed with the patient her x-ray.  She does have posttraumatic CMC arthritis.  Recommend Voltaren cream, CBD oil, paraffin wax, and anti-inflammatory diet.  We will also refer her to therapy to create a comfort cool custom brace.  Believe is too soon to do any injections at this time.  We will have patient follow up in a month if her symptoms are not improving or worsening for potential injection.               Justyn Will PA-C, ATC  Hand and Upper Extremity   [facility]      Please be aware that this note has been generated with the assistance of MModal voice-to-text.  Please excuse any spelling or grammatical errors.

## 2025-03-24 RX ORDER — ROSUVASTATIN CALCIUM 40 MG/1
40 TABLET, COATED ORAL NIGHTLY
Qty: 90 TABLET | Refills: 3 | Status: CANCELLED | OUTPATIENT
Start: 2025-03-24 | End: 2026-03-24

## 2025-03-25 RX ORDER — METOPROLOL SUCCINATE 50 MG/1
50 TABLET, EXTENDED RELEASE ORAL DAILY
Qty: 90 TABLET | Refills: 3 | OUTPATIENT
Start: 2025-03-25 | End: 2026-03-25

## 2025-03-25 RX ORDER — NITROGLYCERIN 0.4 MG/1
0.4 TABLET SUBLINGUAL EVERY 5 MIN PRN
Qty: 25 TABLET | Refills: 0 | OUTPATIENT
Start: 2025-03-25 | End: 2026-03-25

## 2025-03-25 RX ORDER — NAPROXEN SODIUM 220 MG/1
81 TABLET, FILM COATED ORAL DAILY
Qty: 90 TABLET | Refills: 3 | OUTPATIENT
Start: 2025-03-25 | End: 2026-03-25

## 2025-03-25 RX ORDER — METFORMIN HYDROCHLORIDE 500 MG/1
500 TABLET, EXTENDED RELEASE ORAL 2 TIMES DAILY WITH MEALS
Qty: 120 TABLET | Refills: 5 | OUTPATIENT
Start: 2025-03-25

## 2025-03-25 RX ORDER — VALSARTAN 40 MG/1
40 TABLET ORAL DAILY
Qty: 90 TABLET | Refills: 3 | OUTPATIENT
Start: 2025-03-25 | End: 2026-03-25

## 2025-03-25 RX ORDER — ROSUVASTATIN CALCIUM 40 MG/1
40 TABLET, COATED ORAL NIGHTLY
Qty: 90 TABLET | Refills: 3 | OUTPATIENT
Start: 2025-03-25 | End: 2026-03-25

## 2025-05-12 RX ORDER — METFORMIN HYDROCHLORIDE 500 MG/1
500 TABLET, EXTENDED RELEASE ORAL 2 TIMES DAILY WITH MEALS
Qty: 120 TABLET | Refills: 5 | OUTPATIENT
Start: 2025-05-12

## (undated) DEVICE — INFLATOR ENCORE 26 BLLN INFL

## (undated) DEVICE — KIT GLIDESHEATH SLEND 6FR 10CM

## (undated) DEVICE — DRESSING LEUKOPLAST FLEX 1X3IN

## (undated) DEVICE — TRAY CATH LAB OMC

## (undated) DEVICE — DRAPE ANGIO BRACH 38X44IN

## (undated) DEVICE — KIT CUSTOM MANIFOLD

## (undated) DEVICE — CATH EAGLE EYE PLATINUM

## (undated) DEVICE — CATH EMERGE MR 15 X 2.00

## (undated) DEVICE — BANDAID STRIP PLASTIC 3/4X3

## (undated) DEVICE — CATH RADIAL TIG 6FR 4.0 110CM

## (undated) DEVICE — OMNIPAQUE 350MG 150ML VIAL

## (undated) DEVICE — GUIDEWIRE RUNTHROUGH EF 180CM

## (undated) DEVICE — HEMOSTAT VASC BAND REG 24CM

## (undated) DEVICE — PAD DEFIB CADENCE ADULT R2

## (undated) DEVICE — BANDAGE ADHESIVE

## (undated) DEVICE — GUIDE LAUNCHER 6FR EBU 3.5

## (undated) DEVICE — TRANSDUCER ADULT DISP

## (undated) DEVICE — GUIDEWIRE EMERALD 150CM PTFE

## (undated) DEVICE — KIT COPILOT VALVE HEMO TOOL

## (undated) DEVICE — SPIKE SHORT LG BORE 1-WAY 2IN